# Patient Record
Sex: FEMALE | Race: WHITE | NOT HISPANIC OR LATINO | Employment: OTHER | URBAN - NONMETROPOLITAN AREA
[De-identification: names, ages, dates, MRNs, and addresses within clinical notes are randomized per-mention and may not be internally consistent; named-entity substitution may affect disease eponyms.]

---

## 2017-01-13 ENCOUNTER — COMMUNICATION - GICH (OUTPATIENT)
Dept: FAMILY MEDICINE | Facility: OTHER | Age: 67
End: 2017-01-13

## 2017-01-13 DIAGNOSIS — E78.5 HYPERLIPIDEMIA: ICD-10-CM

## 2017-02-21 ENCOUNTER — COMMUNICATION - GICH (OUTPATIENT)
Dept: FAMILY MEDICINE | Facility: OTHER | Age: 67
End: 2017-02-21

## 2017-02-21 DIAGNOSIS — F32.9 MAJOR DEPRESSIVE DISORDER, SINGLE EPISODE: ICD-10-CM

## 2017-04-24 ENCOUNTER — COMMUNICATION - GICH (OUTPATIENT)
Dept: FAMILY MEDICINE | Facility: OTHER | Age: 67
End: 2017-04-24

## 2017-04-24 DIAGNOSIS — E78.5 HYPERLIPIDEMIA: ICD-10-CM

## 2017-04-24 DIAGNOSIS — F32.9 MAJOR DEPRESSIVE DISORDER, SINGLE EPISODE: ICD-10-CM

## 2017-05-30 ENCOUNTER — COMMUNICATION - GICH (OUTPATIENT)
Dept: FAMILY MEDICINE | Facility: OTHER | Age: 67
End: 2017-05-30

## 2017-05-30 DIAGNOSIS — E13.9 OTHER SPECIFIED DIABETES MELLITUS WITHOUT COMPLICATIONS (CODE): ICD-10-CM

## 2017-07-07 ENCOUNTER — OFFICE VISIT - GICH (OUTPATIENT)
Dept: FAMILY MEDICINE | Facility: OTHER | Age: 67
End: 2017-07-07

## 2017-07-07 ENCOUNTER — HOSPITAL ENCOUNTER (OUTPATIENT)
Dept: RADIOLOGY | Facility: OTHER | Age: 67
End: 2017-07-07
Attending: FAMILY MEDICINE

## 2017-07-07 ENCOUNTER — HISTORY (OUTPATIENT)
Dept: FAMILY MEDICINE | Facility: OTHER | Age: 67
End: 2017-07-07

## 2017-07-07 ENCOUNTER — HISTORY (OUTPATIENT)
Dept: EMERGENCY MEDICINE | Facility: OTHER | Age: 67
End: 2017-07-07

## 2017-07-07 DIAGNOSIS — E11.9 TYPE 2 DIABETES MELLITUS WITHOUT COMPLICATIONS (H): ICD-10-CM

## 2017-07-07 DIAGNOSIS — E78.5 HYPERLIPIDEMIA: ICD-10-CM

## 2017-07-07 DIAGNOSIS — R07.9 CHEST PAIN: ICD-10-CM

## 2017-07-07 LAB
A/G RATIO - HISTORICAL: 1.4 (ref 1–2)
ABSOLUTE BASOPHILS - HISTORICAL: 0.1 THOU/CU MM
ABSOLUTE EOSINOPHILS - HISTORICAL: 0.4 THOU/CU MM
ABSOLUTE IMMATURE GRANULOCYTES(METAS,MYELOS,PROS) - HISTORICAL: 0 THOU/CU MM
ABSOLUTE LYMPHOCYTES - HISTORICAL: 2.9 THOU/CU MM (ref 0.9–2.9)
ABSOLUTE MONOCYTES - HISTORICAL: 0.5 THOU/CU MM
ABSOLUTE NEUTROPHILS - HISTORICAL: 3.8 THOU/CU MM (ref 1.7–7)
ALBUMIN SERPL-MCNC: 4.1 G/DL (ref 3.5–5.7)
ALP SERPL-CCNC: 95 IU/L (ref 34–104)
ALT (SGPT) - HISTORICAL: 26 IU/L (ref 7–52)
ANION GAP - HISTORICAL: 10 (ref 5–18)
AST SERPL-CCNC: 27 IU/L (ref 13–39)
BASOPHILS # BLD AUTO: 0.8 %
BILIRUB SERPL-MCNC: 0.6 MG/DL (ref 0.3–1)
BUN SERPL-MCNC: 18 MG/DL (ref 7–25)
BUN/CREAT RATIO - HISTORICAL: 21
CALCIUM SERPL-MCNC: 9.3 MG/DL (ref 8.6–10.3)
CHLORIDE SERPLBLD-SCNC: 105 MMOL/L (ref 98–107)
CO2 SERPL-SCNC: 23 MMOL/L (ref 21–31)
CREAT SERPL-MCNC: 0.86 MG/DL (ref 0.7–1.3)
EOSINOPHIL NFR BLD AUTO: 4.7 %
ERYTHROCYTE [DISTWIDTH] IN BLOOD BY AUTOMATED COUNT: 13 % (ref 11.5–15.5)
ESTIMATED AVERAGE GLUCOSE: 143 MG/DL
GFR IF NOT AFRICAN AMERICAN - HISTORICAL: >60 ML/MIN/1.73M2
GLOBULIN - HISTORICAL: 2.9 G/DL (ref 2–3.7)
GLUCOSE SERPL-MCNC: 121 MG/DL (ref 70–105)
HCT VFR BLD AUTO: 42.6 % (ref 33–51)
HEMOGLOBIN A1C MONITORING (POCT) - HISTORICAL: 6.6 % (ref 4–6.2)
HEMOGLOBIN: 14.6 G/DL (ref 12–16)
IMMATURE GRANULOCYTES(METAS,MYELOS,PROS) - HISTORICAL: 0.3 %
LYMPHOCYTES NFR BLD AUTO: 37.9 % (ref 20–44)
MCH RBC QN AUTO: 30.5 PG (ref 26–34)
MCHC RBC AUTO-ENTMCNC: 34.3 G/DL (ref 32–36)
MCV RBC AUTO: 89 FL (ref 80–100)
MONOCYTES NFR BLD AUTO: 6.3 %
NEUTROPHILS NFR BLD AUTO: 50 % (ref 42–72)
PLATELET # BLD AUTO: 269 THOU/CU MM (ref 140–440)
PMV BLD: 10.8 FL (ref 6.5–11)
POTASSIUM SERPL-SCNC: 4 MMOL/L (ref 3.5–5.1)
PROT SERPL-MCNC: 7 G/DL (ref 6.4–8.9)
RED BLOOD COUNT - HISTORICAL: 4.78 MIL/CU MM (ref 4–5.2)
SODIUM SERPL-SCNC: 138 MMOL/L (ref 133–143)
TROPONIN I - HISTORICAL: 0.13 NG/ML
WHITE BLOOD COUNT - HISTORICAL: 7.6 THOU/CU MM (ref 4.5–11)

## 2017-07-07 ASSESSMENT — ANXIETY QUESTIONNAIRES
1. FEELING NERVOUS, ANXIOUS, OR ON EDGE: NOT AT ALL
7. FEELING AFRAID AS IF SOMETHING AWFUL MIGHT HAPPEN: NOT AT ALL
6. BECOMING EASILY ANNOYED OR IRRITABLE: NOT AT ALL
2. NOT BEING ABLE TO STOP OR CONTROL WORRYING: NOT AT ALL
3. WORRYING TOO MUCH ABOUT DIFFERENT THINGS: NOT AT ALL
GAD7 TOTAL SCORE: 0
4. TROUBLE RELAXING: NOT AT ALL
5. BEING SO RESTLESS THAT IT IS HARD TO SIT STILL: NOT AT ALL

## 2017-07-07 ASSESSMENT — PATIENT HEALTH QUESTIONNAIRE - PHQ9: SUM OF ALL RESPONSES TO PHQ QUESTIONS 1-9: 6

## 2017-07-10 ENCOUNTER — AMBULATORY - GICH (OUTPATIENT)
Dept: SCHEDULING | Facility: OTHER | Age: 67
End: 2017-07-10

## 2017-07-12 ENCOUNTER — COMMUNICATION - GICH (OUTPATIENT)
Dept: INTERNAL MEDICINE | Facility: OTHER | Age: 67
End: 2017-07-12

## 2017-07-20 ENCOUNTER — OFFICE VISIT - GICH (OUTPATIENT)
Dept: FAMILY MEDICINE | Facility: OTHER | Age: 67
End: 2017-07-20

## 2017-07-20 ENCOUNTER — HISTORY (OUTPATIENT)
Dept: FAMILY MEDICINE | Facility: OTHER | Age: 67
End: 2017-07-20

## 2017-07-20 DIAGNOSIS — I25.10 ATHEROSCLEROTIC HEART DISEASE OF NATIVE CORONARY ARTERY WITHOUT ANGINA PECTORIS: ICD-10-CM

## 2017-07-20 DIAGNOSIS — F32.9 MAJOR DEPRESSIVE DISORDER, SINGLE EPISODE: ICD-10-CM

## 2017-07-20 DIAGNOSIS — E78.5 HYPERLIPIDEMIA: ICD-10-CM

## 2017-07-20 DIAGNOSIS — E13.9 OTHER SPECIFIED DIABETES MELLITUS WITHOUT COMPLICATIONS (CODE): ICD-10-CM

## 2017-07-20 DIAGNOSIS — I21.4 NON-ST ELEVATION (NSTEMI) MYOCARDIAL INFARCTION (H): ICD-10-CM

## 2017-07-20 DIAGNOSIS — I16.0 HYPERTENSIVE URGENCY: ICD-10-CM

## 2017-07-20 DIAGNOSIS — F41.9 ANXIETY DISORDER: ICD-10-CM

## 2017-07-20 LAB
ALBUMIN SERPL-MCNC: 4.4 G/DL (ref 3.5–5.7)
ANION GAP - HISTORICAL: 9 (ref 5–18)
BUN SERPL-MCNC: 13 MG/DL (ref 7–25)
BUN/CREAT RATIO - HISTORICAL: 15
CALCIUM SERPL-MCNC: 10 MG/DL (ref 8.6–10.3)
CHLORIDE SERPLBLD-SCNC: 102 MMOL/L (ref 98–107)
CHOL/HDL RATIO - HISTORICAL: 3.22
CHOLESTEROL TOTAL: 148 MG/DL
CO2 SERPL-SCNC: 26 MMOL/L (ref 21–31)
CREAT SERPL-MCNC: 0.88 MG/DL (ref 0.7–1.3)
GFR IF NOT AFRICAN AMERICAN - HISTORICAL: >60 ML/MIN/1.73M2
GLUCOSE SERPL-MCNC: 109 MG/DL (ref 70–105)
HDLC SERPL-MCNC: 46 MG/DL (ref 23–92)
LDLC SERPL CALC-MCNC: 76 MG/DL
NON-HDL CHOLESTEROL - HISTORICAL: 102 MG/DL
PATIENT STATUS - HISTORICAL: NORMAL
PHOSPHATE SERPL-MCNC: 3.8 MG/DL (ref 2.5–5)
POTASSIUM SERPL-SCNC: 3.9 MMOL/L (ref 3.5–5.1)
SODIUM SERPL-SCNC: 137 MMOL/L (ref 133–143)
TRIGL SERPL-MCNC: 129 MG/DL

## 2017-07-21 ENCOUNTER — COMMUNICATION - GICH (OUTPATIENT)
Dept: CARDIAC REHAB | Facility: OTHER | Age: 67
End: 2017-07-21

## 2017-07-24 ENCOUNTER — HOSPITAL ENCOUNTER (OUTPATIENT)
Dept: CARDIAC REHAB | Facility: OTHER | Age: 67
Setting detail: THERAPIES SERIES
End: 2017-07-24
Attending: FAMILY MEDICINE

## 2017-07-24 ENCOUNTER — COMMUNICATION - GICH (OUTPATIENT)
Dept: CARDIAC REHAB | Facility: OTHER | Age: 67
End: 2017-07-24

## 2017-07-24 DIAGNOSIS — I21.4 NON-ST ELEVATION (NSTEMI) MYOCARDIAL INFARCTION (H): ICD-10-CM

## 2017-07-28 ENCOUNTER — HOSPITAL ENCOUNTER (OUTPATIENT)
Dept: CARDIAC REHAB | Facility: OTHER | Age: 67
Setting detail: THERAPIES SERIES
End: 2017-07-28
Attending: INTERNAL MEDICINE

## 2017-07-28 DIAGNOSIS — I21.4 NON-ST ELEVATION (NSTEMI) MYOCARDIAL INFARCTION (H): ICD-10-CM

## 2017-07-31 ENCOUNTER — HOSPITAL ENCOUNTER (OUTPATIENT)
Dept: CARDIAC REHAB | Facility: OTHER | Age: 67
Setting detail: THERAPIES SERIES
End: 2017-07-31
Attending: INTERNAL MEDICINE

## 2017-07-31 DIAGNOSIS — I21.4 NON-ST ELEVATION (NSTEMI) MYOCARDIAL INFARCTION (H): ICD-10-CM

## 2017-08-02 ENCOUNTER — HOSPITAL ENCOUNTER (OUTPATIENT)
Dept: CARDIAC REHAB | Facility: OTHER | Age: 67
Setting detail: THERAPIES SERIES
End: 2017-08-02
Attending: INTERNAL MEDICINE

## 2017-08-02 DIAGNOSIS — I21.4 NON-ST ELEVATION (NSTEMI) MYOCARDIAL INFARCTION (H): ICD-10-CM

## 2017-08-04 ENCOUNTER — HOSPITAL ENCOUNTER (OUTPATIENT)
Dept: CARDIAC REHAB | Facility: OTHER | Age: 67
Setting detail: THERAPIES SERIES
End: 2017-08-04
Attending: INTERNAL MEDICINE

## 2017-08-04 DIAGNOSIS — I21.4 NON-ST ELEVATION (NSTEMI) MYOCARDIAL INFARCTION (H): ICD-10-CM

## 2017-08-07 ENCOUNTER — HOSPITAL ENCOUNTER (OUTPATIENT)
Dept: CARDIAC REHAB | Facility: OTHER | Age: 67
Setting detail: THERAPIES SERIES
End: 2017-08-07
Attending: INTERNAL MEDICINE

## 2017-08-07 DIAGNOSIS — I21.4 NON-ST ELEVATION (NSTEMI) MYOCARDIAL INFARCTION (H): ICD-10-CM

## 2017-08-14 ENCOUNTER — HOSPITAL ENCOUNTER (OUTPATIENT)
Dept: CARDIAC REHAB | Facility: OTHER | Age: 67
Setting detail: THERAPIES SERIES
End: 2017-08-14
Attending: INTERNAL MEDICINE

## 2017-08-14 DIAGNOSIS — I21.4 NON-ST ELEVATION (NSTEMI) MYOCARDIAL INFARCTION (H): ICD-10-CM

## 2017-08-16 ENCOUNTER — HOSPITAL ENCOUNTER (OUTPATIENT)
Dept: CARDIAC REHAB | Facility: OTHER | Age: 67
Setting detail: THERAPIES SERIES
End: 2017-08-16
Attending: INTERNAL MEDICINE

## 2017-08-16 DIAGNOSIS — I21.4 NON-ST ELEVATION (NSTEMI) MYOCARDIAL INFARCTION (H): ICD-10-CM

## 2017-08-17 ENCOUNTER — AMBULATORY - GICH (OUTPATIENT)
Dept: SCHEDULING | Facility: OTHER | Age: 67
End: 2017-08-17

## 2017-08-21 ENCOUNTER — HOSPITAL ENCOUNTER (OUTPATIENT)
Dept: CARDIAC REHAB | Facility: OTHER | Age: 67
Setting detail: THERAPIES SERIES
End: 2017-08-21
Attending: INTERNAL MEDICINE

## 2017-08-21 DIAGNOSIS — I21.4 NON-ST ELEVATION (NSTEMI) MYOCARDIAL INFARCTION (H): ICD-10-CM

## 2017-08-30 ENCOUNTER — HOSPITAL ENCOUNTER (OUTPATIENT)
Dept: CARDIAC REHAB | Facility: OTHER | Age: 67
Setting detail: THERAPIES SERIES
End: 2017-08-30
Attending: INTERNAL MEDICINE

## 2017-08-30 DIAGNOSIS — I21.4 NON-ST ELEVATION (NSTEMI) MYOCARDIAL INFARCTION (H): ICD-10-CM

## 2017-09-05 ENCOUNTER — OFFICE VISIT - GICH (OUTPATIENT)
Dept: FAMILY MEDICINE | Facility: OTHER | Age: 67
End: 2017-09-05

## 2017-09-05 ENCOUNTER — HISTORY (OUTPATIENT)
Dept: RADIOLOGY | Facility: OTHER | Age: 67
End: 2017-09-05

## 2017-09-05 ENCOUNTER — HISTORY (OUTPATIENT)
Dept: FAMILY MEDICINE | Facility: OTHER | Age: 67
End: 2017-09-05

## 2017-09-05 ENCOUNTER — HOSPITAL ENCOUNTER (OUTPATIENT)
Dept: RADIOLOGY | Facility: OTHER | Age: 67
End: 2017-09-05
Attending: FAMILY MEDICINE

## 2017-09-05 DIAGNOSIS — Z12.31 ENCOUNTER FOR SCREENING MAMMOGRAM FOR MALIGNANT NEOPLASM OF BREAST: ICD-10-CM

## 2017-09-05 DIAGNOSIS — F41.9 ANXIETY DISORDER: ICD-10-CM

## 2017-09-05 DIAGNOSIS — M54.41 LOW BACK PAIN WITH RIGHT-SIDED SCIATICA: ICD-10-CM

## 2017-09-05 DIAGNOSIS — M25.551 PAIN IN RIGHT HIP: ICD-10-CM

## 2017-09-05 DIAGNOSIS — G89.29 OTHER CHRONIC PAIN: ICD-10-CM

## 2017-09-07 ENCOUNTER — AMBULATORY - GICH (OUTPATIENT)
Dept: FAMILY MEDICINE | Facility: OTHER | Age: 67
End: 2017-09-07

## 2017-09-07 DIAGNOSIS — Z51.89 ENCOUNTER FOR OTHER SPECIFIED AFTERCARE: ICD-10-CM

## 2017-09-11 ENCOUNTER — COMMUNICATION - GICH (OUTPATIENT)
Dept: FAMILY MEDICINE | Facility: OTHER | Age: 67
End: 2017-09-11

## 2017-09-18 ENCOUNTER — HOSPITAL ENCOUNTER (OUTPATIENT)
Dept: CARDIAC REHAB | Facility: OTHER | Age: 67
Setting detail: THERAPIES SERIES
End: 2017-09-18
Attending: INTERNAL MEDICINE

## 2017-09-18 DIAGNOSIS — I21.4 NON-ST ELEVATION (NSTEMI) MYOCARDIAL INFARCTION (H): ICD-10-CM

## 2017-09-20 ENCOUNTER — HOSPITAL ENCOUNTER (OUTPATIENT)
Dept: CARDIAC REHAB | Facility: OTHER | Age: 67
Setting detail: THERAPIES SERIES
End: 2017-09-20
Attending: INTERNAL MEDICINE

## 2017-09-20 DIAGNOSIS — I21.4 NON-ST ELEVATION (NSTEMI) MYOCARDIAL INFARCTION (H): ICD-10-CM

## 2017-10-13 ENCOUNTER — COMMUNICATION - GICH (OUTPATIENT)
Dept: CARDIAC REHAB | Facility: OTHER | Age: 67
End: 2017-10-13

## 2017-10-17 ENCOUNTER — COMMUNICATION - GICH (OUTPATIENT)
Dept: EDUCATION SERVICES | Facility: OTHER | Age: 67
End: 2017-10-17

## 2017-12-20 ENCOUNTER — OFFICE VISIT - GICH (OUTPATIENT)
Dept: FAMILY MEDICINE | Facility: OTHER | Age: 67
End: 2017-12-20

## 2017-12-20 ENCOUNTER — HISTORY (OUTPATIENT)
Dept: FAMILY MEDICINE | Facility: OTHER | Age: 67
End: 2017-12-20

## 2017-12-20 DIAGNOSIS — N63.10 MASS OF RIGHT BREAST: ICD-10-CM

## 2017-12-20 DIAGNOSIS — N63.14 MASS OF LOWER INNER QUADRANT OF RIGHT BREAST: ICD-10-CM

## 2017-12-20 DIAGNOSIS — Z12.31 ENCOUNTER FOR SCREENING MAMMOGRAM FOR MALIGNANT NEOPLASM OF BREAST: ICD-10-CM

## 2017-12-20 DIAGNOSIS — E11.9 TYPE 2 DIABETES MELLITUS WITHOUT COMPLICATIONS (H): ICD-10-CM

## 2017-12-20 ASSESSMENT — ANXIETY QUESTIONNAIRES
3. WORRYING TOO MUCH ABOUT DIFFERENT THINGS: NEARLY EVERY DAY
2. NOT BEING ABLE TO STOP OR CONTROL WORRYING: NEARLY EVERY DAY
6. BECOMING EASILY ANNOYED OR IRRITABLE: MORE THAN HALF THE DAYS
4. TROUBLE RELAXING: NEARLY EVERY DAY
5. BEING SO RESTLESS THAT IT IS HARD TO SIT STILL: NOT AT ALL
1. FEELING NERVOUS, ANXIOUS, OR ON EDGE: NEARLY EVERY DAY
GAD7 TOTAL SCORE: 17
7. FEELING AFRAID AS IF SOMETHING AWFUL MIGHT HAPPEN: NEARLY EVERY DAY

## 2017-12-20 ASSESSMENT — PATIENT HEALTH QUESTIONNAIRE - PHQ9: SUM OF ALL RESPONSES TO PHQ QUESTIONS 1-9: 15

## 2017-12-21 ENCOUNTER — HOSPITAL ENCOUNTER (OUTPATIENT)
Dept: RADIOLOGY | Facility: OTHER | Age: 67
End: 2017-12-21
Attending: FAMILY MEDICINE

## 2017-12-21 ENCOUNTER — COMMUNICATION - GICH (OUTPATIENT)
Dept: FAMILY MEDICINE | Facility: OTHER | Age: 67
End: 2017-12-21

## 2017-12-21 ENCOUNTER — AMBULATORY - GICH (OUTPATIENT)
Dept: FAMILY MEDICINE | Facility: OTHER | Age: 67
End: 2017-12-21

## 2017-12-21 ENCOUNTER — COMMUNICATION - GICH (OUTPATIENT)
Dept: SURGERY | Facility: OTHER | Age: 67
End: 2017-12-21

## 2017-12-21 DIAGNOSIS — N63.14 MASS OF LOWER INNER QUADRANT OF RIGHT BREAST: ICD-10-CM

## 2017-12-21 DIAGNOSIS — N63.10 MASS OF RIGHT BREAST: ICD-10-CM

## 2017-12-21 DIAGNOSIS — Z12.31 ENCOUNTER FOR SCREENING MAMMOGRAM FOR MALIGNANT NEOPLASM OF BREAST: ICD-10-CM

## 2017-12-28 NOTE — TELEPHONE ENCOUNTER
Patient Information     Patient Name MRN Jennyfer Brady [id number] Female 1950      Telephone Encounter by Jelena Witt at 2017  3:56 PM     Author:  Jelena Witt Service:  (none) Author Type:  (none)     Filed:  2017  4:02 PM Encounter Date:  2017 Status:  Signed     :  Jelena Witt            Patient has concerns about making her cardiac appointments. 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 The last appointment for Cardiac Rehab is 2:00. Which she can't make. She was wondering if [facility] is an option. She wants to do something and states its better than nothing.  Jelena Russo ....................  2017   4:01 PM

## 2017-12-28 NOTE — ADDENDUM NOTE
Patient Information     Patient Name MRN Sex Jennyfer Solis [id number] Female 1950      Addendum Note by Jennyfer Hopper MD at 2017  9:41 PM     Author:  Jennyfer Hopper MD Service:  (none) Author Type:  Physician     Filed:  2017  9:41 PM Encounter Date:  2017 Status:  Signed     :  Jennyfer Hopper MD (Physician)       Addended by: JENNYFER HOPPER on: 2017 09:41 PM        Modules accepted: Orders

## 2017-12-28 NOTE — PROGRESS NOTES
Patient Information     Patient Name MRN Sex Jennyfer Solis [id number] Female 1950      Progress Notes by Jennyfer Hopper MD at 2017  1:00 PM     Author:  Jennyfer Hopper MD Service:  (none) Author Type:  Physician     Filed:  2017  6:19 PM Encounter Date:  2017 Status:  Signed     :  Jennyfer Hopper MD (Physician)            Nursing Notes:   Wen Vinson  2017  2:20 PM  Signed  Patient presents today with pain in her right leg. Patient is unable to walk any distance, or do her cardio rehab.  She is having trouble sleeping at night, and cant get comfortable.  Wen Vinson ....................  2017   1:05 PM           Subjective:  Jennyfer Elizabeth is a 67 y.o. female who presents for right leg pain       Patient is 67 year old white female here with pain in right leg.  She has had previous SI problems.  Has been to [facility]. Things are improving for a while. However mid August she started having more discomfort in her back in the lower area sometimes into her right buttock and then into her right groin area. She also notes that her right knee will infrequently give out.  She denies any trauma. She's been completing her exercises. May have overdone it. She has not been using any muscle relaxants.     No Known Allergies  Current Outpatient Prescriptions on File Prior to Visit       Medication  Sig Dispense Refill     aspirin chewable 81 mg chewable tablet Take 1 tablet by mouth once daily. 100 tablet 0     atorvastatin (LIPITOR) 40 mg tablet Take 1 tablet by mouth once daily. 90 tablet 3     clopidogrel (PLAVIX) 75 mg tablet Take 1 tablet by mouth once daily. Take for 1 year minimum 90 tablet 3     DULoxetine (CYMBALTA) 30 mg Delayed-release capsule Take 1 capsule by mouth once daily. 90 capsule 3     metFORMIN (GLUCOPHAGE) 500 mg tablet 250 mg po bid 90 tablet 1     metoprolol succinate (TOPROL XL) 25 mg Sustained-Release tablet Take 1  "tablet by mouth once daily. 90 tablet 3     nitroglycerin (NITROSTAT) 0.4 mg SL tablet Place 1 tablet under the tongue every 5 minutes if needed for Chest Pain (up to 3 doses). 25 tablet 2     No current facility-administered medications on file prior to visit.        Problem List/PMH: reviewed in EMR    Social Hx:  Social History        Substance Use Topics          Smoking status:   Former Smoker      Quit date:  10/1/2010      Smokeless tobacco:   Never Used       Comment: Patient occasionally uses Nicorette gum       Alcohol use   8.4 oz/week     14 Standard drinks or equivalent per week       Social History Narrative    Single, no children. Lives alone, own home.     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     Enjoys gardening, reading. Has been reconnecting with old friends.        Family Hx:   Family History       Problem   Relation Age of Onset     Hypertension  Father      Heart Disease  Father      MI       Cancer  Mother      Lung       Diabetes  Sister      Hyperlipidemia  Sister      High       Other  Sister 33     Kidney problems       Cancer-breast  No Family History        Objective:  /78  Pulse 80  Temp 99.1  F (37.3  C)  Ht 1.6 m (5' 3\")  Wt 70.3 kg (155 lb)  BMI 27.46 kg/m2    she is alert well-groomed oriented ×3 no apparent distress PERRLA EOMI. Walks favoring her right hip is slightly she is able to walk on her tiptoes and her heels. Lungs clear heart sounds regular abdomen is obese. She has good internal and external rotation of her left hip she is tender with internal rotation of her right hip she is tender over the right SI with palpation as well as the right bursa though mildly. Negative straight leg lifts bilaterally. She has tight hamstrings. Lateral knees with OA type changes she is mildly tender with palpation over the medial aspect of her left knee joint line no effusion is present.. No appreciable Baker cyst negative " Lin's negative Lachman       Study:XR HIP 2 OR 3 VIEWS W PELVIS RIGHT     History:67 years Female Chronic hip pain, right     Comparisons: 06/09/2015     Technique: 4 views     IMPRESSION: Stable diffuse degenerative narrowing of the bilateral hip joints. Degenerative arthritis lower lumbar spine. Vascular calcifications. The bones are demineralized.     Electronically Signed By: Cecelia Olea M.D. on 9/5/2017 3:00 PM  LUMBAR 3 VIEWS     History:67 years Female Chronic right-sided low back pain with right-sided sciatica     Comparisons: 06/03/2014     Technique: 3 views     IMPRESSION:Progression of low-grade anterolisthesis of L4 and L5 and mild lumbar curve, convex to the right. Degenerative arthritis lumbar facets.  Stable narrowed L5 interspace with endplate osteophyte formation. Abdominal surgical clips. Very dense vascular calcifications.       Assessment:    ICD-10-CM    1. Chronic hip pain, right M25.551 XR HIP 2 OR 3 VIEWS W PELVIS RIGHT     G89.29 lidocaine 5% (LIDODERM) 5 % patch      traMADol (ULTRAM) 50 mg tablet      piroxicam (FELDENE) 10 mg capsule   2. Chronic right-sided low back pain with right-sided sciatica M54.41 XR SPINE LUMBAR 3 VIEWS     G89.29 traMADol (ULTRAM) 50 mg tablet      piroxicam (FELDENE) 10 mg capsule   3. Anxiety F41.9 LORazepam (ATIVAN) 0.5 mg tab         Plan:   -- Expected clinical course discussed   -- Medications and their side effects discussed  Patient Instructions   Antiinflammatory   Ice 20 minutes three times a day in full extension   Feldene 10 mg ;  Take one tablet a day ;  Minimum of 3-5 days   ( you cannot use ibuprofen or aleve over the counter when using this medication )     Muscle relaxant   Use your lorazepam for muscle spasm every night next 3 night; then as needed #30 ( will make you drowsy)    lidoderm patch 5% ;  Use during the day ( will not make you drowsy)     Breakthrough Pain Medication   Ultram   50 mg;  Take one half to one  tablet twice a  day as needed  Pain     Stretching exercises as below    Follow up in 2-3 weeks       Index [place]ian     Adult Advisor 2016.3 published by [employer].  Last reviewed: 2016-05-26     Index [place]ian   Meniscal (Cartilage) Tear Exercises   Your healthcare provider may recommend exercises to help you heal. Talk to your healthcare provider or physical therapist about which exercises will best help you and how to do them correctly and safely.  You may do the first 7 exercises right away. You may do the rest of the exercises when the pain in your knee has decreased.    Passive knee extension: Do this exercise if you are unable to extend your knee fully. While lying on your back, place a rolled-up towel under the heel of your injured leg so the heel is about 6 inches off the ground. Relax your leg muscles and let gravity slowly straighten your knee. Try to hold this position for 2 minutes. Repeat 3 times. You may feel some discomfort while doing this exercise. Do the exercise several times a day.   This exercise can also be done while sitting in a chair with your heel on another chair or stool.    Heel slide: Sit on a firm surface with your legs straight in front of you. Slowly slide the heel of the foot on your injured side toward your buttock by pulling your knee toward your chest as you slide the heel. Return to the starting position. Do 2 sets of 15.    Standing calf stretch: Stand facing a wall with your hands on the wall at about eye level. Keep your injured leg back with your heel on the floor. Keep the other leg forward with the knee bent. Turn your back foot slightly inward (as if you were pigeon-toed). Slowly lean into the wall until you feel a stretch in the back of your calf. Hold the stretch for 15 to 30 seconds. Return to the starting position. Repeat 3 times. Do this exercise several times each day.    Hamstring stretch on wall: Lie on your back with your buttocks close to a doorway. Stretch your uninjured leg  straight out in front of you on the floor through the doorway. Raise your injured leg and rest it against the wall next to the door frame. Keep your leg as straight as possible. You should feel a stretch in the back of your thigh. Hold this position for 15 to 30 seconds. Repeat 3 times.    Straight leg raise: Lie on your back with your legs straight out in front of you. Bend the knee on your uninjured side and place the foot flat on the floor. Tighten the thigh muscle on your injured side and lift your leg about 8 inches off the floor. Keep your leg straight and your thigh muscle tight. Slowly lower your leg back down to the floor. Do 2 sets of 15.    Prone hip extension: Lie on your stomach with your legs straight out behind you. Fold your arms under your head and rest your head on your arms. Draw your belly button in towards your spine and tighten your abdominal muscles. Tighten the buttocks and thigh muscles of the leg on your injured side and lift the leg off the floor about 8 inches. Keep your leg straight. Hold for 5 seconds. Then lower your leg and relax. Do 2 sets of 15.    Clam exercise: Lie on your uninjured side with your hips and knees bent and feet together. Slowly raise your top leg toward the ceiling while keeping your heels touching each other. Hold for 2 seconds and lower slowly. Do 2 sets of 15 repetitions.    Wall squat with a ball: Stand with your back, shoulders, and head against a wall. Look straight ahead. Keep your shoulders relaxed and your feet 3 feet (90 centimeters) from the wall and shoulder's width apart. Place a soccer or basketball-sized ball behind your back. Keeping your back against the wall, slowly squat down to a 45-degree angle. Your thighs will not yet be parallel to the floor. Try to keep your knees aligned over your second toe as you bend them. Hold this position for 10 seconds and then slowly slide back up the wall. Repeat 10 times. Build up to 2 sets of 15.    Step-up:  Stand with the foot of your injured leg on a support 3 to 5 inches (8 to 13 centimeters) high --like a small step or block of wood. Keep your other foot flat on the floor. Shift your weight onto the injured leg on the support. Straighten your injured leg as the other leg comes off the floor. Return to the starting position by bending your injured leg and slowly lowering your uninjured leg back to the floor. Do 3 sets of 15.    Knee stabilization: Wrap a piece of elastic tubing around the ankle of your uninjured leg. Tie a knot in the other end of the tubing and close it in a door at about ankle height.  1. Stand facing the door on the leg without tubing (your injured leg) and bend your knee slightly, keeping your thigh muscles tight. Stay in this position while you move the leg with the tubing (the uninjured leg) straight back behind you. Do 2 sets of 15.  2. Turn 90 degrees so the leg without tubing is closest to the door. Move the leg with tubing away from your body. Do 2 sets of 15.  3. Turn 90 degrees again so your back is to the door. Move the leg with tubing straight out in front of you. Do 2 sets of 15.  4. Turn your body 90 degrees again so the leg with tubing is closest to the door. Move the leg with tubing across your body. Do 2 sets of 15.  Hold onto a chair if you need help balancing. This exercise can be made more challenging by standing on a firm pillow or foam mat while you move the leg with tubing.    Resisted terminal knee extension: Make a loop with a piece of elastic tubing by tying a knot in both ends. Close the knot in a door at knee height. Step into the loop with your injured leg so the tubing is around the back of your knee. Lift the other foot off the ground and hold onto a chair for balance, if needed. Bend the knee with tubing about 45 degrees. Slowly straighten your leg, keeping your thigh muscle tight as you do this. Repeat 15 times. Do 2 sets of 15. If you need an easier way to do this,  stand on both legs for better support while you do the exercise.  If you have access to a wobble board, do the following exercises:    Wobble board exercises  Stand on a wobble board with your feet shoulder-width apart.   1. Rock the board forwards and backwards 30 times, then side to side 30 times. Hold on to a chair if you need support.  2. Rotate the wobble board around so that the edge of the board is in contact with the floor at all times. Do this 30 times in a clockwise and then a counterclockwise direction.  3. Balance on the wobble board for as long as you can without letting the edges touch the floor. Try to do this for 2 minutes without touching the floor.  4. Rotate the wobble board in clockwise and counterclockwise circles, but do not let the edge of the board touch the floor.  When you have mastered the wobble exercises standing on both legs, try repeating them while standing on just your injured leg. After you are able to do these exercises on one leg, try to do them with your eyes closed. Make sure you have something nearby to support you in case you lose your balance.  Developed by [employer].  Adult Advisor 2016.3 published by [employer].  Last modified: 2016-06-06  Last reviewed: 2015-01-12  This content is reviewed periodically and is subject to change as new health information becomes available. The information is intended to inform and educate and is not a replacement for medical evaluation, advice, diagnosis or treatment by a healthcare professional.  References   Adult Advisor 2016.3 Index    Copyright   2016 [employer], a division of [employer] All rights reserved.                 Electronically signed by Jennyfer Hopper MD

## 2017-12-28 NOTE — TELEPHONE ENCOUNTER
Patient Information     Patient Name MRN Sex Jennyfer Solis [id number] Female 1950      Telephone Encounter by Chica Lowry NP at 2017  3:28 PM     Author:  Chica Lowry NP Service:  (none) Author Type:  PHYS- Nurse Practitioner     Filed:  2017  3:29 PM Encounter Date:  2017 Status:  Signed     :  Chica Lowry NP (PHYS- Nurse Practitioner)            She is Dr. Hopper's patient. I do not believe I ever saw this patient

## 2017-12-28 NOTE — PROGRESS NOTES
Patient Information     Patient Name MRN Sex Jennyfer Solis [id number] Female 1950      Progress Notes by Roselyn Wade RN at 10/20/2017 10:26 AM     Author:  Roselyn Wade RN Service:  (none) Author Type:  NURS- Registered Nurse     Filed:  10/20/2017 10:38 AM Date of Service:  10/20/2017 10:26 AM Status:  Signed     :  Roselyn Wade RN (NURS- Registered Nurse)            Cardiac Rehabilitation   Outpatient  Discharge Summary    Jennyfer Elizabeth has completed Phase II Cardiac Rehab on 10/20/2017.    Objective:          Diagnosis: Myocardial Infarction and Percutaneous Intervention        Sessions Attended:  12             Beginning Date: 2017                    Ending Date:  2017        Average beginning Met Level:  1.8                   Average Discharge Met Level:  4.1    Most Recent Labs:         No results for input(s): CHOL, HDL, LDL, TRIGLYCERIDE, HGBA1C, CK, TRPTQUANT in the last 720 hours.     Exercise Levels Completed:  Treadmill:     Initial:  10 Minutes    Speed 1.0     0.0 Grade    1.8 Mets     Discharge:       51 Minutes    Speed 2.9    2.5 Grade    4.2 Mets  Other:      Discharge:  15 Minutes    4 Mets    107 melendrez           Resting Heart Rate Range: 84                      Resting Blood Pressure Range:  144/84        Exercise Heart Rate Range: 108                    Exercise Blood Pressure Range:  152/78        Rhythm: Sinus Rhythm             Initial Blood Glucose Pre Exercise:               Initial Weight:  153.8lb        Discharge Weight:  155lb    Goals:     Patient goal(s) as indentified in the initial evaluation have not been met due to she has not returned to cardiac rehab since 2017. .     Tolerated exercise session(s) without cardiac symptoms or complaints.     Prevented deconditioning by increasing tolerance for activity through graded exercise program.     Increased knowledge of cardiac condition and provided with home exercise program and  activity guidelines.     Met level at discharge: 4.1    Plan:  Discharge to pt did not return to cardiac rehab.  A letter was sent to her on 10/13/17.

## 2017-12-28 NOTE — TELEPHONE ENCOUNTER
Patient Information     Patient Name MRN Sex Jennyfer Solis [id number] Female 1950      Telephone Encounter by Terra Dalton RN at 2017  2:37 PM     Author:  Terra Dalton RN Service:  (none) Author Type:  NURS- Registered Nurse     Filed:  2017  2:50 PM Encounter Date:  2017 Status:  Signed     :  Terra Dalton RN (NURS- Registered Nurse)            Called patient to set up cardiac rehab initial appointment. Scheduled 2017 @ 10:30.

## 2017-12-28 NOTE — TELEPHONE ENCOUNTER
Patient Information     Patient Name MRN Sex Jennyfer Solis [id number] Female 1950      Telephone Encounter by Jennyfer Hopper MD at 2017  6:54 AM     Author:  Jennyfer Hopper MD Service:  (none) Author Type:  Physician     Filed:  2017  6:54 AM Encounter Date:  2017 Status:  Signed     :  Jennyfer Hopper MD (Physician)            She needs to finish cardiac rehab.

## 2017-12-28 NOTE — TELEPHONE ENCOUNTER
Patient Information     Patient Name MRN Sex Jennyfer Solis [id number] Female 1950      Telephone Encounter by Dorie Christiansen at 2017  2:47 PM     Author:  Dorie Christiansen Service:  (none) Author Type:  (none)     Filed:  2017  2:47 PM Encounter Date:  2017 Status:  Signed     :  Dorie Christiansen            [facility]- Patient wants a work in on 17 instead of 17.

## 2017-12-28 NOTE — PATIENT INSTRUCTIONS
Patient Information     Patient Name MRN Sex Jennyfer Solis [id number] Female 1950      Patient Instructions by Jennyfer Hopper MD at 2017  2:34 PM     Author:  Jennyfer Hopper MD  Service:  (none) Author Type:  Physician     Filed:  2017  2:35 PM  Encounter Date:  2017 Status:  Addendum     :  Jennyfer Hopper MD (Physician)        Related Notes: Original Note by Jennyfer Hopper MD (Physician) filed at 2017  2:34 PM            Antiinflammatory   Ice 20 minutes three times a day in full extension   Feldene 10 mg ;  Take one tablet a day ;  Minimum of 3-5 days   ( you cannot use ibuprofen or aleve over the counter when using this medication )     Muscle relaxant   Use your lorazepam for muscle spasm every night next 3 night; then as needed #30 ( will make you drowsy)    lidoderm patch 5% ;  Use during the day ( will not make you drowsy)     Breakthrough Pain Medication   Ultram   50 mg;  Take one half to one  tablet twice a day as needed  Pain     Stretching exercises as below    Follow up in 2-3 weeks       Index [place]ian     Adult Advisor 2016.3 published by [employer].  Last reviewed: 2016     Index [place]ian   Meniscal (Cartilage) Tear Exercises   Your healthcare provider may recommend exercises to help you heal. Talk to your healthcare provider or physical therapist about which exercises will best help you and how to do them correctly and safely.  You may do the first 7 exercises right away. You may do the rest of the exercises when the pain in your knee has decreased.    Passive knee extension: Do this exercise if you are unable to extend your knee fully. While lying on your back, place a rolled-up towel under the heel of your injured leg so the heel is about 6 inches off the ground. Relax your leg muscles and let gravity slowly straighten your knee. Try to hold this position for 2 minutes. Repeat 3 times. You may feel some discomfort  while doing this exercise. Do the exercise several times a day.   This exercise can also be done while sitting in a chair with your heel on another chair or stool.    Heel slide: Sit on a firm surface with your legs straight in front of you. Slowly slide the heel of the foot on your injured side toward your buttock by pulling your knee toward your chest as you slide the heel. Return to the starting position. Do 2 sets of 15.    Standing calf stretch: Stand facing a wall with your hands on the wall at about eye level. Keep your injured leg back with your heel on the floor. Keep the other leg forward with the knee bent. Turn your back foot slightly inward (as if you were pigeon-toed). Slowly lean into the wall until you feel a stretch in the back of your calf. Hold the stretch for 15 to 30 seconds. Return to the starting position. Repeat 3 times. Do this exercise several times each day.    Hamstring stretch on wall: Lie on your back with your buttocks close to a doorway. Stretch your uninjured leg straight out in front of you on the floor through the doorway. Raise your injured leg and rest it against the wall next to the door frame. Keep your leg as straight as possible. You should feel a stretch in the back of your thigh. Hold this position for 15 to 30 seconds. Repeat 3 times.    Straight leg raise: Lie on your back with your legs straight out in front of you. Bend the knee on your uninjured side and place the foot flat on the floor. Tighten the thigh muscle on your injured side and lift your leg about 8 inches off the floor. Keep your leg straight and your thigh muscle tight. Slowly lower your leg back down to the floor. Do 2 sets of 15.    Prone hip extension: Lie on your stomach with your legs straight out behind you. Fold your arms under your head and rest your head on your arms. Draw your belly button in towards your spine and tighten your abdominal muscles. Tighten the buttocks and thigh muscles of the leg on  your injured side and lift the leg off the floor about 8 inches. Keep your leg straight. Hold for 5 seconds. Then lower your leg and relax. Do 2 sets of 15.    Clam exercise: Lie on your uninjured side with your hips and knees bent and feet together. Slowly raise your top leg toward the ceiling while keeping your heels touching each other. Hold for 2 seconds and lower slowly. Do 2 sets of 15 repetitions.    Wall squat with a ball: Stand with your back, shoulders, and head against a wall. Look straight ahead. Keep your shoulders relaxed and your feet 3 feet (90 centimeters) from the wall and shoulder's width apart. Place a soccer or basketball-sized ball behind your back. Keeping your back against the wall, slowly squat down to a 45-degree angle. Your thighs will not yet be parallel to the floor. Try to keep your knees aligned over your second toe as you bend them. Hold this position for 10 seconds and then slowly slide back up the wall. Repeat 10 times. Build up to 2 sets of 15.    Step-up: Stand with the foot of your injured leg on a support 3 to 5 inches (8 to 13 centimeters) high --like a small step or block of wood. Keep your other foot flat on the floor. Shift your weight onto the injured leg on the support. Straighten your injured leg as the other leg comes off the floor. Return to the starting position by bending your injured leg and slowly lowering your uninjured leg back to the floor. Do 3 sets of 15.    Knee stabilization: Wrap a piece of elastic tubing around the ankle of your uninjured leg. Tie a knot in the other end of the tubing and close it in a door at about ankle height.  1. Stand facing the door on the leg without tubing (your injured leg) and bend your knee slightly, keeping your thigh muscles tight. Stay in this position while you move the leg with the tubing (the uninjured leg) straight back behind you. Do 2 sets of 15.  2. Turn 90 degrees so the leg without tubing is closest to the door. Move  the leg with tubing away from your body. Do 2 sets of 15.  3. Turn 90 degrees again so your back is to the door. Move the leg with tubing straight out in front of you. Do 2 sets of 15.  4. Turn your body 90 degrees again so the leg with tubing is closest to the door. Move the leg with tubing across your body. Do 2 sets of 15.  Hold onto a chair if you need help balancing. This exercise can be made more challenging by standing on a firm pillow or foam mat while you move the leg with tubing.    Resisted terminal knee extension: Make a loop with a piece of elastic tubing by tying a knot in both ends. Close the knot in a door at knee height. Step into the loop with your injured leg so the tubing is around the back of your knee. Lift the other foot off the ground and hold onto a chair for balance, if needed. Bend the knee with tubing about 45 degrees. Slowly straighten your leg, keeping your thigh muscle tight as you do this. Repeat 15 times. Do 2 sets of 15. If you need an easier way to do this, stand on both legs for better support while you do the exercise.  If you have access to a wobble board, do the following exercises:    Wobble board exercises  Stand on a wobble board with your feet shoulder-width apart.   1. Rock the board forwards and backwards 30 times, then side to side 30 times. Hold on to a chair if you need support.  2. Rotate the wobble board around so that the edge of the board is in contact with the floor at all times. Do this 30 times in a clockwise and then a counterclockwise direction.  3. Balance on the wobble board for as long as you can without letting the edges touch the floor. Try to do this for 2 minutes without touching the floor.  4. Rotate the wobble board in clockwise and counterclockwise circles, but do not let the edge of the board touch the floor.  When you have mastered the wobble exercises standing on both legs, try repeating them while standing on just your injured leg. After you are  able to do these exercises on one leg, try to do them with your eyes closed. Make sure you have something nearby to support you in case you lose your balance.  Developed by [employer].  Adult Advisor 2016.3 published by [employer].  Last modified: 2016-06-06  Last reviewed: 2015-01-12  This content is reviewed periodically and is subject to change as new health information becomes available. The information is intended to inform and educate and is not a replacement for medical evaluation, advice, diagnosis or treatment by a healthcare professional.  References   Adult Advisor 2016.3 Index    Copyright   2016 [employer], a division of [employer] All rights reserved.

## 2017-12-28 NOTE — TELEPHONE ENCOUNTER
Patient Information     Patient Name MRN Sex Jennyfer Solis [id number] Female 1950      Telephone Encounter by Sunita Rincon at 2017  8:56 AM     Author:  Sunita Rincon Service:  (none) Author Type:  (none)     Filed:  2017  8:58 AM Encounter Date:  2017 Status:  Signed     :  Sunita Rincon            Spoke with patient and she is needing follow up for stent placement that was done at [facility].  She was supposed to follow up sooner but is stuck in the [facility] as she has no ride home.  Her sister that brought her to the [facility] ended up also having a heart attack.  So she now does not have a ride home until next week.  Apt was given for  the soonest available.  I will monitor our schedule for cancellations and call her if something opens up.      Sunita Rincon LPN........................2017  8:57 AM

## 2017-12-28 NOTE — PROGRESS NOTES
Patient Information     Patient Name MRN Jennyfer Brady [id number] Female 1950      Progress Notes by Klinefelter, Kristin K, RD at 2017 11:00 AM     Author:  Klinefelter, Kristin K, RD Service:  (none) Author Type:  NUTR- Registered Dietitian     Filed:  2017 12:07 PM Encounter Date:  2017 Status:  Signed     :  Klinefelter, Kristin K, RD (NUTR- Registered Dietitian)            Patient is referred by Cardiac Rehab. PCP Dr. Hopper    Reviewed Diet Hx. Eats 2 meals per day. Often will skip b and l and just eat large d.   Lives alone and eats in restaurants often with friends.    Reviewed [facility] diet for cardiac rehab and she has many questions about her DM meal plan as well. Provided her with resources. She verbalized understanding.  Time spent: 60 minutes on education.   Kristin K Klinefelter, RD ....................  2017   12:07 PM                  My Diabetes Self-Management Support Plan   [facility] Diabetes Education  The following are resources that will help you manage your diabetes.   Let your educator know if you need help accessing the websites.    Emotional Support  Diabetes Support Group: Sponsored by [facility]    Meets the  of the month at 6:30    [address]  Other: ______________________________________________________________________  Education    Diabetes Quarterly BASICS Class: for those that have attended BASICS #1 & #2    Meets the first Tuesday of the month quarterly-10:00am-11:30am    November, February, May    [address]  Doc Talk    Meets the third Tuesday of every month, 5:30-6:30pm    FREE light dinner starting at 5:00    [address]  Weight Management   Weight Watchers     Meets  9:30, 11:45, or 5:30    Dennis Santos, [address], [place]  [place], [place]     Contact Veronica [phone] [email or web address]    Weight Watchers www. [email or web address]    My Fitness Pal    [email or web address].[email or web address] or download the from [employer]  Other: ______________________________________________________________________    Exercise   [address]  [phone]  Walking/Biking Trails    Get Fit [place]  [email or web address]  Walk with The Doc    Second Thursday of every month, 11:00am-12:00pm    Free T-shirt and water    [address]    Stress Relief    [facility], [address] [place] [address], [phone]    [address] [phone]  Other: ______________________________________________________________________  Journals     Diabetes Forecast- [phone]- www.diabetesforecast.org     Diabetes Self-Management- [phone]- www.[email or web address]       Screenings:    FREE Health Screenings (cholesterol, blood pressure, blood glucose, height & weight)    Second Monday of every month, 8:30am-11:00am    [facility] Active [address]    Apps/Web Based Programs    My Fitness Pal  [email or web address]    Glucose Kehinde [email or web address]  (Free, tracks blood glucose, graphs)     Emeals  emeals.com  (weekly meal plans)    Fooducate.com   (for iphone, ipad, ipod touch, android, & web)    CalorieKing.com  (for iphone, ipad, ipod touch, android, blackberry, & web)    SparkPeople.com  (free tools, resources & support for weight loss, calorie counter, fitness)    [email or web address]              Other Steps I can Take to Improve or Maintain My Health:  1.  2.

## 2017-12-28 NOTE — TELEPHONE ENCOUNTER
Patient Information     Patient Name MRN Sex Jennyfer Solis [id number] Female 1950      Telephone Encounter by Luisa Montero LPN at 2017  4:08 PM     Author:  Luisa Montero LPN Service:  (none) Author Type:  NURS- Licensed Practical Nurse     Filed:  2017  4:09 PM Encounter Date:  2017 Status:  Signed     :  Luisa Montero LPN (NURS- Licensed Practical Nurse)            Advised patient that there is not an opening for 17 to be seen. Patient requested message to be sent to Jennyfer Hopper MD to see if she would be able to be seen on 17. Stated that she is unable to get to her appointment on Friday, 17 with [facility] [place] and will not be able to get a ride back up to [place] unit Saturday, 7/15/17. Please advise.  Luisa Montero LPN.........2017   4:09 PM

## 2017-12-28 NOTE — TELEPHONE ENCOUNTER
Patient Information     Patient Name MRN Sex Jennyfer Solis [id number] Female 1950      Telephone Encounter by Jelena Witt at 2017  8:13 AM     Author:  Jelena Witt Service:  (none) Author Type:  (none)     Filed:  2017  8:14 AM Encounter Date:  2017 Status:  Signed     :  Jelena Witt            Per patient yesterday, a detailed message was let or patient. Instructed I she has any other questions to please call us.  Jelena WittLPN ....................  2017   8:14 AM

## 2017-12-28 NOTE — TELEPHONE ENCOUNTER
Patient Information     Patient Name MRN Sex Jennyfer Solis [id number] Female 1950      Telephone Encounter by Germania Lentz RN at 2017  8:34 AM     Author:  Germania Lentz RN Service:  (none) Author Type:  NURS- Registered Nurse     Filed:  2017  8:37 AM Encounter Date:  2017 Status:  Signed     :  Germania Lentz RN (NURS- Registered Nurse)            Left message re: changing intake date.  Will check again later today or tomorrow to reschedule.

## 2017-12-28 NOTE — TELEPHONE ENCOUNTER
Patient Information     Patient Name MRN Sex Jennyfer Solis [id number] Female 1950      Telephone Encounter by Luisa Montero LPN at 2017  2:59 PM     Author:  Luisa Montero LPN Service:  (none) Author Type:  NURS- Licensed Practical Nurse     Filed:  2017  3:00 PM Encounter Date:  2017 Status:  Signed     :  Luisa Montero LPN (NURS- Licensed Practical Nurse)            She is scheduled for hospital follow up, would you like to work her in?  Luisa Montero LPN.........2017   2:59 PM

## 2017-12-28 NOTE — PROGRESS NOTES
"Patient Information     Patient Name MRN Sex Jennyfer Solis [id number] Female 1950      Progress Notes by Jennyfer Hopper MD at 2017  1:15 PM     Author:  Jennyfer Hopper MD Service:  (none) Author Type:  Physician     Filed:  2017  7:01 AM Encounter Date:  2017 Status:  Signed     :  Jennyfer Hopper MD (Physician)            Nursing Notes:   Sunita Rincon  2017  2:16 PM  Signed  Patient presents to finish her apt for medication management and also to follow up with her recent heart attach/ stent placement   Sunita Rincon LPN........................2017  1:30 PM         Subjective:  Jennyfer Elizabeth is a 67 y.o. female who presents for hospital follow up     Patient is a 67 year old white female who had recent  Drug eluting stent placement in RCA 7/10/17 after she was hospitalized after last clinic visit, and had elevated troponin.  She is doing well.  She notes her sister went to cath lab same day as she did.  She is very appreciative of her care here as well as at [facility].   \" Couldn't ask for a better cardiology team.\"   Due to the concern of her sister's condition, she was not aware that cardiac rehab was ordered.   She has not started.  She feels good.  Infrequent tightness of chest but feels this is her anxiety.   She notes no shortness of breath, no palptiations.  No nausea, no indigestion.  No back pain.  She is on plavix. She takes an aspirin and has nitro available.  She spends 15 minutes telling me of her recent hospital stay.     Diabetes     Last A1c was 6.6 on 17;   She is following diabetic diet.   Recent stent as noted above ;  She has not been following a diabetic diet last few weeks.   She has been under stress.   Blood sugars have been up  130's .   She had her metformin withheld.   She notes sister's diabetes which was uncontrolled has her now more motivated to have better control.            No Known Allergies  Current " Outpatient Prescriptions on File Prior to Visit       Medication  Sig Dispense Refill     aspirin chewable 81 mg chewable tablet Take 1 tablet by mouth once daily. 100 tablet 0     clopidogrel (PLAVIX) 75 mg tablet Take 1 tablet by mouth once daily. Take for 1 year minimum 90 tablet 3     nitroglycerin (NITROSTAT) 0.4 mg SL tablet Place 1 tablet under the tongue every 5 minutes if needed for Chest Pain (up to 3 doses). 25 tablet 2     No current facility-administered medications on file prior to visit.        Problem List/PMH: reviewed in EMR    Social Hx:  Social History        Substance Use Topics          Smoking status:   Former Smoker      Quit date:  10/1/2010      Smokeless tobacco:   Never Used       Comment: Patient occasionally uses Nicorette gum       Alcohol use   8.4 oz/week     14 Standard drinks or equivalent per week       Social History Narrative    Single, no children. Lives alone, own home.     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     Enjoys gardening, reading. Has been reconnecting with old friends.        Family Hx:   Family History       Problem   Relation Age of Onset     Hypertension  Father      Heart Disease  Father      MI       Cancer  Mother      Lung       Diabetes  Sister      Hyperlipidemia  Sister      High       Other  Sister 33     Kidney problems       Cancer-breast  No Family History        Objective:  /82  Pulse 84  Wt 68.5 kg (151 lb)  Breastfeeding? No  BMI 26.75 kg/m2    Patient appears well, alert and oriented x 3, pleasant, cooperative.  FARAZ. TM's clear, Oral pharynx with good dentition, without lesion, erythema or exudate. Moist mucous membranes. Neck supple and free of adenopathy, or masses. No thyromegaly. Lungs are clear, without wheezes, rhonchi or rales. Heart sounds are normal, no murmurs, clicks, gallops or rubs. Abdomen is soft, no tenderness, masses or organomegaly. No CVAT.  Extremities are  without edema. Peripheral pulses are normal. Screening neurological exam is normal without focal findings. Skin is normal without suspicious lesions noted.    Results for orders placed or performed in visit on 07/20/17      RENAL FUNCTION PANEL      Result  Value Ref Range    SODIUM 137 133 - 143 mmol/L    POTASSIUM 3.9 3.5 - 5.1 mmol/L    CHLORIDE 102 98 - 107 mmol/L    CO2,TOTAL 26 21 - 31 mmol/L    ANION GAP 9 5 - 18                    GLUCOSE 109 (H) 70 - 105 mg/dL    CALCIUM 10.0 8.6 - 10.3 mg/dL    BUN 13 7 - 25 mg/dL    CREATININE 0.88 0.70 - 1.30 mg/dL    BUN/CREAT RATIO           15                    GFR if African American >60 >60 ml/min/1.73m2    GFR if not African American >60 >60 ml/min/1.73m2    PHOSPHORUS 3.8 2.5 - 5.0 mg/dL    ALBUMIN 4.4 3.5 - 5.7 g/dL   LIPID PANEL      Result  Value Ref Range    CHOLESTEROL,TOTAL 148 <200 mg/dL    TRIGLYCERIDES 129 <150 mg/dL    HDL CHOLESTEROL 46 23 - 92 mg/dL    NON-HDL CHOLESTEROL 102 <145 mg/dl    CHOL/HDL RATIO            3.22 <4.50                    LDL CHOLESTEROL 76 <100 mg/dL    PATIENT STATUS            NON-FASTING                         Assessment:    ICD-10-CM    1. 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 Anxiety F41.9 LORazepam (ATIVAN) 0.5 mg tab   3. NSTEMI, initial episode of care (HC) I21.4 metoprolol succinate (TOPROL XL) 25 mg Sustained-Release tablet      LIPID PANEL      LIPID PANEL   4. Hypertensive urgency I16.0 metoprolol succinate (TOPROL XL) 25 mg Sustained-Release tablet   5. Depression, unspecified depression type F32.9 DULoxetine (CYMBALTA) 30 mg Delayed-release capsule   6. Hyperlipidemia, unspecified hyperlipidemia type E78.5 atorvastatin (LIPITOR) 40 mg tablet   7. NSTEMI (non-ST elevated myocardial infarction) (HC) I21.4 OP CARDIAC REHAB EVAL AND TREAT   8. Arteriosclerotic  heart disease (ASHD) I25.10          I spent approximately 40 minutes with the patient (exclusive of separately billed services/procedures), with greater than 50% spent in counseling, prognosis, risks and benefits of management or follow-up.  Reviewed importance of compliance with chosen treatment options and follow-up.  Risk factor reduction and patient education and coordinating care, establishing and/or reviewing the patient's medical record also completed during today's exam .    Plan:   -- Expected clinical course discussed   -- Medications and their side effects discussed  Patient Instructions   1.  Keep Follow up appointment with Dr. Everardo Rojas 8/9/ 2017;   He is part of the [facility] in [place]   2.  You will need to be on Plavix for one year  3.  Continue Lipitor;     4.  Cardiac Rehab contacted.  They did receive order from [facility];   Patient will be contacted by Cardiac rehab   5.  Okay to use tylenol for pain but no more than 3 grams a day   6.  asperceme with lidocaine otc as needed  7. Increased metformin 250 mg twice a day   8.  Diabetic diet  9.  Follow up A1c in 3 months  10.   Labs placed in [employer] account               Index Related topics   Cardiac Rehabilitation   ________________________________________________________________________  KEY POINTS    Cardiac rehabilitation is a program to help you improve your health when you have a heart problem.    The program includes learning about a heart-healthy lifestyle, including a healthy diet, exercise, managing stress, and working with your healthcare team to treat any other health problems you may have.    A cardiac rehabilitation program usually lasts from 4 to 12 weeks.  ________________________________________________________________________  What is cardiac rehabilitation?   Cardiac rehabilitation, also called cardiac rehab, is a program to help you improve your health when you have a heart problem. Cardiac rehab includes:    Learning about a heart-healthy  lifestyle    Exercise that is right for you and your condition    Follow up visits or phone calls from the cardiac rehab staff  Cardiac rehab helps you get back to normal activities slowly and safely. A rehab program usually lasts from 4 to 12 weeks.  When is it used?   Cardiac rehab can help you:    Live better with heart problems, such as angina (chest pain) or heart failure.    Recover after a heart attack or heart surgery.  If you don't have heart disease, but are out of shape, rehab can help you get in better health and lower your risk for heart disease.   How does it work?   A team of healthcare providers will develop a program that meets your individual needs. The team may include doctors, nurses, physical therapists, dietitians, and counselors.  You program may start while you are in the hospital. This usually includes short walks and stretching exercises. Your blood pressure and heart rate will be checked before and after exercise.   After you go home, you will slowly increase how much exercise you do. You may have an exercise treadmill test to help develop a safe exercise program for you.   Your program will also include classes and support groups to learn why it is important to control high blood pressure, high cholesterol, diabetes, or other things that can cause serious heart problems. Rehab helps you learn how to take care of yourself now and prevent problems in the future. This includes heart-healthy changes such as:    If you smoke, try to quit. Talk to your healthcare provider about ways to quit smoking.    Lose weight if you are overweight and eat a healthy diet.    Lower the amount of salt, saturated and trans fats, and cholesterol in your diet.    Follow your healthcare provider's advice about how much liquid you should drink.    Ask your provider if you should avoid drinking alcohol. Alcohol can weaken your heart or may worsen heart failure. Also, some of your medicines may not work well if you  drink alcohol.    Work with your healthcare provider to control diabetes, blood pressure, or other health problems you may have. Learn how to take your own blood pressure or have a family member learn how to take it.    Be physically active. Exercise helps your heart and body get stronger. It also improves your blood flow and energy level. Balance exercise with rest.    Learn to manage the stress in your life. Anxiety and anger can cause a fast heart rate and high blood pressure.    Protect yourself against infections. Get a flu shot every year. Get the pneumococcal shot as recommended by your healthcare provider.    Work as a partner with your healthcare team. This means having regular provider visits and following your treatment plan.  What are the benefits?   Cardiac rehabilitation can:    Increase your chances of surviving a heart attack.    Help prevent other heart problems.    Increase your ability to be active and exercise.    Help you return to work sooner.    Develop habits for a lifetime of heart health.  Developed by [email or web address].  Adult Advisor 2016.3 published by [employer].  Last modified: 2016-05-19  Last reviewed: 2014-12-08  This content is reviewed periodically and is subject to change as new health information becomes available. The information is intended to inform and educate and is not a replacement for medical evaluation, advice, diagnosis or treatment by a healthcare professional.  References   Adult Advisor 2016.3 Index    Copyright   2016 [employer], a division of [employer] All rights reserved.        Index [place]   Diabetes: Eating Out   ________________________________________________________________________  KEY POINTS    Having diabetes should not keep you from eating at your favorite restaurant or eating out when you travel if you plan ahead.    When you are away from home, try to eat at the same times you do when you are home and to eat the same amounts and types of  "food.    If you count carbohydrate food choices with each meal at home, try to eat the same amount when you eat out.    Follow your healthcare provider's recommendation about the use of alcohol.  ________________________________________________________________________  Having diabetes should not keep you from eating at your favorite restaurant or eating out when you travel. However, you do need to plan ahead. Following your plan helps control your blood glucose as well as your weight. Your meal plan is a guide for what you eat, when you eat, and how much you should eat to keep your blood glucose at a healthy level. It is based on:    How many calories you need each day    A balance of carbohydrates (starches, fruit, milk)    Eating less saturated fat, trans fats, and salt.    The medicines you take    Your activity level  A dietitian or diabetes health educator can help you make a meal plan. When you are away from home, try to eat at the same times you do when you are home and to eat the same amounts and types of food.  How do I stay on my meal plan when I eat out?   Many restaurants offer foods that are lower in fat, cholesterol, and salt, and higher in fiber. Healthy food choices include salads, whole-grain breads, baked, grilled or broiled foods, steamed vegetables, sugar and salt substitutes, and diet drinks.   When you eat out:    Pick a restaurant with many food choices.    Be flexible with your plan. If you want more noodles, then have less bread. If you want a dessert, order a meal such as grilled fish with steamed vegetables that doesn t contain a starch.    Don t eat large servings. You don't have to finish everything on your plate. Most restaurants offer \"to-go\" boxes.    If you count carbohydrate food choices with each meal at home, try to eat the same amount when you eat out. Here are examples of different kinds of food choices:    Italian. Choose minestrone soup, chicken or veal piccata, pasta with " marinara sauce and grilled chicken, fish or vegetables. One cup of pasta has about 45 grams of carbs. Limit or avoid lasagna and risotto, which are high in carbs. Also limit high-fat cheese and meats, and breadsticks.    Mexican. Choose soft tacos with chicken or fish and salsa, shrimp or chicken fajitas, or a taco salad. Limit or avoid tortilla chips, tortillas, rice, and beans, which are high in carbs. Also limit sour cream, cheese and chimichangas, which are high in calories.    Chinese. Choose wonton soup, chicken or beef chop suey, stir-fried chicken or shrimp with vegetables, or chicken chow mein. Limit or avoid servings of fried rice, egg rolls, breaded and fried chicken, and sauces with sugar, like sweet and sour sauce.    Ask that the bread or chips not be placed on the table until the food is served. It is too easy to overeat these foods while waiting for your meal. Have broth or salad before the meal instead.    Ask that sauces and salad dressings be served on the side so that you can control how much you put on your food. Or you can try dipping your fork in the sauce or dressing before picking up the food. Avoid sauces that are high in fat.    Order steamed nonstarchy vegetables such as broccoli or green beans.    Have fruit, or low-fat or fat-free yogurt, for dessert.    Eat slowly.    Follow your healthcare provider's recommendation about the use of alcohol.  Is it OK to eat at fast food restaurants?   Many fast food restaurants offer smaller portions, grilled chicken selections, meals with a choice of fruit and milk instead of fries and soda, and creative salads with bottled nunez. Here are some tips for ordering healthier fast foods:    Order the smaller portion sizes. Avoid menu items that include deluxe, super sized, or jumbo in their name.    Pick low-fat or skim milk or water instead of a soft drink.    Choose grilled chicken items rather than fried foods.    Ask for mustard and ketchup instead  [facility].    Ask for low-fat or fat-free salad dressing.    Skip the fries, croissants, and biscuits. They are high in carbs and fat. Fat-free muffins are also high in sugar and calories.  Where can I find nutrition information?   Most restaurants have nutrition information for all menu items. They tell you the total calories, grams of carbohydrate, protein, fat, and sodium. You can ask for this information at the restaurant or check the restaurant's Web site.  Although restaurants may offer healthy choices, many restaurant foods are still high in salt. To keep a balance on days you eat out, make sure the meals you eat at home are low in salt.  Are there other tips for when I travel?   When traveling by plane, boat, or train, request diabetic meals a day or two before you leave. Travel agents can help with this. Always keep a carry-on bag with you. The bag should have your diabetic medicines and supplies and also some snacks. If a meal is late, eat a snack from your bag. Snacks that travel well are fresh fruits, dried fruit, cheese and crackers, peanut butter, and snack bars. Be sure to replace the snacks you eat so you never run out.  When you are changing time zones, ask your healthcare provider what changes you need to make and when to take your medicine.  For more information, contact    The American Diabetes Association   482.605.2300   http://www.diabetes.org  Developed by [employer].  Adult Advisor 2016.3 published by [employer].  Last modified: 2016-03-03  Last reviewed: 2015-06-08  This content is reviewed periodically and is subject to change as new health information becomes available. The information is intended to inform and educate and is not a replacement for medical evaluation, advice, diagnosis or treatment by a healthcare professional.  References   Adult Advisor 2016.3 Index    Copyright   2016 [employer], a division of [employer] All rights reserved.        Index [place]ish  "All languages Related topics   Cholesterol: Controlling with Lifestyle Changes   ________________________________________________________________________  KEY POINTS    Your body makes some cholesterol, and you get the rest from foods such as meats, dairy products, and eggs.    Eating a diet high in fiber and low in saturated fat and cholesterol, losing weight, and exercising can help lower cholesterol levels.    Get your cholesterol levels checked by your healthcare provider regularly.  ________________________________________________________________________  What is cholesterol?  Cholesterol is a type of fat. Your body makes some cholesterol and gets the rest from foods such as meats, dairy products, and eggs.  Cholesterol has both good and bad effects on the body. Your body uses cholesterol to make hormones and to build and maintain cells. When your body has too much cholesterol, the excess fat sticks to the inside of the blood vessel walls. This is called plaque. Plaque makes the blood vessel walls thicker and the area inside the vessels smaller. This means less blood can flow through the blood vessels. Also, pieces of plaque may break off and block blood flow, which can cause a heart attack or stroke.  The main types of cholesterol are LDL (low-density lipoprotein) and HDL (high-density lipoprotein).    LDL leaves behind fatty deposits on artery walls and contributes to heart disease. LDL is called bad cholesterol. (You can think of \"L\" for \"lousy\" cholesterol.)    HDL does the opposite. It cleans the artery walls, removes extra cholesterol from the body, and lowers the risk of heart disease. HDL is called good cholesterol. (Think of \"H\" for \"healthy\" cholesterol.)  It is good to have lower levels of LDL and higher levels of HDL. Your healthcare provider can check your cholesterol with a blood test.  Controlling the level of cholesterol in your blood lowers your risk for heart disease. It also lowers the chance " "of a heart attack or death from heart disease if you already have heart disease.  How can I control my cholesterol level?  You can often control cholesterol levels by:    Eating a healthy diet    Losing weight if you are overweight    Exercising    Not smoking  If you have a high risk for heart disease, your healthcare provider may prescribe cholesterol-lowering medicine as well as advise you to change in your diet and other aspects of your lifestyle. Your provider may also prescribe medicine if you have an inherited tendency to have high cholesterol.  Eat a healthy diet.  A diet high in fiber and low in saturated fat and cholesterol can help prevent or lower cholesterol levels. You can find out how much and what kind of fat a food has by reading the food label.    No more than 25 to 35% of your total calories should be from fats.    Most of the fat you eat should be polyunsaturated or monounsaturated fat. These fats are healthier than other types of fat. They can be found in foods such as fish, avocados, vegetable oils (especially olive oil and canola oil), nuts (like almonds, peanuts, and walnuts) and seeds (for example, pumpkin seeds).    Limit the amount of saturated and trans fat that you eat. Saturated fat is found in whole milk, cheese, butter, ice cream, cream, lard, fatty cuts of meat, poultry with the skin on, and some tropical vegetable oils, such as coconut and palm kernel oil. Trans fat may be found in stick margarine, shortening, french fries, cookies, crackers, and bakery goods. If the food label has the words \"partially hydrogenated,\" the product probably has trans fat.    Limit the cholesterol in your diet to less than 300 mg a day. If you have heart disease or a high risk of heart disease, limit cholesterol to less than 200 mg a day, which is one or less egg with yolk.  Changes you can make in your diet include:    Drink nonfat or 1% milk instead of whole milk, and eat fat-free or low-fat milk, " cheese, spreads, and yogurt.    Use egg whites or egg substitutes rather than whole eggs. Ask your healthcare provider how many whole eggs or egg whites are okay for you.    Use salad dressings made from healthy oils, such as canola, olive, peanut, and flaxseed oil.    Eat fish, chicken, and turkey cooked without the skin, and meatless entrees.    Eat red meat (beef, pork, lamb) and processed meats (like salami, bologna, hot dogs, sausage, and marquez) no more than 2 times a week.    Choose lean cuts of meat and trim off all visible fat. Choose at least 90% lean ground beef. Keep portion sizes moderate, which is about 3 to 4 ounces per serving.    Eat less sugar and less fried food and junk food, like French fries, chips, cookies, crackers, and doughnuts. Choose healthier desserts, such as fresh fruits, nonfat frozen yogurt, and Popsicles. Avoid fatty desserts such as ice cream, cream-filled cakes, and cheesecakes.    Eat more fruit, vegetables; beans; and whole grains, such as oats, brown rice, quinoa and bran. The fiber in these foods helps lower cholesterol.    Eat 1.5 ounces (42.5 grams) a day of unsalted nuts and seeds. Nuts and seeds are full of fiber, protein, and healthy fats. Nuts and seeds are also high in calories, so they should be eaten in small portions and used to replace other protein foods, like some meat or poultry, rather than being added to the diet. Examples of nuts and seeds that can be a part of a healthy diet are walnuts, almonds, hazelnuts, peanuts, pecans, and pistachio nuts, sunflower seeds, and pumpkin seeds.    Check labels for plant sterols or stanols added to some foods, such as special margarines, milk, and orange juice. These ingredients can help lower LDL. The recommended amount is 2 grams a day.    Limit alcohol to no more than 2 drinks a day for men and 1 drink a day for women.  Ask your healthcare provider for a referral to a dietitian to learn more about eating healthy.  Lose  excess weight.  You can lose weight by eating fewer calories. Even losing 5 to 10% of your body weight can help:    Lower both total cholesterol and bad LDL cholesterol    Lower your blood sugar    Lower your blood pressure    Increase your energy and helps you feel better (both physically and mentally)    Lower your risk for heart attack or stroke  Talk to your healthcare provider about your weight. If you need to lose weight, plan for a gradual weight loss of 1 to 2 pounds a week.  Exercise.  Being physically active also helps control cholesterol. Exercise helps because it:    Keeps your weight down    Lowers your total cholesterol    Lowers your LDL (bad cholesterol)    Raises your HDL (good cholesterol)  A good exercise goal is at least 2 hours and 30 minutes (150 minutes) of moderate exercise a week. Moderate exercise means you are working hard enough to raise your heart rate and break a sweat. Examples of moderate exercise are walking fast, doing water aerobics, or playing doubles tennis. Increasing the intensity of your exercise or getting at least 5 hours (300 minutes) of moderate exercise a week will have even greater health benefits. It can help you lose weight and keep a healthy weight.  If you haven't been exercising, ask your provider to give you a physical activity plan that tells you what kind of activity, and how much, is safe for you. Start slowly to avoid injury.  Don t smoke.  Smoking increases your risk of heart disease because it lowers HDL levels, increases your risk of blood clots and stroke, and decreases oxygen to the tissues. If you smoke, talk to your healthcare provider about quitting.  How can I know if my cholesterol level is normal?  Get your cholesterol levels checked by your healthcare provider regularly. At first, your cholesterol level may need to be checked every 3 to 6 months until it is staying in the normal range. Then you may need to check it just once a year.  Developed by  [employer].  Adult Advisor 2016.3 published by [employer].  Last modified: 2015-09-14  Last reviewed: 2016-05-31  This content is reviewed periodically and is subject to change as new health information becomes available. The information is intended to inform and educate and is not a replacement for medical evaluation, advice, diagnosis or treatment by a healthcare professional.  References   Adult Advisor 2016.3 Index    Copyright   2016 [employer], a division of [employer] All rights reserved.             Electronically signed by Jennyfer Hopper MD

## 2017-12-28 NOTE — TELEPHONE ENCOUNTER
Patient Information     Patient Name MRN Sex Jennyfer Solis [id number] Female 1950      Telephone Encounter by Dorie Christiansen at 2017  2:45 PM     Author:  Dorie Christiansen Service:  (none) Author Type:  (none)     Filed:  2017  2:46 PM Encounter Date:  2017 Status:  Signed     :  Dorie Christiansen            RKV- Patient wants a work in on 17 instead of 17.

## 2017-12-28 NOTE — PROGRESS NOTES
Addended by: FREYERMUTH, CHERYL A on: 6/19/2017 03:33 PM     Modules accepted: Orders     Patient Information     Patient Name MRN Jennyfer Brady [id number] Female 1950      Progress Notes by Jennyfer Hopper MD at 2017 11:30 AM     Author:  Jennyfer Hopper MD Service:  (none) Author Type:  Physician     Filed:  2017  1:13 PM Encounter Date:  2017 Status:  Signed     :  Jennyfer Hopper MD (Physician)            SUBJECTIVE:    Jennyfer Elizabeth is a 67 y.o. female who presents for chest heaviness Diabetes     Chest Pain/problem    This is a new problem. The current episode started 1 to 4 weeks ago (2-3 weeks ). The onset quality is gradual. The problem occurs intermittently. The problem has been waxing and waning. The pain is present in the substernal region. The pain is at a severity of 5/10. The pain is moderate. The quality of the pain is described as heavy and pressure. The pain does not radiate (patient does indicate 'felt something in my arms' ). Associated symptoms include exertional chest pressure and malaise/fatigue. Pertinent negatives include no abdominal pain, back pain, claudication, cough, diaphoresis, fever, headaches, irregular heartbeat, leg pain, nausea, near-syncope, orthopnea, palpitations, PND, shortness of breath, sputum production, vomiting or weakness. The pain is aggravated by movement, walking and exertion. She has tried rest and acetaminophen for the symptoms. The treatment provided no relief. Risk factors include being elderly, post-menopausal and stress.   Her past medical history is significant for anxiety/panic attacks, diabetes, hyperlipidemia and sleep apnea.   Pertinent negatives for past medical history include no aortic aneurysm, no aortic dissection, no Marfan's syndrome, no MI, no mitral valve prolapse, no pacemaker, no recent injury and no spontaneous pneumothorax.   Her family medical history is significant for CAD, heart disease and early MI.     - she indicated 'almost was going to wait until  '  when she has follow up appointment schedule.     Diabetic  patient is a diabetic date-year-old white female who does not check her blood sugars regularly. Her last A1c was December 14, 2016 and her A1c at that time was 7.1. She continues on her low dose  Metformin 250 mg a day . She is taking an aspirin. She is on a statin. Her microalbumin was under 5 at time of last office visit and her creatinine is 0.44. She denies any excessive thirst hunger or urination.    No Known Allergies      No current facility-administered medications on file prior to visit.      Current Outpatient Prescriptions on File Prior to Visit       Medication  Sig Dispense Refill     ACCU-CHEK FASTCLIX Dispense item covered by pt ins. E13.9 NIDDM type II - Test 1 time/day 100 Each 3     ACCU-CHEK CHERYL Dispense glucose meter, test strips and lancets covered by the patient insurance. Test 1 time per day. 1 Device 0     ACCU-CHEK SMARTVIEW TEST STRIP strip Dispense item covered by pt ins. E13.9 NIDDM type II - Test 1 time/day 100 Strip 3     aspirin chewable 81 mg chewable tablet Take 1 tablet by mouth once daily with a meal. 1 tablet 0     atorvastatin (LIPITOR) 40 mg tablet TAKE ONE TABLET BY MOUTH EVERY DAY 90 tablet 2     CALCIUM CARB/MAGNESIUM CMB #10 (GHASSAN-MAG ORAL) Take  by mouth.       cholecalciferol (VITAMIN D) 2,000 unit capsule Take 1 capsule by mouth once daily.  0     DULoxetine (CYMBALTA) 30 mg Delayed-release capsule TAKE 1 CAPSULE BY MOUTH EVERY DAY 90 capsule 1     metFORMIN (GLUCOPHAGE) 500 mg tablet TAKE 1/2 TABLET BY MOUTH EVERY DAY WITH A MEAL 45 tablet 0     omega-3 fatty acids-vitamin E (FISH OIL) 1,000 mg cap Take  by mouth.       Family History       Problem   Relation Age of Onset     Hypertension  Father      Heart Disease  Father      MI       Cancer  Mother      Lung       Diabetes  Sister      Hyperlipidemia  Sister      High       Other  Sister 33     Kidney problems       Cancer-breast  No Family History   "        REVIEW OF SYSTEMS:  Review of Systems   Constitutional: Positive for malaise/fatigue. Negative for chills, diaphoresis, fever and weight loss.   HENT: Negative.    Eyes: Negative.    Respiratory: Negative.  Negative for cough, sputum production and shortness of breath.    Cardiovascular: Positive for chest pain. Negative for palpitations, orthopnea, claudication, leg swelling, PND and near-syncope.   Gastrointestinal: Negative for abdominal pain, constipation, diarrhea, nausea and vomiting.   Musculoskeletal: Negative.  Negative for back pain.   Skin: Negative.    Neurological: Negative for weakness and headaches.   Endo/Heme/Allergies: Negative.        OBJECTIVE:  /78  Pulse 72  Ht 1.585 m (5' 2.4\")  Wt 71.1 kg (156 lb 12.8 oz)  Breastfeeding? No  BMI 28.31 kg/m2    EXAM:   Physical Exam   Constitutional: She is oriented to person, place, and time and well-developed, well-nourished, and in no distress. No distress.   HENT:   Head: Normocephalic and atraumatic.   Right Ear: External ear normal.   Left Ear: External ear normal.   Nose: Nose normal.   Mouth/Throat: Oropharynx is clear and moist.   Eyes: Conjunctivae and EOM are normal. Pupils are equal, round, and reactive to light. Left eye exhibits no discharge.   Neck: Normal range of motion. Neck supple. No JVD present. No tracheal deviation present. No thyromegaly present.   Cardiovascular: Normal rate, regular rhythm, normal heart sounds and intact distal pulses.  Exam reveals no gallop and no friction rub.    No murmur heard.  Pulmonary/Chest: Effort normal and breath sounds normal. No respiratory distress. She has no wheezes. She has no rales.   Abdominal: Soft. Bowel sounds are normal. She exhibits distension. She exhibits no mass. There is no tenderness. There is no rebound and no guarding.   Mildly obese    Musculoskeletal: Normal range of motion. She exhibits no edema or tenderness.   Lymphadenopathy:     She has no cervical adenopathy. "   Neurological: She is alert and oriented to person, place, and time. She has normal reflexes. No cranial nerve deficit. She exhibits normal muscle tone. Gait normal. Coordination normal.   Skin: Skin is warm and dry.   She has resolving patches of skin after cryotherapy on her left cheek by dermatology    Psychiatric: Memory, affect and judgment normal.     EKG:       Results for orders placed or performed in visit on 07/07/17      TROPONIN I      Result  Value Ref Range    TROPONIN I 0.130 (H) <0.034 ng/mL   COMPLETE METABOLIC PANEL      Result  Value Ref Range    SODIUM 138 133 - 143 mmol/L    POTASSIUM 4.0 3.5 - 5.1 mmol/L    CHLORIDE 105 98 - 107 mmol/L    CO2,TOTAL 23 21 - 31 mmol/L    ANION GAP 10 5 - 18                    GLUCOSE 121 (H) 70 - 105 mg/dL    CALCIUM 9.3 8.6 - 10.3 mg/dL    BUN 18 7 - 25 mg/dL    CREATININE 0.86 0.70 - 1.30 mg/dL    BUN/CREAT RATIO           21                    GFR if African American >60 >60 ml/min/1.73m2    GFR if not African American >60 >60 ml/min/1.73m2    ALBUMIN 4.1 3.5 - 5.7 g/dL    PROTEIN,TOTAL 7.0 6.4 - 8.9 g/dL    GLOBULIN                  2.9 2.0 - 3.7 g/dL    A/G RATIO 1.4 1.0 - 2.0                    BILIRUBIN,TOTAL 0.6 0.3 - 1.0 mg/dL    ALK PHOSPHATASE 95 34 - 104 IU/L    ALT (SGPT) 26 7 - 52 IU/L    AST (SGOT) 27 13 - 39 IU/L   Hgb A1c      Result  Value Ref Range    HEMOGLOBIN A1C MONITORING (POCT) 6.6 (H) 4.0 - 6.2 %    ESTIMATED AVERAGE GLUCOSE  143 mg/dL   CBC WITH AUTO DIFFERENTIAL      Result  Value Ref Range    WHITE BLOOD COUNT         7.6 4.5 - 11.0 thou/cu mm    RED BLOOD COUNT           4.78 4.00 - 5.20 mil/cu mm    HEMOGLOBIN                14.6 12.0 - 16.0 g/dL    HEMATOCRIT                42.6 33.0 - 51.0 %    MCV                       89 80 - 100 fL    MCH                       30.5 26.0 - 34.0 pg    MCHC                      34.3 32.0 - 36.0 g/dL    RDW                       13.0 11.5 - 15.5 %    PLATELET COUNT            269 140 - 440 thou/cu  mm    MPV                       10.8 6.5 - 11.0 fL    NEUTROPHILS               50.0 42.0 - 72.0 %    LYMPHOCYTES               37.9 20.0 - 44.0 %    MONOCYTES                 6.3 <12.0 %    EOSINOPHILS               4.7 <8.0 %    BASOPHILS                 0.8 <3.0 %    IMMATURE GRANULOCYTES(METAS,MYELOS,PROS) 0.3 %    ABSOLUTE NEUTROPHILS      3.8 1.7 - 7.0 thou/cu mm    ABSOLUTE LYMPHOCYTES      2.9 0.9 - 2.9 thou/cu mm    ABSOLUTE MONOCYTES        0.5 <0.9 thou/cu mm    ABSOLUTE EOSINOPHILS      0.4 <0.5 thou/cu mm    ABSOLUTE BASOPHILS        0.1 <0.3 thou/cu mm    ABSOLUTE IMMATURE GRANULOCYTES(METAS,MYELOS,PROS) 0.0 <=0.3 thou/cu mm       EKG:  Inferior Q waves, NSR;          PROCEDURE:  XR CHEST 2 VIEWS PA AND LATERAL     HISTORY: Chest pain.     COMPARISON:  09/07/2016     FINDINGS:  The cardiomediastinal contours are stable.  The trachea is midline.  New costophrenic angle blunting suggests trace effusions. No focal consolidation or pneumothorax.      Osteopenia.     IMPRESSION:       Trace effusions can be seen with nonspecific pleurisy.       Electronically Signed By: Hay Cain on 7/7/2017 12:39 PM  4 baby aspirin given     ASSESSMENT/PLAN:    ICD-10-CM    1. Chest pain, unspecified type R07.9 TROPONIN I      COMPLETE METABOLIC PANEL      CBC WITH DIFFERENTIAL      XR CHEST 2 VIEWS PA AND LATERAL      TROPONIN I      COMPLETE METABOLIC PANEL      CBC WITH DIFFERENTIAL      CBC WITH AUTO DIFFERENTIAL      TN ELECTROCARDIOGRAM COMPLETE   2. Hyperlipidemia, unspecified hyperlipidemia type E78.5    3. Type 2 diabetes mellitus without complication, without long-term current use of insulin (HC) E11.9 Hgb A1c      Hgb A1c        Plan:       Once troponin returned back elevated for baby aspirin were provided. Patient was transferred to the emergency room after reviewing case with Dr. Barber and nurse Cedric Caruso;    Patient informed of elevated troponin, need to transfer to ER given concern of acute MI.    Patient became tearful, anxious.           I spent approximately  30 minutes with the patient (exclusive of separately billed services/procedures), with greater than 50% spent in counseling, prognosis, risks and benefits of management or follow-up.  Reviewed importance of compliance with chosen treatment options and follow-up.  Risk factor reduction and patient education and coordinating care, establishing and/or reviewing the patient's medical record also completed during today's exam .

## 2017-12-28 NOTE — TELEPHONE ENCOUNTER
Patient Information     Patient Name MRN Sex Jennyfer Solis [id number] Female 1950      Telephone Encounter by Lucia Spaulding at 2017  3:38 PM     Author:  Lucia Spaulding Service:  (none) Author Type:  (none)     Filed:  2017  3:44 PM Encounter Date:  2017 Status:  Signed     :  Lucia Spaulding            SER - Patient has questions about Cardiac Rehab.  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  Please call.    Lucia Spaulding ....................  2017   3:44 PM

## 2017-12-28 NOTE — PROGRESS NOTES
Patient Information     Patient Name MRN Sex Jennyfer Solis [id number] Female 1950      Progress Notes by Marleen Lozano at 2017 12:16 PM     Author:  Marleen Lozano Service:  (none) Author Type:  (none)     Filed:  2017 12:16 PM Date of Service:  2017 12:16 PM Status:  Signed     :  Marleen Lozano            Falls Risk Criteria:    Age 65 and older or under age 4        Sensory deficits    Poor vision    Use of ambulatory aides    Impaired judgment    Unable to walk independently    Meets High Risk criteria for falls:  Yes             1.  Do you have dizziness or vertigo?    no                    2.  Do you need help standing or walking?   no                 3.  Have you fallen within the last 6 months?    no           4.  Has the patient been fasting?      no       If any risks are marked Yes, the following interventions are utilized:    Do not leave patient unattended     Assist patient in the dressing room and bathroom    Have ambulatory aides available throughout procedure    Involve patient s family if available

## 2017-12-29 NOTE — PATIENT INSTRUCTIONS
Patient Information     Patient Name MRN Jennyfer Brady [id number] Female 1950      Patient Instructions by Jennyfer Hopper MD at 2017  3:03 PM     Author:  Jennyfer Hopper MD  Service:  (none) Author Type:  Physician     Filed:  2017  7:01 AM  Encounter Date:  2017 Status:  Addendum     :  Jennyfer Hopper MD (Physician)        Related Notes: Original Note by Jennyfer Hopper MD (Physician) filed at 2017  3:03 PM            1.  Keep Follow up appointment with Dr. Everardo Rojas 2017;   He is part of the [facility] in [place]   2.  You will need to be on Plavix for one year  3.  Continue Lipitor;     4.  Cardiac Rehab contacted.  They did receive order from [facility];   Patient will be contacted by Cardiac rehab   5.  Okay to use tylenol for pain but no more than 3 grams a day   6.  asperceme with lidocaine otc as needed  7. Increased metformin 250 mg twice a day   8.  Diabetic diet  9.  Follow up A1c in 3 months  10.   Labs placed in [employer] account               Index Related topics   Cardiac Rehabilitation   ________________________________________________________________________  KEY POINTS    Cardiac rehabilitation is a program to help you improve your health when you have a heart problem.    The program includes learning about a heart-healthy lifestyle, including a healthy diet, exercise, managing stress, and working with your healthcare team to treat any other health problems you may have.    A cardiac rehabilitation program usually lasts from 4 to 12 weeks.  ________________________________________________________________________  What is cardiac rehabilitation?   Cardiac rehabilitation, also called cardiac rehab, is a program to help you improve your health when you have a heart problem. Cardiac rehab includes:    Learning about a heart-healthy lifestyle    Exercise that is right for you and your condition    Follow up visits or phone calls  from the cardiac rehab staff  Cardiac rehab helps you get back to normal activities slowly and safely. A rehab program usually lasts from 4 to 12 weeks.  When is it used?   Cardiac rehab can help you:    Live better with heart problems, such as angina (chest pain) or heart failure.    Recover after a heart attack or heart surgery.  If you don't have heart disease, but are out of shape, rehab can help you get in better health and lower your risk for heart disease.   How does it work?   A team of healthcare providers will develop a program that meets your individual needs. The team may include doctors, nurses, physical therapists, dietitians, and counselors.  You program may start while you are in the hospital. This usually includes short walks and stretching exercises. Your blood pressure and heart rate will be checked before and after exercise.   After you go home, you will slowly increase how much exercise you do. You may have an exercise treadmill test to help develop a safe exercise program for you.   Your program will also include classes and support groups to learn why it is important to control high blood pressure, high cholesterol, diabetes, or other things that can cause serious heart problems. Rehab helps you learn how to take care of yourself now and prevent problems in the future. This includes heart-healthy changes such as:    If you smoke, try to quit. Talk to your healthcare provider about ways to quit smoking.    Lose weight if you are overweight and eat a healthy diet.    Lower the amount of salt, saturated and trans fats, and cholesterol in your diet.    Follow your healthcare provider's advice about how much liquid you should drink.    Ask your provider if you should avoid drinking alcohol. Alcohol can weaken your heart or may worsen heart failure. Also, some of your medicines may not work well if you drink alcohol.    Work with your healthcare provider to control diabetes, blood pressure, or other  health problems you may have. Learn how to take your own blood pressure or have a family member learn how to take it.    Be physically active. Exercise helps your heart and body get stronger. It also improves your blood flow and energy level. Balance exercise with rest.    Learn to manage the stress in your life. Anxiety and anger can cause a fast heart rate and high blood pressure.    Protect yourself against infections. Get a flu shot every year. Get the pneumococcal shot as recommended by your healthcare provider.    Work as a partner with your healthcare team. This means having regular provider visits and following your treatment plan.  What are the benefits?   Cardiac rehabilitation can:    Increase your chances of surviving a heart attack.    Help prevent other heart problems.    Increase your ability to be active and exercise.    Help you return to work sooner.    Develop habits for a lifetime of heart health.  Developed by [employer].  Adult Advisor 2016.3 published by [employer].  Last modified: 2016-05-19  Last reviewed: 2014-12-08  This content is reviewed periodically and is subject to change as new health information becomes available. The information is intended to inform and educate and is not a replacement for medical evaluation, advice, diagnosis or treatment by a healthcare professional.  References   Adult Advisor 2016.3 Index    Copyright   2016 [employer], a division of [employer] All rights reserved.        Index Welsh   Diabetes: Eating Out   ________________________________________________________________________  KEY POINTS    Having diabetes should not keep you from eating at your favorite restaurant or eating out when you travel if you plan ahead.    When you are away from home, try to eat at the same times you do when you are home and to eat the same amounts and types of food.    If you count carbohydrate food choices with each meal at home, try to eat the same amount  "when you eat out.    Follow your healthcare provider's recommendation about the use of alcohol.  ________________________________________________________________________  Having diabetes should not keep you from eating at your favorite restaurant or eating out when you travel. However, you do need to plan ahead. Following your plan helps control your blood glucose as well as your weight. Your meal plan is a guide for what you eat, when you eat, and how much you should eat to keep your blood glucose at a healthy level. It is based on:    How many calories you need each day    A balance of carbohydrates (starches, fruit, milk)    Eating less saturated fat, trans fats, and salt.    The medicines you take    Your activity level  A dietitian or diabetes health educator can help you make a meal plan. When you are away from home, try to eat at the same times you do when you are home and to eat the same amounts and types of food.  How do I stay on my meal plan when I eat out?   Many restaurants offer foods that are lower in fat, cholesterol, and salt, and higher in fiber. Healthy food choices include salads, whole-grain breads, baked, grilled or broiled foods, steamed vegetables, sugar and salt substitutes, and diet drinks.   When you eat out:    Pick a restaurant with many food choices.    Be flexible with your plan. If you want more noodles, then have less bread. If you want a dessert, order a meal such as grilled fish with steamed vegetables that doesn t contain a starch.    Don t eat large servings. You don't have to finish everything on your plate. Most restaurants offer \"to-go\" boxes.    If you count carbohydrate food choices with each meal at home, try to eat the same amount when you eat out. Here are examples of different kinds of food choices:    Italian. Choose minestrone soup, chicken or veal piccata, pasta with marinara sauce and grilled chicken, fish or vegetables. One cup of pasta has about 45 grams of carbs. " Limit or avoid lasagna and risotto, which are high in carbs. Also limit high-fat cheese and meats, and breadsticks.    Mexican. Choose soft tacos with chicken or fish and salsa, shrimp or chicken fajitas, or a taco salad. Limit or avoid tortilla chips, tortillas, rice, and beans, which are high in carbs. Also limit sour cream, cheese and chimichangas, which are high in calories.    Chinese. Choose wonton soup, chicken or beef chop suey, stir-fried chicken or shrimp with vegetables, or chicken chow mein. Limit or avoid servings of fried rice, egg rolls, breaded and fried chicken, and sauces with sugar, like sweet and sour sauce.    Ask that the bread or chips not be placed on the table until the food is served. It is too easy to overeat these foods while waiting for your meal. Have broth or salad before the meal instead.    Ask that sauces and salad dressings be served on the side so that you can control how much you put on your food. Or you can try dipping your fork in the sauce or dressing before picking up the food. Avoid sauces that are high in fat.    Order steamed nonstarchy vegetables such as broccoli or green beans.    Have fruit, or low-fat or fat-free yogurt, for dessert.    Eat slowly.    Follow your healthcare provider's recommendation about the use of alcohol.  Is it OK to eat at fast food restaurants?   Many fast food restaurants offer smaller portions, grilled chicken selections, meals with a choice of fruit and milk instead of fries and soda, and creative salads with bottled nunez. Here are some tips for ordering healthier fast foods:    Order the smaller portion sizes. Avoid menu items that include deluxe, super sized, or jumbo in their name.    Pick low-fat or skim milk or water instead of a soft drink.    Choose grilled chicken items rather than fried foods.    Ask for mustard and ketchup instead of mayonnaise.    Ask for low-fat or fat-free salad dressing.    Skip the fries, croissants, and  biscuits. They are high in carbs and fat. Fat-free muffins are also high in sugar and calories.  Where can I find nutrition information?   Most restaurants have nutrition information for all menu items. They tell you the total calories, grams of carbohydrate, protein, fat, and sodium. You can ask for this information at the restaurant or check the restaurant's Web site.  Although restaurants may offer healthy choices, many restaurant foods are still high in salt. To keep a balance on days you eat out, make sure the meals you eat at home are low in salt.  Are there other tips for when I travel?   When traveling by plane, boat, or train, request diabetic meals a day or two before you leave. Travel agents can help with this. Always keep a carry-on bag with you. The bag should have your diabetic medicines and supplies and also some snacks. If a meal is late, eat a snack from your bag. Snacks that travel well are fresh fruits, dried fruit, cheese and crackers, peanut butter, and snack bars. Be sure to replace the snacks you eat so you never run out.  When you are changing time zones, ask your healthcare provider what changes you need to make and when to take your medicine.  For more information, contact    The American Diabetes Association   [phone]   http://www.diabetes.org  Developed by [employer].  Adult Advisor 2016.3 published by [employer].  Last modified: 2016-03-03  Last reviewed: 2015-06-08  This content is reviewed periodically and is subject to change as new health information becomes available. The information is intended to inform and educate and is not a replacement for medical evaluation, advice, diagnosis or treatment by a healthcare professional.  References   Adult Advisor 2016.3 Index    Copyright   2016 [employer], a division of [employer] All rights reserved.        Index Kinyarwanda All languages Related topics   Cholesterol: Controlling with Lifestyle Changes  "  ________________________________________________________________________  KEY POINTS    Your body makes some cholesterol, and you get the rest from foods such as meats, dairy products, and eggs.    Eating a diet high in fiber and low in saturated fat and cholesterol, losing weight, and exercising can help lower cholesterol levels.    Get your cholesterol levels checked by your healthcare provider regularly.  ________________________________________________________________________  What is cholesterol?  Cholesterol is a type of fat. Your body makes some cholesterol and gets the rest from foods such as meats, dairy products, and eggs.  Cholesterol has both good and bad effects on the body. Your body uses cholesterol to make hormones and to build and maintain cells. When your body has too much cholesterol, the excess fat sticks to the inside of the blood vessel walls. This is called plaque. Plaque makes the blood vessel walls thicker and the area inside the vessels smaller. This means less blood can flow through the blood vessels. Also, pieces of plaque may break off and block blood flow, which can cause a heart attack or stroke.  The main types of cholesterol are LDL (low-density lipoprotein) and HDL (high-density lipoprotein).    LDL leaves behind fatty deposits on artery walls and contributes to heart disease. LDL is called bad cholesterol. (You can think of \"L\" for \"lousy\" cholesterol.)    HDL does the opposite. It cleans the artery walls, removes extra cholesterol from the body, and lowers the risk of heart disease. HDL is called good cholesterol. (Think of \"H\" for \"healthy\" cholesterol.)  It is good to have lower levels of LDL and higher levels of HDL. Your healthcare provider can check your cholesterol with a blood test.  Controlling the level of cholesterol in your blood lowers your risk for heart disease. It also lowers the chance of a heart attack or death from heart disease if you already have heart " "disease.  How can I control my cholesterol level?  You can often control cholesterol levels by:    Eating a healthy diet    Losing weight if you are overweight    Exercising    Not smoking  If you have a high risk for heart disease, your healthcare provider may prescribe cholesterol-lowering medicine as well as advise you to change in your diet and other aspects of your lifestyle. Your provider may also prescribe medicine if you have an inherited tendency to have high cholesterol.  Eat a healthy diet.  A diet high in fiber and low in saturated fat and cholesterol can help prevent or lower cholesterol levels. You can find out how much and what kind of fat a food has by reading the food label.    No more than 25 to 35% of your total calories should be from fats.    Most of the fat you eat should be polyunsaturated or monounsaturated fat. These fats are healthier than other types of fat. They can be found in foods such as fish, avocados, vegetable oils (especially olive oil and canola oil), nuts (like almonds, peanuts, and walnuts) and seeds (for example, pumpkin seeds).    Limit the amount of saturated and trans fat that you eat. Saturated fat is found in whole milk, cheese, butter, ice cream, cream, lard, fatty cuts of meat, poultry with the skin on, and some tropical vegetable oils, such as coconut and palm kernel oil. Trans fat may be found in stick margarine, shortening, french fries, cookies, crackers, and bakery goods. If the food label has the words \"partially hydrogenated,\" the product probably has trans fat.    Limit the cholesterol in your diet to less than 300 mg a day. If you have heart disease or a high risk of heart disease, limit cholesterol to less than 200 mg a day, which is one or less egg with yolk.  Changes you can make in your diet include:    Drink nonfat or 1% milk instead of whole milk, and eat fat-free or low-fat milk, cheese, spreads, and yogurt.    Use egg whites or egg substitutes rather " than whole eggs. Ask your healthcare provider how many whole eggs or egg whites are okay for you.    Use salad dressings made from healthy oils, such as canola, olive, peanut, and flaxseed oil.    Eat fish, chicken, and turkey cooked without the skin, and meatless entrees.    Eat red meat (beef, pork, lamb) and processed meats (like salami, bologna, hot dogs, sausage, and marquez) no more than 2 times a week.    Choose lean cuts of meat and trim off all visible fat. Choose at least 90% lean ground beef. Keep portion sizes moderate, which is about 3 to 4 ounces per serving.    Eat less sugar and less fried food and junk food, like French fries, chips, cookies, crackers, and doughnuts. Choose healthier desserts, such as fresh fruits, nonfat frozen yogurt, and Popsicles. Avoid fatty desserts such as ice cream, cream-filled cakes, and cheesecakes.    Eat more fruit, vegetables; beans; and whole grains, such as oats, brown rice, quinoa and bran. The fiber in these foods helps lower cholesterol.    Eat 1.5 ounces (42.5 grams) a day of unsalted nuts and seeds. Nuts and seeds are full of fiber, protein, and healthy fats. Nuts and seeds are also high in calories, so they should be eaten in small portions and used to replace other protein foods, like some meat or poultry, rather than being added to the diet. Examples of nuts and seeds that can be a part of a healthy diet are walnuts, almonds, hazelnuts, peanuts, pecans, and pistachio nuts, sunflower seeds, and pumpkin seeds.    Check labels for plant sterols or stanols added to some foods, such as special margarines, milk, and orange juice. These ingredients can help lower LDL. The recommended amount is 2 grams a day.    Limit alcohol to no more than 2 drinks a day for men and 1 drink a day for women.  Ask your healthcare provider for a referral to a dietitian to learn more about eating healthy.  Lose excess weight.  You can lose weight by eating fewer calories. Even losing 5  to 10% of your body weight can help:    Lower both total cholesterol and bad LDL cholesterol    Lower your blood sugar    Lower your blood pressure    Increase your energy and helps you feel better (both physically and mentally)    Lower your risk for heart attack or stroke  Talk to your healthcare provider about your weight. If you need to lose weight, plan for a gradual weight loss of 1 to 2 pounds a week.  Exercise.  Being physically active also helps control cholesterol. Exercise helps because it:    Keeps your weight down    Lowers your total cholesterol    Lowers your LDL (bad cholesterol)    Raises your HDL (good cholesterol)  A good exercise goal is at least 2 hours and 30 minutes (150 minutes) of moderate exercise a week. Moderate exercise means you are working hard enough to raise your heart rate and break a sweat. Examples of moderate exercise are walking fast, doing water aerobics, or playing doubles tennis. Increasing the intensity of your exercise or getting at least 5 hours (300 minutes) of moderate exercise a week will have even greater health benefits. It can help you lose weight and keep a healthy weight.  If you haven't been exercising, ask your provider to give you a physical activity plan that tells you what kind of activity, and how much, is safe for you. Start slowly to avoid injury.  Don t smoke.  Smoking increases your risk of heart disease because it lowers HDL levels, increases your risk of blood clots and stroke, and decreases oxygen to the tissues. If you smoke, talk to your healthcare provider about quitting.  How can I know if my cholesterol level is normal?  Get your cholesterol levels checked by your healthcare provider regularly. At first, your cholesterol level may need to be checked every 3 to 6 months until it is staying in the normal range. Then you may need to check it just once a year.  Developed by [employer].  Adult Advisor 2016.3 published by [employer].  Last modified:  2015-09-14  Last reviewed: 2016-05-31  This content is reviewed periodically and is subject to change as new health information becomes available. The information is intended to inform and educate and is not a replacement for medical evaluation, advice, diagnosis or treatment by a healthcare professional.  References   Adult Advisor 2016.3 Index    Copyright   2016 [employer], a division of [employer] All rights reserved.

## 2017-12-30 NOTE — NURSING NOTE
Patient Information     Patient Name MRN Sex Jennyfer Solis [id number] Female 1950      Nursing Note by Sunita Rincon at 2017  1:15 PM     Author:  Sunita Rincon Service:  (none) Author Type:  (none)     Filed:  2017  2:16 PM Encounter Date:  2017 Status:  Signed     :  Sunita Rincon            Patient presents to finish her apt for medication management and also to follow up with her recent heart attach/ stent placement   Sunita Rincon LPN........................2017  1:30 PM

## 2017-12-30 NOTE — NURSING NOTE
Patient Information     Patient Name MRN Sex Jennyfer Solis [id number] Female 1950      Nursing Note by Wen Vinson at 2017  1:00 PM     Author:  Wen Vinson Service:  (none) Author Type:  (none)     Filed:  2017  2:20 PM Encounter Date:  2017 Status:  Signed     :  Wen Vinson            Patient presents today with pain in her right leg. Patient is unable to walk any distance, or do her cardio rehab.  She is having trouble sleeping at night, and cant get comfortable.  Wen Vinson ....................  2017   1:05 PM

## 2017-12-30 NOTE — INITIAL ASSESSMENTS
"Patient Information     Patient Name MRN Sex Jennyfer Solis [id number] Female 1950      Initial Assessments by Germania Lentz RN at 2017  3:11 PM     Author:  Germania Lentz RN Service:  (none) Author Type:  NURS- Registered Nurse     Filed:  2017  3:21 PM Date of Service:  2017  3:11 PM Status:  Signed     :  Germania Lentz RN (NURS- Registered Nurse)             Cardiac Rehabilitation   Outpatient Initial Assessment     Patient:  Jennyfer Elizabeth    Primary Care Provider:  MD HARLEY Aguilar Referring MD/Provider: EKATERINA  Cardiologist Maria Fernanda    Date of Evaluation: 2017  Onset Date: 17  Outpatient Cardiac Rehab Provider: [facility] [id number]    Diagnosis: Myocardial Infarction and Percutaneous Intervention    Allergies:  No Known Allergies    Current Laboratory Results:       Recent Labs        17   1340  17   1222   CHOL  148   --    HDL  46   --    LDLCHOL  76   --    TRIGLYCERIDE  129   --    HGBA1C   --   6.6 H       Physical Assessment:    Cardiovascular Exam:  Skin Color: Normal  Vital Signs:  /62  Pulse 74  Resp 20  Ht 1.6 m (5' 3\")  Wt 69.8 kg (153 lb 12.8 oz)  SpO2 94%  BMI 27.24 kg/m2   Auscultation:  Right Posteriorr-Clear  Left Posteriorr-Clear  Heart Rate:  74  Heart Sounds:  S1S2  Blood Pressure:  Left Arm, Sitting, 124/62  Pain Assessment:         Location / Type: (not recorded)     Pain Intensity (0-10): (not recorded)     Onset: (not recorded)     Location / Type: (not recorded)     Pain Intensity (0-10): (not recorded)     Quality: (not recorded)  Body Composition:     Age: 67 y.o.     Height: Height: 160 cm (5' 3\")     Weight: Wt.: 69.8 kg (153 lb 12.8 oz)     BMI-kg/m: BMI: 27.24  Musculoskeletal: bilateral bunions and hammertoes and metatarsal pain  Fall Risk Assessment Total Score:       Total Score (If 5 or > is High Risk): 0  Patient Reports an Unexplained Fall in the Past Year/Patient " Considers her to be at Risk for Falling: No    Risk Factor Assessment:     Gender     Nutrition:    Current Diet: Low Fat, Low Cholesterol, No Added Salt and ADA (American Diabetic Association Diet)     Stage of Change: Contemplation     CV Disease:    Stage of Change: Contemplation     Diabetes:    Management:       Diet        Oral Agents     Hyperlipidemia:    Known  Risk Stratification: Intermediate Risk: Functional capacity < 5 METs    Medical History:   Diabetes, Myocardial Infarction and Percutaneous Intervention    Social/Functional History:      Marital Status: Single        Home Responsibilities: Cooking, Shopping, Laundry, Light Cleaning, Lawn Care, Self-Care, Gardening, Driving and Walking Pet(s)        Living Situation:      Preadmission Functional Mobility: Independent       Steps: One Flight       Leisure / Recreational Activities: paddle boat and water activities         Substance Use/Abuse:  No         Domestic Violence:  Unable to Report    Current Mobility and Impairments: Independent    Treatment Plan/Targeted Outcomes:      Frequency of Treatment: 2-3 Times Per Week      Duration of Treatment: 8-12 weeks    Goals:     Activity: Verbalizes an understanding of home exercise guidelines  Exercises 3-5 times per week     Nutrition:     Verbalizes understanding of the food pyramid     Verbalizes understanding how to read food labels     Verbalizes understanding of dietary limitation of fat, salt and cholesterol     Readjust / normalize appetite.     CV Disease: Understands normal CV Anatomy  Identifies personal risk factors of HD  Understands cardiac signs and symptoms     Stress: Verbalizes understanding of stress and it's effects on the human body, Patient is able to identify stressors in their life and Stress management techniques to be used when stress events occur     Diabetes: Verbalizes understanding of optimal blood sugar readings  Verbalizes understanding of effects of diabetes on CV  system  Verbalizes effect exercise has on blood sugar levels  Keep daily log of daily blood sugar readings     Hyperlipidemia: Verbalizes understanding of optimal lipid profile  Verbalizes the effects and side-effects of cholesterol lowering medications     Weight: Understands safety of losing weight slowly (1-2 pounds per week)     General Health:  Increased energy for day to day activities and self care.  Increase joint and body flexibility, less stiffness.  Increase general health by Increasing HDL.  Diabetes control with decreased need for Insulin.     Return to normal daily activities. and increase met level to pre event level..  Patient Personal Goal(s):  Return to pre event level   Patient and/or Family were included in goal selection: yes     Exercise Prescription: Frequency: 3-5 times per week  Duration: 40-60 minutes     Target Heart Rate (THR): THR= 65-80% (age adjusted THR)     Rate of Perceived Exertion (RPE):  7       Exercise Equipment Safety Discussed: Yes    Patient Received the Following Information:    admission education    The patient was oriented to Cardiac Rehabilitation.  Exercise sessions can be seen on the flowsheet..

## 2017-12-30 NOTE — NURSING NOTE
Patient Information     Patient Name MRN Sex Jennyfer Solis [id number] Female 1950      Nursing Note by Lawrence Oh at 2017 11:30 AM     Author:  Lawrence Oh Service:  (none) Author Type:  NURS- Student Nurse     Filed:  2017 11:50 AM Encounter Date:  2017 Status:  Signed     :  Lawrence Oh            Patient presents with pressure above diaphragm that extends to stomach and chest. Patient states it is more of just a dull ache and pressure in chest. Pressure comes and goes, patient states it is not constant and no SOB.  Lawrence Oh ....................  2017   11:38 AM

## 2018-01-03 NOTE — TELEPHONE ENCOUNTER
Patient Information     Patient Name MRN Jennyfer Brady [id number] Female 1950      Telephone Encounter by Deborah Montana at 2017 11:41 AM     Author:  Deborah Montana Service:  (none) Author Type:  (none)     Filed:  2017 11:42 AM Encounter Date:  2017 Status:  Signed     :  Deborah Montana            Patient is wanting to decrease her Duloxetine from 40 mg's to 30 mg's. She states that taking two 20 mg's is too pricey and she spoke to the pharmacist and the 30 mg's are more cost effective.  Deborah Montana LPN.......................... 2017  11:42 AM

## 2018-01-03 NOTE — TELEPHONE ENCOUNTER
Patient Information     Patient Name MRN Jennyfer Brady [id number] Female 1950      Telephone Encounter by Norma Priest at 2017 10:54 AM     Author:  Norma Priest Service:  (none) Author Type:  (none)     Filed:  2017 10:58 AM Encounter Date:  2017 Status:  Signed     :  Norma Priest            SER- PATIENT IS WONDERING IF SHE CAN SWITCH HER DULOXETINE FROM 20 MG TO 30 MG. PLEASE CALL BACK THANK YOU    Norma Priest ....................  2017   10:58 AM

## 2018-01-04 NOTE — TELEPHONE ENCOUNTER
Patient Information     Patient Name MRN Jennyfer Brady [id number] Female 1950      Telephone Encounter by Sabrina Martin RN at 2017 11:57 AM     Author:  Sabrina Martin RN Service:  (none) Author Type:  (none)     Filed:  2017 11:59 AM Encounter Date:  2017 Status:  Signed     :  Sabrina Martni RN (NURS- Registered Nurse)            Depression-in adults 18 and over  Serotonin/Norepinephrine Reuptake Inhibitors    Office visit in the past 12 months or as indicated in chart.  Should have clinic visit 1-2 months after initial prescription.    Last visit with JENNYFER GUNDERSON was on: 2016 in "[facility]  Next visit with JENNYFER GUNDERSON is on: No future appointment listed with this provider  Next visit with Family Practice is on: No future appointment listed in this department    Max refills 12 months from last office visit or per providers notes.    Statins    Office visit in the past 12 months.    Last visit with JENNYFER GUNDERSON was on: 2016 in "[facility]  Next visit with JENNYFER GUNDERSON is on: No future appointment listed with this provider  Next visit with Family Practice is on: No future appointment listed in this department      Last Lipids:  Chol: 165    2016  T    2016  HDL:   46    2016  LDL:  97    2016  LDL DIRECT:  No results found in past 5 years    .      Max refills 12 months from last office visit.      Prescription refilled per RN Medication Refill Policy.................... Sabrina Martin ....................  2017   11:57 AM

## 2018-01-05 NOTE — TELEPHONE ENCOUNTER
Patient Information     Patient Name MRN Sex Jennyfer Solis [id number] Female 1950      Telephone Encounter by Sabrina Martin RN at 2017  2:43 PM     Author:  Sabrina Martin RN Service:  (none) Author Type:  NURS- Registered Nurse     Filed:  2017  3:06 PM Encounter Date:  2017 Status:  Signed     :  Sabrina Martin RN (NURS- Registered Nurse)            Biguanides    Office visit in the past 12 months or per provider note.    Last visit with JENNYFER GUNDERSON was on: 2016 in [facility]  Next visit with JENNYFER GUNDERSON is on: No future appointment listed with this provider  Next visit with Family Practice is on: No future appointment listed in this department    Lab test requirements:  HgbA1c annually or per provider note.  HEMOGLOBIN A1C MONITORING (POCT) (%)    Date Value   2016 7.1 (H)     HEMOGLOBIN A1C GI (%)    Date Value   2011 6.1       Max refill for 12 months from last office visit or per provider note.    If taking for polycystic ovary disease, may refill for 12 months.    Patient is due for medication management appointment. Limited refill provided at this time and letter sent for reminder to patient. Prescription refilled per RN Medication Refill Policy.................... Sabrina Martin RN ....................  2017   3:05 PM

## 2018-01-11 ENCOUNTER — COMMUNICATION - GICH (OUTPATIENT)
Dept: FAMILY MEDICINE | Facility: OTHER | Age: 68
End: 2018-01-11

## 2018-01-23 ENCOUNTER — COMMUNICATION - GICH (OUTPATIENT)
Dept: SURGERY | Facility: OTHER | Age: 68
End: 2018-01-23

## 2018-01-27 VITALS
HEART RATE: 72 BPM | HEIGHT: 62 IN | WEIGHT: 156.8 LBS | DIASTOLIC BLOOD PRESSURE: 78 MMHG | SYSTOLIC BLOOD PRESSURE: 140 MMHG | BODY MASS INDEX: 28.85 KG/M2

## 2018-01-27 VITALS
HEIGHT: 63 IN | BODY MASS INDEX: 27.46 KG/M2 | WEIGHT: 155 LBS | HEART RATE: 80 BPM | SYSTOLIC BLOOD PRESSURE: 132 MMHG | TEMPERATURE: 99.1 F | DIASTOLIC BLOOD PRESSURE: 78 MMHG

## 2018-01-27 VITALS
HEART RATE: 84 BPM | BODY MASS INDEX: 27.26 KG/M2 | DIASTOLIC BLOOD PRESSURE: 82 MMHG | WEIGHT: 151 LBS | SYSTOLIC BLOOD PRESSURE: 128 MMHG

## 2018-02-02 ASSESSMENT — PATIENT HEALTH QUESTIONNAIRE - PHQ9: SUM OF ALL RESPONSES TO PHQ QUESTIONS 1-9: 6

## 2018-02-02 ASSESSMENT — ANXIETY QUESTIONNAIRES: GAD7 TOTAL SCORE: 0

## 2018-02-06 ENCOUNTER — COMMUNICATION - GICH (OUTPATIENT)
Dept: FAMILY MEDICINE | Facility: OTHER | Age: 68
End: 2018-02-06

## 2018-02-06 DIAGNOSIS — E10.9 TYPE 1 DIABETES MELLITUS WITHOUT COMPLICATIONS (H): ICD-10-CM

## 2018-02-09 VITALS
TEMPERATURE: 98.1 F | WEIGHT: 156.6 LBS | HEART RATE: 80 BPM | BODY MASS INDEX: 27.74 KG/M2 | DIASTOLIC BLOOD PRESSURE: 80 MMHG | SYSTOLIC BLOOD PRESSURE: 138 MMHG

## 2018-02-11 ASSESSMENT — PATIENT HEALTH QUESTIONNAIRE - PHQ9: SUM OF ALL RESPONSES TO PHQ QUESTIONS 1-9: 15

## 2018-02-11 ASSESSMENT — ANXIETY QUESTIONNAIRES: GAD7 TOTAL SCORE: 17

## 2018-02-12 NOTE — TELEPHONE ENCOUNTER
Patient Information     Patient Name MRN Sex Jennyfer Solis [id number] Female 1950      Telephone Encounter by Maude Cifuentes at 2018 10:14 AM     Author:  Maude Cifuentes Service:  (none) Author Type:  (none)     Filed:  2018 10:14 AM Encounter Date:  2017 Status:  Signed     :  Maude Cifuentes            Unable to reach patient to schedule.  Sent letter to contact us.  Maude Cifuentes ....................  2018   10:14 AM

## 2018-02-12 NOTE — TELEPHONE ENCOUNTER
Patient Information     Patient Name MRN Jennyfer Brady [id number] Female 1950      Telephone Encounter by Maude Cifuentes at 2017  9:36 AM     Author:  Maude Cifuentes Service:  (none) Author Type:  (none)     Filed:  2017  9:36 AM Encounter Date:  2017 Status:  Signed     :  Maude Cifuentes            Left message to call back.  Maude Cifuentes ....................  2017   9:36 AM

## 2018-02-12 NOTE — PROGRESS NOTES
Patient Information     Patient Name MRN Sex Jennyfer Solis [id number] Female 1950      Progress Notes by Jennyfer Hopper MD at 2017 10:45 AM     Author:  Jennyfer Hopper MD Service:  (none) Author Type:  Physician     Filed:  2017  6:51 PM Encounter Date:  2017 Status:  Signed     :  Jennyfer Hopper MD (Physician)            Nursing Notes:   Kimberly Dukes  2017  1:25 PM  Signed  Patient presents in the clinic with concerns of a lump on her right breast that she noticed one week ago. Patient states she takes blood thinners and alos noticed some bruising in that area.  Kimberly Dukes LPN............................ 2017 11:19 AM        Subjective:  Jennyfer Elizabeth is a 67 y.o. female who presents for right breast lump    patient is a 67-year-old white female with known history of ASHD status post stent placement and on Plavix. Since she's been on Plavix she bruises easily. At time she has no idea why her bruising occurs. She says it typically occurs in her breast area. One week ago she noticed a lump in the medial of her breast and is here to have this evaluated. Denies any weight loss hot flashes. She is a G0. No family history of breast cancer    Wt Readings from Last 3 Encounters:    17 71 kg (156 lb 9.6 oz)   17 70.3 kg (155 lb)   17 70.3 kg (155 lb)      Family History       Problem   Relation Age of Onset     Hypertension  Father      Heart Disease  Father      MI       Cancer  Mother      Lung       Diabetes  Sister      Hyperlipidemia  Sister      High       Other  Sister 33     Kidney problems       Cancer-breast  No Family History           No Known Allergies    Current Outpatient Prescriptions on File Prior to Visit       Medication  Sig Dispense Refill     aspirin chewable 81 mg chewable tablet Take 1 tablet by mouth once daily. 100 tablet 0     atorvastatin (LIPITOR) 40 mg tablet Take 1 tablet by mouth  once daily. 90 tablet 3     clopidogrel (PLAVIX) 75 mg tablet Take 1 tablet by mouth once daily. Take for 1 year minimum 90 tablet 3     DULoxetine (CYMBALTA) 30 mg Delayed-release capsule Take 1 capsule by mouth once daily. 90 capsule 3     lidocaine 5% (LIDODERM) 5 % patch Apply 1 patch to painful area of skin for up to 12 hours within a 24-hour period. 30 Patch 1     LORazepam (ATIVAN) 0.5 mg tab Take 0.5 tablets by mouth at bedtime if needed for Anxiety or Muscle Spasm. 30 tablet 0     metFORMIN (GLUCOPHAGE) 500 mg tablet 250 mg po bid 90 tablet 1     metoprolol succinate (TOPROL XL) 25 mg Sustained-Release tablet Take 1 tablet by mouth once daily. 90 tablet 3     nitroglycerin (NITROSTAT) 0.4 mg SL tablet Place 1 tablet under the tongue every 5 minutes if needed for Chest Pain (up to 3 doses). 25 tablet 2     No current facility-administered medications on file prior to visit.        Problem List/PMH: reviewed in EMR    Social Hx:  Social History        Substance Use Topics          Smoking status:   Former Smoker      Quit date:  10/1/2010      Smokeless tobacco:   Never Used       Comment: Patient occasionally uses Nicorette gum       Alcohol use   8.4 oz/week     14 Standard drinks or equivalent per week       Social History Narrative    Single, no children. Lives alone, own home.     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     Enjoys gardening, reading. Has been reconnecting with old friends.        Family Hx:   Family History       Problem   Relation Age of Onset     Hypertension  Father      Heart Disease  Father      MI       Cancer  Mother      Lung       Diabetes  Sister      Hyperlipidemia  Sister      High       Other  Sister 33     Kidney problems       Cancer-breast  No Family History        Objective:  /80 (Cuff Site: Right Arm, Position: Sitting, Cuff Size: Adult Large)  Pulse 80  Temp 98.1  F (36.7  C) (Tympanic)   Wt 71 kg (156 lb 9.6  oz)  Breastfeeding? No  BMI 27.74 kg/m2    patient is alert oriented ×3 PERRLA EOMI. Left breast is without discrete mass axillary adenopathy or nipple discharge. Right breast has a breast lump at the 3 o'clock position 2 cm from the Vicki measuring approximately 1-1/2 cm it is firm slightly rubbery and slightly mobile. Does not cause dimpling. She does have a dimple at the 3 fingerbreadths away from the 10 o'clock position which is noted more when patient is laying back and not noted when she is sitting up. She has mild chafing of her right nipple no discharge noted. She has ecchymosis of her right breast from the 2 to 5 o'clock position approximately 5 or 6 days old. Lungs clear heart sounds regular    Assessment:    ICD-10-CM    1. Breast mass, right N63.10 XR MAMMO UNI DIAGNOSTIC RIGHT      US BREAST UNILATERAL RIGHT COMPLETE      AMB CONSULT TO GENERAL SURGEON      CANCELED: AMB CONSULT TO GENERAL SURGEON   2. Breast cancer screening, high risk patient Z12.31 XR MAMMO UNI DIAGNOSTIC RIGHT      US BREAST UNILATERAL RIGHT COMPLETE      AMB CONSULT TO GENERAL SURGEON      CANCELED: AMB CONSULT TO GENERAL SURGEON   3. Mass of lower inner quadrant of right breast  N63.14 XR MAMMO UNI DIAGNOSTIC RIGHT      US BREAST UNILATERAL RIGHT COMPLETE      AMB CONSULT TO GENERAL SURGEON      CANCELED: AMB CONSULT TO GENERAL SURGEON   4. Type 2 diabetes mellitus without complication, without long-term current use of insulin (HC) E11.9 HEMOGLOBIN A1C MONITORING (POCT)      CREATININE      MICROALBUMIN RANDOM URINE    discussion with patient that she needs diagnostic mammogram and ultrasound. Differential diagnosis includes tumor, bruise, fibroadenoma, cyst. She agrees to see surgeon after above testing has been completed. Patient informed she had sex bread appointment for her diabetes as she has only an acute care visit today         Plan:   -- Expected clinical course discussed   -- Medications and their side effects  discussed  Patient Instructions   You are due for diabetic check up ;  Labs will be ordered follow up separate appointment    Referral for diagnostic mammogram and ultrasound of right breast lump and dimpling ordered  Recommend seeing Dr. Bauer after above completed        [employer]: Have You Had Your Mammogram?     Mammograms  Why are mammograms important?            Mammograms can help women detect breast cancer early.            A mammogram may find the smallest cancers before a lump can be felt.            Early detection means more choices for treatment if you do have cancer.  Reasons Women Give for Avoiding a Mammogram  Do you or someone you know say the following about mammograms?   There s no breast cancer in my family.    I don t have time.    It might hurt.   There are many reasons why women say they do not get regular mammograms. The following are some of their reasons along with some important information for you to know.             I can t afford a mammogram.   Most insurance companies cover the cost of the screening (you may have a copay).     Check with your insurance provider to see if the mammogram(s) will be covered. Your health care provider s recommendation for services does not guarantee coverage by your insurance provider.    Medicare now covers yearly mammograms. Free and low-cost mammograms are available for women with little or no insurance. Call the American Cancer Society at 1-210.275.3187 for more information about these programs.               No one in my family has ever had breast cancer. I don t need screening.   Being a woman and getting older are the two greatest risk factors for getting breast cancer. The majority of all breast cancers occur in women with no other risk factors.    Breast cancer risk is increased if other close members of your family (mother or sister) had breast cancer.    You have a slightly increased risk of breast cancer if you:  -       had your first  period before age 12  -       have never given birth  -       gave birth to your first child after age 35  -       started menopause after age 55.  Obesity is linked to an increased risk of breast cancer.     Please talk with your health care provider if you have any questions or concerns about these risk factors.                My health care provider never told me to get a mammogram.   If your health care provider hasn t told you about the need for a mammogram, ask him or her.                I don t have time to get a mammogram.   The entire test usually takes less than 30 minutes. It s important to take the time to care for yourself and your health.                I heard it hurts. I don t want to go through that.   You may feel some brief discomfort, but it lasts less than 30 seconds. A technologist will position your breast and compress it to get a good  picture.      If you have sensitive breasts, try having your mammogram at a time of the month when your breasts will be least tender. For example, avoid scheduling a mammogram the week before your period. This will help lessen discomfort.                I m better off not knowing.   More than 80 percent of breast lumps are not cancerous. You can gain peace of mind from taking control of your health and having regular mammograms.     You Can See the Difference a Mammogram Makes    Regular mammograms are your best way to find tumors at their smallest when treatment can be the most successful.  Breast Cancer Screening Schedule  When to start having mammograms to screen for breast cancer, and how often to have them, is a personal decision. It should be based on your preferences, your values and your risk for developing breast cancer. [email or web address] [facility] recommends that you and your health care provider together determine when mammograms are right for you.  [email or web address] [facility] recommends the following guidelines for women who have an average risk for breast cancer, based on  American Cancer Society guidelines:            Age 40 to 44: Mammograms are optional.             Age 45 to 54: Have a mammogram every year.            Age 55 and older: Have a mammogram every year, or transition to having one every 2 years. Continue to have mammograms as long as your health is good.  If you have a  higher than average risk for breast cancer, your health care provider may recommend a different schedule.  Talk with your health care provider about your risk level. Together, you and your health care provider can decide what screening schedule is right for you.   2016 [employer] - A [employer] OF [employer]  OTHER TRADEMARKS USED ARE OWNED BY THEIR RESPECTIVE OWNERS  THIS FACT SHEET DOES NOT REPLACE MEDICAL OR PROFESSIONAL ADVICE; IT IS ONLY A GUIDE  [id number] (12/16)        Electronically signed by Jennyfer Hopper MD

## 2018-02-12 NOTE — TELEPHONE ENCOUNTER
Patient Information     Patient Name MRN Sex Jennyfer Solis [id number] Female 1950      Telephone Encounter by Jennyfer Hopper MD at 2017  1:06 PM     Author:  Jennyfer Hopper MD Service:  (none) Author Type:  Physician     Filed:  2017  1:07 PM Encounter Date:  2017 Status:  Signed     :  Jennyfer Hopper MD (Physician)            Results are not ready;   She has appointment with Dr. Bauer to review results.

## 2018-02-12 NOTE — NURSING NOTE
Patient Information     Patient Name MRN Jennyfer Brady [id number] Female 1950      Nursing Note by Kimberly Dukes at 2017 10:45 AM     Author:  Kimberly Dukes Service:  (none) Author Type:  (none)     Filed:  2017  1:25 PM Encounter Date:  2017 Status:  Signed     :  Kimberly Dukes            Patient presents in the clinic with concerns of a lump on her right breast that she noticed one week ago. Patient states she takes blood thinners and alos noticed some bruising in that area.  Kimberly Dukes LPN............................ 2017 11:19 AM

## 2018-02-12 NOTE — TELEPHONE ENCOUNTER
Patient Information     Patient Name MRN Sex Jennyfer Solis [id number] Female 1950      Telephone Encounter by Deborah Montana at 2017  4:28 PM     Author:  Deborah Montana Service:  (none) Author Type:  (none)     Filed:  2017  4:28 PM Encounter Date:  2017 Status:  Signed     :  Deborah Montana            Left message to call back  ....................  2017   4:28 PM  Deborah Montana LPN.......................... 2017  4:28 PM

## 2018-02-12 NOTE — TELEPHONE ENCOUNTER
Patient Information     Patient Name MRN Sex Jennyfer Solis [id number] Female 1950      Telephone Encounter by Dorie Christiansen at 2017 12:29 PM     Author:  Dorie Christiansen Service:  (none) Author Type:  (none)     Filed:  2017 12:30 PM Encounter Date:  2017 Status:  Signed     :  Dorie Christiansen             Patient was seen today 17 for lump in rt breast. Patient would like results from breast exam as soon as possible.

## 2018-02-12 NOTE — PATIENT INSTRUCTIONS
Patient Information     Patient Name MRN Jennyfer Brady [id number] Female 1950      Patient Instructions by Jennyfer Hopper MD at 2017 11:55 AM     Author:  Jennyfer Hopper MD  Service:  (none) Author Type:  Physician     Filed:  2017 11:57 AM  Encounter Date:  2017 Status:  Addendum     :  Jennyfer Hopper MD (Physician)        Related Notes: Original Note by Jennyfer Hopper MD (Physician) filed at 2017 11:55 AM            You are due for diabetic check up ;  Labs will be ordered follow up separate appointment    Referral for diagnostic mammogram and ultrasound of right breast lump and dimpling ordered  Recommend seeing Dr. Bauer after above completed        [employer]: Have You Had Your Mammogram?     Mammograms  Why are mammograms important?            Mammograms can help women detect breast cancer early.            A mammogram may find the smallest cancers before a lump can be felt.            Early detection means more choices for treatment if you do have cancer.  Reasons Women Give for Avoiding a Mammogram  Do you or someone you know say the following about mammograms?   There s no breast cancer in my family.    I don t have time.    It might hurt.   There are many reasons why women say they do not get regular mammograms. The following are some of their reasons along with some important information for you to know.             I can t afford a mammogram.   Most insurance companies cover the cost of the screening (you may have a copay).     Check with your insurance provider to see if the mammogram(s) will be covered. Your health care provider s recommendation for services does not guarantee coverage by your insurance provider.    Medicare now covers yearly mammograms. Free and low-cost mammograms are available for women with little or no insurance. Call the American [employer] at 1-172.843.6500 for more information  about these programs.               No one in my family has ever had breast cancer. I don t need screening.   Being a woman and getting older are the two greatest risk factors for getting breast cancer. The majority of all breast cancers occur in women with no other risk factors.    Breast cancer risk is increased if other close members of your family (mother or sister) had breast cancer.    You have a slightly increased risk of breast cancer if you:  -       had your first period before age 12  -       have never given birth  -       gave birth to your first child after age 35  -       started menopause after age 55.  Obesity is linked to an increased risk of breast cancer.     Please talk with your health care provider if you have any questions or concerns about these risk factors.                My health care provider never told me to get a mammogram.   If your health care provider hasn t told you about the need for a mammogram, ask him or her.                I don t have time to get a mammogram.   The entire test usually takes less than 30 minutes. It s important to take the time to care for yourself and your health.                I heard it hurts. I don t want to go through that.   You may feel some brief discomfort, but it lasts less than 30 seconds. A technologist will position your breast and compress it to get a good  picture.      If you have sensitive breasts, try having your mammogram at a time of the month when your breasts will be least tender. For example, avoid scheduling a mammogram the week before your period. This will help lessen discomfort.                I m better off not knowing.   More than 80 percent of breast lumps are not cancerous. You can gain peace of mind from taking control of your health and having regular mammograms.     You Can See the Difference a Mammogram Makes    Regular mammograms are your best way to find tumors at their smallest when treatment can be the most  successful.  Breast Cancer Screening Schedule  When to start having mammograms to screen for breast cancer, and how often to have them, is a personal decision. It should be based on your preferences, your values and your risk for developing breast cancer. [employer] recommends that you and your health care provider together determine when mammograms are right for you.  [employer] recommends the following guidelines for women who have an average risk for breast cancer, based on American Cancer Society guidelines:            Age 40 to 44: Mammograms are optional.             Age 45 to 54: Have a mammogram every year.            Age 55 and older: Have a mammogram every year, or transition to having one every 2 years. Continue to have mammograms as long as your health is good.  If you have a  higher than average risk for breast cancer, your health care provider may recommend a different schedule.  Talk with your health care provider about your risk level. Together, you and your health care provider can decide what screening schedule is right for you.   2016 [employer] - A [employer]EMARK OF [employer]  OTHER TRADEMARKS USED ARE OWNED BY THEIR RESPECTIVE OWNERS  THIS FACT SHEET DOES NOT REPLACE MEDICAL OR PROFESSIONAL ADVICE; IT IS ONLY A GUIDE  [id number] (12/16)

## 2018-02-12 NOTE — TELEPHONE ENCOUNTER
Patient Information     Patient Name MRN Sex Jennyfer Solis [id number] Female 1950      Telephone Encounter by Deborah Montana at 2017  8:55 AM     Author:  Deborah Montana Service:  (none) Author Type:  (none)     Filed:  2017  8:56 AM Encounter Date:  2017 Status:  Signed     :  Deborah Montana            Left message to call back  ....................  2017   8:55 AM    Per Anayeli Bauer MD patient can be seen on the  or .  Deborah Montana LPN.......................... 2017  8:56 AM

## 2018-02-12 NOTE — TELEPHONE ENCOUNTER
Patient Information     Patient Name MRN Sex Jennyfer Solis [id number] Female 1950      Telephone Encounter by Amarilis Nam at 2017  2:58 PM     Author:  Amarilis Nam Service:  (none) Author Type:  (none)     Filed:  2017  3:03 PM Encounter Date:  2017 Status:  Signed     :  Amarilis Nam            Patient is having US and XR today at 330pm and was under the understanding that she was suppose to see a Surgeon today.   She is wondering if someone can call her and let her know what is going on before the Holidays.   The reason I was calling patient was to make an appt Dr Bauer. Thank You  Amarilis Nam ....................  2017   3:01 PM

## 2018-02-12 NOTE — TELEPHONE ENCOUNTER
Patient Information     Patient Name MRN Sex Jennyfer Solis [id number] Female 1950      Telephone Encounter by Sunita Rincon at 2017  2:02 PM     Author:  Sunita Rincon Service:  (none) Author Type:  (none)     Filed:  2017  2:02 PM Encounter Date:  2017 Status:  Signed     :  Sunita Rincon            Patient notified  Sunita Rincon LPN........................2017  2:02 PM

## 2018-02-12 NOTE — TELEPHONE ENCOUNTER
Patient Information     Patient Name MRN Sex Jennyfer Solis [id number] Female 1950      Telephone Encounter by Maude Cifuentes at 2018  3:08 PM     Author:  Maude Cifuentes Service:  (none) Author Type:  (none)     Filed:  2018  3:12 PM Encounter Date:  2018 Status:  Signed     :  Maude Cifuentes            Patient called back in reference to the general surgery referral that was placed on 2017.  She had a breast lump and was recommended seeing general surgeon to have it checked out.  She has had a mammogram and ultrasound and was told that they did not think that it was cancerous.  She has not seen the surgeon yet and she stated that the lump has gotten smaller.  She is wondering if Dr. Hopper would still recommend her to see the surgeon, her, or just see how it goes.  Please call to discuss and advise.  Maude Cifuentes ....................  2018   3:11 PM

## 2018-02-13 NOTE — TELEPHONE ENCOUNTER
Patient Information     Patient Name MRN Sex Jennyfer Solis [id number] Female 1950      Telephone Encounter by Deborah Montana at 2018  3:10 PM     Author:  Deborah Montana Service:  (none) Author Type:  (none)     Filed:  2018  3:11 PM Encounter Date:  2018 Status:  Signed     :  Deborah Montana

## 2018-02-13 NOTE — TELEPHONE ENCOUNTER
Patient Information     Patient Name MRN Sex Jennyfer Solis [id number] Female 1950      Telephone Encounter by Eunice Granados RN at 2018 12:26 PM     Author:  Eunice Granados RN Service:  (none) Author Type:  NURS- Registered Nurse     Filed:  2018 12:31 PM Encounter Date:  2018 Status:  Signed     :  Eunice Granados RN (NURS- Registered Nurse)            Biguanides    Office visit in the past 12 months or per provider note.    Last visit with JENNYFER GUNDERSON was on: 2017 in [facility]  Next visit with JENNYFER GUNDERSON is on: No future appointment listed with this provider  Next visit with Family Practice is on: No future appointment listed in this department    Lab test requirements:  HgbA1c annually or per provider note.  HEMOGLOBIN A1C MONITORING (POCT) (%)    Date Value   2017 6.6 (H)     HEMOGLOBIN A1C GI (%)    Date Value   2011 6.1       Max refill for 12 months from last office visit or per provider note.    If taking for polycystic ovary disease, may refill for 12 months.  Patient is due for medication management appointment. Limited refill provided at this time. [employer] message and/or letter sent for reminder to patient. Prescription refilled per RN Medication Refill Policy.................... Eunice Granados RN ....................  2018   12:30 PM

## 2018-02-13 NOTE — TELEPHONE ENCOUNTER
Patient Information     Patient Name MRN Sex Jennyfer Solis [id number] Female 1950      Telephone Encounter by Jennyfer Hopper MD at 2018  6:48 PM     Author:  Jennyfer Hopper MD Service:  (none) Author Type:  Physician     Filed:  2018  6:49 PM Encounter Date:  2018 Status:  Signed     :  Jennyfer Hopper MD (Physician)            Keep appointment with surgeon

## 2018-02-13 NOTE — TELEPHONE ENCOUNTER
Patient Information     Patient Name MRN Jennyfer Brady [id number] Female 1950      Telephone Encounter by Maude Cifuentes at 2018  3:04 PM     Author:  Maude Cifuentes Service:  (none) Author Type:  (none)     Filed:  2018  3:06 PM Encounter Date:  2018 Status:  Signed     :  Maude Cifuentes            Spoke with patient several times.  We were unable to find a day that works for her and she is holding off for now.  She's going to touch base with [facility] again and let us know if she wants to try to find a day that works.  She can only had late in the afternoon as she doesn't want to miss any work.  She will be in touch if anything changes.  Maude Cifuentes ....................  2018   3:05 PM

## 2018-02-13 NOTE — TELEPHONE ENCOUNTER
Patient Information     Patient Name MRN Sex Jennyfer Solis [id number] Female 1950      Telephone Encounter by Kimberly Dukes at 2018  8:14 AM     Author:  Kimberly Dukes Service:  (none) Author Type:  (none)     Filed:  2018  8:17 AM Encounter Date:  2018 Status:  Signed     :  Kimberly Dukes            Spoke with patient who confirmed her last name and birth date. Informed patient of Jennyfer Hopper MD note below. Patient had no further questions, and she will await a call to schedule.   Kimberly Dukes LPN............................ 2018 8:16 AM

## 2018-02-13 NOTE — TELEPHONE ENCOUNTER
Patient Information     Patient Name MRN Sex Jennyfer Solis [id number] Female 1950      Telephone Encounter by Maude Cifuentes at 2018  9:49 AM     Author:  Maude Cifuentes Service:  (none) Author Type:  (none)     Filed:  2018  9:51 AM Encounter Date:  2018 Status:  Signed     :  Maude Cifuentes            Patient called with questions regarding a consult with [facility].  She is an [facility] patient who was referred to [facility] back in December.  Patient had been putting it off, wondering if she still needed to see a surgeon.  She has a Breast lump on right side at 3 o'clock position and it has been getting smaller but it is still there.  She spoke with [facility] who told her to still see a surgeon.  She's now concerned and would like to see [facility] as soon as possible.  Can she be worked in at all?  Thank you.  Maude Cifuentes ....................  2018   9:51 AM

## 2018-02-28 ENCOUNTER — OFFICE VISIT (OUTPATIENT)
Dept: FAMILY MEDICINE | Facility: OTHER | Age: 68
End: 2018-02-28
Attending: FAMILY MEDICINE
Payer: MEDICARE

## 2018-02-28 VITALS
HEART RATE: 76 BPM | WEIGHT: 156 LBS | SYSTOLIC BLOOD PRESSURE: 138 MMHG | BODY MASS INDEX: 27.63 KG/M2 | DIASTOLIC BLOOD PRESSURE: 78 MMHG

## 2018-02-28 DIAGNOSIS — I25.2 HISTORY OF MI (MYOCARDIAL INFARCTION): ICD-10-CM

## 2018-02-28 DIAGNOSIS — I10 BENIGN ESSENTIAL HYPERTENSION: Primary | ICD-10-CM

## 2018-02-28 DIAGNOSIS — L91.8 INFLAMED SKIN TAG: ICD-10-CM

## 2018-02-28 DIAGNOSIS — F41.1 GAD (GENERALIZED ANXIETY DISORDER): ICD-10-CM

## 2018-02-28 DIAGNOSIS — M25.561 RIGHT KNEE PAIN, UNSPECIFIED CHRONICITY: ICD-10-CM

## 2018-02-28 DIAGNOSIS — H92.03 DISCOMFORT OF BOTH EARS: ICD-10-CM

## 2018-02-28 DIAGNOSIS — E11.8 TYPE 2 DIABETES MELLITUS WITH COMPLICATION, WITHOUT LONG-TERM CURRENT USE OF INSULIN (H): ICD-10-CM

## 2018-02-28 LAB
ALBUMIN SERPL-MCNC: 4.5 G/DL (ref 3.5–5.7)
ALP SERPL-CCNC: 99 U/L (ref 34–104)
ALT SERPL W P-5'-P-CCNC: 19 U/L (ref 7–52)
ANION GAP SERPL CALCULATED.3IONS-SCNC: 10 MMOL/L (ref 3–14)
AST SERPL W P-5'-P-CCNC: 23 U/L (ref 13–39)
BILIRUB SERPL-MCNC: 0.4 MG/DL (ref 0.3–1)
BUN SERPL-MCNC: 14 MG/DL (ref 7–25)
CALCIUM SERPL-MCNC: 9.8 MG/DL (ref 8.6–10.3)
CHLORIDE SERPL-SCNC: 102 MMOL/L (ref 98–107)
CO2 SERPL-SCNC: 27 MMOL/L (ref 21–31)
CREAT SERPL-MCNC: 0.77 MG/DL (ref 0.6–1.2)
ERYTHROCYTE [DISTWIDTH] IN BLOOD BY AUTOMATED COUNT: 13.2 % (ref 10–15)
GFR SERPL CREATININE-BSD FRML MDRD: 74 ML/MIN/1.7M2
GLUCOSE SERPL-MCNC: 108 MG/DL (ref 70–105)
HBA1C MFR BLD: 6.5 % (ref 4–6)
HCT VFR BLD AUTO: 40.9 % (ref 35–47)
HGB BLD-MCNC: 13.8 G/DL (ref 11.7–15.7)
MCH RBC QN AUTO: 29.4 PG (ref 26.5–33)
MCHC RBC AUTO-ENTMCNC: 33.7 G/DL (ref 31.5–36.5)
MCV RBC AUTO: 87 FL (ref 78–100)
PLATELET # BLD AUTO: 354 10E9/L (ref 150–450)
POTASSIUM SERPL-SCNC: 3.7 MMOL/L (ref 3.5–5.1)
PROT SERPL-MCNC: 8.2 G/DL (ref 6.4–8.9)
RBC # BLD AUTO: 4.7 10E12/L (ref 3.8–5.2)
SODIUM SERPL-SCNC: 139 MMOL/L (ref 134–144)
TROPONIN I SERPL-MCNC: <0.03 UG/L (ref 0–0.03)
WBC # BLD AUTO: 8.7 10E9/L (ref 4–11)

## 2018-02-28 PROCEDURE — 85027 COMPLETE CBC AUTOMATED: CPT | Performed by: FAMILY MEDICINE

## 2018-02-28 PROCEDURE — 11200 RMVL SKIN TAGS UP TO&INC 15: CPT | Performed by: FAMILY MEDICINE

## 2018-02-28 PROCEDURE — 36415 COLL VENOUS BLD VENIPUNCTURE: CPT | Performed by: FAMILY MEDICINE

## 2018-02-28 PROCEDURE — G0463 HOSPITAL OUTPT CLINIC VISIT: HCPCS

## 2018-02-28 PROCEDURE — 83036 HEMOGLOBIN GLYCOSYLATED A1C: CPT | Performed by: FAMILY MEDICINE

## 2018-02-28 PROCEDURE — 84484 ASSAY OF TROPONIN QUANT: CPT | Performed by: FAMILY MEDICINE

## 2018-02-28 PROCEDURE — 82043 UR ALBUMIN QUANTITATIVE: CPT | Performed by: FAMILY MEDICINE

## 2018-02-28 PROCEDURE — 99214 OFFICE O/P EST MOD 30 MIN: CPT | Mod: 25 | Performed by: FAMILY MEDICINE

## 2018-02-28 PROCEDURE — 80053 COMPREHEN METABOLIC PANEL: CPT | Performed by: FAMILY MEDICINE

## 2018-02-28 RX ORDER — METOPROLOL SUCCINATE 25 MG/1
25 TABLET, EXTENDED RELEASE ORAL DAILY
COMMUNITY
Start: 2017-07-20 | End: 2018-03-27

## 2018-02-28 RX ORDER — CLOPIDOGREL BISULFATE 75 MG/1
75 TABLET ORAL DAILY
COMMUNITY
Start: 2017-07-11 | End: 2018-03-27

## 2018-02-28 RX ORDER — ASPIRIN 81 MG/1
81 TABLET, CHEWABLE ORAL DAILY
COMMUNITY
Start: 2017-07-11 | End: 2022-10-31 | Stop reason: ALTCHOICE

## 2018-02-28 RX ORDER — DULOXETIN HYDROCHLORIDE 30 MG/1
30 CAPSULE, DELAYED RELEASE ORAL DAILY
COMMUNITY
Start: 2017-07-20 | End: 2018-03-27

## 2018-02-28 RX ORDER — LORAZEPAM 0.5 MG/1
0.25 TABLET ORAL
COMMUNITY
Start: 2017-09-05 | End: 2018-04-09

## 2018-02-28 RX ORDER — NITROGLYCERIN 0.4 MG/1
0.4 TABLET SUBLINGUAL
COMMUNITY
Start: 2017-07-11 | End: 2020-09-30

## 2018-02-28 RX ORDER — ATORVASTATIN CALCIUM 40 MG/1
40 TABLET, FILM COATED ORAL DAILY
COMMUNITY
Start: 2017-07-20 | End: 2018-03-27

## 2018-02-28 RX ORDER — TRAMADOL HYDROCHLORIDE 50 MG/1
1 TABLET ORAL 3 TIMES DAILY PRN
Refills: 0 | COMMUNITY
Start: 2017-09-05 | End: 2018-10-02

## 2018-02-28 ASSESSMENT — PAIN SCALES - GENERAL: PAINLEVEL: NO PAIN (0)

## 2018-02-28 NOTE — PROGRESS NOTES
Nursing Notes:   Sunita Rincon LPN  2/28/2018  3:59 PM  Signed  Patient presents for concerns of elevated Blood pressure and pulse.  Also injured her Right knee.  Ears are also bothering her.   Sunita Rincon LPN........................2/28/2018  3:42 PM       Subjective:  Jennyfer Elizabeth is a 68 year old female who presents for  Multiple issues    High blood pressure  Patient has had MI previously;   She is suppose to see I in April.  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  She has history of anxiety.  She has learned of a coworker's relative having a stroke with mildly elevated blood pressure.  She is quite worried.  She is not having any focal neurologic symptoms, no change in vision, ears are full, no slurred speech.  No focal tingling or numbness.  She notes she has been aware of her heart and at times can feel her heart beat.   No symptoms during office visit.  No diaphoresis, no reflux, no jaw pain         Right knee  Patient reports her right knee has hurt off and on.   She was at a friends house for dinner and a dog jumped and injured her right femur when a large dog jumped up on her.   She thought she would have a large bruise but none has appeared.  She has not had any obvious swelling or bruise but feels like it.  She is able to walk on it but dull toothache with prolonged sitting.  Her knee will occasionally give out.   She puts a tennis ball under her right knee;  She uses a ace wrap            Type 2 diabetes mellitus with complication, without long-term current use of insulin (H)  Patient with diagnosis of type 2 diabetes.  She has had MI .  Does not regularly check her blood sugars but indicates they have been elevated in the last week.   S        Discomfort of both ears  Patient complaining pain in bilateral ears.   Feels full.  Is getting over uri.   She denies  fever/ chills.       No Known Allergies       Current Outpatient Prescriptions   Medication     aspirin 81 MG chewable tablet     atorvastatin (LIPITOR) 40 MG tablet     clopidogrel (PLAVIX) 75 MG tablet     DULoxetine (CYMBALTA) 30 MG EC capsule     LORazepam (ATIVAN) 0.5 MG tablet     metFORMIN (GLUCOPHAGE) 500 MG tablet     metoprolol succinate (TOPROL-XL) 25 MG 24 hr tablet     nitroGLYcerin (NITROSTAT) 0.4 MG sublingual tablet     traMADol (ULTRAM) 50 MG tablet     No current facility-administered medications for this visit.        Problem List/PMH: reviewed in EMR    Social Hx:  Social History   Substance Use Topics     Smoking status: Former Smoker     Quit date: 10/1/2007     Smokeless tobacco: Never Used      Comment: Quit smoking: Patient occasionally uses Nicorette gum     Alcohol use 8.4 oz/week       Family Hx:   Family History   Problem Relation Age of Onset     Hypertension Father      Hypertension     HEART DISEASE Father      Heart Disease,MI     CANCER Mother      Cancer,Lung     DIABETES Sister      Diabetes     Hyperlipidemia Sister      Hyperlipidemia,High     Other - See Comments Sister 33     Kidney problems     Breast Cancer No family hx of      Cancer-breast       Objective:  /78  Pulse 76  Wt 156 lb (70.8 kg)  Breastfeeding? No  BMI 27.63 kg/m2    Patient appears well, alert and oriented x 3, pleasant, cooperative.  FARAZ. TM's clear after significant amount of cerumen removed. , Oral pharynx with good dentition, without lesion, erythema or exudate. Moist mucous membranes.Neck supple and free of adenopathy, or masses. No thyromegaly. Lungs are clear, without wheezes, rhonchi or rales. Heart sounds are normal, no murmurs, clicks, gallops or rubs. Abdomen is soft, no tenderness, masses ororganomegaly. No CVAT.  Extremities are without edema.  Right knee with negative lachman, negative Lin, no erythema.  No bruise,  No obvious swelling.  Contralateral knee is normal.   Bilateral  hips with good ROM. Peripheral pulses are normal. Screening neurological exam is normal without focal findings. Skin is slightly dry.  She has a pedunculated flesh colored skin tag on anterior neck she would like frozen. It is inflamed at base.  Areas of excoriation noted. After risks benefits and alternatives to cryotherapy discussed liquid nitrogen was applied to each area times two seven second freezes carefully protecting normal tissue.  Patient tolerated procedure well.          Results for orders placed or performed in visit on 02/28/18   Albumin Random Urine Quantitative with Creat Ratio   Result Value Ref Range    Creatinine Urine 130 mg/dL    Albumin Urine mg/L 8 mg/L    Albumin Urine mg/g Cr 6.35 0 - 25 mg/g Cr   Hemoglobin A1c   Result Value Ref Range    Hemoglobin A1C 6.5 (H) 4.0 - 6.0 %   CBC with platelets   Result Value Ref Range    WBC 8.7 4.0 - 11.0 10e9/L    RBC Count 4.70 3.8 - 5.2 10e12/L    Hemoglobin 13.8 11.7 - 15.7 g/dL    Hematocrit 40.9 35.0 - 47.0 %    MCV 87 78 - 100 fl    MCH 29.4 26.5 - 33.0 pg    MCHC 33.7 31.5 - 36.5 g/dL    RDW 13.2 10.0 - 15.0 %    Platelet Count 354 150 - 450 10e9/L   Troponin I   Result Value Ref Range    Troponin I ES <0.030 0.000 - 0.034 ug/L   Comprehensive metabolic panel (BMP + Alb, Alk Phos, ALT, AST, Total. Bili, TP)   Result Value Ref Range    Sodium 139 134 - 144 mmol/L    Potassium 3.7 3.5 - 5.1 mmol/L    Chloride 102 98 - 107 mmol/L    Carbon Dioxide 27 21 - 31 mmol/L    Anion Gap 10 3 - 14 mmol/L    Glucose 108 (H) 70 - 105 mg/dL    Urea Nitrogen 14 7 - 25 mg/dL    Creatinine 0.77 0.60 - 1.20 mg/dL    GFR Estimate 74 >60 mL/min/1.7m2    GFR Estimate If Black 90 >60 mL/min/1.7m2    Calcium 9.8 8.6 - 10.3 mg/dL    Bilirubin Total 0.4 0.3 - 1.0 mg/dL    Albumin 4.5 3.5 - 5.7 g/dL    Protein Total 8.2 6.4 - 8.9 g/dL    Alkaline Phosphatase 99 34 - 104 U/L    ALT 19 7 - 52 U/L    AST 23 13 - 39 U/L         Assessment:    ICD-10-CM    1. Benign essential  hypertension I10 CBC with platelets     Troponin I     Comprehensive metabolic panel (BMP + Alb, Alk Phos, ALT, AST, Total. Bili, TP)   2. Type 2 diabetes mellitus with complication, without long-term current use of insulin (H) E11.8 Albumin Random Urine Quantitative with Creat Ratio     Hemoglobin A1c     Comprehensive metabolic panel (BMP + Alb, Alk Phos, ALT, AST, Total. Bili, TP)   3. Discomfort of both ears H92.03    4. Right knee pain, unspecified chronicity M25.561    5. Inflamed skin tag L91.8 DESTRUCT PREMALIGNANT LESION, FIRST   6. History of MI (myocardial infarction) I25.2    7. TAYLOR (generalized anxiety disorder) F41.1           Plan:   -- Expected clinical course discussed   -- Medications and their side effects discussed     Patient Instructions     1.  If your blood pressure consistently stays above 140/90 and or pulse 80's could consider increasing your toprol XL 25 mg  From 1 tablet a day to 1.5 tablet a day.   2.  Blood sugar is normal  3.   Ice to right knee;   Ice in extension ;  4.  Use icyhot with lidocaine or aspercreme with lidocaine to knee  5.  Walk every day for 30 minutes  6. Keep follow up appointment [facility]  7.  Goal A1c under 7.0   8.  Lesion that was frozen today will blister up and then slough off       Results for orders placed or performed in visit on 02/28/18   Hemoglobin A1c   Result Value Ref Range    Hemoglobin A1C 6.5 (H) 4.0 - 6.0 %   CBC with platelets   Result Value Ref Range    WBC 8.7 4.0 - 11.0 10e9/L    RBC Count 4.70 3.8 - 5.2 10e12/L    Hemoglobin 13.8 11.7 - 15.7 g/dL    Hematocrit 40.9 35.0 - 47.0 %    MCV 87 78 - 100 fl    MCH 29.4 26.5 - 33.0 pg    MCHC 33.7 31.5 - 36.5 g/dL    RDW 13.2 10.0 - 15.0 %    Platelet Count 354 150 - 450 10e9/L   Troponin I   Result Value Ref Range    Troponin I ES <0.030 0.000 - 0.034 ug/L   Comprehensive metabolic panel (BMP + Alb, Alk Phos, ALT, AST, Total. Bili, TP)   Result Value Ref Range    Sodium 139 134 - 144 mmol/L    Potassium  3.7 3.5 - 5.1 mmol/L    Chloride 102 98 - 107 mmol/L    Carbon Dioxide 27 21 - 31 mmol/L    Anion Gap 10 3 - 14 mmol/L    Glucose 108 (H) 70 - 105 mg/dL    Urea Nitrogen 14 7 - 25 mg/dL    Creatinine 0.77 0.60 - 1.20 mg/dL    GFR Estimate 74 >60 mL/min/1.7m2    GFR Estimate If Black 90 >60 mL/min/1.7m2    Calcium 9.8 8.6 - 10.3 mg/dL    Bilirubin Total 0.4 0.3 - 1.0 mg/dL    Albumin 4.5 3.5 - 5.7 g/dL    Protein Total 8.2 6.4 - 8.9 g/dL    Alkaline Phosphatase 99 34 - 104 U/L    ALT 19 7 - 52 U/L    AST 23 13 - 39 U/L          Eating Heart-Healthy Food: Using the DASH Plan    Eating for your heart doesn t have to be hard or boring. You just need to know how to make healthier choices. The DASH eating plan has been developed to help you do just that. DASH stands for Dietary Approaches to Stop Hypertension. It is a plan that has been proven to be healthier for your heart and to lower your risk for high blood pressure. It can also help lower your risk for cancer, heart disease, osteoporosis, and diabetes.  Choosing from each food group  Choose foods from each of the food groups below each day. Try to get the recommended number of servings for each food group. The serving numbers are based on a diet of 2,000 calories a day. Talk to your doctor if you re unsure about your calorie needs. Along with getting the correct servings, the DASH plan also recommends a sodium intake less than 2,300 mg per day.        Grains  Servings: 6 to 8 a day  A serving is:    1 slice bread    1 ounce dry cereal    Half a cup cooked rice, pasta or cereal  Best choices: Whole grains and any grains high in fiber. Vegetables  Servings: 4 to 5 a day  A serving is:    1 cup raw leafy vegetable    Half a cup cut-up raw or cooked vegetable    Half a cup vegetable juice  Best choices: Fresh or frozen vegetables prepared without added salt or fat.   Fruits  Servings: 4 to 5 a day  A serving is:    1 medium fruit    One-quarter cup dried fruit    Half a  cup fresh, frozen, or canned fruit    Half a cup of 100% fruit juices  Best choices: A variety of fresh fruits of different colors. Whole fruits are a better choice than fruit juices. Low-fat or fat-free dairy  Servings: 2 to 3 a day  A serving is:    1 cup milk    1 cup yogurt    One and a half ounces cheese  Best choices: Skim or 1% milk, low-fat or fat-free yogurt or buttermilk, and low-fat cheeses.         Lean meats, poultry, fish  Servings: 6 or fewer a day  A serving is:    1 ounce cooked meats, poultry, or fish    1 egg  Best choices: Lean poultry and fish. Trim away visible fat. Broil, grill, roast, or boil instead of frying. Remove skin from poultry before eating. Limit how much red meat you eat.  Nuts, seeds, beans  Servings: 4 to 5 a week  A serving is:    One-third cup nuts (one and a half ounces)    2 tablespoons nut butter or seeds    Half a cup cooked dry beans or legumes  Best choices: Dry roasted nuts with no salt added, lentils, kidney beans, garbanzo beans, and whole hall beans.   Fats and oils  Servings: 2 to 3 a day  A serving is:    1 teaspoon vegetable oil    1 teaspoon soft margarine    1 tablespoon mayonnaise    2 tablespoons salad dressing  Best choices: Nut and vegetable oils (nontropical vegetable oils), such as olive and canola oil. Sweets  Servings: 5 a week or fewer  A serving is:    1 tablespoon sugar, maple syrup, or honey    1 tablespoon jam or jelly    1 half-ounce jelly beans (about 15)    1 cup lemonade  Best choices: Dried fruit can be a satisfying sweet. Choose low-fat sweets. And watch your serving sizes!      For more on the DASH eating plan, visit:  www.nhlbi.nih.gov/health/health-topics/topics/dash   Date Last Reviewed: 6/1/2016 2000-2017 [employer]. [address] [place] [address], [place] [address]. All rights reserved. This information is not intended as a substitute for professional medical care. Always follow your healthcare professional's  instructions.        Controlling High Blood Pressure  High blood pressure (hypertension) is often called the silent killer. This is because many people who have it don t know it. High blood pressure is defined as 140/90 mm Hg or higher. Know your blood pressure and remember to check it regularly. Doing so can save your life. Here are some things you can do to help control your blood pressure.    Choose heart-healthy foods    Select low-salt, low-fat foods. Limit sodium intake to 2,400 mg per day or the amount suggested by your healthcare provider.    Limit canned, dried, cured, packaged, and fast foods. These can contain a lot of salt.    Eat 8 to 10 servings of fruits and vegetables every day.    Choose lean meats, fish, or chicken.    Eat whole-grain pasta, brown rice, and beans.    Eat 2 to 3 servings of low-fat or fat-free dairy products.    Ask your doctor about the DASH eating plan. This plan helps reduce blood pressure.    When you go to a restaurant, ask that your meal be prepared with no added salt.  Maintain a healthy weight    Ask your healthcare provider how many calories to eat a day. Then stick to that number.    Ask your healthcare provider what weight range is healthiest for you. If you are overweight, a weight loss of only 3% to 5% of your body weight can help lower blood pressure. Generally, a good weight loss goal is to lose 10% of your body weight in a year.    Limit snacks and sweets.    Get regular exercise.  Get up and get active    Choose activities you enjoy. Find ones you can do with friends or family. This includes bicycling, dancing, walking, and jogging.    Park farther away from building entrances.    Use stairs instead of the elevator.    When you can, walk or bike instead of driving.    Centennial leaves, garden, or do household repairs.    Be active at a moderate to vigorous level of physical activity for at least 40 minutes for a minimum of 3 to 4 days a week.   Manage stress    Make time  to relax and enjoy life. Find time to laugh.    Communicate your concerns with your loved ones and your healthcare provider.    Visit with family and friends, and keep up with hobbies.  Limit alcohol and quit smoking    Men should have no more than 2 drinks per day.    Women should have no more than 1 drink per day.    Talk with your healthcare provider about quitting smoking. Smoking significantly increases your risk for heart disease and stroke. Ask your healthcare provider about community smoking cessation programs and other options.  Medicines  If lifestyle changes aren t enough, your healthcare provider may prescribe high blood pressure medicine. Take all medicines as prescribed. If you have any questions about your medicines, ask your healthcare provider before stopping or changing them.   Date Last Reviewed: 4/27/2016 2000-2017 [employer]. [address] [place] [address], [place] [address]. All rights reserved. This information is not intended as a substitute for professional medical care. Always follow your healthcare professional's instructions.        What is High Blood Pressure?  High blood pressure (also called hypertension) is known as the  silent killer.  This is because most of the time it doesn t cause symptoms. In fact, many people don t know they have it until other problems develop. In most cases, high blood pressure can t be cured. It s a disease that often requires lifelong treatment. The good news is that it can be managed.  Understanding blood pressure  The circulatory system is made up of the heart and blood vessels that carry blood through the body. Your heart is the pump for this system. With each heartbeat (contraction), the heart sends blood out through large blood vessels called arteries. Blood pressure is a measure of how hard the moving blood pushes against the walls of the arteries.  High blood pressure can harm your health  In a healthy blood vessel, the blood moves smoothly  "through the vessel and puts normal pressure on the vessel walls.       High blood pressure occurs when blood pushes too hard against artery walls. This causes damage to the artery walls and then the formation of scar tissue as it heals. This makes the arteries stiff and weak. Plaque sticks to the scarred tissue narrowing and hardening the arteries. High blood pressure also causes your heart to work harder to get blood out to the body. High blood pressure raises your risk of heart attack, also known as acute myocardial infarction, or AMI, and stroke. It can also lead to kidney disease, and blindness.  Measuring blood pressure  An example of a blood pressure measurement is 120/70 (120 over 70). The top number is the pressure of blood against the artery walls during a heartbeat (systolic). The bottom number is the pressure of blood against artery walls between heartbeats (diastolic). Talk with your healthcare provider to find out what your blood pressure goals should be.   Controlling blood pressure  If your blood pressure is too high, work with your doctor on a plan for lowering it. Below are steps you can take that will help lower your blood pressure.    Choose heart-healthy foods. Eating healthier meals helps you control your blood pressure. Ask your doctor about the DASH eating plan. This plan helps reduce blood pressure by limiting the amount of sodium (salt) you have in your diet. DASH also encourages eating plenty of fruits and vegetables, low-fat or non-fat dairy, whole-grains, and foods high in fiber, and low in fat.    Reduce sodium. Reducing sodium in your diet reduces fluid retention. Fluid retention caused by too much salt increases blood volume and blood pressure. The American Heart Association s (AHA) \"ideal\" sodium intake recommendation is 1,500 milligrams per day.  However, since American's eat so much salt, the AHA says a positive change can occur by cutting back to even 2,400 milligrams of sodium a " day.    Maintain a healthy weight. Being overweight makes you more likely to have high blood pressure. Losing excess weight helps lower blood pressure.    Exercise regularly. Daily exercise helps your heart and blood vessels work better and stay healthier. It can help lower your blood pressure.    Stop smoking. Smoking increases blood pressure and damages blood vessels.    Limit alcohol. Drinking too much alcohol can raise blood pressure. Men should have no more than 2 drinks a day. Women should have no more than 1. (A drink is equal to 1 beer, or a small glass of wine, or a shot of liquor.)    Control stress. Stress makes your heart work harder and beat faster. Controlling stress helps you control your blood pressure.  Facts about high blood pressure    Feeling OK does not mean that blood pressure is under control. Likewise, feeling bad doesn t mean it s out of control. The only way to know for sure is to check your pressure regularly.    Medicine is only one part of controlling high blood pressure. You also need to manage your weight, get regular exercise, and adjust your eating habits.    High blood pressure is usually a lifelong problem. But it can be controlled with healthy lifestyle changes and medicine.    Hypertension is not the same as stress. Although stress may be a factor in high blood pressure, it s only one part of the story.    Blood pressure medicines need to be taken every day. Stopping suddenly may cause a dangerous increase in pressure.   Date Last Reviewed: 4/27/2016 2000-2017 [employer]. [address] [place] [address]. All rights reserved. This information is not intended as a substitute for professional medical care. Always follow your healthcare professional's instructions.        Understanding Meniscal Tears    The meniscus is a tough cushion of fibrous tissue called cartilage in the knee joint. It cushions the knee. It absorbs shock and helps spread weight across  the knee joint. It also works with other parts of the knee to help keep the joint stable. Injury or aging can cause the meniscus to tear and lead to pain and problems using the knee.   What causes meniscal tears?  A sudden injury can tear the meniscus. This is often because of planting the foot and twisting the knee. Sports such as soccer, football, and basketball are often involved. Repeated actions, such as squatting, may also lead to a tear. Breakdown of the meniscus because of aging can also lead to tears.  Symptoms of meniscal tears  These can include:    Knee pain    Knee swelling    Catching of the knee or inability to straighten the knee    Unstable feeling in the knee  Treatment for meniscal tears  A tear is unlikely to heal on its own. You often will need surgery to repair a tear. In many cases, your healthcare provider will first try treatments to help relieve symptoms. These may include:    Rest the knee. This means avoiding any activity that puts stress on the knee joint. These include kneeling, squatting, jogging, and climbing stairs. In some cases, you may need to use crutches for a time to keep body weight off of the knee joint.    Cold pack. Putting a cold pack on the knee helps reduce pain and swelling.    Knee brace. Bracing the knee helps support it.    Medicine. Prescription and over-the-counter pain medicines can help relieve swelling and pain.    Exercises. Exercises help strengthen the muscles of the leg to help support the knee joint.  If these treatments don t help relieve symptoms or the injury is severe, you may need surgery. This can repair the meniscus to relieve symptoms and restore movement.     When to call your healthcare provider  Call your healthcare provider right away if you have any of these:    Fever of 100.4 F (38 C) or higher, or as directed    Pain or swelling that gets worse, including pain in the calf    Numbness or tingling in leg or foot    You suddenly can t put any  weight on your leg    Your knee  locks    Date Last Reviewed: 3/10/2016    3323-6375 [employer]. [address] [place] [address], [place] [address]. All rights reserved. This information is not intended as a substitute for professional medical care. Always follow your healthcare professional's instructions.          ALVA GUNDERSON MD

## 2018-02-28 NOTE — MR AVS SNAPSHOT
After Visit Summary   2/28/2018    Jennyfer Elizabeth    MRN: [id number]           Patient Information     Date Of Birth          1950        Visit Information        Provider Department      2/28/2018 3:00 PM Jennyfer Hopper MD [facility]        Today's Diagnoses     Benign essential hypertension    -  1    Type 2 diabetes mellitus with complication, without long-term current use of insulin (H)        Discomfort of both ears          Care Instructions    1.  If your blood pressure consistently stays above 140/90 and or pulse 80's could consider increasing your toprol XL 25 mg  From 1 tablet a day to 1.5 tablet a day.   2.  Blood sugar is normal  3.   Ice to right knee;   Ice in extension ;  4.  Use icyhot with lidocaine or aspercreme with lidocaine to knee  5.  Walk every day for 30 minutes  6. Keep follow up appointment [facility]  7.  Goal A1c under 7.0   8.  Lesion that was frozen today will blister up and then slough off       Results for orders placed or performed in visit on 02/28/18   Hemoglobin A1c   Result Value Ref Range    Hemoglobin A1C 6.5 (H) 4.0 - 6.0 %   CBC with platelets   Result Value Ref Range    WBC 8.7 4.0 - 11.0 10e9/L    RBC Count 4.70 3.8 - 5.2 10e12/L    Hemoglobin 13.8 11.7 - 15.7 g/dL    Hematocrit 40.9 35.0 - 47.0 %    MCV 87 78 - 100 fl    MCH 29.4 26.5 - 33.0 pg    MCHC 33.7 31.5 - 36.5 g/dL    RDW 13.2 10.0 - 15.0 %    Platelet Count 354 150 - 450 10e9/L   Troponin I   Result Value Ref Range    Troponin I ES <0.030 0.000 - 0.034 ug/L   Comprehensive metabolic panel (BMP + Alb, Alk Phos, ALT, AST, Total. Bili, TP)   Result Value Ref Range    Sodium 139 134 - 144 mmol/L    Potassium 3.7 3.5 - 5.1 mmol/L    Chloride 102 98 - 107 mmol/L    Carbon Dioxide 27 21 - 31 mmol/L    Anion Gap 10 3 - 14 mmol/L    Glucose 108 (H) 70 - 105 mg/dL    Urea Nitrogen 14 7 - 25 mg/dL    Creatinine 0.77 0.60 - 1.20 mg/dL    GFR Estimate 74 >60 mL/min/1.7m2    GFR Estimate  If Black 90 >60 mL/min/1.7m2    Calcium 9.8 8.6 - 10.3 mg/dL    Bilirubin Total 0.4 0.3 - 1.0 mg/dL    Albumin 4.5 3.5 - 5.7 g/dL    Protein Total 8.2 6.4 - 8.9 g/dL    Alkaline Phosphatase 99 34 - 104 U/L    ALT 19 7 - 52 U/L    AST 23 13 - 39 U/L          Eating Heart-Healthy Food: Using the DASH Plan    Eating for your heart doesn t have to be hard or boring. You just need to know how to make healthier choices. The DASH eating plan has been developed to help you do just that. DASH stands for Dietary Approaches to Stop Hypertension. It is a plan that has been proven to be healthier for your heart and to lower your risk for high blood pressure. It can also help lower your risk for cancer, heart disease, osteoporosis, and diabetes.  Choosing from each food group  Choose foods from each of the food groups below each day. Try to get the recommended number of servings for each food group. The serving numbers are based on a diet of 2,000 calories a day. Talk to your doctor if you re unsure about your calorie needs. Along with getting the correct servings, the DASH plan also recommends a sodium intake less than 2,300 mg per day.        Grains  Servings: 6 to 8 a day  A serving is:    1 slice bread    1 ounce dry cereal    Half a cup cooked rice, pasta or cereal  Best choices: Whole grains and any grains high in fiber. Vegetables  Servings: 4 to 5 a day  A serving is:    1 cup raw leafy vegetable    Half a cup cut-up raw or cooked vegetable    Half a cup vegetable juice  Best choices: Fresh or frozen vegetables prepared without added salt or fat.   Fruits  Servings: 4 to 5 a day  A serving is:    1 medium fruit    One-quarter cup dried fruit    Half a cup fresh, frozen, or canned fruit    Half a cup of 100% fruit juices  Best choices: A variety of fresh fruits of different colors. Whole fruits are a better choice than fruit juices. Low-fat or fat-free dairy  Servings: 2 to 3 a day  A serving is:    1 cup milk    1 cup  yogurt    One and a half ounces cheese  Best choices: Skim or 1% milk, low-fat or fat-free yogurt or buttermilk, and low-fat cheeses.         Lean meats, poultry, fish  Servings: 6 or fewer a day  A serving is:    1 ounce cooked meats, poultry, or fish    1 egg  Best choices: Lean poultry and fish. Trim away visible fat. Broil, grill, roast, or boil instead of frying. Remove skin from poultry before eating. Limit how much red meat you eat.  Nuts, seeds, beans  Servings: 4 to 5 a week  A serving is:    One-third cup nuts (one and a half ounces)    2 tablespoons nut butter or seeds    Half a cup cooked dry beans or legumes  Best choices: Dry roasted nuts with no salt added, lentils, kidney beans, garbanzo beans, and whole hall beans.   Fats and oils  Servings: 2 to 3 a day  A serving is:    1 teaspoon vegetable oil    1 teaspoon soft margarine    1 tablespoon mayonnaise    2 tablespoons salad dressing  Best choices: Nut and vegetable oils (nontropical vegetable oils), such as olive and canola oil. Sweets  Servings: 5 a week or fewer  A serving is:    1 tablespoon sugar, maple syrup, or honey    1 tablespoon jam or jelly    1 half-ounce jelly beans (about 15)    1 cup lemonade  Best choices: Dried fruit can be a satisfying sweet. Choose low-fat sweets. And watch your serving sizes!      For more on the DASH eating plan, visit:  www.nhlbi.nih.gov/health/health-topics/topics/dash   Date Last Reviewed: 6/1/2016 2000-2017 [employer]. [address] [place] [address], [place] [address]. All rights reserved. This information is not intended as a substitute for professional medical care. Always follow your healthcare professional's instructions.        Controlling High Blood Pressure  High blood pressure (hypertension) is often called the silent killer. This is because many people who have it don t know it. High blood pressure is defined as 140/90 mm Hg or higher. Know your blood pressure and remember to check it  regularly. Doing so can save your life. Here are some things you can do to help control your blood pressure.    Choose heart-healthy foods    Select low-salt, low-fat foods. Limit sodium intake to 2,400 mg per day or the amount suggested by your healthcare provider.    Limit canned, dried, cured, packaged, and fast foods. These can contain a lot of salt.    Eat 8 to 10 servings of fruits and vegetables every day.    Choose lean meats, fish, or chicken.    Eat whole-grain pasta, brown rice, and beans.    Eat 2 to 3 servings of low-fat or fat-free dairy products.    Ask your doctor about the DASH eating plan. This plan helps reduce blood pressure.    When you go to a restaurant, ask that your meal be prepared with no added salt.  Maintain a healthy weight    Ask your healthcare provider how many calories to eat a day. Then stick to that number.    Ask your healthcare provider what weight range is healthiest for you. If you are overweight, a weight loss of only 3% to 5% of your body weight can help lower blood pressure. Generally, a good weight loss goal is to lose 10% of your body weight in a year.    Limit snacks and sweets.    Get regular exercise.  Get up and get active    Choose activities you enjoy. Find ones you can do with friends or family. This includes bicycling, dancing, walking, and jogging.    Park farther away from building entrances.    Use stairs instead of the elevator.    When you can, walk or bike instead of driving.    [employer] leaves, garden, or do household repairs.    Be active at a moderate to vigorous level of physical activity for at least 40 minutes for a minimum of 3 to 4 days a week.   Manage stress    Make time to relax and enjoy life. Find time to laugh.    Communicate your concerns with your loved ones and your healthcare provider.    Visit with family and friends, and keep up with hobbies.  Limit alcohol and quit smoking    Men should have no more than 2 drinks per day.    Women should  have no more than 1 drink per day.    Talk with your healthcare provider about quitting smoking. Smoking significantly increases your risk for heart disease and stroke. Ask your healthcare provider about community smoking cessation programs and other options.  Medicines  If lifestyle changes aren t enough, your healthcare provider may prescribe high blood pressure medicine. Take all medicines as prescribed. If you have any questions about your medicines, ask your healthcare provider before stopping or changing them.   Date Last Reviewed: 4/27/2016 2000-2017 [employer]. [address] [place] [address]. All rights reserved. This information is not intended as a substitute for professional medical care. Always follow your healthcare professional's instructions.        What is High Blood Pressure?  High blood pressure (also called hypertension) is known as the  silent killer.  This is because most of the time it doesn t cause symptoms. In fact, many people don t know they have it until other problems develop. In most cases, high blood pressure can t be cured. It s a disease that often requires lifelong treatment. The good news is that it can be managed.  Understanding blood pressure  The circulatory system is made up of the heart and blood vessels that carry blood through the body. Your heart is the pump for this system. With each heartbeat (contraction), the heart sends blood out through large blood vessels called arteries. Blood pressure is a measure of how hard the moving blood pushes against the walls of the arteries.  High blood pressure can harm your health  In a healthy blood vessel, the blood moves smoothly through the vessel and puts normal pressure on the vessel walls.       High blood pressure occurs when blood pushes too hard against artery walls. This causes damage to the artery walls and then the formation of scar tissue as it heals. This makes the arteries stiff and weak.  "Plaque sticks to the scarred tissue narrowing and hardening the arteries. High blood pressure also causes your heart to work harder to get blood out to the body. High blood pressure raises your risk of heart attack, also known as acute myocardial infarction, or AMI, and stroke. It can also lead to kidney disease, and blindness.  Measuring blood pressure  An example of a blood pressure measurement is 120/70 (120 over 70). The top number is the pressure of blood against the artery walls during a heartbeat (systolic). The bottom number is the pressure of blood against artery walls between heartbeats (diastolic). Talk with your healthcare provider to find out what your blood pressure goals should be.   Controlling blood pressure  If your blood pressure is too high, work with your doctor on a plan for lowering it. Below are steps you can take that will help lower your blood pressure.    Choose heart-healthy foods. Eating healthier meals helps you control your blood pressure. Ask your doctor about the DASH eating plan. This plan helps reduce blood pressure by limiting the amount of sodium (salt) you have in your diet. DASH also encourages eating plenty of fruits and vegetables, low-fat or non-fat dairy, whole-grains, and foods high in fiber, and low in fat.    Reduce sodium. Reducing sodium in your diet reduces fluid retention. Fluid retention caused by too much salt increases blood volume and blood pressure. The American Heart Association s (AHA) \"ideal\" sodium intake recommendation is 1,500 milligrams per day.  However, since American's eat so much salt, the AHA says a positive change can occur by cutting back to even 2,400 milligrams of sodium a day.    Maintain a healthy weight. Being overweight makes you more likely to have high blood pressure. Losing excess weight helps lower blood pressure.    Exercise regularly. Daily exercise helps your heart and blood vessels work better and stay healthier. It can help lower " your blood pressure.    Stop smoking. Smoking increases blood pressure and damages blood vessels.    Limit alcohol. Drinking too much alcohol can raise blood pressure. Men should have no more than 2 drinks a day. Women should have no more than 1. (A drink is equal to 1 beer, or a small glass of wine, or a shot of liquor.)    Control stress. Stress makes your heart work harder and beat faster. Controlling stress helps you control your blood pressure.  Facts about high blood pressure    Feeling OK does not mean that blood pressure is under control. Likewise, feeling bad doesn t mean it s out of control. The only way to know for sure is to check your pressure regularly.    Medicine is only one part of controlling high blood pressure. You also need to manage your weight, get regular exercise, and adjust your eating habits.    High blood pressure is usually a lifelong problem. But it can be controlled with healthy lifestyle changes and medicine.    Hypertension is not the same as stress. Although stress may be a factor in high blood pressure, it s only one part of the story.    Blood pressure medicines need to be taken every day. Stopping suddenly may cause a dangerous increase in pressure.   Date Last Reviewed: 4/27/2016 2000-2017 [employer]. [address] [place] [address]. All rights reserved. This information is not intended as a substitute for professional medical care. Always follow your healthcare professional's instructions.        Understanding Meniscal Tears    The meniscus is a tough cushion of fibrous tissue called cartilage in the knee joint. It cushions the knee. It absorbs shock and helps spread weight across the knee joint. It also works with other parts of the knee to help keep the joint stable. Injury or aging can cause the meniscus to tear and lead to pain and problems using the knee.   What causes meniscal tears?  A sudden injury can tear the meniscus. This is often because  of planting the foot and twisting the knee. Sports such as soccer, football, and basketball are often involved. Repeated actions, such as squatting, may also lead to a tear. Breakdown of the meniscus because of aging can also lead to tears.  Symptoms of meniscal tears  These can include:    Knee pain    Knee swelling    Catching of the knee or inability to straighten the knee    Unstable feeling in the knee  Treatment for meniscal tears  A tear is unlikely to heal on its own. You often will need surgery to repair a tear. In many cases, your healthcare provider will first try treatments to help relieve symptoms. These may include:    Rest the knee. This means avoiding any activity that puts stress on the knee joint. These include kneeling, squatting, jogging, and climbing stairs. In some cases, you may need to use crutches for a time to keep body weight off of the knee joint.    Cold pack. Putting a cold pack on the knee helps reduce pain and swelling.    Knee brace. Bracing the knee helps support it.    Medicine. Prescription and over-the-counter pain medicines can help relieve swelling and pain.    Exercises. Exercises help strengthen the muscles of the leg to help support the knee joint.  If these treatments don t help relieve symptoms or the injury is severe, you may need surgery. This can repair the meniscus to relieve symptoms and restore movement.     When to call your healthcare provider  Call your healthcare provider right away if you have any of these:    Fever of 100.4 F (38 C) or higher, or as directed    Pain or swelling that gets worse, including pain in the calf    Numbness or tingling in leg or foot    You suddenly can t put any weight on your leg    Your knee  locks    Date Last Reviewed: 3/10/2016    9064-5247 [employer]. [address] [place] [address]. All rights reserved. This information is not intended as a substitute for professional medical care. Always follow your  "healthcare professional's instructions.                Follow-ups after your visit        Who to contact     If you have questions or need follow up information about today's clinic visit or your schedule please contact [facility] directly at [phone].  Normal or non-critical lab and imaging results will be communicated to you by [employer], letter or phone within 4 business days after the clinic has received the results. If you do not hear from us within 7 days, please contact the clinic through [employer] or phone. If you have a critical or abnormal lab result, we will notify you by phone as soon as possible.  Submit refill requests through [employer] or call your pharmacy and they will forward the refill request to us. Please allow 3 business days for your refill to be completed.          Additional Information About Your Visit        [employer] Information     [employer] lets you send messages to your doctor, view your test results, renew your prescriptions, schedule appointments and more. To sign up, go to [email or web address] . Click on \"Log in\" on the left side of the screen, which will take you to the Welcome page. Then click on \"Sign up Now\" on the right side of the page.     You will be asked to enter the access code listed below, as well as some personal information. Please follow the directions to create your username and password.     Your access code is: [email or web address]  Expires: 2018  5:39 PM     Your access code will  in 90 days. If you need help or a new code, please call your [place] clinic or [phone].        Care EveryWhere ID     This is your Care EveryWhere ID. This could be used by other organizations to access your [place] medical records  [id number]        Your Vitals Were     Pulse Breastfeeding? BMI (Body Mass Index)             76 No 27.63 kg/m2          Blood Pressure from Last 3 Encounters:   18 138/78   17 138/80   17 132/78    Weight " from Last 3 Encounters:   02/28/18 156 lb (70.8 kg)   12/20/17 156 lb 9.6 oz (71 kg)   09/05/17 155 lb (70.3 kg)              We Performed the Following     Albumin Random Urine Quantitative with Creat Ratio     CBC with platelets     Comprehensive metabolic panel (BMP + Alb, Alk Phos, ALT, AST, Total. Bili, TP)     Hemoglobin A1c     Troponin I        Primary Care Provider Office Phone # Fax #    Jennyfer Hopper [phone] [address]   [place] [address]        Equal Access to Services     [facility]: Hadii aad ku hadasho Soomaali, waaxda luqadaha, qaybta kaalmada adechristiano, lisbet rivera . So [facility] [phone].    ATENCIÓN: Si habla español, tiene a jackson disposición servicios gratuitos de asistencia lingüística. [facility] [phone].    We comply with applicable federal civil rights laws and [place] laws. We do not discriminate on the basis of race, color, national origin, age, disability, sex, sexual orientation, or gender identity.            Thank you!     Thank you for choosing [facility]  for your care. Our goal is always to provide you with excellent care. Hearing back from our patients is one way we can continue to improve our services. Please take a few minutes to complete the written survey that you may receive in the mail after your visit with us. Thank you!             Your Updated Medication List - Protect others around you: Learn how to safely use, store and throw away your medicines at www.disposemymeds.org.          This list is accurate as of 2/28/18  5:39 PM.  Always use your most recent med list.                   Brand Name Dispense Instructions for use Diagnosis    aspirin 81 MG chewable tablet      Take 81 mg by mouth daily        atorvastatin 40 MG tablet    LIPITOR     Take 40 mg by mouth daily        clopidogrel 75 MG tablet    PLAVIX     Take 75 mg by mouth daily        DULoxetine 30 MG EC capsule     CYMBALTA     Take 30 mg by mouth daily        LORazepam 0.5 MG tablet    ATIVAN     Take 0.25 mg by mouth nightly as needed        metFORMIN 500 MG tablet    GLUCOPHAGE     Take 250 mg by mouth 2 times daily (with meals)        metoprolol succinate 25 MG 24 hr tablet    TOPROL-XL     Take 25 mg by mouth daily        nitroGLYcerin 0.4 MG sublingual tablet    NITROSTAT     Place 0.4 mg under the tongue Place 1 tablet under the tongue every 5 mon if needed for chest pain may repeat X 3 doses.        traMADol 50 MG tablet    ULTRAM     Take 1 tablet by mouth 3 times daily as needed

## 2018-02-28 NOTE — NURSING NOTE
Patient presents for concerns of elevated Blood pressure and pulse.  Also injured her Right knee.  Ears are also bothering her.   Sunita Rincon LPN........................2/28/2018  3:42 PM

## 2018-02-28 NOTE — PATIENT INSTRUCTIONS
1.  If your blood pressure consistently stays above 140/90 and or pulse 80's could consider increasing your toprol XL 25 mg  From 1 tablet a day to 1.5 tablet a day.   2.  Blood sugar is normal  3.   Ice to right knee;   Ice in extension ;  4.  Use icyhot with lidocaine or aspercreme with lidocaine to knee  5.  Walk every day for 30 minutes  6. Keep follow up appointment [facility]  7.  Goal A1c under 7.0   8.  Lesion that was frozen today will blister up and then slough off       Results for orders placed or performed in visit on 02/28/18   Hemoglobin A1c   Result Value Ref Range    Hemoglobin A1C 6.5 (H) 4.0 - 6.0 %   CBC with platelets   Result Value Ref Range    WBC 8.7 4.0 - 11.0 10e9/L    RBC Count 4.70 3.8 - 5.2 10e12/L    Hemoglobin 13.8 11.7 - 15.7 g/dL    Hematocrit 40.9 35.0 - 47.0 %    MCV 87 78 - 100 fl    MCH 29.4 26.5 - 33.0 pg    MCHC 33.7 31.5 - 36.5 g/dL    RDW 13.2 10.0 - 15.0 %    Platelet Count 354 150 - 450 10e9/L   Troponin I   Result Value Ref Range    Troponin I ES <0.030 0.000 - 0.034 ug/L   Comprehensive metabolic panel (BMP + Alb, Alk Phos, ALT, AST, Total. Bili, TP)   Result Value Ref Range    Sodium 139 134 - 144 mmol/L    Potassium 3.7 3.5 - 5.1 mmol/L    Chloride 102 98 - 107 mmol/L    Carbon Dioxide 27 21 - 31 mmol/L    Anion Gap 10 3 - 14 mmol/L    Glucose 108 (H) 70 - 105 mg/dL    Urea Nitrogen 14 7 - 25 mg/dL    Creatinine 0.77 0.60 - 1.20 mg/dL    GFR Estimate 74 >60 mL/min/1.7m2    GFR Estimate If Black 90 >60 mL/min/1.7m2    Calcium 9.8 8.6 - 10.3 mg/dL    Bilirubin Total 0.4 0.3 - 1.0 mg/dL    Albumin 4.5 3.5 - 5.7 g/dL    Protein Total 8.2 6.4 - 8.9 g/dL    Alkaline Phosphatase 99 34 - 104 U/L    ALT 19 7 - 52 U/L    AST 23 13 - 39 U/L          Eating Heart-Healthy Food: Using the DASH Plan    Eating for your heart doesn t have to be hard or boring. You just need to know how to make healthier choices. The DASH eating plan has been developed to help you do just that. DASH stands for  Dietary Approaches to Stop Hypertension. It is a plan that has been proven to be healthier for your heart and to lower your risk for high blood pressure. It can also help lower your risk for cancer, heart disease, osteoporosis, and diabetes.  Choosing from each food group  Choose foods from each of the food groups below each day. Try to get the recommended number of servings for each food group. The serving numbers are based on a diet of 2,000 calories a day. Talk to your doctor if you re unsure about your calorie needs. Along with getting the correct servings, the DASH plan also recommends a sodium intake less than 2,300 mg per day.        Grains  Servings: 6 to 8 a day  A serving is:    1 slice bread    1 ounce dry cereal    Half a cup cooked rice, pasta or cereal  Best choices: Whole grains and any grains high in fiber. Vegetables  Servings: 4 to 5 a day  A serving is:    1 cup raw leafy vegetable    Half a cup cut-up raw or cooked vegetable    Half a cup vegetable juice  Best choices: Fresh or frozen vegetables prepared without added salt or fat.   Fruits  Servings: 4 to 5 a day  A serving is:    1 medium fruit    One-quarter cup dried fruit    Half a cup fresh, frozen, or canned fruit    Half a cup of 100% fruit juices  Best choices: A variety of fresh fruits of different colors. Whole fruits are a better choice than fruit juices. Low-fat or fat-free dairy  Servings: 2 to 3 a day  A serving is:    1 cup milk    1 cup yogurt    One and a half ounces cheese  Best choices: Skim or 1% milk, low-fat or fat-free yogurt or buttermilk, and low-fat cheeses.         Lean meats, poultry, fish  Servings: 6 or fewer a day  A serving is:    1 ounce cooked meats, poultry, or fish    1 egg  Best choices: Lean poultry and fish. Trim away visible fat. Broil, grill, roast, or boil instead of frying. Remove skin from poultry before eating. Limit how much red meat you eat.  Nuts, seeds, beans  Servings: 4 to 5 a week  A serving  is:    One-third cup nuts (one and a half ounces)    2 tablespoons nut butter or seeds    Half a cup cooked dry beans or legumes  Best choices: Dry roasted nuts with no salt added, lentils, kidney beans, garbanzo beans, and whole hall beans.   Fats and oils  Servings: 2 to 3 a day  A serving is:    1 teaspoon vegetable oil    1 teaspoon soft margarine    1 tablespoon mayonnaise    2 tablespoons salad dressing  Best choices: Nut and vegetable oils (nontropical vegetable oils), such as olive and canola oil. Sweets  Servings: 5 a week or fewer  A serving is:    1 tablespoon sugar, maple syrup, or honey    1 tablespoon jam or jelly    1 half-ounce jelly beans (about 15)    1 cup lemonade  Best choices: Dried fruit can be a satisfying sweet. Choose low-fat sweets. And watch your serving sizes!      For more on the DASH eating plan, visit:  www.nhlbi.nih.gov/health/health-topics/topics/dash   Date Last Reviewed: 6/1/2016 2000-2017 [employer]. [address] [place] [address]. All rights reserved. This information is not intended as a substitute for professional medical care. Always follow your healthcare professional's instructions.        Controlling High Blood Pressure  High blood pressure (hypertension) is often called the silent killer. This is because many people who have it don t know it. High blood pressure is defined as 140/90 mm Hg or higher. Know your blood pressure and remember to check it regularly. Doing so can save your life. Here are some things you can do to help control your blood pressure.    Choose heart-healthy foods    Select low-salt, low-fat foods. Limit sodium intake to 2,400 mg per day or the amount suggested by your healthcare provider.    Limit canned, dried, cured, packaged, and fast foods. These can contain a lot of salt.    Eat 8 to 10 servings of fruits and vegetables every day.    Choose lean meats, fish, or chicken.    Eat whole-grain pasta, brown rice, and  beans.    Eat 2 to 3 servings of low-fat or fat-free dairy products.    Ask your doctor about the DASH eating plan. This plan helps reduce blood pressure.    When you go to a restaurant, ask that your meal be prepared with no added salt.  Maintain a healthy weight    Ask your healthcare provider how many calories to eat a day. Then stick to that number.    Ask your healthcare provider what weight range is healthiest for you. If you are overweight, a weight loss of only 3% to 5% of your body weight can help lower blood pressure. Generally, a good weight loss goal is to lose 10% of your body weight in a year.    Limit snacks and sweets.    Get regular exercise.  Get up and get active    Choose activities you enjoy. Find ones you can do with friends or family. This includes bicycling, dancing, walking, and jogging.    Park farther away from building entrances.    Use stairs instead of the elevator.    When you can, walk or bike instead of driving.    [place] leaves, garden, or do household repairs.    Be active at a moderate to vigorous level of physical activity for at least 40 minutes for a minimum of 3 to 4 days a week.   Manage stress    Make time to relax and enjoy life. Find time to laugh.    Communicate your concerns with your loved ones and your healthcare provider.    Visit with family and friends, and keep up with hobbies.  Limit alcohol and quit smoking    Men should have no more than 2 drinks per day.    Women should have no more than 1 drink per day.    Talk with your healthcare provider about quitting smoking. Smoking significantly increases your risk for heart disease and stroke. Ask your healthcare provider about community smoking cessation programs and other options.  Medicines  If lifestyle changes aren t enough, your healthcare provider may prescribe high blood pressure medicine. Take all medicines as prescribed. If you have any questions about your medicines, ask your healthcare provider before  stopping or changing them.   Date Last Reviewed: 4/27/2016 2000-2017 [employer]. [address] [place] [address], [place] [address]. All rights reserved. This information is not intended as a substitute for professional medical care. Always follow your healthcare professional's instructions.        What is High Blood Pressure?  High blood pressure (also called hypertension) is known as the  silent killer.  This is because most of the time it doesn t cause symptoms. In fact, many people don t know they have it until other problems develop. In most cases, high blood pressure can t be cured. It s a disease that often requires lifelong treatment. The good news is that it can be managed.  Understanding blood pressure  The circulatory system is made up of the heart and blood vessels that carry blood through the body. Your heart is the pump for this system. With each heartbeat (contraction), the heart sends blood out through large blood vessels called arteries. Blood pressure is a measure of how hard the moving blood pushes against the walls of the arteries.  High blood pressure can harm your health  In a healthy blood vessel, the blood moves smoothly through the vessel and puts normal pressure on the vessel walls.       High blood pressure occurs when blood pushes too hard against artery walls. This causes damage to the artery walls and then the formation of scar tissue as it heals. This makes the arteries stiff and weak. Plaque sticks to the scarred tissue narrowing and hardening the arteries. High blood pressure also causes your heart to work harder to get blood out to the body. High blood pressure raises your risk of heart attack, also known as acute myocardial infarction, or AMI, and stroke. It can also lead to kidney disease, and blindness.  Measuring blood pressure  An example of a blood pressure measurement is 120/70 (120 over 70). The top number is the pressure of blood against the artery walls during a  "heartbeat (systolic). The bottom number is the pressure of blood against artery walls between heartbeats (diastolic). Talk with your healthcare provider to find out what your blood pressure goals should be.   Controlling blood pressure  If your blood pressure is too high, work with your doctor on a plan for lowering it. Below are steps you can take that will help lower your blood pressure.    Choose heart-healthy foods. Eating healthier meals helps you control your blood pressure. Ask your doctor about the DASH eating plan. This plan helps reduce blood pressure by limiting the amount of sodium (salt) you have in your diet. DASH also encourages eating plenty of fruits and vegetables, low-fat or non-fat dairy, whole-grains, and foods high in fiber, and low in fat.    Reduce sodium. Reducing sodium in your diet reduces fluid retention. Fluid retention caused by too much salt increases blood volume and blood pressure. The American Heart Association s (AHA) \"ideal\" sodium intake recommendation is 1,500 milligrams per day.  However, since American's eat so much salt, the AHA says a positive change can occur by cutting back to even 2,400 milligrams of sodium a day.    Maintain a healthy weight. Being overweight makes you more likely to have high blood pressure. Losing excess weight helps lower blood pressure.    Exercise regularly. Daily exercise helps your heart and blood vessels work better and stay healthier. It can help lower your blood pressure.    Stop smoking. Smoking increases blood pressure and damages blood vessels.    Limit alcohol. Drinking too much alcohol can raise blood pressure. Men should have no more than 2 drinks a day. Women should have no more than 1. (A drink is equal to 1 beer, or a small glass of wine, or a shot of liquor.)    Control stress. Stress makes your heart work harder and beat faster. Controlling stress helps you control your blood pressure.  Facts about high blood pressure    Feeling OK " does not mean that blood pressure is under control. Likewise, feeling bad doesn t mean it s out of control. The only way to know for sure is to check your pressure regularly.    Medicine is only one part of controlling high blood pressure. You also need to manage your weight, get regular exercise, and adjust your eating habits.    High blood pressure is usually a lifelong problem. But it can be controlled with healthy lifestyle changes and medicine.    Hypertension is not the same as stress. Although stress may be a factor in high blood pressure, it s only one part of the story.    Blood pressure medicines need to be taken every day. Stopping suddenly may cause a dangerous increase in pressure.   Date Last Reviewed: 4/27/2016 2000-2017 [employer]. [address] [place] [address], [place] [address]. All rights reserved. This information is not intended as a substitute for professional medical care. Always follow your healthcare professional's instructions.        Understanding Meniscal Tears    The meniscus is a tough cushion of fibrous tissue called cartilage in the knee joint. It cushions the knee. It absorbs shock and helps spread weight across the knee joint. It also works with other parts of the knee to help keep the joint stable. Injury or aging can cause the meniscus to tear and lead to pain and problems using the knee.   What causes meniscal tears?  A sudden injury can tear the meniscus. This is often because of planting the foot and twisting the knee. Sports such as soccer, football, and basketball are often involved. Repeated actions, such as squatting, may also lead to a tear. Breakdown of the meniscus because of aging can also lead to tears.  Symptoms of meniscal tears  These can include:    Knee pain    Knee swelling    Catching of the knee or inability to straighten the knee    Unstable feeling in the knee  Treatment for meniscal tears  A tear is unlikely to heal on its own. You often will need  surgery to repair a tear. In many cases, your healthcare provider will first try treatments to help relieve symptoms. These may include:    Rest the knee. This means avoiding any activity that puts stress on the knee joint. These include kneeling, squatting, jogging, and climbing stairs. In some cases, you may need to use crutches for a time to keep body weight off of the knee joint.    Cold pack. Putting a cold pack on the knee helps reduce pain and swelling.    Knee brace. Bracing the knee helps support it.    Medicine. Prescription and over-the-counter pain medicines can help relieve swelling and pain.    Exercises. Exercises help strengthen the muscles of the leg to help support the knee joint.  If these treatments don t help relieve symptoms or the injury is severe, you may need surgery. This can repair the meniscus to relieve symptoms and restore movement.     When to call your healthcare provider  Call your healthcare provider right away if you have any of these:    Fever of 100.4 F (38 C) or higher, or as directed    Pain or swelling that gets worse, including pain in the calf    Numbness or tingling in leg or foot    You suddenly can t put any weight on your leg    Your knee  locks    Date Last Reviewed: 3/10/2016    0339-2829 [employer]. [address] [place] [address], [place] [address]. All rights reserved. This information is not intended as a substitute for professional medical care. Always follow your healthcare professional's instructions.

## 2018-03-01 LAB
CREAT UR-MCNC: 130 MG/DL
MICROALBUMIN UR-MCNC: 8 MG/L
MICROALBUMIN/CREAT UR: 6.35 MG/G CR (ref 0–25)

## 2018-03-03 PROBLEM — F41.1 GAD (GENERALIZED ANXIETY DISORDER): Status: ACTIVE | Noted: 2018-03-03

## 2018-03-03 PROBLEM — I25.2 HISTORY OF MI (MYOCARDIAL INFARCTION): Status: ACTIVE | Noted: 2018-03-03

## 2018-03-03 PROBLEM — E11.8 TYPE 2 DIABETES MELLITUS WITH COMPLICATION, WITHOUT LONG-TERM CURRENT USE OF INSULIN (H): Status: ACTIVE | Noted: 2018-03-03

## 2018-03-03 PROBLEM — I10 BENIGN ESSENTIAL HYPERTENSION: Status: ACTIVE | Noted: 2018-03-03

## 2018-03-07 ENCOUNTER — TELEPHONE (OUTPATIENT)
Dept: FAMILY MEDICINE | Facility: OTHER | Age: 68
End: 2018-03-07

## 2018-03-07 NOTE — TELEPHONE ENCOUNTER
Yes; needs follow up mammogram per radiologist suggestion   She can look up on [employer] knee exercises for meniscal tear

## 2018-03-07 NOTE — TELEPHONE ENCOUNTER
SER-Pt wondering if she needs F/U mammo. Lump is gone. Also did not receive knee exercises. Thank You.  Savannah Fierro

## 2018-03-07 NOTE — TELEPHONE ENCOUNTER
Message below was left on voicemail per patients instructions  Cassie Mo LPN.......3/7/2018 4:30 PM

## 2018-03-26 ENCOUNTER — TELEPHONE (OUTPATIENT)
Dept: FAMILY MEDICINE | Facility: OTHER | Age: 68
End: 2018-03-26

## 2018-03-26 DIAGNOSIS — G89.29 CHRONIC PAIN OF RIGHT KNEE: ICD-10-CM

## 2018-03-26 DIAGNOSIS — M25.561 CHRONIC PAIN OF RIGHT KNEE: ICD-10-CM

## 2018-03-26 DIAGNOSIS — I10 BENIGN ESSENTIAL HYPERTENSION: ICD-10-CM

## 2018-03-26 DIAGNOSIS — E11.9 TYPE 2 DIABETES MELLITUS WITHOUT COMPLICATION, WITHOUT LONG-TERM CURRENT USE OF INSULIN (H): ICD-10-CM

## 2018-03-26 DIAGNOSIS — E78.5 HYPERLIPIDEMIA LDL GOAL <100: Primary | ICD-10-CM

## 2018-03-26 DIAGNOSIS — F41.9 ANXIETY: ICD-10-CM

## 2018-03-26 NOTE — TELEPHONE ENCOUNTER
SER - Patient would like to speak with Sunita regarding recent visit. Patient would like an order for fasting lab work.  Please call.    Lucia Spaulding on 3/26/2018 at 4:57 PM

## 2018-03-27 RX ORDER — METOPROLOL SUCCINATE 25 MG/1
25 TABLET, EXTENDED RELEASE ORAL DAILY
Qty: 90 TABLET | Refills: 3 | Status: SHIPPED | OUTPATIENT
Start: 2018-03-27 | End: 2018-10-02

## 2018-03-27 RX ORDER — ATORVASTATIN CALCIUM 40 MG/1
40 TABLET, FILM COATED ORAL DAILY
Qty: 90 TABLET | Refills: 1 | Status: SHIPPED | OUTPATIENT
Start: 2018-03-27 | End: 2018-05-31

## 2018-03-27 RX ORDER — DULOXETIN HYDROCHLORIDE 30 MG/1
30 CAPSULE, DELAYED RELEASE ORAL DAILY
Qty: 90 CAPSULE | Refills: 1 | Status: SHIPPED | OUTPATIENT
Start: 2018-03-27 | End: 2018-05-31

## 2018-03-27 RX ORDER — CLOPIDOGREL BISULFATE 75 MG/1
75 TABLET ORAL DAILY
Qty: 90 TABLET | Refills: 1 | Status: SHIPPED | OUTPATIENT
Start: 2018-03-27 | End: 2018-06-29

## 2018-03-27 NOTE — TELEPHONE ENCOUNTER
Needs fasting lipid can do this with next A1c at the end of may   See ortho, referral placed  meds refilled 6 months

## 2018-03-27 NOTE — TELEPHONE ENCOUNTER
1) Patient is calling as she thought she was supposed to come back for fasting lab work.  If so can you order those labs.    2.) She states her Right knee is not better either after trying the recommendations of brace, rest, ice she is wondering what to do next - if she needs to see ortho she would like to know who you would recommend.     3.) Also she Would like her medications refilled to give her more time to find a regular provider after you are gone and since most of her labs have already been done.    Sunita Rincon LPN........................3/27/2018  9:02 AM

## 2018-03-28 ENCOUNTER — TRANSFERRED RECORDS (OUTPATIENT)
Dept: HEALTH INFORMATION MANAGEMENT | Facility: OTHER | Age: 68
End: 2018-03-28

## 2018-04-02 DIAGNOSIS — M25.561 ACUTE PAIN OF RIGHT KNEE: Primary | ICD-10-CM

## 2018-04-03 ENCOUNTER — HOSPITAL ENCOUNTER (OUTPATIENT)
Dept: MAMMOGRAPHY | Facility: OTHER | Age: 68
End: 2018-04-03
Attending: FAMILY MEDICINE
Payer: MEDICARE

## 2018-04-03 DIAGNOSIS — Z09 FOLLOW-UP EXAM, 3-6 MONTHS SINCE PREVIOUS EXAM: ICD-10-CM

## 2018-04-03 PROCEDURE — 77065 DX MAMMO INCL CAD UNI: CPT | Mod: RT

## 2018-04-09 DIAGNOSIS — F41.1 GAD (GENERALIZED ANXIETY DISORDER): Primary | ICD-10-CM

## 2018-04-09 DIAGNOSIS — G89.29 CHRONIC PAIN OF RIGHT KNEE: ICD-10-CM

## 2018-04-09 DIAGNOSIS — M25.561 CHRONIC PAIN OF RIGHT KNEE: ICD-10-CM

## 2018-04-11 ENCOUNTER — HOSPITAL ENCOUNTER (OUTPATIENT)
Dept: GENERAL RADIOLOGY | Facility: OTHER | Age: 68
Discharge: HOME | End: 2018-04-11
Attending: ORTHOPAEDIC SURGERY | Admitting: ORTHOPAEDIC SURGERY
Payer: MEDICARE

## 2018-04-11 ENCOUNTER — OFFICE VISIT (OUTPATIENT)
Dept: ORTHOPEDICS | Facility: OTHER | Age: 68
End: 2018-04-11
Attending: FAMILY MEDICINE
Payer: MEDICARE

## 2018-04-11 VITALS
WEIGHT: 156 LBS | SYSTOLIC BLOOD PRESSURE: 132 MMHG | HEIGHT: 63 IN | DIASTOLIC BLOOD PRESSURE: 70 MMHG | HEART RATE: 80 BPM | BODY MASS INDEX: 27.64 KG/M2

## 2018-04-11 DIAGNOSIS — M25.561 CHRONIC PAIN OF RIGHT KNEE: Primary | ICD-10-CM

## 2018-04-11 DIAGNOSIS — M25.561 ACUTE PAIN OF RIGHT KNEE: ICD-10-CM

## 2018-04-11 DIAGNOSIS — M62.559 ATROPHY OF QUADRICEPS FEMORIS MUSCLE: ICD-10-CM

## 2018-04-11 DIAGNOSIS — G89.29 CHRONIC PAIN OF RIGHT KNEE: Primary | ICD-10-CM

## 2018-04-11 DIAGNOSIS — M79.604 RIGHT LEG PAIN: ICD-10-CM

## 2018-04-11 PROCEDURE — 99213 OFFICE O/P EST LOW 20 MIN: CPT | Performed by: ORTHOPAEDIC SURGERY

## 2018-04-11 PROCEDURE — G0463 HOSPITAL OUTPT CLINIC VISIT: HCPCS | Mod: 25

## 2018-04-11 PROCEDURE — G0463 HOSPITAL OUTPT CLINIC VISIT: HCPCS

## 2018-04-11 PROCEDURE — 73562 X-RAY EXAM OF KNEE 3: CPT | Mod: RT

## 2018-04-11 ASSESSMENT — PAIN SCALES - GENERAL: PAINLEVEL: NO PAIN (0)

## 2018-04-11 NOTE — PROGRESS NOTES
Jennyfer Elizabeth is seen in consultation for Jennyfer Hopper for a chief complaint of right knee pain.    CHIEF COMPLAINT: Consult (Right knee)    HPI: This 68 year old female relates she began experiencing some pain around the right knee may be in January 2018.  She remembers a dog jumping up and hitting the front of the right knee near the distal thigh area.  She may have had some discomfort afterwards.  No other significant trauma.  She has noticeable pain when driving.  Seated position with leg and one area bothers her significantly.  Sometimes 10 out of 10 pain.  She describes multiple locations.  The distal and middle quadriceps area.  The medial thigh and lower leg along the lateral calf.  Today, more comfortable with no pain sitting.  She has pain when getting up out of a seated position at times.  Also uncomfortable at nighttime.  Driving activity causes significant pain.  No complaint of bruising or redness.  No complaint of knee joint swelling.  Comfortable hip.  Sometimes puts a tennis ball under her lower hamstring area.    REVIEW OF SYSTEMS:    The review of systems as documented in the HPI and on the intake questionnaire, completed by the patient 4/11/2018 have been reviewed by myself and the pertinent positives and negatives addressed.  The remainder of the complete review of systems was non-contributory.    ([facility]) PAST, FAMILY, and/or SOCIAL HISTORY:    PAST MEDICAL HISTORY:  Past Medical History:   Diagnosis Date     Hypertensive urgency     7/7/2017     Major depressive disorder, single episode     1995     Obstructive sleep apnea     2005,BIPAP     Other specified disorders of bone density and structure, unspecified site (CODE)     2007,Lumbar spine     Prediabetes     2008     Pure hypercholesterolemia     2001     Pure hyperglyceridemia     2008       PAST SURGICAL HISTORY:  Past Surgical History:   Procedure Laterality Date     CARDIAC CATH - HIM SCAN      07/10/2017,[id number],CARDIAC  "CATHETERIZATION,Mid RCA;  drug eluting stent     CHOLECYSTECTOMY      2008     OTHER SURGICAL HISTORY      1986,[id number],BIOPSY BREAST,In [place]     TONSILLECTOMY, ADENOIDECTOMY, COMBINED      1955       FAMILY HISTORY:  Family History   Problem Relation Age of Onset     Hypertension Father      Hypertension     HEART DISEASE Father      Heart Disease,MI     CANCER Mother      Cancer,Lung     DIABETES Sister      Diabetes     Hyperlipidemia Sister      Hyperlipidemia,High     Other - See Comments Sister 33     Kidney problems     Breast Cancer No family hx of      Cancer-breast       Additional [facility] information documented on the intake form  completed by the patient 4/11/2018 was reviewed by myself.    ALLERGIES:  No Known Allergies    CURRENT MEDICATIONS:    Current Outpatient Prescriptions on File Prior to Visit:  atorvastatin (LIPITOR) 40 MG tablet Take 1 tablet (40 mg) by mouth daily   clopidogrel (PLAVIX) 75 MG tablet Take 1 tablet (75 mg) by mouth daily   DULoxetine (CYMBALTA) 30 MG EC capsule Take 1 capsule (30 mg) by mouth daily   metFORMIN (GLUCOPHAGE) 500 MG tablet Take 0.5 tablets (250 mg) by mouth 2 times daily (with meals)   metoprolol succinate (TOPROL-XL) 25 MG 24 hr tablet Take 1 tablet (25 mg) by mouth daily   aspirin 81 MG chewable tablet Take 81 mg by mouth daily   LORazepam (ATIVAN) 0.5 MG tablet Take 0.25 mg by mouth nightly as needed   nitroGLYcerin (NITROSTAT) 0.4 MG sublingual tablet Place 0.4 mg under the tongue Place 1 tablet under the tongue every 5 mon if needed for chest pain may repeat X 3 doses.   traMADol (ULTRAM) 50 MG tablet Take 1 tablet by mouth 3 times daily as needed     No current facility-administered medications on file prior to visit.     PHYSICAL EXAM:   /70 (BP Location: Right arm, Patient Position: Sitting, Cuff Size: Adult Regular)  Pulse 80  Ht 1.6 m (5' 3\")  Wt 70.8 kg (156 lb)  BMI 27.63 kg/m2 Body mass index is 27.63 kg/(m^2).    General Appearance: " Pleasant female in good appearance, mood and affect.  Alert and orientated times three (time, date and location).    Right knee exam:    Skin: No bruising or redness to the knee or leg.  Noticeable quadriceps atrophy on the right compared to the left.  Intact extensor mechanism of the thigh and leg.  She has noticeable pain along the distal quadriceps tendon just proximal to the kneecap with palpation.  Reproduces symptoms.  Pressure and manipulation at the area causes pain.  Mild pain along the lateral side of the knee and the iliotibial band with exam.  No knee joint effusion.  Full active and passive knee extension.  Discomfort to the knee with flexion past 130 .  Most of it around the front of the knee and quadriceps area.  Ligaments are stable.  Patella tracks well.  No significant joint line tenderness.  The patient's calf is supple.  No popliteal masses palpable.  Sensory intact to touch.  Comfortable passive motion of the right hip and groin..      Xray/MRI/MRA:  Radiographic images where independently reviewed and discussed with the patient.   X-rays of the right knee today demonstrates well-maintained joint space.  Mild narrowing and signs of arthritis.  No fractures appreciated.    IMPRESSION:  Chronic right knee and leg pain.  Most of her symptoms seem to be extra-articular to the soft tissues, tendons and muscles around the quadriceps, thigh and calf area.  distal quadriceps tendinitis.  Noticeable atrophy of the right thigh, quadriceps compared to the left.  Ligaments stable on exam.  Suspect right iliotibial band tendinitis    PLAN:  Discussion included review of x-rays.  Discussed with the patient reasonably well-maintained joint space for her age.  Discussed symptoms seem to be more extra-articular especially along the quad and quadriceps tendon area with noticeable atrophy.  I suspect the extra-articular findings are causing more of her knee symptoms and leg symptoms.    Recommendation includes  physical therapy for evaluation and treatment of the soft tissues and strengthening activities.  Ice to the affected area as needed.  Consider a cane for walking activities to take stress off the leg.    Discussed further evaluation with MRI but we will see how she progresses with the therapy first.  Possibility of some meniscal pathology but I do not think that would explain all of her symptoms around the leg and soft tissues.  Patient seems satisfied with recommendations and will call back if needed.    Edil Shah D.O., F.A.O.A.O.  Orthopedic Surgeon    [address] [address]  Phone [phone]  Fax [phone]    4/11/2018

## 2018-04-11 NOTE — MR AVS SNAPSHOT
"              After Visit Summary   4/11/2018    Jennyfer Elizabeth    MRN: [id number]           Patient Information     Date Of Birth          1950        Visit Information        Provider Department      4/11/2018 3:45 PM Edil Shah DO [facility]        Today's Diagnoses     Chronic pain of right knee    -  1    Atrophy of quadriceps femoris muscle        Right leg pain           Follow-ups after your visit        Additional Services     PHYSICAL THERAPY REFERRAL       *This therapy referral will be filtered to a centralized scheduling office at [facility] and the patient will receive a call to schedule an appointment at a [place] location most convenient for them. *     [facility] provides Physical Therapy evaluation and treatment and many specialty services across the [place] system.  If requesting a specialty program, please choose from the list below.    If you have not heard from the scheduling office within 2 business days, please call [phone] for all locations, with the exception of [place], please call [phone] and [facility], please call [phone]  Treatment: Evaluation & Treatment  Special Instructions/Modalities: none  Special Programs: None      Please be aware that coverage of these services is subject to the terms and limitations of your health insurance plan.  Call member services at your health plan with any benefit or coverage questions.      **Note to Provider:  If you are referring outside of [place] for the therapy appointment, please list the name of the location in the \"special instructions\" above, print the referral and give to the patient to schedule the appointment.                  Follow-up notes from your care team     Return if symptoms worsen or fail to improve.      Your next 10 appointments already scheduled     May 31, 2018  9:00 AM CDT   LAB with GH LAB   [facility] ([facility] " "[facility])    [address]  [place] [address]   [phone]           Please do not eat 10-12 hours before your appointment if you are coming in fasting for labs on lipids, cholesterol, or glucose (sugar). This does not apply to pregnant women. Water, hot tea and black coffee (with nothing added) are okay. Do not drink other fluids, diet soda or chew gum.            May 31, 2018 11:00 AM CDT   CONSULT with Lili Perkins,    [facility] ([facility]    [address]  [place] [address]   [phone]              Who to contact     If you have questions or need follow up information about today's clinic visit or your schedule please contact [facility] directly at [phone].  Normal or non-critical lab and imaging results will be communicated to you by AGILE customer insighthart, letter or phone within 4 business days after the clinic has received the results. If you do not hear from us within 7 days, please contact the clinic through AGILE customer insighthart or phone. If you have a critical or abnormal lab result, we will notify you by phone as soon as possible.  Submit refill requests through Vestorly or call your pharmacy and they will forward the refill request to us. Please allow 3 business days for your refill to be completed.          Additional Information About Your Visit        AGILE customer insighthart Information     Vestorly lets you send messages to your doctor, view your test results, renew your prescriptions, schedule appointments and more. To sign up, go to [email or web address] . Click on \"Log in\" on the left side of the screen, which will take you to the Welcome page. Then click on \"Sign up Now\" on the right side of the page.     You will be asked to enter the access code listed below, as well as some personal information. Please follow the directions to create your username and password.     Your access code is: [email or web address]  Expires: " "2018  6:39 PM     Your access code will  in 90 days. If you need help or a new code, please call your [facility] or [phone].        Care EveryWhere ID     This is your Care EveryWhere ID. This could be used by other organizations to access your [place] medical records  [id number]        Your Vitals Were     Pulse Height BMI (Body Mass Index)             80 1.6 m (5' 3\") 27.63 kg/m2          Blood Pressure from Last 3 Encounters:   18 132/70   18 138/78   17 138/80    Weight from Last 3 Encounters:   18 70.8 kg (156 lb)   18 70.8 kg (156 lb)   17 71 kg (156 lb 9.6 oz)              We Performed the Following     PHYSICAL THERAPY REFERRAL        Primary Care Provider Office Phone # Fax #    Jennyfer Hopper [phone]       [address] [facility] [address]        Equal Access to Services     [facility]: Hadii aad ku hadasho Soomaali, waaxda luqadaha, qaybta kaalmada adeegyada, lisbet rivera . So [facility] [phone].    ATENCIÓN: Si habla español, tiene a jackson disposición servicios gratuitos de asistencia lingüística. Llame al [phone].    We comply with applicable federal civil rights laws and [place] laws. We do not discriminate on the basis of race, color, national origin, age, disability, sex, sexual orientation, or gender identity.            Thank you!     Thank you for choosing [facility] AND [facility]  for your care. Our goal is always to provide you with excellent care. Hearing back from our patients is one way we can continue to improve our services. Please take a few minutes to complete the written survey that you may receive in the mail after your visit with us. Thank you!             Your Updated Medication List - Protect others around you: Learn how to safely use, store and throw away your medicines at www.disposemymeds.org.          This list is accurate as of 18  4:40 PM. "  Always use your most recent med list.                   Brand Name Dispense Instructions for use Diagnosis    aspirin 81 MG chewable tablet      Take 81 mg by mouth daily        atorvastatin 40 MG tablet    LIPITOR    90 tablet    Take 1 tablet (40 mg) by mouth daily    Hyperlipidemia LDL goal <100       clopidogrel 75 MG tablet    PLAVIX    90 tablet    Take 1 tablet (75 mg) by mouth daily    Hyperlipidemia LDL goal <100       DULoxetine 30 MG EC capsule    CYMBALTA    90 capsule    Take 1 capsule (30 mg) by mouth daily    Anxiety       LORazepam 0.5 MG tablet    ATIVAN     Take 0.25 mg by mouth nightly as needed        metFORMIN 500 MG tablet    GLUCOPHAGE    90 tablet    Take 0.5 tablets (250 mg) by mouth 2 times daily (with meals)    Type 2 diabetes mellitus without complication, without long-term current use of insulin (H)       metoprolol succinate 25 MG 24 hr tablet    TOPROL-XL    90 tablet    Take 1 tablet (25 mg) by mouth daily    Hyperlipidemia LDL goal <100, Benign essential hypertension       nitroGLYcerin 0.4 MG sublingual tablet    NITROSTAT     Place 0.4 mg under the tongue Place 1 tablet under the tongue every 5 mon if needed for chest pain may repeat X 3 doses.        traMADol 50 MG tablet    ULTRAM     Take 1 tablet by mouth 3 times daily as needed

## 2018-04-12 RX ORDER — LORAZEPAM 0.5 MG/1
TABLET ORAL
Qty: 15 TABLET | Refills: 0 | Status: SHIPPED | OUTPATIENT
Start: 2018-04-12 | End: 2019-04-17

## 2018-04-12 NOTE — TELEPHONE ENCOUNTER
Patient may have a limited amount of ativan; remind her it is to be used for panic , anxiety or muscle spasm if other alternative treatments do not work.        Needs to establish care with another provider.

## 2018-04-12 NOTE — TELEPHONE ENCOUNTER
printed placed on Jennyfer Hopper MD desk for review.     Patient notified and she states she has not used much at all, however has recently been in an increase amount of pain due to her R) knee.  She states she was recently in to see you and you referred her to see Ortho.  She did see Dr. Shah yesterday and he does not think its her meniscus he feels it is a Tendon issue which can take time to heal.  She will be starting Physical therapy but would like to know if you think that [facility] would be best for that.      She is hoping for something else for her pain as she is taking as much tylenol that she can and she can not take Ibuprofen.  I did let her know that typically we are unable to prescribe over the phone due to clinic policy but she was recently seen and she was persistent that I send Jennyfer Hopper MD this message.     Sunita Rincon LPN........................4/12/2018  2:45 PM

## 2018-04-12 NOTE — TELEPHONE ENCOUNTER
Patient notified  She states that she just looked in cuboard and found about 6 tramadol left.  So she will try one of those too.   Sunita Rincon LPN........................4/12/2018  4:25 PM   Sunita Rincon LPN........................4/12/2018  4:24 PM

## 2018-04-12 NOTE — TELEPHONE ENCOUNTER
MPMP shows she was provided tramdol 9/5/17 by myself #30;   She was given ativan 7/11/17 by Brenda Kang from [facility];  This was refilled by myself on 9/5/17;  Limited supply  She may try voltaren gel   No other pain meds.   PT good at [facility]

## 2018-04-12 NOTE — TELEPHONE ENCOUNTER
,  Routing refill request to provider for review/approval because:  Drug not on the [facility] refill protocol   Last office visit was 02/28/2018, medication appears to have been reviewed.  Please review and advise or sign if appropriate.  Eunice Granados RN.............................4/12/2018 10:38 AM

## 2018-04-17 ENCOUNTER — HOSPITAL ENCOUNTER (OUTPATIENT)
Dept: PHYSICAL THERAPY | Facility: OTHER | Age: 68
Setting detail: THERAPIES SERIES
End: 2018-04-17
Attending: ORTHOPAEDIC SURGERY
Payer: MEDICARE

## 2018-04-17 PROCEDURE — 97162 PT EVAL MOD COMPLEX 30 MIN: CPT | Mod: GP

## 2018-04-17 PROCEDURE — G8978 MOBILITY CURRENT STATUS: HCPCS | Mod: GP,CK

## 2018-04-17 PROCEDURE — 97110 THERAPEUTIC EXERCISES: CPT | Mod: GP

## 2018-04-17 PROCEDURE — 40000185 ZZHC STATISTIC PT OUTPT VISIT

## 2018-04-17 PROCEDURE — G8979 MOBILITY GOAL STATUS: HCPCS | Mod: GP,CI

## 2018-04-17 NOTE — PROGRESS NOTES
[facility]          OUTPATIENT PHYSICAL THERAPY ORTHOPEDIC EVALUATION  PLAN OF TREATMENT FOR OUTPATIENT REHABILITATION  (COMPLETE FOR INITIAL CLAIMS ONLY)  Patient's Last Name, First Name, M.I.  YOB: 1950  GlennJennyfer  JANNETTE    Provider s Name:  [facility]   Medical Record No.  [id number]   Start of Care Date:  04/11/18   Onset Date:  01/01/18   Type:     _X__PT   ___OT   ___SLP Medical Diagnosis:  Acute pain of right knee     PT Diagnosis:  Pain, reduced strength in RLE   Visits from SOC:  1      _________________________________________________________________________________  Plan of Treatment/Functional Goals:  gait training, manual therapy, neuromuscular re-education, ROM, strengthening, stretching     Ultrasound     Goals  Goal Identifier: Pain  Goal Description: Pt to report worst pain as 5/10  Target Date: 06/12/18    Goal Identifier: Driving  Goal Description: Pt able to drive 1 hour with 5/10 pain or less  Target Date: 06/12/18    Goal Identifier: Car transfer  Goal Description: Pt able to transfer into car with 3/10 pain or less  Target Date: 06/12/18    Goal Identifier: Self-Manage  Goal Description: Pt to manage symptoms with HEP  Target Date: 06/12/18    Therapy Frequency:  2 times/Week  Predicted Duration of Therapy Intervention:  8 weeks    Lizzy Singh, PT                 I CERTIFY THE NEED FOR THESE SERVICES FURNISHED UNDER        THIS PLAN OF TREATMENT AND WHILE UNDER MY CARE     (Physician co-signature of this document indicates review and certification of the therapy plan).                       Certification Date From:  04/17/18   Certification Date To:  06/12/18    Referring Provider:  Dr. Shah    Initial Assessment        See Epic Evaluation Start of Care Date: 04/11/18

## 2018-04-17 NOTE — PROGRESS NOTES
04/17/18 0900   Signing Clinician's Name / Credentials   Signing clinician's name / credentials Lizzy Singh DPT   Session Number   Session Number 1   PT Medicare Only G-code   G-code Mobility: Walking & Moving Around   Mobility: Walking & Moving Around   Mobility Current Status,  (eval/re-eval & every progress note) CK: 40-59% impairment   Current Mobility Modifier Rationale PSFS 54% disability   Mobility Goal,  (eval/re-eval, every progress note, & discharge) CI: 1-19% impairment   Adult Goals   PT Ortho Eval Goals 1;2;3;4   Ortho Goal 1   Goal Identifier Pain   Goal Description Pt to report worst pain as 5/10   Target Date 06/12/18   Ortho Goal 2   Goal Identifier Driving   Goal Description Pt able to drive 1 hour with 5/10 pain or less   Target Date 06/12/18   Ortho Goal 3   Goal Identifier Car transfer   Goal Description Pt able to transfer into car with 3/10 pain or less   Target Date 06/12/18   Ortho Goal 4   Goal Identifier Self-Manage   Goal Description Pt to manage symptoms with HEP   Target Date 06/12/18   Objective Measures   Objective Measures Objective Measure 1   Objective Measure 1   Objective Measure R knee ROM   Details Knee hyper ext L 5, R 5, flex supine L 140, R 127, with hands assisting R knee flex 142   Treatment Interventions   Interventions Therapeutic Procedure/Exercise   Therapeutic Procedure/exercise   Minutes 25   Skilled Intervention Reduce pain, increase strength   Patient Response Good   Treatment Detail Quad sets x 15 R, SLR x 15 R, Sidelying hip abd x 15R, long sitting knee flexion with hand assist, seated HS stretch 2x30 R   Plan   Homework Medbridge [id number]   Total Session Time   Timed Code Treatment Minutes 25   Total Treatment Time (sum of timed and untimed services) 45

## 2018-04-23 ENCOUNTER — HOSPITAL ENCOUNTER (OUTPATIENT)
Dept: PHYSICAL THERAPY | Facility: OTHER | Age: 68
Setting detail: THERAPIES SERIES
End: 2018-04-23
Attending: ORTHOPAEDIC SURGERY
Payer: MEDICARE

## 2018-04-23 PROCEDURE — 40000185 ZZHC STATISTIC PT OUTPT VISIT

## 2018-04-23 PROCEDURE — 97110 THERAPEUTIC EXERCISES: CPT | Mod: GP

## 2018-04-30 ENCOUNTER — HOSPITAL ENCOUNTER (OUTPATIENT)
Dept: PHYSICAL THERAPY | Facility: OTHER | Age: 68
Setting detail: THERAPIES SERIES
End: 2018-04-30
Attending: ORTHOPAEDIC SURGERY
Payer: MEDICARE

## 2018-04-30 PROCEDURE — 40000185 ZZHC STATISTIC PT OUTPT VISIT

## 2018-04-30 PROCEDURE — 97110 THERAPEUTIC EXERCISES: CPT | Mod: GP

## 2018-04-30 PROCEDURE — 97112 NEUROMUSCULAR REEDUCATION: CPT | Mod: GP

## 2018-05-17 ENCOUNTER — HOSPITAL ENCOUNTER (OUTPATIENT)
Dept: PHYSICAL THERAPY | Facility: OTHER | Age: 68
Setting detail: THERAPIES SERIES
End: 2018-05-17
Attending: ORTHOPAEDIC SURGERY
Payer: MEDICARE

## 2018-05-17 PROCEDURE — 97140 MANUAL THERAPY 1/> REGIONS: CPT | Mod: GP

## 2018-05-17 PROCEDURE — 97110 THERAPEUTIC EXERCISES: CPT | Mod: GP

## 2018-05-17 PROCEDURE — 40000185 ZZHC STATISTIC PT OUTPT VISIT

## 2018-05-31 ENCOUNTER — OFFICE VISIT (OUTPATIENT)
Dept: INTERNAL MEDICINE | Facility: OTHER | Age: 68
End: 2018-05-31
Attending: INTERNAL MEDICINE
Payer: MEDICARE

## 2018-05-31 VITALS
DIASTOLIC BLOOD PRESSURE: 82 MMHG | BODY MASS INDEX: 28.11 KG/M2 | SYSTOLIC BLOOD PRESSURE: 138 MMHG | WEIGHT: 158.7 LBS | HEART RATE: 72 BPM

## 2018-05-31 DIAGNOSIS — E78.5 HYPERLIPIDEMIA LDL GOAL <100: ICD-10-CM

## 2018-05-31 DIAGNOSIS — F41.9 ANXIETY: ICD-10-CM

## 2018-05-31 DIAGNOSIS — M25.561 CHRONIC PAIN OF RIGHT KNEE: ICD-10-CM

## 2018-05-31 DIAGNOSIS — G89.29 CHRONIC PAIN OF RIGHT KNEE: ICD-10-CM

## 2018-05-31 DIAGNOSIS — M85.89 OSTEOPENIA OF MULTIPLE SITES: ICD-10-CM

## 2018-05-31 DIAGNOSIS — Z23 NEED FOR VACCINATION: ICD-10-CM

## 2018-05-31 DIAGNOSIS — Z11.59 NEED FOR HEPATITIS C SCREENING TEST: ICD-10-CM

## 2018-05-31 DIAGNOSIS — E11.9 TYPE 2 DIABETES MELLITUS WITHOUT COMPLICATION, WITHOUT LONG-TERM CURRENT USE OF INSULIN (H): Primary | ICD-10-CM

## 2018-05-31 PROBLEM — I10 BENIGN ESSENTIAL HYPERTENSION: Status: RESOLVED | Noted: 2018-03-03 | Resolved: 2018-05-31

## 2018-05-31 PROBLEM — I25.2 HISTORY OF MI (MYOCARDIAL INFARCTION): Status: RESOLVED | Noted: 2018-03-03 | Resolved: 2018-05-31

## 2018-05-31 PROBLEM — M62.559 ATROPHY OF QUADRICEPS FEMORIS MUSCLE: Status: RESOLVED | Noted: 2018-04-11 | Resolved: 2018-05-31

## 2018-05-31 LAB
CHOLEST SERPL-MCNC: 168 MG/DL
HDLC SERPL-MCNC: 45 MG/DL (ref 23–92)
LDLC SERPL CALC-MCNC: 89 MG/DL
NONHDLC SERPL-MCNC: 123 MG/DL
TRIGL SERPL-MCNC: 172 MG/DL

## 2018-05-31 PROCEDURE — 90732 PPSV23 VACC 2 YRS+ SUBQ/IM: CPT | Performed by: INTERNAL MEDICINE

## 2018-05-31 PROCEDURE — 99215 OFFICE O/P EST HI 40 MIN: CPT | Performed by: INTERNAL MEDICINE

## 2018-05-31 PROCEDURE — G0463 HOSPITAL OUTPT CLINIC VISIT: HCPCS | Mod: 25

## 2018-05-31 PROCEDURE — G0009 ADMIN PNEUMOCOCCAL VACCINE: HCPCS

## 2018-05-31 PROCEDURE — G0472 HEP C SCREEN HIGH RISK/OTHER: HCPCS | Performed by: INTERNAL MEDICINE

## 2018-05-31 PROCEDURE — 36415 COLL VENOUS BLD VENIPUNCTURE: CPT | Performed by: INTERNAL MEDICINE

## 2018-05-31 PROCEDURE — G0463 HOSPITAL OUTPT CLINIC VISIT: HCPCS

## 2018-05-31 PROCEDURE — 90471 IMMUNIZATION ADMIN: CPT

## 2018-05-31 PROCEDURE — 36415 COLL VENOUS BLD VENIPUNCTURE: CPT | Performed by: FAMILY MEDICINE

## 2018-05-31 PROCEDURE — 80061 LIPID PANEL: CPT | Performed by: FAMILY MEDICINE

## 2018-05-31 RX ORDER — DULOXETIN HYDROCHLORIDE 30 MG/1
30 CAPSULE, DELAYED RELEASE ORAL 2 TIMES DAILY
Qty: 180 CAPSULE | Refills: 4 | Status: SHIPPED | OUTPATIENT
Start: 2018-05-31 | End: 2019-04-17

## 2018-05-31 RX ORDER — ATORVASTATIN CALCIUM 40 MG/1
40 TABLET, FILM COATED ORAL DAILY
Qty: 90 TABLET | Refills: 3 | Status: SHIPPED | OUTPATIENT
Start: 2018-05-31 | End: 2018-10-02

## 2018-05-31 ASSESSMENT — PATIENT HEALTH QUESTIONNAIRE - PHQ9: 5. POOR APPETITE OR OVEREATING: NOT AT ALL

## 2018-05-31 ASSESSMENT — ANXIETY QUESTIONNAIRES
1. FEELING NERVOUS, ANXIOUS, OR ON EDGE: NOT AT ALL
GAD7 TOTAL SCORE: 1
IF YOU CHECKED OFF ANY PROBLEMS ON THIS QUESTIONNAIRE, HOW DIFFICULT HAVE THESE PROBLEMS MADE IT FOR YOU TO DO YOUR WORK, TAKE CARE OF THINGS AT HOME, OR GET ALONG WITH OTHER PEOPLE: NOT DIFFICULT AT ALL
5. BEING SO RESTLESS THAT IT IS HARD TO SIT STILL: NOT AT ALL
7. FEELING AFRAID AS IF SOMETHING AWFUL MIGHT HAPPEN: NOT AT ALL
6. BECOMING EASILY ANNOYED OR IRRITABLE: SEVERAL DAYS
3. WORRYING TOO MUCH ABOUT DIFFERENT THINGS: NOT AT ALL
2. NOT BEING ABLE TO STOP OR CONTROL WORRYING: NOT AT ALL

## 2018-05-31 ASSESSMENT — PAIN SCALES - GENERAL: PAINLEVEL: NO PAIN (0)

## 2018-05-31 NOTE — MR AVS SNAPSHOT
After Visit Summary   5/31/2018    Jennyfer Elizabeth    MRN: [id number]           Patient Information     Date Of Birth          1950        Visit Information        Provider Department      5/31/2018 11:00 AM Lili Perkins DO [facility]        Today's Diagnoses     Type 2 diabetes mellitus without complication, without long-term current use of insulin (H)    -  1    Anxiety        Hyperlipidemia LDL goal <100        Chronic pain of right knee        Need for vaccination        Osteopenia of multiple sites        Need for hepatitis C screening test          Care Instructions    Caution with NSAIDS  (ibuprofen, high dose aspirin, naproxen, aleve, advil) due to risk for increased blood pressure, heart and vascular disease, stomach pain/nausea/ulcers and kidney damage; use minimal amount necessary            Follow-ups after your visit        Follow-up notes from your care team     Return in about 6 months (around 11/30/2018) for Recheck diabetes.      Future tests that were ordered for you today     Open Future Orders        Priority Expected Expires Ordered    DX Hip/Pelvis/Spine Routine  5/31/2019 5/31/2018            Who to contact     If you have questions or need follow up information about today's clinic visit or your schedule please contact [facility] AND [facility] directly at [phone].  Normal or non-critical lab and imaging results will be communicated to you by MyChart, letter or phone within 4 business days after the clinic has received the results. If you do not hear from us within 7 days, please contact the clinic through MyChart or phone. If you have a critical or abnormal lab result, we will notify you by phone as soon as possible.  Submit refill requests through Big Live or call your pharmacy and they will forward the refill request to us. Please allow 3 business days for your refill to be completed.          Additional Information About Your Visit       "  MyChart Information     Sessions lets you send messages to your doctor, view your test results, renew your prescriptions, schedule appointments and more. To sign up, go to [email or web address] . Click on \"Log in\" on the left side of the screen, which will take you to the Welcome page. Then click on \"Sign up Now\" on the right side of the page.     You will be asked to enter the access code listed below, as well as some personal information. Please follow the directions to create your username and password.     Your access code is: [email or web address]  Expires: 2018  9:01 AM     Your access code will  in 90 days. If you need help or a new code, please call your [place] clinic or [phone].        Care EveryWhere ID     This is your Care EveryWhere ID. This could be used by other organizations to access your [place] medical records  [id number]        Your Vitals Were     Pulse BMI (Body Mass Index)                72 28.11 kg/m2           Blood Pressure from Last 3 Encounters:   18 138/82   18 132/70   18 138/78    Weight from Last 3 Encounters:   18 158 lb 11.2 oz (72 kg)   18 156 lb (70.8 kg)   18 156 lb (70.8 kg)              We Performed the Following     Hemoglobin A1c     Hepatitis C antibody     Pneumococcal vaccine 23 valent PPSV23 (Pneumovax) [68372]          Today's Medication Changes          These changes are accurate as of 18 12:08 PM.  If you have any questions, ask your nurse or doctor.               These medicines have changed or have updated prescriptions.        Dose/Directions    DULoxetine 30 MG EC capsule   Commonly known as:  CYMBALTA   This may have changed:  when to take this   Used for:  Anxiety   Changed by:  Lili Perkins DO        Dose:  30 mg   Take 1 capsule (30 mg) by mouth 2 times daily   Quantity:  180 capsule   Refills:  4            Where to get your medicines      These medications were sent to [facility] " - [place], [place] - [address]  [address], [facility] [address]     Phone:  [phone]     atorvastatin 40 MG tablet    DULoxetine 30 MG EC capsule    metFORMIN 500 MG tablet                Primary Care Provider Office Phone # Fax #    Jennyfer Hopper [phone]       [address]  [facility] [address]        Equal Access to Services     FELECIA TO : Hadii aad ku hadasho Soomaali, waaxda luqadaha, qaybta kaalmada adeegyada, waxay idiin hayaan adeeg kharash lanessa . So [facility] [phone].    ATENCIÓN: Si asuncion damon, tiene a jackson disposición servicios gratuitos de asistencia lingüística. Llame al [phone].    We comply with applicable federal civil rights laws and [place] laws. We do not discriminate on the basis of race, color, national origin, age, disability, sex, sexual orientation, or gender identity.            Thank you!     Thank you for choosing [facility] AND hospitals  for your care. Our goal is always to provide you with excellent care. Hearing back from our patients is one way we can continue to improve our services. Please take a few minutes to complete the written survey that you may receive in the mail after your visit with us. Thank you!             Your Updated Medication List - Protect others around you: Learn how to safely use, store and throw away your medicines at www.disposemymeds.org.          This list is accurate as of 5/31/18 12:08 PM.  Always use your most recent med list.                   Brand Name Dispense Instructions for use Diagnosis    aspirin 81 MG chewable tablet      Take 81 mg by mouth daily        atorvastatin 40 MG tablet    LIPITOR    90 tablet    Take 1 tablet (40 mg) by mouth daily    Hyperlipidemia LDL goal <100       clopidogrel 75 MG tablet    PLAVIX    90 tablet    Take 1 tablet (75 mg) by mouth daily    Hyperlipidemia LDL goal <100       diclofenac 1 % Gel topical gel    VOLTAREN    100 g    Apply 4  grams to knees or 2 grams to hands four times daily using enclosed dosing card.    TAYLOR (generalized anxiety disorder)       DULoxetine 30 MG EC capsule    CYMBALTA    180 capsule    Take 1 capsule (30 mg) by mouth 2 times daily    Anxiety       LORazepam 0.5 MG tablet    ATIVAN    15 tablet    TAKE 1/2 TABLET BY MOUTH AT BEDTIME AS NEEDED FOR ANXIETY OR FOR MUSCLE SPASMS    Chronic pain of right knee       metFORMIN 500 MG tablet    GLUCOPHAGE    90 tablet    Take 0.5 tablets (250 mg) by mouth 2 times daily (with meals)    Type 2 diabetes mellitus without complication, without long-term current use of insulin (H)       metoprolol succinate 25 MG 24 hr tablet    TOPROL-XL    90 tablet    Take 1 tablet (25 mg) by mouth daily    Hyperlipidemia LDL goal <100, Benign essential hypertension       nitroGLYcerin 0.4 MG sublingual tablet    NITROSTAT     Place 0.4 mg under the tongue Place 1 tablet under the tongue every 5 mon if needed for chest pain may repeat X 3 doses.        traMADol 50 MG tablet    ULTRAM     Take 1 tablet by mouth 3 times daily as needed

## 2018-05-31 NOTE — PROGRESS NOTES
Chief Complaint   Patient presents with     [facility]       HPI: Ms. Elizabeth is a 68 year old female who presents today to [facility].  She overall is feeling good.      She has a history of type 2 diabetes mellitus.  She is currently on metformin very low dose for this at 250 mg twice daily.  She denies any side effects from the medication.  She is on a statin.  She is on an aspirin.  She does have her eye exam yearly in June 2018.  She is up-to-date on her microalbumin.    She does have underlining anxiety.  She is on Cymbalta at 30 mg daily.  She has found that it is too expensive when she went up to the 60 mg dose.  She does find this to be very beneficial though and is hoping that perhaps there is a way to get a higher dose to help better with her anxiety.    She is on rosuvastatin for her lipids.  She denies any side effects.  She is on Plavix due to a history of myocardial infarction.  This did occur in July 2017.  We did discuss that she likely will be done with the Plavix after 1 year.  She is also on metoprolol and sublingual nitroglycerin although has not had to use this.    She has chronic pain of her right knee.  She uses tramadol as needed for this.  She denies any side effects from the medication.    She is postmenopausal.  She is due for hep C screening.  She is due for pneumonia vaccine.  She has a history of osteopenia and is due for recheck DEXA scan.    History is discussed on 5/31/2018 with patient and reviewed in previous available records by myself.  It is current to the best of my knowledge as below.    Past Medical History:   Diagnosis Date     Benign essential hypertension 3/3/2018     Chronic pain of right knee 4/11/2018     TAYLOR (generalized anxiety disorder) 3/3/2018     History of MI (myocardial infarction) 07/2017    follows yearly with [facility]     Major depressive disorder, single episode 1995     Obstructive sleep apnea 2005    BIPAP     Osteopenia 2007    Lumbar spine     Pure  hypercholesterolemia 2001     Type 2 diabetes mellitus with complication, without long-term current use of insulin (H) 03/03/2018        Past Surgical History:   Procedure Laterality Date     BREAST BIOPSY, RT/LT  1986    x2; in [place]     CARDIAC CATH - HIM SCAN  07/10/2017    Mid RCA;  drug eluting stent     CHOLECYSTECTOMY  2008     TONSILLECTOMY, ADENOIDECTOMY, COMBINED  1955         Current Outpatient Prescriptions   Medication Sig Dispense Refill     aspirin 81 MG chewable tablet Take 81 mg by mouth daily       atorvastatin (LIPITOR) 40 MG tablet Take 1 tablet (40 mg) by mouth daily 90 tablet 3     clopidogrel (PLAVIX) 75 MG tablet Take 1 tablet (75 mg) by mouth daily 90 tablet 1     diclofenac (VOLTAREN) 1 % GEL topical gel Apply 4 grams to knees or 2 grams to hands four times daily using enclosed dosing card. 100 g 0     DULoxetine (CYMBALTA) 30 MG EC capsule Take 1 capsule (30 mg) by mouth 2 times daily 180 capsule 4     LORazepam (ATIVAN) 0.5 MG tablet TAKE 1/2 TABLET BY MOUTH AT BEDTIME AS NEEDED FOR ANXIETY OR FOR MUSCLE SPASMS 15 tablet 0     metFORMIN (GLUCOPHAGE) 500 MG tablet Take 0.5 tablets (250 mg) by mouth 2 times daily (with meals) 90 tablet 3     metoprolol succinate (TOPROL-XL) 25 MG 24 hr tablet Take 1 tablet (25 mg) by mouth daily 90 tablet 3     nitroGLYcerin (NITROSTAT) 0.4 MG sublingual tablet Place 0.4 mg under the tongue Place 1 tablet under the tongue every 5 mon if needed for chest pain may repeat X 3 doses.       traMADol (ULTRAM) 50 MG tablet Take 1 tablet by mouth 3 times daily as needed  0     [DISCONTINUED] atorvastatin (LIPITOR) 40 MG tablet Take 1 tablet (40 mg) by mouth daily 90 tablet 1     [DISCONTINUED] DULoxetine (CYMBALTA) 30 MG EC capsule Take 1 capsule (30 mg) by mouth daily 90 capsule 1     [DISCONTINUED] metFORMIN (GLUCOPHAGE) 500 MG tablet Take 0.5 tablets (250 mg) by mouth 2 times daily (with meals) 90 tablet 1        No Known Allergies     Family History    Problem Relation Age of Onset     Hypertension Father      HEART DISEASE Father      MI     Lung Cancer Mother      smoker     DIABETES Sister      HEART DISEASE Sister      KIDNEY DISEASE Sister 33     ?congenital     Hyperlipidemia Sister      No Known Problems Brother      Breast Cancer No family hx of      Cancer-breast       Family Status   Relation Status     Father  at age 56    MI     Mother  at age 70    Lung cancer     Sister Alive     Brother Alive     No family hx of          Social History     Social History     Marital status: Single     Spouse name: N/A     Number of children: 0     Years of education: N/A     Social History Main Topics     Smoking status: Former Smoker     Quit date: 10/1/2007     Smokeless tobacco: Never Used      Comment: Patient occasionally uses Nicorette gum     Alcohol use 8.4 oz/week      Comment: 2-3 beer daily     Drug use: No      Comment: Drug use: No     Sexual activity: Not Currently     Partners: Male     Other Topics Concern     None     Social History Narrative    Single, no children. Lives alone, own home.   s.o of 30 yrs left her in    Retired () as a . continues to sub.   Enjoys gardening, reading.s.            ROS  GEN:-fevers/-chills/-night sweats/-wt change  NEURO: -headaches/-vision changes  EARS: -hearing changes/-tinnitus  NOSE: -drainage/-congestion  MOUTH/THROAT: - sore throat/-dysphagia/-sores  LUNGS: -sob/-cough  CARDIOVASCULAR: -cp/-palpitations  GI: -pain/-diarrhea/-constipation/-bloody stools  : -dysuria/-hematuria  ENDOCRINE: -skin or hair changes  HEMATOLOGIC/LYMPHATIC: -swollen nodes  SKIN: -rashes/-lesions  MSK/RHEUM: +joint pain/-swelling/-falls  NEURO: -weakness/-parasthesias  PSYCH:-anhedonia/+depression/+anxiety  SLEEP: -daytime somnolence        EXAM:   /82 (BP Location: Right arm, Patient Position: Sitting, Cuff Size: Adult Regular)  Pulse 72  Wt 158 lb 11.2 oz (72 kg)  BMI 28.11  "kg/m2    Estimated body mass index is 28.11 kg/(m^2) as calculated from the following:    Height as of 4/11/18: 5' 3\" (1.6 m).    Weight as of this encounter: 158 lb 11.2 oz (72 kg).   GEN: Vitals reviewed.  Healthy appearing. Patient is in no acute distress. Cooperative with exam.  HEENT: Normocephalic atraumatic.  Normal external eye, conjunctiva, lids, cornea with no scleral icterus or conjunctival erythema. Pupils equally round. Oropharynx with no erythema or exudates. Dentition adequate.    NECK: Supple; no thyromegaly or masses noted.  No cervical or supraclavicular lymphadenopathy.  CV:Heart regular in rate and rhythm with no murmur.    LUNGS: Lungs clear to auscultation bilaterally.  Chest rise equal bilaterally.  No accessory muscle use.  ABD:  Non distended  SKIN: Warm and dry to touch.  No rash on face, arms and legs.  EXT: No clubbing or cyanosis.  No peripheral edema.  NEURO: Alert and oriented to person, place, and time.  Cranial nerves II-XII grossly intact with no focal or lateralizing deficits.  Muscle tone normal.  Gait normal. No tremor. Sensation intact to light touch.  MSK: ROM of upper and lower ext symmetric and full.  PSYCH: Mood is good. Affect appropriate. Speech fluent. Answers questions appropriately and thought process normal.    LABS: 5/31/2018 - Personally ordered/reviewed  Results for orders placed or performed in visit on 05/31/18   Lipid Profile (Chol, Trig, HDL, LDL calc) - FASTING   Result Value Ref Range    Cholesterol 168 <200 mg/dL    Triglycerides 172 (H) <150 mg/dL    HDL Cholesterol 45 23 - 92 mg/dL    LDL Cholesterol Calculated 89 <100 mg/dL    Non HDL Cholesterol 123 <130 mg/dL        Results for orders placed or performed in visit on 05/31/18   Hepatitis C antibody   Result Value Ref Range    Hepatitis C Antibody Nonreactive NR^Nonreactive               ASSESSMENT AND PLAN:    1. Type 2 diabetes mellitus without complication, without long-term current use of insulin " (H)  - Previous A1C is at goal of < 8 -unable to add this on the labs that were already drawn today.  We will plan to repeat with next labs.  Continue with current medication.  - Urine microalbumin:creatine: yearly  - Foot exam yearly  - Eye exam yearly  - Patient is not a current smoker  - Patient is on a daily aspirin  - Patient is on a statin  - Blood pressure today of 138/82 is at the goal of <140/90 for diabetics   - metFORMIN (GLUCOPHAGE) 500 MG tablet; Take 0.5 tablets (250 mg) by mouth 2 times daily (with meals)  Dispense: 90 tablet; Refill: 3    2. Anxiety  -At this time we will try to see if insurance will cover 30 mg twice daily as I do think she would strongly benefit from this medication at a slightly higher dose.  If this does not work we will consider between dose if that would would be covered as such as 40 or 50 mg daily.  She is to let me know what she finds at the pharmacy.  - DULoxetine (CYMBALTA) 30 MG EC capsule; Take 1 capsule (30 mg) by mouth 2 times daily  Dispense: 180 capsule; Refill: 4    3. Hyperlipidemia LDL goal <100  - lipids checked and okay  - On statin with no clinical evidence or complaint of myalgias or other ADRs  - Discussed importance of exercise and diet.  Weight management plan: Discussed healthy diet and exercise guidelines and patient will follow up in 6 months in clinic to re-evaluate.  - atorvastatin (LIPITOR) 40 MG tablet; Take 1 tablet (40 mg) by mouth daily  Dispense: 90 tablet; Refill: 3    4. Chronic pain of right knee  -She will call when she needs a refill of her tramadol.  She only uses this sparingly.    5. Need for vaccination  - Pneumococcal vaccine 23 valent PPSV23 (Pneumovax) [56611]    6. Osteopenia of multiple sites  -Continue on calcium and vitamin D.  - DX Hip/Pelvis/Spine; Future    7. Need for hepatitis C screening test  - Hepatitis C antibody    Follow-up in approximately 6 months with A1c, BMP, renewal of her Cymbalta and verification of dose.  She  may need a urine drug screen at that time also.      A total of 40 minutes spent with in face-to-face consultation of this patient with greater than 50% spent in history taking, counseling and care coordination of above listed medical problems including diagnosis, treatment options with emphasis on risks and benefits of each,prognosis and importance of compliance for each.     Patient Instructions   Caution with NSAIDS  (ibuprofen, high dose aspirin, naproxen, aleve, advil) due to risk for increased blood pressure, heart and vascular disease, stomach pain/nausea/ulcers and kidney damage; use minimal amount necessary          CHULA ROBBINS DO   5/31/2018 6:36 PM    This document was prepared using voice generated softwear. While every attempt was made for accuracy, grammatical errors may exist.

## 2018-05-31 NOTE — NURSING NOTE
Patient presents today to establish care. Patient concerned about a mole on her left shoulder. She is unsure how long it has been there, but noticed 1 month  Ago.  Patient inquiring about pneumonia shot.    Previous A1C is at goal of <8  Lab Results   Component Value Date    A1C 6.5 02/28/2018     Urine microalbumin:creatine:    Foot exam unknown  Eye exam future-6/7/2018    Tobacco User no  Patient is on a daily aspirin  Patient is on a Statin.  Blood pressure today of:     BP Readings from Last 1 Encounters:   04/11/18 132/70      is at the goal of <139/89 for diabetics.    Wen Vinson LPN on 5/31/2018 at 11:20 AM        Wen Vinson LPN on 5/31/2018 at 11:19 AM

## 2018-05-31 NOTE — PATIENT INSTRUCTIONS
Caution with NSAIDS  (ibuprofen, high dose aspirin, naproxen, aleve, advil) due to risk for increased blood pressure, heart and vascular disease, stomach pain/nausea/ulcers and kidney damage; use minimal amount necessary

## 2018-06-01 LAB — HCV AB SERPL QL IA: NONREACTIVE

## 2018-06-01 ASSESSMENT — PATIENT HEALTH QUESTIONNAIRE - PHQ9: SUM OF ALL RESPONSES TO PHQ QUESTIONS 1-9: 10

## 2018-06-01 ASSESSMENT — ANXIETY QUESTIONNAIRES: GAD7 TOTAL SCORE: 1

## 2018-06-04 ENCOUNTER — TELEPHONE (OUTPATIENT)
Dept: SURGERY | Facility: OTHER | Age: 68
End: 2018-06-04

## 2018-06-04 NOTE — TELEPHONE ENCOUNTER
Returned call to patient and notified her of lab results.    Wen Vinson LPN on 6/4/2018 at 12:11 PM

## 2018-06-05 ENCOUNTER — TRANSFERRED RECORDS (OUTPATIENT)
Dept: HEALTH INFORMATION MANAGEMENT | Facility: OTHER | Age: 68
End: 2018-06-05

## 2018-06-07 ENCOUNTER — TELEPHONE (OUTPATIENT)
Dept: INTERNAL MEDICINE | Facility: OTHER | Age: 68
End: 2018-06-07

## 2018-06-07 ENCOUNTER — TRANSFERRED RECORDS (OUTPATIENT)
Dept: HEALTH INFORMATION MANAGEMENT | Facility: OTHER | Age: 68
End: 2018-06-07

## 2018-06-07 NOTE — TELEPHONE ENCOUNTER
Left message inquiring if patient would like order for colonoscopy placed at this time with Dr. Melendez or to have procedure here at [facility].  See below message.    Zenia Negro LPN............6/7/2018 3:22 PM

## 2018-06-07 NOTE — TELEPHONE ENCOUNTER
Left message on answering machine for patient to call back. Patient is due for a Colonoscopy. Dr. Perkins said she can have it with Dr. Melendez or with us at [facility].  Radha Zayas CMA..............6/7/2018........9:27 AM

## 2018-06-12 NOTE — TELEPHONE ENCOUNTER
After birth date was verified, spoke with Jennyfer. Patient is wondering whether Dr. Perkins would recommend a cologuard screening or a colonoscopy. Patient states that she has no personal or family history of colon cancer.         Lawrence Oh LPN 06/12/18 2:42 PM

## 2018-06-13 NOTE — TELEPHONE ENCOUNTER
I think it is reasonable to complete a cologuard.  She would have to stop in to unit 3 to sign the paperwork to order it.      Either test is good and just depends on her preference.  If the cologuard is positive, colonoscopy would be needed.

## 2018-06-14 NOTE — TELEPHONE ENCOUNTER
Patient notified of information below. Patient is going to stop by and  paperwork for randy.  Noy Dalton LPN .............6/14/2018  2:44 PM

## 2018-06-26 ENCOUNTER — TRANSFERRED RECORDS (OUTPATIENT)
Dept: HEALTH INFORMATION MANAGEMENT | Facility: OTHER | Age: 68
End: 2018-06-26

## 2018-06-29 ENCOUNTER — TELEPHONE (OUTPATIENT)
Dept: INTERNAL MEDICINE | Facility: OTHER | Age: 68
End: 2018-06-29

## 2018-06-29 ENCOUNTER — HOSPITAL ENCOUNTER (OUTPATIENT)
Dept: BONE DENSITY | Facility: OTHER | Age: 68
Discharge: HOME | End: 2018-06-29
Attending: INTERNAL MEDICINE | Admitting: INTERNAL MEDICINE
Payer: MEDICARE

## 2018-06-29 DIAGNOSIS — Z12.11 COLON CANCER SCREENING: Primary | ICD-10-CM

## 2018-06-29 DIAGNOSIS — M85.89 OSTEOPENIA OF MULTIPLE SITES: ICD-10-CM

## 2018-06-29 PROCEDURE — 77080 DXA BONE DENSITY AXIAL: CPT

## 2018-06-29 RX ORDER — LOSARTAN POTASSIUM 25 MG/1
25 TABLET ORAL
COMMUNITY
Start: 2018-06-05 | End: 2018-10-02

## 2018-06-29 RX ORDER — ROSUVASTATIN CALCIUM 40 MG/1
40 TABLET, COATED ORAL
COMMUNITY
Start: 2018-06-05 | End: 2018-10-02

## 2018-06-29 NOTE — TELEPHONE ENCOUNTER
Patient is inquiring if she had any polyps from the last colonoscopy in 2008.  If she did then she would want another colonoscopy.  Also, she was seen by Dr. Ortiz, Cardiologist at [facility].  He has made a few medication changes and wanted you to be aware.  Please note paper dropped off by patient.    Zenia Negro LPN............6/29/2018 1:54 PM

## 2018-06-29 NOTE — TELEPHONE ENCOUNTER
Patient's note and medications are reviewed.      If her echocardiogram is done in [place] I will not have the results and cardiology will need to send these to her.  I only have the ability to send records that are completed here at [facility].    She did have one polyp on her colonoscopy in 2008.  Pathology from this is not available however it would be reasonable to repeat a colonoscopy instead of the DNA testing.  If she would like this ordered, please let me know and clarify if she wants it done with Dr. Melendez again versus elsewhere.

## 2018-07-02 NOTE — TELEPHONE ENCOUNTER
Called patient with the message from Dr. Perkins.  She will call to get her echocardiogram sent.  She would like to have colonoscopy with Dr. Melendez.  Rama Blevins LPN .......7/2/2018 10:26 AM

## 2018-07-11 ENCOUNTER — TELEPHONE (OUTPATIENT)
Dept: INTERNAL MEDICINE | Facility: OTHER | Age: 68
End: 2018-07-11

## 2018-07-11 NOTE — TELEPHONE ENCOUNTER
Spoke with patient she would like to know what type and dose of Calcium she should be taking and also said that she will have to wait on her colonoscopy if it's preventative only because of her insurance. Please advise. Stephanie Sheppard LPN......................7/11/2018 4:27 PM

## 2018-07-11 NOTE — TELEPHONE ENCOUNTER
I would recommend Calcium citrate 1200-1500mg daily from supplement and diet combined.  Be sure to take into account the amount of calcium she may be getting from food daily so as not to get too much calcium.

## 2018-07-12 NOTE — TELEPHONE ENCOUNTER
Patient bought calcium citrate and will sart taking it.  She was given the information below by Holl, Stacy K Norma J. Gosselin LPN....................  7/12/2018   11:50 AM

## 2018-07-23 NOTE — PROGRESS NOTES
Patient Information     Patient Name  Jennyfer Elizabeth MRN  [id number] Sex  Female   1950      Letter by Jennyfer Hopper MD at      Author:  Jennyfer Hopper MD Service:  (none) Author Type:  (none)    Filed:   Encounter Date:  2017 Status:  (Other)           Jennyfer Elizabeth  [address] [facility] [address]          May 31, 2017    Dear Ms. Elizabeth:    This letter is to remind you that you are due for your 3 month diabetic exam with Jennyfer Hopper MD . Your last comprehensive diabetic visit was more than 3 months ago.     A LIMITED refill of metFORMIN (GLUCOPHAGE) 500 mg tablet has been called into your pharmacy.    Additional refills require a diabetic medication management appointment with Jennyfer Hopper MD. Please call the clinic at [phone] to schedule your appointment.    Please call to inform us if you no longer see Jennyfer Hopper MD for primary care, doing so will remove you from our call/contact list.    Thank you,    The Refill Nurse  [facility]

## 2018-07-23 NOTE — PROGRESS NOTES
Patient Information     Patient Name  Jennyfer Elizabeth MRN  [id number] Sex  Female   1950      Letter by Zenia Dominique MD at      Author:  Zenia Dominique MD Service:  (none) Author Type:  (none)    Filed:   Date of Service:   Status:  (Other)       [facility]  [address]  [place] [address]  [phone]         Jennyfer Elizabeth   [address]  [facility] [address]      2017  Date of Breast Imagin2017  3:51 PM    Dear Ms. Elizabeth:    Your recent breast imaging examination showed an area that we believe is probably benign (probably not cancer). However, in 3 month(s), you should have a follow-up breast imaging study to confirm that this area has not changed. Please call [phone] to schedule an appointment for these tests if you have not already done so.    A report of your results was sent to your health care provider(s).    Your images will become part of your medical file here at [facility] and will be available for your continuing care. You are responsible for informing any new health care provider or breast imaging facility of the date and location of this examination.    Although mammography is the most accurate method for early detection, not all cancers are found through mammography. If you notice any new changes in your breast(s) please inform your health care provider without delay.    Thank you for choosing [facility] to participate in your healthcare needs.         [facility] Recommendations for Early Breast Cancer Detection   in Women without Symptoms  When to start having mammograms to screen for breast cancer, and how often to have them, is a personal decision. It should be based on your preferences, your values and your risk for developing breast cancer. [facility] recommends that you and your health care provider together determine when mammograms are right for  you.    [facility] recommends the following guidelines for women who have an average risk for breast cancer, based on American Cancer Society guidelines:    Age 40 to 44: Mammograms are optional.     Age 45 to 54: Have a mammogram every year.           Age 55 and older: Have a mammogram every year, or transition to having one every 2 years. Continue to have mammograms as long as your health is good.    If you have a higher than average risk for breast cancer, your health care provider may recommend a different schedule.

## 2018-07-23 NOTE — PROGRESS NOTES
Patient Information     Patient Name  Jennyfer Elizabeth MRN  [id number] Sex  Female   1950      Letter by Klinefelter, Kristin K at      Author:  Klinefelter, Kristin K Service:  (none) Author Type:  (none)    Filed:   Encounter Date:  10/17/2017 Status:  (Other)           Jennyfer Elizabeth  [address]  [facility] [address]          2017    Dear Ms. Elizabeth:    This is a reminder that you are signed up for our Diabetes BASICS class, which meets 2017 from 9:00 AM-11:00 AM.  We will meet in the [facility]. You can enter the [facility] and go to the clinic registration window to check in.  If you need to change or cancel this appointment please call us at [phone].  What you can expect from the class:    Information on nutrition from our Registered Dietitian Nutritionist    Information about caring for your diabetes from our Certified Diabetes Educator    A book that is yours to keep that will give you helpful information about diabetes    Personal, individual planning to care for your diabetes    Answers to your questions about diabetes  You are welcome to bring a support person with you to the class.  We will have a light snack and coffee available.   Please complete the enclosed assessment form and bring it with you to the class. You may also bring your glucose meter, if you have one.   Thank you for taking your diabetes seriously. We are confident that you will learn more about diabetes and set some goals to help you manage it.    Sincerely,     Your Diabetes Education Team at [facility]

## 2018-07-23 NOTE — PROGRESS NOTES
Patient Information     Patient Name  Jennyfer Elizabeth MRN  [id number] Sex  Female   1950      Letter by Nora Chaudhry at      Author:  Nora Chaudhry Service:  (none) Author Type:  (none)    Filed:   Encounter Date:  10/13/2017 Status:  (Other)           [address] [address]  [phone]                           10/13/2017        Dear  Jennyfer JANNETTE Elizabeth                         We have noticed that you have not attended Cardiac Rehabilitation since  2017.  We are concerned that you are not receiving the care that was ordered due to missed appointments.  Please make every effort to attend your next appointment.  If you are experiencing problems, have questions or concerns, or are unable to keep your appointments, please call our office at [phone].  We value you as a patient and desire to assist you in achieving your goals for Cardiac Rehabilitation.    Sincerely,                BRONSON Salmeron      Cardiac Rehab Staff   [facility]  [address] [place] [id number]  [phone]

## 2018-07-24 NOTE — PROGRESS NOTES
Patient Information     Patient Name  Jennyfer Elizabeth MRN  [id number] Sex  Female   1950      Letter by Pascual Wood MD at      Author:  Pascual Wood MD Service:  (none) Author Type:  (none)    Filed:   Date of Service:   Status:  (Other)       [facility]  [address]  [place] [address]  [phone]         Jennyfer Elizabeth   [address] [facility] [address]      2017  Date of Breast Imagin2017 12:27 PM    Dear Ms. Elizabeth:    We are pleased to inform you that the result of your recent breast imaging examination is normal/benign (not cancer).    A report of your results was sent to your health care provider(s).    Your images will become part of your medical file here at [facility] and will be available for your continuing care. You are responsible for informing any new health care provider or breast imaging facility of the date and location of this examination.    Although mammography is the most accurate method for early detection, not all cancers are found through mammography. If you notice any new changes in your breast(s) please inform your health care provider without delay.    Thank you for choosing [facility] to participate in your healthcare needs.         [facility] Recommendations for Early Breast Cancer Detection   in Women without Symptoms  When to start having mammograms to screen for breast cancer, and how often to have them, is a personal decision. It should be based on your preferences, your values and your risk for developing breast cancer. [facility] recommends that you and your health care provider together determine when mammograms are right for you.    [facility] recommends the following guidelines for women who have an average risk for breast cancer, based on American Cancer Society guidelines:    Age 40 to 44: Mammograms  are optional.     Age 45 to 54: Have a mammogram every year.           Age 55 and older: Have a mammogram every year, or transition to having one every 2 years. Continue to have mammograms as long as your health is good.    If you have a higher than average risk for breast cancer, your health care provider may recommend a different schedule.

## 2018-08-27 ENCOUNTER — TELEPHONE (OUTPATIENT)
Dept: LAB | Facility: OTHER | Age: 68
End: 2018-08-27

## 2018-08-27 DIAGNOSIS — Z79.899 HIGH RISK MEDICATION USE: Primary | ICD-10-CM

## 2018-08-27 DIAGNOSIS — E11.9 TYPE 2 DIABETES MELLITUS WITHOUT COMPLICATION, WITHOUT LONG-TERM CURRENT USE OF INSULIN (H): ICD-10-CM

## 2018-08-30 DIAGNOSIS — E11.9 TYPE 2 DIABETES MELLITUS WITHOUT COMPLICATION, WITHOUT LONG-TERM CURRENT USE OF INSULIN (H): ICD-10-CM

## 2018-08-30 DIAGNOSIS — Z79.899 HIGH RISK MEDICATION USE: ICD-10-CM

## 2018-08-30 LAB
ANION GAP SERPL CALCULATED.3IONS-SCNC: 10 MMOL/L (ref 3–14)
BUN SERPL-MCNC: 12 MG/DL (ref 7–25)
CALCIUM SERPL-MCNC: 9.4 MG/DL (ref 8.6–10.3)
CHLORIDE SERPL-SCNC: 99 MMOL/L (ref 98–107)
CO2 SERPL-SCNC: 26 MMOL/L (ref 21–31)
CREAT SERPL-MCNC: 0.85 MG/DL (ref 0.6–1.2)
GFR SERPL CREATININE-BSD FRML MDRD: 66 ML/MIN/1.7M2
GLUCOSE SERPL-MCNC: 220 MG/DL (ref 70–105)
HBA1C MFR BLD: 6.9 % (ref 4–6)
POTASSIUM SERPL-SCNC: 3.9 MMOL/L (ref 3.5–5.1)
SODIUM SERPL-SCNC: 135 MMOL/L (ref 134–144)

## 2018-08-30 PROCEDURE — 80048 BASIC METABOLIC PNL TOTAL CA: CPT | Performed by: INTERNAL MEDICINE

## 2018-08-30 PROCEDURE — 83036 HEMOGLOBIN GLYCOSYLATED A1C: CPT | Performed by: INTERNAL MEDICINE

## 2018-08-30 PROCEDURE — 36415 COLL VENOUS BLD VENIPUNCTURE: CPT | Performed by: INTERNAL MEDICINE

## 2018-10-02 ENCOUNTER — OFFICE VISIT (OUTPATIENT)
Dept: INTERNAL MEDICINE | Facility: OTHER | Age: 68
End: 2018-10-02
Attending: INTERNAL MEDICINE
Payer: MEDICARE

## 2018-10-02 VITALS
SYSTOLIC BLOOD PRESSURE: 122 MMHG | BODY MASS INDEX: 27.99 KG/M2 | HEART RATE: 84 BPM | WEIGHT: 158 LBS | DIASTOLIC BLOOD PRESSURE: 84 MMHG

## 2018-10-02 DIAGNOSIS — G89.29 CHRONIC PAIN OF RIGHT KNEE: Primary | ICD-10-CM

## 2018-10-02 DIAGNOSIS — M25.561 CHRONIC PAIN OF RIGHT KNEE: Primary | ICD-10-CM

## 2018-10-02 DIAGNOSIS — E11.8 TYPE 2 DIABETES MELLITUS WITH COMPLICATION, WITHOUT LONG-TERM CURRENT USE OF INSULIN (H): ICD-10-CM

## 2018-10-02 DIAGNOSIS — Z23 NEED FOR IMMUNIZATION AGAINST INFLUENZA: ICD-10-CM

## 2018-10-02 DIAGNOSIS — Z23 NEED FOR PROPHYLACTIC VACCINATION AND INOCULATION AGAINST INFLUENZA: ICD-10-CM

## 2018-10-02 PROCEDURE — G0463 HOSPITAL OUTPT CLINIC VISIT: HCPCS

## 2018-10-02 PROCEDURE — G0463 HOSPITAL OUTPT CLINIC VISIT: HCPCS | Mod: 25

## 2018-10-02 PROCEDURE — G0008 ADMIN INFLUENZA VIRUS VAC: HCPCS

## 2018-10-02 PROCEDURE — 90686 IIV4 VACC NO PRSV 0.5 ML IM: CPT | Performed by: INTERNAL MEDICINE

## 2018-10-02 PROCEDURE — 99214 OFFICE O/P EST MOD 30 MIN: CPT | Performed by: INTERNAL MEDICINE

## 2018-10-02 RX ORDER — LOSARTAN POTASSIUM 25 MG/1
25 TABLET ORAL DAILY
Qty: 30 TABLET | COMMUNITY
Start: 2018-10-02 | End: 2019-04-17

## 2018-10-02 RX ORDER — OMEGA-3 FATTY ACIDS/FISH OIL 300-1000MG
1 CAPSULE ORAL DAILY
Qty: 90 CAPSULE | COMMUNITY
Start: 2018-10-02 | End: 2021-11-10

## 2018-10-02 RX ORDER — ROSUVASTATIN CALCIUM 40 MG/1
40 TABLET, COATED ORAL DAILY
Qty: 30 TABLET | COMMUNITY
Start: 2018-10-02 | End: 2019-04-17

## 2018-10-02 ASSESSMENT — ANXIETY QUESTIONNAIRES

## 2018-10-02 ASSESSMENT — ENCOUNTER SYMPTOMS
ALLERGIC/IMMUNOLOGIC NEGATIVE: 1
ENDOCRINE NEGATIVE: 1
CONSTITUTIONAL NEGATIVE: 1
HEMATOLOGIC/LYMPHATIC NEGATIVE: 1

## 2018-10-02 ASSESSMENT — PATIENT HEALTH QUESTIONNAIRE - PHQ9: 5. POOR APPETITE OR OVEREATING: NOT AT ALL

## 2018-10-02 NOTE — PROGRESS NOTES
Injectable Influenza Immunization Documentation    1.  Is the person to be vaccinated sick today?   No    2. Does the person to be vaccinated have an allergy to a component   of the vaccine?   No  Egg Allergy Algorithm Link    3. Has the person to be vaccinated ever had a serious reaction   to influenza vaccine in the past?   No    4. Has the person to be vaccinated ever had Guillain-Barré syndrome?   No    Form completed by Bailey Fatima LPN on 10/2/2018 at 4:16 PM

## 2018-10-02 NOTE — MR AVS SNAPSHOT
After Visit Summary   10/2/2018    Jennyfer Elizabeth    MRN: [id number]           Patient Information     Date Of Birth          1950        Visit Information        Provider Department      10/2/2018 4:00 PM Placido Redding MD [facility]        Today's Diagnoses     Chronic pain of right knee    -  1    Type 2 diabetes mellitus with complication, without long-term current use of insulin (H)        Need for immunization against influenza        Need for prophylactic vaccination and inoculation against influenza           Follow-ups after your visit        Additional Services     ORTHOPEDICS ADULT REFERRAL                 Future tests that were ordered for you today     Open Future Orders        Priority Expected Expires Ordered    MR Knee Right w/o Contrast Routine  10/2/2019 10/2/2018            Who to contact     If you have questions or need follow up information about today's clinic visit or your schedule please contact [facility] AND [facility] directly at [phone].  Normal or non-critical lab and imaging results will be communicated to you by MyChart, letter or phone within 4 business days after the clinic has received the results. If you do not hear from us within 7 days, please contact the clinic through MyChart or phone. If you have a critical or abnormal lab result, we will notify you by phone as soon as possible.  Submit refill requests through [employer] or call your pharmacy and they will forward the refill request to us. Please allow 3 business days for your refill to be completed.          Additional Information About Your Visit        Care EveryWhere ID     This is your Care EveryWhere ID. This could be used by other organizations to access your [facility] medical records  [id number]        Your Vitals Were     Pulse Breastfeeding? BMI (Body Mass Index)             84 No 27.99 kg/m2          Blood Pressure from Last 3 Encounters:   10/02/18 122/84    05/31/18 138/82   04/11/18 132/70    Weight from Last 3 Encounters:   10/02/18 158 lb (71.7 kg)   05/31/18 158 lb 11.2 oz (72 kg)   04/11/18 156 lb (70.8 kg)              We Performed the Following     GH IMM-  HC FLU VAC PRESRV FREE QUAD SPLIT VIR 3+YRS IM     ORTHOPEDICS ADULT REFERRAL          Today's Medication Changes          These changes are accurate as of 10/2/18  4:16 PM.  If you have any questions, ask your nurse or doctor.               These medicines have changed or have updated prescriptions.        Dose/Directions    losartan 25 MG tablet   Commonly known as:  COZAAR   This may have changed:  when to take this   Changed by:  Placido Redding MD        Dose:  25 mg   Take 1 tablet (25 mg) by mouth daily   Quantity:  30 tablet   Refills:  0       rosuvastatin 40 MG tablet   Commonly known as:  CRESTOR   This may have changed:  when to take this   Changed by:  Placido Redding MD        Dose:  40 mg   Take 1 tablet (40 mg) by mouth daily   Quantity:  30 tablet   Refills:  0         Stop taking these medicines if you haven't already. Please contact your care team if you have questions.     atorvastatin 40 MG tablet   Commonly known as:  LIPITOR   Stopped by:  Placido Redding MD           DOCOSAHEXAENOIC ACID PO   Stopped by:  Placido Redding MD           metoprolol succinate 25 MG 24 hr tablet   Commonly known as:  TOPROL-XL   Stopped by:  Placido Redding MD           traMADol 50 MG tablet   Commonly known as:  ULTRAM   Stopped by:  Placido Redding MD                    Primary Care Provider Office Phone # Fax #    Lili REESE DO Maddie [phone] [address]  [place] [address]        Equal Access to Services     : Hadii aad faisal hadviral Soomaali, waaxda luqadaha, qaybta kaalmada adeegyada, lisbet rivera . So [facility] [phone].    ATENCIÓN: Si habla español, tiene a jackson disposición servicios gratuitos de asistencia lingüística. Llame al  [phone].    We comply with applicable federal civil rights laws and [place] laws. We do not discriminate on the basis of race, color, national origin, age, disability, sex, sexual orientation, or gender identity.            Thank you!     Thank you for choosing [facility] AND [facility]  for your care. Our goal is always to provide you with excellent care. Hearing back from our patients is one way we can continue to improve our services. Please take a few minutes to complete the written survey that you may receive in the mail after your visit with us. Thank you!             Your Updated Medication List - Protect others around you: Learn how to safely use, store and throw away your medicines at www.disposemymeds.org.          This list is accurate as of 10/2/18  4:16 PM.  Always use your most recent med list.                   Brand Name Dispense Instructions for use Diagnosis    aspirin 81 MG chewable tablet      Take 81 mg by mouth daily        diclofenac 1 % Gel topical gel    VOLTAREN    100 g    Apply 4 grams to knees or 2 grams to hands four times daily using enclosed dosing card.    TAYLOR (generalized anxiety disorder)       DULoxetine 30 MG EC capsule    CYMBALTA    180 capsule    Take 1 capsule (30 mg) by mouth 2 times daily    Anxiety       LORazepam 0.5 MG tablet    ATIVAN    15 tablet    TAKE 1/2 TABLET BY MOUTH AT BEDTIME AS NEEDED FOR ANXIETY OR FOR MUSCLE SPASMS    Chronic pain of right knee       losartan 25 MG tablet    COZAAR    30 tablet    Take 1 tablet (25 mg) by mouth daily        metFORMIN 500 MG tablet    GLUCOPHAGE    90 tablet    Take 0.5 tablets (250 mg) by mouth 2 times daily (with meals)    Type 2 diabetes mellitus without complication, without long-term current use of insulin (H)       nitroGLYcerin 0.4 MG sublingual tablet    NITROSTAT     Place 0.4 mg under the tongue Place 1 tablet under the tongue every 5 mon if needed for chest pain may repeat X 3 doses.        omega 3  1000 MG Caps     90 capsule    Take 1 g by mouth daily        rosuvastatin 40 MG tablet    CRESTOR    30 tablet    Take 1 tablet (40 mg) by mouth daily

## 2018-10-02 NOTE — NURSING NOTE
"The patient is here today to discuss her right knee pain and discomfort.  Bailey Fatima LPN on 10/2/2018 at 3:47 PM  Chief Complaint   Patient presents with     Knee Pain     right        Initial /84 (BP Location: Right arm, Patient Position: Sitting, Cuff Size: Adult Large)  Pulse 84  Wt 158 lb (71.7 kg)  Breastfeeding? No  BMI 27.99 kg/m2 Estimated body mass index is 27.99 kg/(m^2) as calculated from the following:    Height as of 4/11/18: 5' 3\" (1.6 m).    Weight as of this encounter: 158 lb (71.7 kg).  Medication Reconciliation: incomplete    Bailey Fatima LPN    "

## 2018-10-02 NOTE — PROGRESS NOTES
Chief Complaint:  Complaints of knee pain.    HPI: This patient comes in today with complaint of right knee pain.  This is a chronic problem for her.  She did see Dr. Shah in March of this year.  He did an x-ray which showed fairly well-preserved joint space anatomy.  He recommended physical therapy which she did not that seem to help quite a bit.  Now the pain is back and is just as bad as it was before and the exercises that she learned in therapy really do not seem to be hurting.  She is really quite troubled with this discomfort.  She had to drive back from [place] and had to put a tennis ball behind her knee.  She finds that it often will throb at night and there is no comfortable position to put it in.  There is no injury that she can recall.  Sometimes it feels great such as today when she is in the office.    Medications are reconciled.  Her list was not up-to-date.  In July she had her one-year anniversary from her stent and she was changed from atorvastatin to Crestor.  She was also taken off of the Toprol and put on lisinopril in its place.  Her Plavix was discontinued at that time.  She did have an echocardiogram showing a 4 cm ascending aortic aneurysm.  Past medical history, past surgical history, family history and social histories are reviewed and updated.  Immunizations are reviewed, she is only due for a flu shot.    Past Medical History:   Diagnosis Date     Benign essential hypertension 3/3/2018     Chronic pain of right knee 4/11/2018     Diabetic eye exam (H) 06/07/2018     TAYLOR (generalized anxiety disorder) 3/3/2018     History of MI (myocardial infarction) 07/2017    follows yearly with [facility]     Major depressive disorder, single episode 1995     Obstructive sleep apnea 2005    BIPAP     Osteopenia 2007    Lumbar spine     Pure hypercholesterolemia 2001     Type 2 diabetes mellitus with complication, without long-term current use of insulin (H) 03/03/2018       Past Surgical History:    Procedure Laterality Date     BREAST BIOPSY, RT/LT  1986    x2; in [place]     CARDIAC CATH - HIM SCAN  07/10/2017    Mid RCA;  drug eluting stent     CHOLECYSTECTOMY  2008     TONSILLECTOMY, ADENOIDECTOMY, COMBINED  1955       No Known Allergies    Current Outpatient Prescriptions   Medication Sig Dispense Refill     aspirin 81 MG chewable tablet Take 81 mg by mouth daily       diclofenac (VOLTAREN) 1 % GEL topical gel Apply 4 grams to knees or 2 grams to hands four times daily using enclosed dosing card. 100 g 0     DULoxetine (CYMBALTA) 30 MG EC capsule Take 1 capsule (30 mg) by mouth 2 times daily 180 capsule 4     LORazepam (ATIVAN) 0.5 MG tablet TAKE 1/2 TABLET BY MOUTH AT BEDTIME AS NEEDED FOR ANXIETY OR FOR MUSCLE SPASMS 15 tablet 0     losartan (COZAAR) 25 MG tablet Take 1 tablet (25 mg) by mouth daily 30 tablet      metFORMIN (GLUCOPHAGE) 500 MG tablet Take 0.5 tablets (250 mg) by mouth 2 times daily (with meals) 90 tablet 3     nitroGLYcerin (NITROSTAT) 0.4 MG sublingual tablet Place 0.4 mg under the tongue Place 1 tablet under the tongue every 5 mon if needed for chest pain may repeat X 3 doses.       omega 3 1000 MG CAPS Take 1 g by mouth daily 90 capsule      rosuvastatin (CRESTOR) 40 MG tablet Take 1 tablet (40 mg) by mouth daily 30 tablet      [DISCONTINUED] losartan (COZAAR) 25 MG tablet Take 25 mg by mouth       [DISCONTINUED] rosuvastatin (CRESTOR) 40 MG tablet Take 40 mg by mouth         Social History     Social History     Marital status: Single     Spouse name: N/A     Number of children: 0     Years of education: N/A     Occupational History     Not on file.     Social History Main Topics     Smoking status: Former Smoker     Quit date: 10/1/2007     Smokeless tobacco: Never Used      Comment: Patient occasionally uses Nicorette gum     Alcohol use 8.4 oz/week      Comment: 2-3 beer daily     Drug use: No      Comment: Drug use: No     Sexual activity: Not Currently     Partners: Male      Other Topics Concern     Not on file     Social History Narrative    Single, no children. Lives alone, own home.   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   Enjoys gardening, reading.s.       Review of Systems   Constitutional: Negative.    Endocrine: Negative.    Skin: Negative.    Allergic/Immunologic: Negative.    Hematological: Negative.        Physical Exam   Constitutional: She appears well-developed and well-nourished. No distress.   Musculoskeletal:   Right knee exam today is normal.  There is no effusion.  There is full range of motion.  There is no palpable tenderness.  No ligamentous laxity.  Patella movement is normal and unremarkable.  There is no abnormality with palpation in the upper portion of the calf or the lower portion of the thigh.   Skin: She is not diaphoretic.   Nursing note and vitals reviewed.      Assessment:      ICD-10-CM    1. Chronic pain of right knee M25.561 MR Knee Right w/o Contrast    G89.29 ORTHOPEDICS ADULT REFERRAL   2. Type 2 diabetes mellitus with complication, without long-term current use of insulin (H) E11.8    3. Need for immunization against influenza Z23 GH IMM-  HC FLU VAC PRESRV FREE QUAD SPLIT VIR 3+YRS IM   4. Need for prophylactic vaccination and inoculation against influenza Z23        Plan: Long discussion with the patient today.  Elected to get her scheduled for an MRI of the knee to be followed by orthopedic consultation as I believe she will need to see orthopedics regardless of the results of the MRI due to the amount of discomfort she is having with the knee.  No specific treatment is prescribed for her today other than continued exercises as she learned in physical therapy.  Flu shot given today.  Immunizations are all up-to-date and it was reviewed with her today.

## 2018-10-03 ASSESSMENT — ANXIETY QUESTIONNAIRES: GAD7 TOTAL SCORE: 0

## 2018-10-03 ASSESSMENT — PATIENT HEALTH QUESTIONNAIRE - PHQ9: SUM OF ALL RESPONSES TO PHQ QUESTIONS 1-9: 0

## 2018-10-05 ENCOUNTER — HOSPITAL ENCOUNTER (OUTPATIENT)
Dept: MRI IMAGING | Facility: OTHER | Age: 68
Discharge: HOME | End: 2018-10-05
Attending: INTERNAL MEDICINE | Admitting: INTERNAL MEDICINE
Payer: MEDICARE

## 2018-10-05 DIAGNOSIS — G89.29 CHRONIC PAIN OF RIGHT KNEE: ICD-10-CM

## 2018-10-05 DIAGNOSIS — M25.561 CHRONIC PAIN OF RIGHT KNEE: ICD-10-CM

## 2018-10-05 PROCEDURE — 73721 MRI JNT OF LWR EXTRE W/O DYE: CPT | Mod: RT

## 2018-11-29 DIAGNOSIS — E11.9 TYPE 2 DIABETES MELLITUS WITHOUT COMPLICATION, WITHOUT LONG-TERM CURRENT USE OF INSULIN (H): ICD-10-CM

## 2018-11-29 NOTE — LETTER
November 30, 2018      Jennyfer Elizabeth  [address] [facility] [address]        A refill request was received from your pharmacy for Metformin.    In reviewing your chart you are due for diabetic follow with Dr. Perkins.    Please call [phone] to schedule appointment.      Sincerely,      Refill nurse

## 2018-11-30 NOTE — TELEPHONE ENCOUNTER
"Routing refill request to provider for review/approval because:  Will route to Lili Perkins for review and consideration due to needing change for formulary coverage.      LOV: 5/31/18  Last HGBA1C: 8/30/18  Per result letter   \"Continue with current dosing of metformin.  Follow-up with me in approximately 3-4 months for diabetes\"  Reminder letter sent  Debbie Jackson RN on 11/30/2018 at 8:34 AM    "

## 2019-01-31 NOTE — ADDENDUM NOTE
Encounter addended by: Lizzy Singh, PT on: 1/31/2019 10:39 AM   Actions taken: Sign clinical note, Flowsheet accepted, Episode resolved

## 2019-01-31 NOTE — PROGRESS NOTES
Outpatient Physical Therapy Discharge Note     Patient: Jennyfer Elizabeth  : 1950    Beginning/End Dates of Reporting Period:  18 to 18    Referring Provider: Dr. Shah    Therapy Diagnosis: Acute pain of right knee     Client Self Report: Reports improvement generally. Sometimes no problems at all, sometimes really bothers her at night. No correlation between activity and night pain. Pain is at the top of the calf.    Objective Measurements:  Objective Measure: R knee ROM     Objective Measure: Pain  Details: 1/10       Goals:  Goal Identifier Pain   Goal Description Pt to report worst pain as 5/10   Target Date 18   Date Met      Progress:     Goal Identifier Driving   Goal Description Pt able to drive 1 hour with 5/10 pain or less   Target Date 18   Date Met      Progress:     Goal Identifier Car transfer   Goal Description Pt able to transfer into car with 3/10 pain or less   Target Date 18   Date Met      Progress:     Goal Identifier Self-Manage   Goal Description Pt to manage symptoms with HEP   Target Date 18   Date Met      Progress:     Progress Toward Goals:   Progress this reporting period: good progress          Plan:  Discharge from therapy.    Discharge:    Reason for Discharge: Patient chooses to discontinue therapy, was doing ok on her own, kept chart open in case of exacerbation.     Equipment Issued: none    Discharge Plan: Patient to continue home program.

## 2019-02-15 ENCOUNTER — TELEPHONE (OUTPATIENT)
Dept: INTERNAL MEDICINE | Facility: OTHER | Age: 69
End: 2019-02-15

## 2019-02-15 NOTE — TELEPHONE ENCOUNTER
Called and spoke with patient after proper verification. Patient states she is about to run out of her medication. Needing Metformin. Hasn't seen PCP in a while. Wondering if she can just have lab work or if she needs a physical. Please advise.   FYI: leaving March, 1st on vacation.   Frances Richardson LPN............. February 15, 2019 2:43 PM

## 2019-02-15 NOTE — TELEPHONE ENCOUNTER
Called and spoke with patient after proper verification. Informed patient of below message. Patient states she hasn't refilled her medication, will fill with her pharmacy. Transferred to scheduling line to make px in may.   Frances Richardson LPN............. February 15, 2019 3:04 PM

## 2019-02-15 NOTE — TELEPHONE ENCOUNTER
It appears that I sent in her medication at the beginning of December with enough refills to get to the summer.  She should have additional refills at [facility] unless she is taking it differently than prescribed (half tablet twice daily).  Does she need other meds refilled?    At this time I will send in the metformin needed and recommend that she set up an appointment with me in May for a physical at which point we will get labs and send in additional renewals of her medications.

## 2019-02-15 NOTE — TELEPHONE ENCOUNTER
The patient called because she was needing to refill her medications but was told she needed blood work first.  Does she need an appointment to see [facility] or would you just order lab work for her.  Please advise.

## 2019-03-11 ENCOUNTER — TELEPHONE (OUTPATIENT)
Dept: INTERNAL MEDICINE | Facility: OTHER | Age: 69
End: 2019-03-11

## 2019-03-11 NOTE — TELEPHONE ENCOUNTER
Patient called in regards to getting a physical and med refill before may 2nd I do not see any openings in her schedule. Patient would really like to be seen before then. She also states she only can be seen after 3pm due to being in school during the day. Please call her back in regards to this. Thank You!

## 2019-03-13 NOTE — TELEPHONE ENCOUNTER
Please advise work in time for physical.   Frances Richardson LPN............. March 13, 2019 9:10 AM

## 2019-03-14 NOTE — TELEPHONE ENCOUNTER
Called and spoke with patient after proper verification. Informed patient of below message. Patient stated understanding and no further questions at this time.   Frances Richardson LPN............. March 14, 2019 4:07 PM

## 2019-04-01 ENCOUNTER — TELEPHONE (OUTPATIENT)
Dept: INTERNAL MEDICINE | Facility: OTHER | Age: 69
End: 2019-04-01

## 2019-04-01 DIAGNOSIS — E11.8 TYPE 2 DIABETES MELLITUS WITH COMPLICATION, WITHOUT LONG-TERM CURRENT USE OF INSULIN (H): Primary | ICD-10-CM

## 2019-04-01 DIAGNOSIS — Z79.899 HIGH RISK MEDICATION USE: ICD-10-CM

## 2019-04-01 NOTE — TELEPHONE ENCOUNTER
Pt wants to know if she needs fasting bloodwork done prior to appt on 4/18.  If so, pt has appt on 4/3 at 9:30.  She would like to get blood work done at that time if possibile and necessary.

## 2019-04-02 NOTE — TELEPHONE ENCOUNTER
Called and spoke with patient after proper verification. Informed patient of below message. Patient stated understanding and no further questions at this time.  Jono only apt made.   Frances Richardson LPN............. April 2, 2019 8:49 AM

## 2019-04-02 NOTE — TELEPHONE ENCOUNTER
Yes she does need labs - these should be fasting.  Orders are placed.  Please let her know and have her schedule a lab appointment for 4/3 prior to her mammogram.

## 2019-04-03 ENCOUNTER — HOSPITAL ENCOUNTER (OUTPATIENT)
Dept: MAMMOGRAPHY | Facility: OTHER | Age: 69
Discharge: HOME | End: 2019-04-03
Attending: INTERNAL MEDICINE | Admitting: INTERNAL MEDICINE
Payer: MEDICARE

## 2019-04-03 DIAGNOSIS — Z12.39 SCREENING BREAST EXAMINATION: ICD-10-CM

## 2019-04-03 DIAGNOSIS — E11.8 TYPE 2 DIABETES MELLITUS WITH COMPLICATION, WITHOUT LONG-TERM CURRENT USE OF INSULIN (H): ICD-10-CM

## 2019-04-03 DIAGNOSIS — Z79.899 HIGH RISK MEDICATION USE: ICD-10-CM

## 2019-04-03 LAB
ALBUMIN SERPL-MCNC: 4.7 G/DL (ref 3.5–5.7)
ALP SERPL-CCNC: 86 U/L (ref 34–104)
ALT SERPL W P-5'-P-CCNC: 32 U/L (ref 7–52)
ANION GAP SERPL CALCULATED.3IONS-SCNC: 7 MMOL/L (ref 3–14)
AST SERPL W P-5'-P-CCNC: 37 U/L (ref 13–39)
BILIRUB SERPL-MCNC: 0.6 MG/DL (ref 0.3–1)
BUN SERPL-MCNC: 14 MG/DL (ref 7–25)
CALCIUM SERPL-MCNC: 9.7 MG/DL (ref 8.6–10.3)
CHLORIDE SERPL-SCNC: 104 MMOL/L (ref 98–107)
CHOLEST SERPL-MCNC: 124 MG/DL
CO2 SERPL-SCNC: 27 MMOL/L (ref 21–31)
CREAT SERPL-MCNC: 0.81 MG/DL (ref 0.6–1.2)
CREAT UR-MCNC: 114 MG/DL
ERYTHROCYTE [DISTWIDTH] IN BLOOD BY AUTOMATED COUNT: 14.1 % (ref 10–15)
GFR SERPL CREATININE-BSD FRML MDRD: 70 ML/MIN/{1.73_M2}
GLUCOSE SERPL-MCNC: 152 MG/DL (ref 70–105)
HBA1C MFR BLD: 6.8 % (ref 4–6)
HCT VFR BLD AUTO: 45.3 % (ref 35–47)
HDLC SERPL-MCNC: 54 MG/DL (ref 23–92)
HGB BLD-MCNC: 14.7 G/DL (ref 11.7–15.7)
LDLC SERPL CALC-MCNC: 52 MG/DL
MCH RBC QN AUTO: 28.5 PG (ref 26.5–33)
MCHC RBC AUTO-ENTMCNC: 32.5 G/DL (ref 31.5–36.5)
MCV RBC AUTO: 88 FL (ref 78–100)
MICROALBUMIN UR-MCNC: 12 MG/L
MICROALBUMIN/CREAT UR: 10.61 MG/G CR (ref 0–25)
NONHDLC SERPL-MCNC: 70 MG/DL
PLATELET # BLD AUTO: 311 10E9/L (ref 150–450)
POTASSIUM SERPL-SCNC: 4.2 MMOL/L (ref 3.5–5.1)
PROT SERPL-MCNC: 7.9 G/DL (ref 6.4–8.9)
RBC # BLD AUTO: 5.16 10E12/L (ref 3.8–5.2)
SODIUM SERPL-SCNC: 138 MMOL/L (ref 134–144)
TRIGL SERPL-MCNC: 90 MG/DL
TSH SERPL DL<=0.05 MIU/L-ACNC: 2.85 IU/ML (ref 0.34–5.6)
WBC # BLD AUTO: 9 10E9/L (ref 4–11)

## 2019-04-03 PROCEDURE — 84443 ASSAY THYROID STIM HORMONE: CPT | Performed by: INTERNAL MEDICINE

## 2019-04-03 PROCEDURE — 36415 COLL VENOUS BLD VENIPUNCTURE: CPT | Performed by: INTERNAL MEDICINE

## 2019-04-03 PROCEDURE — 83036 HEMOGLOBIN GLYCOSYLATED A1C: CPT | Performed by: INTERNAL MEDICINE

## 2019-04-03 PROCEDURE — 77063 BREAST TOMOSYNTHESIS BI: CPT

## 2019-04-03 PROCEDURE — 80061 LIPID PANEL: CPT | Performed by: INTERNAL MEDICINE

## 2019-04-03 PROCEDURE — 85027 COMPLETE CBC AUTOMATED: CPT | Performed by: INTERNAL MEDICINE

## 2019-04-03 PROCEDURE — 80053 COMPREHEN METABOLIC PANEL: CPT | Performed by: INTERNAL MEDICINE

## 2019-04-03 PROCEDURE — 82043 UR ALBUMIN QUANTITATIVE: CPT | Performed by: INTERNAL MEDICINE

## 2019-04-16 ENCOUNTER — TELEPHONE (OUTPATIENT)
Dept: INTERNAL MEDICINE | Facility: OTHER | Age: 69
End: 2019-04-16

## 2019-04-16 NOTE — TELEPHONE ENCOUNTER
Called to reschedule appointment as there is a schedule conflict this day. Patient stated this appointment has already been rescheduled multiple times and would like to be seen by the end of the week. Please advise.

## 2019-04-17 ENCOUNTER — OFFICE VISIT (OUTPATIENT)
Dept: INTERNAL MEDICINE | Facility: OTHER | Age: 69
End: 2019-04-17
Attending: INTERNAL MEDICINE
Payer: MEDICARE

## 2019-04-17 VITALS
WEIGHT: 163.8 LBS | HEIGHT: 63 IN | RESPIRATION RATE: 18 BRPM | HEART RATE: 80 BPM | BODY MASS INDEX: 29.02 KG/M2 | TEMPERATURE: 99 F | SYSTOLIC BLOOD PRESSURE: 136 MMHG | DIASTOLIC BLOOD PRESSURE: 72 MMHG

## 2019-04-17 DIAGNOSIS — Z23 NEED FOR SHINGLES VACCINE: ICD-10-CM

## 2019-04-17 DIAGNOSIS — E78.2 MIXED HYPERLIPIDEMIA: ICD-10-CM

## 2019-04-17 DIAGNOSIS — M21.961 FOOT DEFORMITY, BILATERAL: ICD-10-CM

## 2019-04-17 DIAGNOSIS — M25.561 CHRONIC PAIN OF RIGHT KNEE: ICD-10-CM

## 2019-04-17 DIAGNOSIS — R25.1 TREMOR: ICD-10-CM

## 2019-04-17 DIAGNOSIS — Z87.891 PERSONAL HISTORY OF TOBACCO USE: ICD-10-CM

## 2019-04-17 DIAGNOSIS — M21.962 FOOT DEFORMITY, BILATERAL: ICD-10-CM

## 2019-04-17 DIAGNOSIS — Z12.11 SCREENING FOR COLON CANCER: ICD-10-CM

## 2019-04-17 DIAGNOSIS — F41.9 ANXIETY: Primary | ICD-10-CM

## 2019-04-17 DIAGNOSIS — I10 ESSENTIAL HYPERTENSION: ICD-10-CM

## 2019-04-17 DIAGNOSIS — G89.29 CHRONIC PAIN OF RIGHT KNEE: ICD-10-CM

## 2019-04-17 DIAGNOSIS — E11.9 TYPE 2 DIABETES MELLITUS WITHOUT COMPLICATION, WITHOUT LONG-TERM CURRENT USE OF INSULIN (H): ICD-10-CM

## 2019-04-17 PROCEDURE — G0296 VISIT TO DETERM LDCT ELIG: HCPCS | Performed by: INTERNAL MEDICINE

## 2019-04-17 PROCEDURE — 99215 OFFICE O/P EST HI 40 MIN: CPT | Mod: 25 | Performed by: INTERNAL MEDICINE

## 2019-04-17 PROCEDURE — G0463 HOSPITAL OUTPT CLINIC VISIT: HCPCS | Mod: 25

## 2019-04-17 PROCEDURE — G0463 HOSPITAL OUTPT CLINIC VISIT: HCPCS

## 2019-04-17 RX ORDER — DULOXETIN HYDROCHLORIDE 30 MG/1
30 CAPSULE, DELAYED RELEASE ORAL 2 TIMES DAILY
Qty: 180 CAPSULE | Refills: 4 | Status: SHIPPED | OUTPATIENT
Start: 2019-04-17 | End: 2019-05-16

## 2019-04-17 RX ORDER — LOSARTAN POTASSIUM 25 MG/1
25 TABLET ORAL DAILY
Qty: 90 TABLET | Refills: 4 | Status: SHIPPED | OUTPATIENT
Start: 2019-04-17 | End: 2019-08-29

## 2019-04-17 RX ORDER — LORAZEPAM 0.5 MG/1
0.25 TABLET ORAL
Qty: 15 TABLET | Refills: 5 | Status: SHIPPED | OUTPATIENT
Start: 2019-04-17 | End: 2020-07-22

## 2019-04-17 RX ORDER — ROSUVASTATIN CALCIUM 40 MG/1
40 TABLET, COATED ORAL DAILY
Qty: 90 TABLET | Refills: 4 | Status: SHIPPED | OUTPATIENT
Start: 2019-04-17 | End: 2020-08-13

## 2019-04-17 ASSESSMENT — MIFFLIN-ST. JEOR: SCORE: 1232.12

## 2019-04-17 ASSESSMENT — PATIENT HEALTH QUESTIONNAIRE - PHQ9: SUM OF ALL RESPONSES TO PHQ QUESTIONS 1-9: 18

## 2019-04-17 ASSESSMENT — PAIN SCALES - GENERAL: PAINLEVEL: NO PAIN (0)

## 2019-04-17 NOTE — PATIENT INSTRUCTIONS
Decrease Duloxetine to 30mg daily for 2 weeks and every other day for 2 weeks and then stop      Lung Cancer Screening   Frequently Asked Questions  If you are at high-risk for lung cancer, getting screened with low-dose computed tomography (LDCT) every year can help save your life. This handout offers answers to some of the most common questions about lung cancer screening. If you have other questions, please call [phone] ([phone]).     What is it?  Lung cancer screening uses special X-ray technology to create an image of your lung tissue. The exam is quick and easy and takes less than 10 seconds. We don t give you any medicine or use any needles. You can eat before and after the exam. You don t need to change your clothes as long as the clothing on your chest doesn t contain metal. But, you do need to be able to hold your breath for at least 6 seconds during the exam.    What is the goal of lung cancer screening?  The goal of lung cancer screening is to save lives. Many times, lung cancer is not found until a person starts having physical symptoms. Lung cancer screening can help detect lung cancer in the earliest stages when it may be easier to treat.    Who should be screened for lung cancer?  We suggest lung cancer screening for anyone who is at high-risk for lung cancer. You are in the high-risk group if you:      are between the ages of 55 and 79, and    have smoked at least 1 pack of cigarettes a day for 30 or more years, and    still smoke or have quit within the past 15 years.    However, if you have a new cough or shortness of breath, you should talk to your doctor before being screened.    Some national lung health advocacy groups also recommend screening for people ages 50 to 79 who have smoked an average of 1 pack of cigarettes a day for 20 years. They must also have at least 1 other risk factor for lung cancer, not including exposure to secondhand smoke. Other risk factors are having  had cancer in the past, emphysema, pulmonary fibrosis, COPD, a family history of lung cancer, or exposure to certain materials such as arsenic, asbestos, beryllium, cadmium, chromium, diesel fumes, nickel, radon or silica. Your care team can help you know if you have one of these risk factors.     Why does it matter if I have symptoms?  Certain symptoms can be a sign that you have a condition in your lungs that should be checked and treated by your doctor. These symptoms include fever, chest pain, a new or changing cough, shortness of breath that you have never felt before, coughing up blood or unexplained weight loss. Having any of these symptoms can greatly affect the results of lung cancer screening.       Should all smokers get an LDCT lung cancer screening exam?  It depends. Lung cancer screening is for a very specific group of men and women who have a history of heavy smoking over a long period of time (see  Who should be screened for lung cancer  above).  I am in the high-risk group, but have been diagnosed with cancer in the past. Is LDCT lung cancer screening right for me?  In some cases, you should not have LDCT lung screening, such as when your doctor is already following your cancer with CT scan studies. Your doctor will help you decide if LDCT lung screening is right for you.  Do I need to have a screening exam every year?  Yes. If you are in the high-risk group described earlier, you should get an LDCT lung cancer screening exam every year until you are 79, or are no longer willing or able to undergo screening and possible procedures to diagnose and treat lung cancer.  How effective is LDCT at preventing death from lung cancer?  Studies have shown that LDCT lung cancer screening can lower the risk of death from lung cancer by 20 percent in people who are at high-risk.  What are the risks?  There are some risks and limitations of LDCT lung cancer screening. We want to make sure you understand the risks  and benefits, so please let us know if you have any questions. Your doctor may want to talk with you more about these risks.    Radiation exposure: As with any exam that uses radiation, there is a very small increased risk of cancer. The amount of radiation in LDCT is small--about the same amount a person would get from a mammogram. Your doctor orders the exam when he or she feels the potential benefits outweigh the risks.    False negatives: No test is perfect, including LDCT. It is possible that you may have a medical condition, including lung cancer, that is not found during your exam. This is called a false negative result.    False positives and more testing: LDCT very often finds something in the lung that could be cancer, but in fact is not. This is called a false positive result. False positive tests often cause anxiety. To make sure these findings are not cancer, you may need to have more tests. These tests will be done only if you give us permission. Sometimes patients need a treatment that can have side effects, such as a biopsy. For more information on false positives, see  What can I expect from the results?     Findings not related to lung cancer: Your LDCT exam also takes pictures of areas of your body next to your lungs. In a very small number of cases, the CT scan will show an abnormal finding in one of these areas, such as your kidneys, adrenal glands, liver or thyroid. This finding may not be serious, but you may need more tests. Your doctor can help you decide what other tests you may need, if any.  What can I expect from the results?  About 1 out of 4 LDCT exams will find something that may need more tests. Most of the time, these findings are lung nodules. Lung nodules are very small collections of tissue in the lung. These nodules are very common, and the vast majority--more than 97 percent--are not cancer (benign). Most are normal lymph nodes or small areas of scarring from past  infections.  But, if a small lung nodule is found to be cancer, the cancer can be cured more than 90 percent of the time. To know if the nodule is cancer, we may need to get more images before your next yearly screening exam. If the nodule has suspicious features (for example, it is large, has an odd shape or grows over time), we will refer you to a specialist for further testing.  Will my doctor also get the results?  Yes. Your doctor will get a copy of your results.  Is it okay to keep smoking now that there s a cancer screening exam?  No. Tobacco is one of the strongest cancer-causing agents. It causes not only lung cancer, but other cancers and cardiovascular (heart) diseases as well. The damage caused by smoking builds over time. This means that the longer you smoke, the higher your risk of disease. While it is never too late to quit, the sooner you quit, the better.  Where can I find help to quit smoking?  The best way to prevent lung cancer is to stop smoking. If you have already quit smoking, congratulations and keep it up! For help on quitting smoking, please call [facility] at 5-787-949-JPJB ([phone]) or the American [employer] Society at 1-535.833.5485 to find local resources near you.  One-on-one health coaching:  If you d prefer to work individually with a health care provider on tobacco cessation, we offer:      Medication Therapy Management:  Our specially trained pharmacists work closely with you and your doctor to help you quit smoking.  Call [phone] or [phone] (toll free).     Can Do: Health coaching offered by [facility].  [email or web address]

## 2019-04-17 NOTE — PROGRESS NOTES
Chief Complaint   Patient presents with     Annual Visit         HPI: Ms. Elizabeth is a 69 year old female who presents today for annual review of her medications.    She has type 2 diabetes mellitus.  She is on low-dose metformin for this.  Her A1c has traditionally been well controlled.  She is up-to-date on her eye exam.  She recently had labs done.  She is due for a foot exam today.  She denies any high or low sugars and does not check them regularly at home.  She is on an aspirin and a statin.    She continues to have issues with chronic knee pain.  She has previously used diclofenac topical for this.  She has not found it to be significantly beneficial.  She is not interested in additional treatment at this time.    She is on losartan for her blood pressure.  She feels that overall this has been controlled.  She denies any side effects from the medication.  She is on Crestor for cholesterol.  She denies any muscle aches with this or other known side effects.    She continues to have issues with anxiety.  She does not feel that her Cymbalta is working as much as it has in the past.  She has felt some increased depression with this.  She is interested in possibly switching to something else.  She would like to taper off her current medication first.  She does use Ativan as needed.    She has noted a tremor of her head.  It is worse at night.  She feels that it has been worse over the last month and a half.      She has postmenopausal.  She is due for shingles vaccine.  She is due for colon cancer screening.  She is interested in lung cancer screening also.  She last smoked in 2007.  She started smoking she believes in 1967.  She smoked 1-1.5 packs/day.    History is discussed and updated on 4/17/2019 with patient.  It is current to the best of my knowledge as below.    Past Medical History:   Diagnosis Date     Benign essential hypertension 3/3/2018     Chronic pain of right knee 4/11/2018     Diabetic eye exam  (H) 06/07/2018     TAYLOR (generalized anxiety disorder) 3/3/2018     History of MI (myocardial infarction) 07/2017    follows yearly with [facility]     Major depressive disorder, single episode 1995     Obstructive sleep apnea 2005    BIPAP     Osteopenia 2007    Lumbar spine     Pure hypercholesterolemia 2001     Type 2 diabetes mellitus with complication, without long-term current use of insulin (H) 03/03/2018        Past Surgical History:   Procedure Laterality Date     BREAST BIOPSY, RT/LT  1986    x2; in [place]     CARDIAC CATH - HIM SCAN  07/10/2017    Mid RCA;  drug eluting stent     CHOLECYSTECTOMY  2008     TONSILLECTOMY, ADENOIDECTOMY, COMBINED  1955         Current Outpatient Medications   Medication Sig Dispense Refill     aspirin 81 MG chewable tablet Take 81 mg by mouth daily       DULoxetine (CYMBALTA) 30 MG capsule Take 1 capsule (30 mg) by mouth 2 times daily 180 capsule 4     LORazepam (ATIVAN) 0.5 MG tablet Take 0.5 tablets (0.25 mg) by mouth nightly as needed for anxiety 15 tablet 5     losartan (COZAAR) 25 MG tablet Take 1 tablet (25 mg) by mouth daily 90 tablet 4     metFORMIN (GLUCOPHAGE) 500 MG tablet Take 0.5 tablets (250 mg) by mouth 2 times daily (with meals) 90 tablet 4     nitroGLYcerin (NITROSTAT) 0.4 MG sublingual tablet Place 0.4 mg under the tongue Place 1 tablet under the tongue every 5 mon if needed for chest pain may repeat X 3 doses.       omega 3 1000 MG CAPS Take 1 g by mouth daily 90 capsule      rosuvastatin (CRESTOR) 40 MG tablet Take 1 tablet (40 mg) by mouth daily 90 tablet 4     zoster vaccine recombinant adjuvanted (SHINGRIX) injection Inject 0.5 mLs into the muscle once for 1 dose Repeat in 6 months 0.5 mL 0       No Known Allergies     Family History   Problem Relation Age of Onset     Hypertension Father      Heart Disease Father         MI     Lung Cancer Mother         smoker     Diabetes Sister      Heart Disease Sister      Kidney Disease Sister 33        ?congenital  "    Hyperlipidemia Sister      No Known Problems Brother      Breast Cancer No family hx of         Cancer-breast       Family Status   Relation Name Status     Fa   at age 56        MI     Mo   at age 70        Lung cancer     Sis Tari Alive     Bro Doyle Alive     Neg  (Not Specified)        Social History     Tobacco Use     Smoking status: Former Smoker     Last attempt to quit: 10/1/2007     Years since quittin.5     Smokeless tobacco: Never Used     Tobacco comment: Patient occasionally uses Nicorette gum   Substance Use Topics     Alcohol use: Yes     Alcohol/week: 8.4 oz     Comment: 2-3 beer daily       Social History     Social History Narrative    Single, no children. Lives alone, own home.   s.o of 30 yrs left her in    Retired () as a . continues to sub.   Enjoys gardening, reading.              ROS  GEN: -fevers/-chills/-night sweats/+wt change - gradual increase of 7 lbs over past year  NEURO: -headaches/-vision changes  EARS: -hearing changes/-tinnitus  NOSE: +drainage/-congestion  MOUTH/THROAT: - sore throat/-dysphagia/-sores  LUNGS: -sob/-cough  CARDIOVASCULAR: -cp/-palpitations  GI: -pain/-diarrhea/-constipation/-bloody stools  :-dysuria/-hematuria/-sores/-vaginal discharge or bleeding  ENDOCRINE: -skin or hair changes  HEMATOLOGIC/LYMPHATIC: -swollen nodes  SKIN: -rashes/-lesions  MSK/RHEUM: +occasional joint pain/-swelling  NEURO: -weakness/-parasthesias  PSYCH: -anhedonia/+depression/+anxiety       EXAM:   /72 (BP Location: Right arm, Patient Position: Sitting, Cuff Size: Adult Regular)   Pulse 80   Temp 99  F (37.2  C) (Tympanic)   Resp 18   Ht 1.6 m (5' 3\")   Wt 74.3 kg (163 lb 12.8 oz)   Breastfeeding? No   BMI 29.02 kg/m    Estimated body mass index is 29.02 kg/m  as calculated from the following:    Height as of this encounter: 1.6 m (5' 3\").    Weight as of this encounter: 74.3 kg (163 lb 12.8 oz).      GEN: Vitals reviewed. " Healthy appearing. Patient is in no acute distress. Cooperative with exam.  HEENT: Normocephalic atraumatic.  Normal external eye, conjunctiva, lids, cornea with no scleral icterus or conjunctival erythema. Pupils equally round. Oropharynx with no erythema or exudates. Dentition adequate.  EACs clear bilat, TM gray with normal landmarks.  NECK: Supple; no thyromegaly or masses noted.  No cervical or supraclavicular lymphadenopathy.  CV: Heart regular in rate and rhythm with 2/6 systolic murmur.    LUNGS: Lungs clear to auscultation bilaterally.  Chest rise equal bilaterally.  No accessory muscle use.  ABD:  Obese, Soft, nontender, and nondistended.  No rebound. Bowel sounds positive.  SKIN: Warm and dry to touch.  No rash on face, arms and legs.  EXT: No clubbing or cyanosis.  No peripheral edema.  DIABETIC FOOT EXAM: No sores or lesions on feet bilaterally, no fungus noted, no erythema or ulceration, pulses adequate bilaterally, monofilament with n decrease in sensation on the right, 9/10 sensation on the left.  Bunion and hammer toe deformity noted bilaterally.  moderate callus formation noted along her MTP and heels.  NEURO: Alert and oriented to person, place, and time.  Cranial nerves II-XII grossly intact with no focal or lateralizing deficits.  Muscle tone normal.  Gait normal. No tremor obvious today  MSK: ROM of upper and lower ext symmetric and full.  PSYCH:Mood is good.  Affect appropriate. Speech fluent. Answers questions appropriately and thought process normal.       ASSESSMENT AND PLAN:    1. Anxiety  -We will plan to taper down.  Instructions are given for this.  Once she is off of this we will discuss additional options for medication treatment.  - DULoxetine (CYMBALTA) 30 MG capsule; Take 1 capsule (30 mg) by mouth 2 times daily  Dispense: 180 capsule; Refill: 4  - LORazepam (ATIVAN) 0.5 MG tablet; Take 0.5 tablets (0.25 mg) by mouth nightly as needed for anxiety  Dispense: 15 tablet; Refill:  5    2. Chronic pain of right knee  We discussed the potential of additional testing.  She is not interested at this time.  We will plan to continue to monitor.  She can use ice, Tylenol and consider referral to physical therapy if interested in the future.      3. Type 2 diabetes mellitus without complication, without long-term current use of insulin (H)  Her A1c continues to be good on low-dose metformin.  We will continue with this.  She is to call if she has any questions or concerns.  She was instructed to have her A1c checked in 6 months.  - metFORMIN (GLUCOPHAGE) 500 MG tablet; Take 0.5 tablets (250 mg) by mouth 2 times daily (with meals)  Dispense: 90 tablet; Refill: 4    4. Tremor  -At this time we will see if her tremor changes as we decrease her Cymbalta.  If it continues when I see her in follow-up we will plan to consider medication such as propranolol.    5. Need for shingles vaccine  Rx given  - zoster vaccine recombinant adjuvanted (SHINGRIX) injection; Inject 0.5 mLs into the muscle once for 1 dose Repeat in 6 months  Dispense: 0.5 mL; Refill: 0    6. Screening for colon cancer  - GASTROENTEROLOGY ADULT REF PROCEDURE ONLY Other; ([facility])    7. Mixed hyperlipidemia  Stable at this time on current dose of medication.  Continue with this.  - rosuvastatin (CRESTOR) 40 MG tablet; Take 1 tablet (40 mg) by mouth daily  Dispense: 90 tablet; Refill: 4    8. Essential hypertension  - Blood pressure today of 136/72   is at the goal of <140/90 with no exacerbation.  - Continue current regimen  Instructed to check BP at home.  - Cautioned patient to monitor with antibiotics, herbals and any OTC medications  - electrolytes and renal function done and normal  - losartan (COZAAR) 25 MG tablet; Take 1 tablet (25 mg) by mouth daily  Dispense: 90 tablet; Refill: 4    9. Foot deformity, bilateral  With her history of bunion and hammertoe if she is interested in any point for referral for podiatry she will call.    10.  Personal history of tobacco use  Patient was an active smoker for approximately 40 years.  She smoked 1-1-1/2 packs/day during this time.  She has smoked within the last 15 years.  - Prof Fee: Shared Decision Making Visit for Lung Cancer Screening  - CT Chest Lung Cancer Scrn Low Dose wo; Future    A total of 40 minutes spent with in face-to-face consultation of this patient with greater than 50% spent in history taking, counseling and care coordination of above listed medical problems in addition to discussion regarding lung cancer screening, including diagnosis, treatment options with emphasis on risks and benefits of each,prognosis and importance of compliance for each.     Return in about 1 month (around 5/15/2019) for Recheck, medications.      CHULA ROBBINS DO   4/17/2019 3:40 PM    This document was prepared using voice generated softwear. While every attempt was made for accuracy, spelling and grammatical errors may exist.      Lung Cancer Screening Shared Decision Making Visit     Jennyfer Elizabeth is eligible for lung cancer screening on the basis of the information provided in my signed lung cancer screening order.     I have discussed with patient the risks and benefits of screening for lung cancer with low-dose CT.     The risks include:  radiation exposure: one low dose chest CT has as much ionizing radiation as about 15 chest x-rays or 6 months of background radiation living in [place]    false positives: 96% of positive findings/nodules are NOT cancer, but some might still require additional diagnostic evaluation, including biopsy  over-diagnosis: some slow growing cancers that might never have been clinically significant will be detected and treated unnecessarily     The benefit of early detection of lung cancer is contingent upon adherence to annual screening or more frequent follow up if indicated.     Furthermore, reaping the benefits of screening requires Jennyfer Elizabeth to be willing and  physically able to undergo diagnostic procedures, if indicated. Although no specific guide is available for determining severity of comorbidities, it is reasonable to withhold screening in patients who have greater mortality risk from other diseases.     We did discuss that the only way to prevent lung cancer is to not smoke. Smoking cessation assistance was not offered as patient is no longer smoking    I did not offer risk estimation using a calculator such as this one:    ShouldIScreen

## 2019-05-10 ENCOUNTER — HOSPITAL ENCOUNTER (OUTPATIENT)
Dept: CT IMAGING | Facility: OTHER | Age: 69
Discharge: HOME | End: 2019-05-10
Attending: INTERNAL MEDICINE | Admitting: INTERNAL MEDICINE
Payer: MEDICARE

## 2019-05-10 DIAGNOSIS — Z87.891 PERSONAL HISTORY OF TOBACCO USE: ICD-10-CM

## 2019-05-10 PROCEDURE — G0297 LDCT FOR LUNG CA SCREEN: HCPCS

## 2019-05-16 ENCOUNTER — OFFICE VISIT (OUTPATIENT)
Dept: INTERNAL MEDICINE | Facility: OTHER | Age: 69
End: 2019-05-16
Attending: INTERNAL MEDICINE
Payer: MEDICARE

## 2019-05-16 VITALS
BODY MASS INDEX: 29.38 KG/M2 | SYSTOLIC BLOOD PRESSURE: 136 MMHG | HEIGHT: 63 IN | RESPIRATION RATE: 16 BRPM | DIASTOLIC BLOOD PRESSURE: 78 MMHG | OXYGEN SATURATION: 93 % | TEMPERATURE: 99.4 F | WEIGHT: 165.8 LBS | HEART RATE: 100 BPM

## 2019-05-16 DIAGNOSIS — R25.1 TREMOR: Primary | ICD-10-CM

## 2019-05-16 DIAGNOSIS — F32.A ANXIETY AND DEPRESSION: ICD-10-CM
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
## 2019-05-16 DIAGNOSIS — F41.9 ANXIETY AND DEPRESSION: ICD-10-CM

## 2019-05-16 PROCEDURE — G0463 HOSPITAL OUTPT CLINIC VISIT: HCPCS

## 2019-05-16 PROCEDURE — 99214 OFFICE O/P EST MOD 30 MIN: CPT | Performed by: INTERNAL MEDICINE

## 2019-05-16 RX ORDER — PROPRANOLOL HYDROCHLORIDE 10 MG/1
10 TABLET ORAL 2 TIMES DAILY
Qty: 60 TABLET | Refills: 1 | Status: SHIPPED | OUTPATIENT
Start: 2019-05-16 | End: 2019-06-12

## 2019-05-16 ASSESSMENT — ANXIETY QUESTIONNAIRES
3. WORRYING TOO MUCH ABOUT DIFFERENT THINGS: SEVERAL DAYS
6. BECOMING EASILY ANNOYED OR IRRITABLE: SEVERAL DAYS
7. FEELING AFRAID AS IF SOMETHING AWFUL MIGHT HAPPEN: SEVERAL DAYS
5. BEING SO RESTLESS THAT IT IS HARD TO SIT STILL: SEVERAL DAYS
1. FEELING NERVOUS, ANXIOUS, OR ON EDGE: SEVERAL DAYS
GAD7 TOTAL SCORE: 7
2. NOT BEING ABLE TO STOP OR CONTROL WORRYING: SEVERAL DAYS
IF YOU CHECKED OFF ANY PROBLEMS ON THIS QUESTIONNAIRE, HOW DIFFICULT HAVE THESE PROBLEMS MADE IT FOR YOU TO DO YOUR WORK, TAKE CARE OF THINGS AT HOME, OR GET ALONG WITH OTHER PEOPLE: SOMEWHAT DIFFICULT

## 2019-05-16 ASSESSMENT — PAIN SCALES - GENERAL: PAINLEVEL: NO PAIN (0)

## 2019-05-16 ASSESSMENT — PATIENT HEALTH QUESTIONNAIRE - PHQ9
5. POOR APPETITE OR OVEREATING: SEVERAL DAYS
SUM OF ALL RESPONSES TO PHQ QUESTIONS 1-9: 10

## 2019-05-16 ASSESSMENT — MIFFLIN-ST. JEOR: SCORE: 1241.19

## 2019-05-16 NOTE — PROGRESS NOTES
Chief Complaint   Patient presents with     RECHECK         HPI: Ms. Elizabeth is a 69 year old female who presents today for follow up of tremor and anxiety.    She was last seen she has noted a head tremor.  She feels that it is still there.  She has tapered off of the Cymbalta that had a 3% risk of causing tremors and does not feel that she has noted much difference.  She is off the Cymbalta now probably about 10 days.  She does feel that the tremor is worse in the morning.    She has a history of anxiety and depression.  This has been a long-standing issue for her.  She does take Ativan as needed.  She has been on several different medications including Cymbalta, Prozac, trazodone.  She is not sure what medications have worked and which ones have not.  She does not recall any specific side effects.    She had a recent tick bite.  She did bring the tick in with her today and it does have the appearance of a deer tick nymph.  She believes that it was in place less than 24 hours.  She removed it last night from her right breast.    She  reports that she quit smoking about 11 years ago. She has never used smokeless tobacco.    Past medical history reviewed as below:     Past Medical History:   Diagnosis Date     Benign essential hypertension 3/3/2018     Chronic pain of right knee 4/11/2018     Diabetic eye exam (H) 06/07/2018     TAYLOR (generalized anxiety disorder) 3/3/2018     History of MI (myocardial infarction) 07/2017    follows yearly with [facility]     Major depressive disorder, single episode 1995     Obstructive sleep apnea 2005    BIPAP     Osteopenia 2007    Lumbar spine     Pure hypercholesterolemia 2001     Type 2 diabetes mellitus with complication, without long-term current use of insulin (H) 03/03/2018   .      ROS  Pertinent ROS was performed and was negative, including for fever, chills, chest pain, shortness of breath, increased lower extremity edema, changes in bowel or bladder, blood in the stool,  "difficulty swallowing, sores in the mouth. No other concerns, with exception of HPI above.      EXAM:   /78 (BP Location: Right arm, Patient Position: Sitting, Cuff Size: Adult Regular)   Pulse 100   Temp 99.4  F (37.4  C) (Tympanic)   Resp 16   Ht 1.6 m (5' 3\")   Wt 75.2 kg (165 lb 12.8 oz)   SpO2 93%   BMI 29.37 kg/m      Estimated body mass index is 29.37 kg/m  as calculated from the following:    Height as of this encounter: 1.6 m (5' 3\").    Weight as of this encounter: 75.2 kg (165 lb 12.8 oz).      GEN: Vitals reviewed. Healthy appearing. Patient is in no acute distress. Cooperative with exam.  HEENT: Normocephalic atraumatic.  Pupils equally round.  No scleral icterus, no conjunctival erythema. Oropharynx with no erythema or exudates. Dentition adequate.  Minimal tremor is noted of the head.  NECK: Supple; no thyromegaly.  No neck, cervical LAD.   CV: Heart regular in rate and rhythm with 1/6 systolic murmur.    LUNGS: Lungs clear to auscultation bilaterally.  Chest rise equal bilaterally.  No accessory muscle use.  ABD:  Obese.  SKIN: Warm and dry to touch.  No rash on face, arms and legs.  Area of tick bite on the right breast with central necrosis, approximately 1 inch of erythema surrounding this.  EXT: No clubbing or cyanosis.  No peripheral edema.  PSYCH: Mood is fair.  Affect appropriate. Speech fluent. Answers questions appropriately and thought process normal.     ASSESSMENT AND PLAN:    Tremor  -At this time we will start her on propranolol low dose for her tremor.  She is to call if she has any side effects.  We discussed these in depth including fatigue, bradycardia, hypotension.  If the tremor is secondary to her Cymbalta she still may see some benefit over the next several weeks off the medication peer  - propranolol (INDERAL) 10 MG tablet  Dispense: 60 tablet; Refill: 1    Anxiety and depression  -At this time we will obtain [employer] testing.  We will use this as guidance for " treatment if appropriate.  She will be called next week with possible medications to try.    Tick bite, initial encounter  -Given that it has been in place less than 24 hours it is unlikely for her to develop Lyme disease however she was encouraged to call if she has any fevers, chills, increased rash or other symptoms.       Return in about 6 weeks (around 6/27/2019) for Recheck.      CHULA ROBBINS DO   5/16/2019 4:22 PM    This document was prepared using voice generated softwear. While every attempt was made for accuracy, grammatical errors may exist.

## 2019-05-16 NOTE — NURSING NOTE
Patient presents to the clinic for diabetic follow-up.     Previous A1C is at goal of <8    Lab Results   Component Value Date    A1C 6.8 04/03/2019       Patient has Type 2 Diabetes  Diabetes medications: Oral Medications: Metformin - Dose: 500 MG, Time: breakfast and supper  Patient is on a daily aspirin  Patient is on a Statin.  Blood glucose tests per day 3  Urine microalbumin:creatine: 04/03/2018  Foot exam DUE  Eye exam 06/07/2018    Blood pressure today of 136/78 is at the goal of <139/89 for diabetics.    Tobacco User:   History   Smoking Status     Former Smoker     Quit date: 10/1/2007   Smokeless Tobacco     Never Used     Comment: Patient occasionally uses Nicorette gum       Other concerns today: follow-up tremor and anxiety    Medication Reconciliation: complete     Frances Richardson LPN on 5/16/2019 at 3:51 PM

## 2019-05-17 ASSESSMENT — ANXIETY QUESTIONNAIRES: GAD7 TOTAL SCORE: 7

## 2019-05-21 ENCOUNTER — TRANSFERRED RECORDS (OUTPATIENT)
Dept: HEALTH INFORMATION MANAGEMENT | Facility: OTHER | Age: 69
End: 2019-05-21

## 2019-05-23 ENCOUNTER — TELEPHONE (OUTPATIENT)
Dept: INTERNAL MEDICINE | Facility: OTHER | Age: 69
End: 2019-05-23

## 2019-05-23 DIAGNOSIS — F32.A ANXIETY AND DEPRESSION: Primary | ICD-10-CM

## 2019-05-23 DIAGNOSIS — F41.9 ANXIETY AND DEPRESSION: Primary | ICD-10-CM

## 2019-05-23 RX ORDER — BUPROPION HYDROCHLORIDE 150 MG/1
150 TABLET ORAL EVERY MORNING
Qty: 30 TABLET | Refills: 1 | Status: SHIPPED | OUTPATIENT
Start: 2019-05-23 | End: 2019-06-12

## 2019-05-23 NOTE — TELEPHONE ENCOUNTER
Called and spoke with patient after proper verification. Informed patient of below message. Patient stated understanding and no further questions at this time.   Frances Richardson LPN............. May 23, 2019 10:29 AM

## 2019-05-23 NOTE — TELEPHONE ENCOUNTER
Please let patient know I received the results of her testing.  Medications that are in her use as directed category include bupropion (Wellbutrin), desvenlafaxine (Pristiq).  I would be okay with starting either of these.  Please let me know if she has a preference.    Please set her up in approximately 6 weeks for follow-up of the medication.  Okay to use the same day spot if needed.

## 2019-05-23 NOTE — TELEPHONE ENCOUNTER
"Called and spoke with patient after proper verification.  Patient states she needs to start on something right away. Since she seen Lili Perkins DO, she has been hearing \"the whooshing sounds\" in her head again. States shes in tears every night, can't think and know she needs to go on medication. Patient is wondering if a side effect for either medication would include weight loss. Does NOT want to start one that would increase her weight. Please advise.   Frances Richardson LPN............. May 23, 2019 8:49 AM     "

## 2019-05-31 DIAGNOSIS — E11.9 TYPE 2 DIABETES MELLITUS WITHOUT COMPLICATION, WITHOUT LONG-TERM CURRENT USE OF INSULIN (H): ICD-10-CM

## 2019-06-03 NOTE — TELEPHONE ENCOUNTER
"Requested Prescriptions   Pending Prescriptions Disp Refills     metFORMIN (GLUCOPHAGE) 500 MG tablet [Pharmacy Med Name: METFORMIN 500MG TABLETS] 90 tablet 0     Sig: TAKE 1/2 TABLET BY MOUTH TWICE DAILY WITH MEALS       Biguanide Agents Passed - 6/3/2019  3:42 PM        Passed - Blood pressure less than 140/90 in past 6 months     BP Readings from Last 3 Encounters:   05/16/19 136/78   04/17/19 136/72   10/02/18 122/84                 Passed - Patient has documented LDL within the past 12 mos.     Recent Labs   Lab Test 04/03/19 0922   LDL 52             Passed - Patient has had a Microalbumin in the past 15 mos.     Recent Labs   Lab Test 04/03/19 0929   MICROL 12   UMALCR 10.61             Passed - Patient is age 10 or older        Passed - Patient has documented A1c within the specified period of time.     If HgbA1C is 8 or greater, it needs to be on file within the past 3 months.  If less than 8, must be on file within the past 6 months.     Recent Labs   Lab Test 04/03/19 0922 07/07/17  1253   A1C 6.8*   < >  --    [id number]  --   --  6.6*    < > = values in this interval not displayed.             Passed - Patient's CR is NOT>1.4 OR Patient's EGFR is NOT<45 within past 12 mos.     Recent Labs   Lab Test 04/03/19 0922   GFRESTIMATED 70   GFRESTBLACK 85       Recent Labs   Lab Test 04/03/19 0922   CR 0.81             Passed - Patient does NOT have a diagnosis of CHF.        Passed - Medication is active on med list        Passed - Patient is not pregnant        Passed - Patient has not had a positive pregnancy test within the past 12 mos.         Passed - Recent (6 mo) or future (30 days) visit within the authorizing provider's specialty     Patient had office visit in the last 6 months or has a visit in the next 30 days with authorizing provider or within the authorizing provider's specialty.  See \"Patient Info\" tab in inbasket, or \"Choose Columns\" in Meds & Orders section of the refill encounter.        "     LOV 4/17/19    Prescription approved per [facility] [place].

## 2019-06-10 ENCOUNTER — TELEPHONE (OUTPATIENT)
Dept: INTERNAL MEDICINE | Facility: OTHER | Age: 69
End: 2019-06-10

## 2019-06-10 NOTE — TELEPHONE ENCOUNTER
Called and spoke with patient after proper verification.  Patient states se is seeing no new changes in her medications. Still is having her hand tremors and states she has no energy is very down and depressed. New medication is Bupropion (Wellbutrin XL) and propranolol. Pharmacist suggested vitamin b complex, has been taking that, but hasn't been helping. Patient states she is very frustrated. Doesn't want to do anything. Patient is unsure if she should have a med increase of these medications, or go back to her Cymbalta. Please advise.     Frances Richardson LPN............. Tiff 10, 2019 11:25 AM

## 2019-06-10 NOTE — TELEPHONE ENCOUNTER
"Called and spoke with patient after proper verification.  Patient states she is very frustrated. Doesn't understand why she has to come in and medications can't just be changed over the phone. Patient also states she has company coming on Friday and wanted \"to feel better\" before her company comes. Patient did take the 8:20 spot on Friday.     Frances Richardson LPN............. Tiff 10, 2019 1:12 PM     "

## 2019-06-10 NOTE — TELEPHONE ENCOUNTER
I would recommend that she stop her propranolol and see me in follow-up later this week.  I have openings available on Friday.

## 2019-06-10 NOTE — TELEPHONE ENCOUNTER
Pt states that her new medications are ineffective, would like to have her Bupropion and her Propranolol doses reviewed for possible adjustment.

## 2019-06-10 NOTE — TELEPHONE ENCOUNTER
Called and spoke with patient after proper verification. Informed patient of below message. Patient stated understanding and no further questions at this time. Appt time moved.     Frances Richardson LPN............. Tiff 10, 2019 1:43 PM

## 2019-06-12 ENCOUNTER — OFFICE VISIT (OUTPATIENT)
Dept: INTERNAL MEDICINE | Facility: OTHER | Age: 69
End: 2019-06-12
Attending: DERMATOLOGY
Payer: MEDICARE

## 2019-06-12 VITALS — OXYGEN SATURATION: 98 % | HEART RATE: 98 BPM | RESPIRATION RATE: 18 BRPM

## 2019-06-12 DIAGNOSIS — F41.9 ANXIETY AND DEPRESSION: Primary | ICD-10-CM

## 2019-06-12 DIAGNOSIS — F32.A ANXIETY AND DEPRESSION: Primary | ICD-10-CM

## 2019-06-12 DIAGNOSIS — R25.1 TREMOR: ICD-10-CM

## 2019-06-12 PROCEDURE — G0463 HOSPITAL OUTPT CLINIC VISIT: HCPCS

## 2019-06-12 PROCEDURE — 99214 OFFICE O/P EST MOD 30 MIN: CPT | Performed by: INTERNAL MEDICINE

## 2019-06-12 RX ORDER — DULOXETIN HYDROCHLORIDE 30 MG/1
30 CAPSULE, DELAYED RELEASE ORAL DAILY
Qty: 30 CAPSULE | Refills: 1 | Status: SHIPPED | OUTPATIENT
Start: 2019-06-12 | End: 2019-06-26

## 2019-06-12 RX ORDER — BUPROPION HYDROCHLORIDE 150 MG/1
300 TABLET ORAL EVERY MORNING
Qty: 60 TABLET | Refills: 1 | Status: SHIPPED | OUTPATIENT
Start: 2019-06-12 | End: 2019-07-09

## 2019-06-12 ASSESSMENT — PATIENT HEALTH QUESTIONNAIRE - PHQ9: SUM OF ALL RESPONSES TO PHQ QUESTIONS 1-9: 18

## 2019-06-12 ASSESSMENT — PAIN SCALES - GENERAL: PAINLEVEL: NO PAIN (0)

## 2019-06-12 NOTE — PROGRESS NOTES
Chief Complaint   Patient presents with     RECHECK         HPI: Ms. Elizabeth is a 69 year old female who presents today for follow up of anxiety and depression along with tremor.    At this time she reports that her anxiety and depression have significantly worsened.  When I saw her over the last couple months she felt that her Cymbalta was making her tremor worse.  Because of this we tapered off of it and started on Wellbutrin.  Additionally she was started on propranolol for a head tremor.  She did not notice a significant improvement in this while on the propranolol however with stopping it a couple days ago she has noted perhaps some slight worsening of her tremor.    Since tapering off of the Cymbalta she has had significant worsening of her symptoms.  When I saw her last we did obtain gene testing which showed that she may benefit from lower doses overall of the Cymbalta.  It also showed options for treatment include Wellbutrin, Pristiq, Viibryd, Fetzima.  She does not believe that she has noticed any improvement of her tremor since stopping the Cymbalta.  She has been significantly tearful and is having a hard time completing the tasks she normally would like to do    She  reports that she quit smoking about 11 years ago. She has never used smokeless tobacco.    Past medical history reviewed as below:     Past Medical History:   Diagnosis Date     Benign essential hypertension 3/3/2018     Chronic pain of right knee 4/11/2018     Diabetic eye exam (H) 06/07/2018     TAYLOR (generalized anxiety disorder) 3/3/2018     History of MI (myocardial infarction) 07/2017    follows yearly with [facility]     Major depressive disorder, single episode 1995     Obstructive sleep apnea 2005    BIPAP     Osteopenia 2007    Lumbar spine     Pure hypercholesterolemia 2001     Type 2 diabetes mellitus with complication, without long-term current use of insulin (H) 03/03/2018   .      ROS  Pertinent ROS was performed and was negative,  "including for fever, chills, chest pain, shortness of breath, increased lower extremity edema, changes in bowel or bladder, blood in the stool, difficulty swallowing, sores in the mouth. No other concerns, with exception of HPI above.      EXAM:   Pulse 98   Resp 18   SpO2 98%   Breastfeeding? No     Estimated body mass index is 29.37 kg/m  as calculated from the following:    Height as of 5/16/19: 1.6 m (5' 3\").    Weight as of 5/16/19: 75.2 kg (165 lb 12.8 oz).      GEN: Vitals reviewed. Healthy appearing. Patient is in mild acute distress. Cooperative with exam.  HEENT: Normocephalic atraumatic.  Pupils equally round.  No scleral icterus, no conjunctival erythema.   LUNGS: Chest rise equal bilaterally.  No accessory muscle use.  ABD: Nondistended.  SKIN: Warm and dry to touch.  No rash on face, arms and legs.  EXT: No clubbing or cyanosis.  No peripheral edema.  PSYCH: Mood is depressed and anxious.  She is tearful throughout our visit..  Affect appropriate. Speech fluent. Answers questions appropriately and thought process normal.     ASSESSMENT AND PLAN:    Anxiety and depression  -At this time given her significant worsening of her depression we will restart her Cymbalta at a low dose.  It does have a side effect of tremor and so she will need to monitor for this closely.  We will also increase her Wellbutrin to 300 mg daily.  She is to call with any side effects.  We will plan to see her in close follow-up in 2 weeks to see if additional adjustments are needed.  - buPROPion (WELLBUTRIN XL) 150 MG 24 hr tablet  Dispense: 60 tablet; Refill: 1  - DULoxetine (CYMBALTA) 30 MG capsule  Dispense: 30 capsule; Refill: 1    Tremor  -At this time I would like her to discontinue her propranolol.  We discussed that this may contribute to depression.  If she continues to have issues with tremor we can consider treatment with primidone.  Plan to follow-up on this when I see her in 2 weeks.         Return in about 2 weeks " (around 6/26/2019) for Recheck.      CHULA ROBBINS DO   6/12/2019 1:37 PM    This document was prepared using voice generated softwear. While every attempt was made for accuracy, grammatical errors may exist.

## 2019-06-12 NOTE — PATIENT INSTRUCTIONS
Stay off of Propranolol    Increase Wellbutrin to 300mg daily (2 tablets); take at the same every morning    Start Cymbalta 30mg daily - in afternoon or evening

## 2019-06-12 NOTE — NURSING NOTE
Patient presents to the clinic for mood follow up.     Medication Reconciliation: complete   Frances Richardson LPN............. June 12, 2019 1:15 PM

## 2019-06-25 ENCOUNTER — TRANSFERRED RECORDS (OUTPATIENT)
Dept: HEALTH INFORMATION MANAGEMENT | Facility: OTHER | Age: 69
End: 2019-06-25

## 2019-06-26 ENCOUNTER — OFFICE VISIT (OUTPATIENT)
Dept: INTERNAL MEDICINE | Facility: OTHER | Age: 69
End: 2019-06-26
Attending: INTERNAL MEDICINE
Payer: MEDICARE

## 2019-06-26 VITALS
DIASTOLIC BLOOD PRESSURE: 60 MMHG | HEIGHT: 63 IN | HEART RATE: 86 BPM | WEIGHT: 161 LBS | SYSTOLIC BLOOD PRESSURE: 126 MMHG | RESPIRATION RATE: 18 BRPM | TEMPERATURE: 98.5 F | OXYGEN SATURATION: 94 % | BODY MASS INDEX: 28.53 KG/M2

## 2019-06-26 DIAGNOSIS — R25.1 TREMOR: ICD-10-CM

## 2019-06-26 DIAGNOSIS — R41.3 MEMORY PROBLEM: ICD-10-CM

## 2019-06-26 DIAGNOSIS — F41.9 ANXIETY AND DEPRESSION: Primary | ICD-10-CM

## 2019-06-26 DIAGNOSIS — F32.A ANXIETY AND DEPRESSION: Primary | ICD-10-CM

## 2019-06-26 PROCEDURE — G0463 HOSPITAL OUTPT CLINIC VISIT: HCPCS

## 2019-06-26 PROCEDURE — 99214 OFFICE O/P EST MOD 30 MIN: CPT | Performed by: INTERNAL MEDICINE

## 2019-06-26 RX ORDER — ZOSTER VACCINE RECOMBINANT, ADJUVANTED 50 MCG/0.5
KIT INTRAMUSCULAR
Refills: 1 | COMMUNITY
Start: 2019-06-12 | End: 2020-07-24

## 2019-06-26 RX ORDER — DULOXETIN HYDROCHLORIDE 30 MG/1
30 CAPSULE, DELAYED RELEASE ORAL 2 TIMES DAILY
Qty: 180 CAPSULE | Refills: 1 | Status: SHIPPED | OUTPATIENT
Start: 2019-06-26 | End: 2019-08-03

## 2019-06-26 ASSESSMENT — MIFFLIN-ST. JEOR: SCORE: 1219.42

## 2019-06-26 ASSESSMENT — PAIN SCALES - GENERAL: PAINLEVEL: NO PAIN (0)

## 2019-06-26 NOTE — PATIENT INSTRUCTIONS
Patient Education     Increase cymbalta to 60mg daily, can be taken together or separate.      Essential Tremor (ET)  What is essential tremor?  Essential tremor (ET) is a neurological disorder that causes your hands, head, trunk, voice, and/or legs to shake rhythmically. It is often confused with Parkinson disease.  ET is the most common trembling disorder that people experience. Everyone has some ET, but the movements usually cannot be seen or felt. When tremors are noticeable, the condition is classified as ET.  ET is most common among people older than age 65, but it can affect people at any age.  What causes ET?  ET can occur in different people for different reasons:    Familial essential tremor. In most people, the condition seems to be passed down from a parent to a child. If your parent has ET, there is a 50% chance that you or your children will inherit the gene responsible for the condition.    Essential tremor related to another disorder. Sometimes, a tremor is a symptom of another neurological disorder, such as Parkinson disease or dystonia. Sometimes, ET is mistaken for these other diseases when they are not present. A health care provider s careful diagnosis is extremely important.  The cause of ET isn t known. However, one theory suggests that your cerebellum and other parts of your brain are not communicating correctly. The cerebellum is a part of the brain that controls muscle coordination.  What are the symptoms of ET?  If you have ET, you will experience shaking and trembling at different times and in different situations, but some characteristics are common to all. Here is what you might typically experience:    Tremors occur when you move and are less noticeable when you rest.    Certain medications, caffeine, or stress can make your tremors worse.    Tremors get worse as you age.    Tremors don t affect both sides of your body in the same way.  Here are different signs of essential  tremor:    Tremors that are most obvious in your hands    Difficulty performing tasks with your hands, such as writing or using tools    Shaking or quivering sound in your voice    Uncontrollable head-nodding    In rare instances, tremors in your legs or feet  How is ET diagnosed?  Your rapid, uncontrollable trembling, as well as questions about your medical and family history, can help your health care provider determine if you have familial ET. He or she will probably need to rule out other conditions that could cause shaking or trembling. For example, tremors could be symptoms of diseases, such as hyperthyroidism. Your health care provider might test you for those, as well.  In some cases, the tremors might be related to other factors. To find out for certain, your health care provider may have you try to:    Abstain from alcohol; if you re an alcoholic, trembling is a common symptom.    Cut out cigarette smoking.    Avoid caffeine.    Avoid certain medications  How is ET treated?   Propanolol and primidone are 2 medications often prescribed to treat ET. Propanolol blocks the stimulating action of neurotransmitters to calm your trembling. Primidone is a common antiseizure medication that also controls the actions of neurotransmitters.  Gabapentin and topiramate are 2 other antiseizure medications that are sometimes prescribed. In some cases, tranquilizers like alprazolam or clonazepam might be suggested.  For ET in your hands, botulinum toxin (Botox) injections have shown some promise in easing the trembling. They work by weakening the surrounding muscles around your hands. For severe tremors, a stimulating device (deep brain stimulator) surgically implanted in your brain may help.  Can ET be prevented?   The specific cause of ET is not known, so scientists are not sure how the condition can be prevented.  Living with ET  ET is usually not dangerous, but it can certainly be frustrating if you have to deal with  it. Certain factors can make tremors worse, so the following steps may help to decrease tremors:    Avoid alcohol, cigarettes, and caffeine    Avoid stressful situations as much as possible    Use relaxation techniques, such as yoga, deep-breathing exercises, or biofeedback    Check with your health care provider to see if any medications you re taking could be making your tremors worse.  Talk with your health care provider about other options, such as surgery, if ET starts to affect your quality of life.  When should I call my health care provider?  If you have been diagnosed with ET, talk with your health care provider about when you might need to call. He or she will likely advise you to call if your tremors become worse, or if you develop new neurologic symptoms, such as numbness or weakness.  Key points    ET is a neurological disorder that causes your hands, head, trunk, voice, or legs to shake rhythmically. The cause is not known, but it is often passed down from a parent to a child.    ET is sometimes confused with other types of tremor, so getting the right diagnosis is important.    Tremors tend to be worse during movement than when at rest. The tremors are usually not dangerous, but they can get worse over time.    Avoiding things that might make tremors worse, such as stress, caffeine, and certain medications, may be helpful. Medicines can also help control or limit tremors in some people. Severe tremors can sometimes be treated with surgery.  Next steps  Tips to help you get the most from a visit to your health care provider:    Before your visit, write down questions you want answered.    Bring someone with you to help you ask questions and remember what your provider tells you.    At the visit, write down the names of new medicines, treatments, or tests, and any new instructions your provider gives you.    If you have a follow-up appointment, write down the date, time, and purpose for that  visit.    Know how you can contact your provider if you have questions.

## 2019-06-26 NOTE — PROGRESS NOTES
Chief Complaint   Patient presents with     RECHECK         HPI: Ms. Elizabeth is a 69 year old female who presents today for follow up of mood and tremor.    When I saw her last she was having significant depression.  We went up on her Wellbutrin and restarted her on Cymbalta.  She is crying less recently but had friends visiting last two weeks.  She is on wellbutrin 300mg, cymbalta 30mg daily, ativan as needed although she takes this infrequently.    She has felt that her tremor has been okay over the last couple weeks although she just restarted back on the Cymbalta.  There was some thought that perhaps the Cymbalta led to worsening tremor previously.  She tried some propranolol however there was concern that this caused her depression to worsen.    She is concerned that she has been having some memory issues.  We discussed performing testing and she is not interested at this time.    She  reports that she quit smoking about 11 years ago. She has never used smokeless tobacco.    Past medical history reviewed as below:     Past Medical History:   Diagnosis Date     Benign essential hypertension 3/3/2018     Cataract      Chronic pain of right knee 4/11/2018     Diabetic eye exam (H) 06/07/2018     TAYLOR (generalized anxiety disorder) 3/3/2018     History of MI (myocardial infarction) 07/2017    follows yearly with [facility]     Macular degeneration      Major depressive disorder, single episode 1995     Obstructive sleep apnea 2005    BIPAP     Osteopenia 2007    Lumbar spine     Pure hypercholesterolemia 2001     Type 2 diabetes mellitus with complication, without long-term current use of insulin (H) 03/03/2018   .      ROS  Pertinent ROS was performed and was negative, including for fever, chills, chest pain, shortness of breath, increased lower extremity edema, changes in bowel or bladder, blood in the stool, difficulty swallowing, sores in the mouth. No other concerns, with exception of HPI above.      EXAM:   /60  "(BP Location: Right arm, Patient Position: Sitting, Cuff Size: Adult Regular)   Pulse 86   Temp 98.5  F (36.9  C) (Tympanic)   Resp 18   Ht 1.6 m (5' 3\")   Wt 73 kg (161 lb)   SpO2 94%   Breastfeeding? No   BMI 28.52 kg/m      Estimated body mass index is 28.52 kg/m  as calculated from the following:    Height as of this encounter: 1.6 m (5' 3\").    Weight as of this encounter: 73 kg (161 lb).      GEN: Vitals reviewed. Healthy appearing. Patient is in no acute distress. Cooperative with exam.  HEENT: Normocephalic atraumatic.  Pupils equally round.  No scleral icterus, no conjunctival erythema.   CV: Heart regular in rate and rhythm with no murmur.    LUNGS: Lungs clear to auscultation bilaterally.  Chest rise equal bilaterally.  No accessory muscle use.  ABD:  Obese.  SKIN: Warm and dry to touch.  No rash on face, arms and legs.  EXT: No clubbing or cyanosis.  No peripheral edema.  PSYCH: Mood is good.  Affect appropriate. Speech fluent. Answers questions appropriately and thought process normal.  NEURO: Minimal tremor     ASSESSMENT AND PLAN:    Anxiety and depression  -At this time she will continue on her Wellbutrin.  We will try the Cymbalta at 30 mg twice daily.  She is to monitor her mood over the next couple weeks without her friends visiting.  She is to call with any issues.  - DULoxetine (CYMBALTA) 30 MG capsule  Dispense: 180 capsule; Refill: 1    Tremor  -Appears to be okay at this time however she will monitor with increased dose of Cymbalta.  If needed we can consider primidone for treatment.  She was given information on this to look over today.    Memory problem  - We discussed undergoing testing.  She declined today she is to let me know if she notices any worsening..         Return in about 6 weeks (around 8/7/2019) for preop.      CHULA ROBBINS DO   6/26/2019 10:14 AM    This document was prepared using voice generated softwear. While every attempt was made for accuracy, grammatical " errors may exist.

## 2019-07-09 DIAGNOSIS — F32.A ANXIETY AND DEPRESSION: ICD-10-CM

## 2019-07-09 DIAGNOSIS — F41.9 ANXIETY AND DEPRESSION: ICD-10-CM

## 2019-07-11 RX ORDER — BUPROPION HYDROCHLORIDE 150 MG/1
TABLET ORAL
Qty: 180 TABLET | Refills: 1 | Status: SHIPPED | OUTPATIENT
Start: 2019-07-11 | End: 2019-10-14

## 2019-07-11 NOTE — TELEPHONE ENCOUNTER
"Requested Prescriptions   Pending Prescriptions Disp Refills     buPROPion (WELLBUTRIN XL) 150 MG 24 hr tablet [Pharmacy Med Name: BUPROPION XL 150MG TABLETS (24 H)] 180 tablet 1     Sig: TAKE 2 TABLETS BY MOUTH EVERY MORNING       SSRIs Protocol Passed - 7/9/2019  2:02 PM        Passed - Recent (12 mo) or future (30 days) visit within the authorizing provider's specialty     Patient had office visit in the last 12 months or has a visit in the next 30 days with authorizing provider or within the authorizing provider's specialty.  See \"Patient Info\" tab in inbasket, or \"Choose Columns\" in Meds & Orders section of the refill encounter.              Passed - Medication is Bupropion     If the medication is Bupropion (Wellbutrin), and the patient is taking for smoking cessation; OK to refill.          Passed - Medication is active on med list        Passed - Patient is age 18 or older        Passed - No active pregnancy on record        Passed - No positive pregnancy test in last 12 months        LOV 6/26/19    Prescription [place] per [facility] [place].        "

## 2019-07-15 ENCOUNTER — MEDICAL CORRESPONDENCE (OUTPATIENT)
Dept: HEALTH INFORMATION MANAGEMENT | Facility: OTHER | Age: 69
End: 2019-07-15

## 2019-07-22 ENCOUNTER — TELEPHONE (OUTPATIENT)
Dept: INTERNAL MEDICINE | Facility: OTHER | Age: 69
End: 2019-07-22

## 2019-07-22 NOTE — TELEPHONE ENCOUNTER
Please call patient and see how her mood has been on increased Cymbalta?  Has the tremor worsened?

## 2019-07-24 NOTE — TELEPHONE ENCOUNTER
"Called and spoke with patient after proper verification.  Patient states her mood has increase \"by far\" since starting on the Cymbalta. Also states her tremors have gotten better, still notices them, but not as much.     Frances Richardson LPN............. July 24, 2019 2:14 PM     "

## 2019-08-03 DIAGNOSIS — F41.9 ANXIETY AND DEPRESSION: ICD-10-CM

## 2019-08-03 DIAGNOSIS — F32.A ANXIETY AND DEPRESSION: ICD-10-CM

## 2019-08-05 RX ORDER — DULOXETIN HYDROCHLORIDE 30 MG/1
CAPSULE, DELAYED RELEASE ORAL
Qty: 180 CAPSULE | Refills: 0 | Status: SHIPPED | OUTPATIENT
Start: 2019-08-05 | End: 2019-08-29

## 2019-08-05 NOTE — TELEPHONE ENCOUNTER
"Requested Prescriptions   Pending Prescriptions Disp Refills     DULoxetine (CYMBALTA) 30 MG capsule [Pharmacy Med Name: DULOXETINE DR 30MG CAPSULES] 180 capsule 0     Sig: TAKE 1 CAPSULE BY MOUTH TWICE DAILY. INCREASE IN DOSE       Serotonin-Norepinephrine Reuptake Inhibitors  Passed - 8/3/2019  2:03 PM        Passed - Blood pressure under 140/90 in past 12 months     BP Readings from Last 3 Encounters:   06/26/19 126/60   05/16/19 136/78   04/17/19 136/72                 Passed - Recent (12 mo) or future (30 days) visit within the authorizing provider's specialty     Patient had office visit in the last 12 months or has a visit in the next 30 days with authorizing provider or within the authorizing provider's specialty.  See \"Patient Info\" tab in inbasket, or \"Choose Columns\" in Meds & Orders section of the refill encounter.              Passed - Medication is active on med list        Passed - Patient is age 18 or older        Passed - No active pregnancy on record        Passed - No positive pregnancy test in past 12 months        LOV 6/26/19    Prescription approved per [facility] Refill Protocol.    "

## 2019-08-06 ENCOUNTER — TELEPHONE (OUTPATIENT)
Dept: INTERNAL MEDICINE | Facility: OTHER | Age: 69
End: 2019-08-06

## 2019-08-06 NOTE — TELEPHONE ENCOUNTER
Called and spoke with patient after proper verification. Patient has 2 pre-ops scheduled. Per patient, her surgery dates are scheduled on 9/4 and 9/25. Informed patient that her pre-op on 8/29 will cover both. Appt for 8/8 was cancelled.     Frances Richardson LPN............. August 6, 2019 1:47 PM

## 2019-08-15 DIAGNOSIS — I10 HYPERTENSION: Primary | ICD-10-CM

## 2019-08-15 LAB
CREAT SERPL-MCNC: 1.27 MG/DL (ref 0.6–1.2)
GFR SERPL CREATININE-BSD FRML MDRD: 42 ML/MIN/{1.73_M2}
POTASSIUM SERPL-SCNC: 4 MMOL/L (ref 3.5–5.1)
SODIUM SERPL-SCNC: 137 MMOL/L (ref 134–144)

## 2019-08-15 PROCEDURE — 36415 COLL VENOUS BLD VENIPUNCTURE: CPT

## 2019-08-15 PROCEDURE — 84295 ASSAY OF SERUM SODIUM: CPT

## 2019-08-15 PROCEDURE — 82565 ASSAY OF CREATININE: CPT

## 2019-08-15 PROCEDURE — 84132 ASSAY OF SERUM POTASSIUM: CPT

## 2019-08-29 ENCOUNTER — OFFICE VISIT (OUTPATIENT)
Dept: INTERNAL MEDICINE | Facility: OTHER | Age: 69
End: 2019-08-29
Attending: INTERNAL MEDICINE
Payer: MEDICARE

## 2019-08-29 ENCOUNTER — MEDICAL CORRESPONDENCE (OUTPATIENT)
Dept: LAB | Facility: OTHER | Age: 69
End: 2019-08-29

## 2019-08-29 VITALS
SYSTOLIC BLOOD PRESSURE: 144 MMHG | RESPIRATION RATE: 16 BRPM | HEART RATE: 92 BPM | TEMPERATURE: 98.2 F | DIASTOLIC BLOOD PRESSURE: 72 MMHG | WEIGHT: 160.4 LBS | BODY MASS INDEX: 28.42 KG/M2 | OXYGEN SATURATION: 96 % | HEIGHT: 63 IN

## 2019-08-29 DIAGNOSIS — Z79.899 HIGH RISK MEDICATION USE: ICD-10-CM

## 2019-08-29 DIAGNOSIS — I10 ESSENTIAL HYPERTENSION: ICD-10-CM

## 2019-08-29 DIAGNOSIS — Z01.818 PREOP GENERAL PHYSICAL EXAM: Primary | ICD-10-CM

## 2019-08-29 DIAGNOSIS — R06.09 DYSPNEA ON EXERTION: ICD-10-CM

## 2019-08-29 DIAGNOSIS — E11.9 TYPE 2 DIABETES MELLITUS WITHOUT COMPLICATION, WITHOUT LONG-TERM CURRENT USE OF INSULIN (H): ICD-10-CM

## 2019-08-29 DIAGNOSIS — I10 ESSENTIAL HYPERTENSION, MALIGNANT: ICD-10-CM

## 2019-08-29 DIAGNOSIS — F41.9 ANXIETY AND DEPRESSION: ICD-10-CM

## 2019-08-29 DIAGNOSIS — F32.A ANXIETY AND DEPRESSION: ICD-10-CM

## 2019-08-29 DIAGNOSIS — R07.89 CHEST TIGHTNESS: ICD-10-CM

## 2019-08-29 DIAGNOSIS — I10 ESSENTIAL HYPERTENSION, MALIGNANT: Primary | ICD-10-CM

## 2019-08-29 LAB
CREAT SERPL-MCNC: 0.94 MG/DL (ref 0.6–1.2)
ERYTHROCYTE [DISTWIDTH] IN BLOOD BY AUTOMATED COUNT: 14.3 % (ref 10–15)
GFR SERPL CREATININE-BSD FRML MDRD: 59 ML/MIN/{1.73_M2}
HBA1C MFR BLD: 6.9 % (ref 4–6)
HCT VFR BLD AUTO: 45 % (ref 35–47)
HGB BLD-MCNC: 14.5 G/DL (ref 11.7–15.7)
MAGNESIUM SERPL-MCNC: 2.2 MG/DL (ref 1.9–2.7)
MCH RBC QN AUTO: 28.8 PG (ref 26.5–33)
MCHC RBC AUTO-ENTMCNC: 32.2 G/DL (ref 31.5–36.5)
MCV RBC AUTO: 90 FL (ref 78–100)
PLATELET # BLD AUTO: 314 10E9/L (ref 150–450)
RBC # BLD AUTO: 5.03 10E12/L (ref 3.8–5.2)
TSH SERPL DL<=0.05 MIU/L-ACNC: 3.57 IU/ML (ref 0.34–5.6)
VIT B12 SERPL-MCNC: 343 PG/ML (ref 180–914)
WBC # BLD AUTO: 9.1 10E9/L (ref 4–11)

## 2019-08-29 PROCEDURE — 36415 COLL VENOUS BLD VENIPUNCTURE: CPT

## 2019-08-29 PROCEDURE — 99214 OFFICE O/P EST MOD 30 MIN: CPT | Performed by: INTERNAL MEDICINE

## 2019-08-29 PROCEDURE — G0463 HOSPITAL OUTPT CLINIC VISIT: HCPCS

## 2019-08-29 PROCEDURE — 84443 ASSAY THYROID STIM HORMONE: CPT | Performed by: INTERNAL MEDICINE

## 2019-08-29 PROCEDURE — 85027 COMPLETE CBC AUTOMATED: CPT | Performed by: INTERNAL MEDICINE

## 2019-08-29 PROCEDURE — 82607 VITAMIN B-12: CPT | Performed by: INTERNAL MEDICINE

## 2019-08-29 PROCEDURE — 83735 ASSAY OF MAGNESIUM: CPT | Performed by: INTERNAL MEDICINE

## 2019-08-29 PROCEDURE — 82565 ASSAY OF CREATININE: CPT

## 2019-08-29 PROCEDURE — 93005 ELECTROCARDIOGRAM TRACING: CPT | Performed by: INTERNAL MEDICINE

## 2019-08-29 PROCEDURE — 93010 ELECTROCARDIOGRAM REPORT: CPT | Performed by: INTERNAL MEDICINE

## 2019-08-29 PROCEDURE — 83036 HEMOGLOBIN GLYCOSYLATED A1C: CPT | Performed by: INTERNAL MEDICINE

## 2019-08-29 RX ORDER — DULOXETIN HYDROCHLORIDE 30 MG/1
30 CAPSULE, DELAYED RELEASE ORAL DAILY
Qty: 90 CAPSULE | Refills: 0 | COMMUNITY
Start: 2019-08-29 | End: 2019-10-14

## 2019-08-29 RX ORDER — MOXIFLOXACIN 5 MG/ML
SOLUTION/ DROPS OPHTHALMIC
Refills: 0 | COMMUNITY
Start: 2019-08-06 | End: 2020-07-24

## 2019-08-29 RX ORDER — LOSARTAN POTASSIUM AND HYDROCHLOROTHIAZIDE 12.5; 5 MG/1; MG/1
1 TABLET ORAL
COMMUNITY
Start: 2019-07-29 | End: 2019-12-09

## 2019-08-29 RX ORDER — PREDNISOLONE ACETATE 10 MG/ML
SUSPENSION/ DROPS OPHTHALMIC
Refills: 1 | COMMUNITY
Start: 2019-08-20 | End: 2020-07-24

## 2019-08-29 RX ORDER — LOSARTAN POTASSIUM 50 MG/1
50 TABLET ORAL DAILY
Qty: 30 TABLET | Refills: 1 | Status: SHIPPED | OUTPATIENT
Start: 2019-08-29 | End: 2019-10-14

## 2019-08-29 ASSESSMENT — PAIN SCALES - GENERAL: PAINLEVEL: MODERATE PAIN (4)

## 2019-08-29 ASSESSMENT — MIFFLIN-ST. JEOR: SCORE: 1216.7

## 2019-08-29 NOTE — NURSING NOTE
This patient presents today for a Preoperative exam for this procedure:  Bilat Catarat  Date of Surgery:  LE 09/04/2019 RE 09/25/2019  Surgeon:  Dr. Allen  Facility:  [facility]  Fax:  KARLY Richardson LPN............. August 29, 2019 10:19 AM

## 2019-08-30 ENCOUNTER — HOSPITAL ENCOUNTER (OUTPATIENT)
Dept: NUCLEAR MEDICINE | Facility: OTHER | Age: 69
End: 2019-08-30
Attending: INTERNAL MEDICINE
Payer: MEDICARE

## 2019-08-30 ENCOUNTER — HOSPITAL ENCOUNTER (OUTPATIENT)
Dept: NUCLEAR MEDICINE | Facility: OTHER | Age: 69
Discharge: HOME | End: 2019-08-30
Attending: INTERNAL MEDICINE | Admitting: INTERNAL MEDICINE
Payer: MEDICARE

## 2019-08-30 VITALS — DIASTOLIC BLOOD PRESSURE: 70 MMHG | HEART RATE: 86 BPM | SYSTOLIC BLOOD PRESSURE: 150 MMHG

## 2019-08-30 DIAGNOSIS — R07.89 CHEST TIGHTNESS: ICD-10-CM

## 2019-08-30 DIAGNOSIS — R06.09 DYSPNEA ON EXERTION: ICD-10-CM

## 2019-08-30 PROCEDURE — A9500 TC99M SESTAMIBI: HCPCS | Performed by: INTERNAL MEDICINE

## 2019-08-30 PROCEDURE — 93016 CV STRESS TEST SUPVJ ONLY: CPT

## 2019-08-30 PROCEDURE — 34300033 ZZH RX 343: Performed by: INTERNAL MEDICINE

## 2019-08-30 PROCEDURE — 93018 CV STRESS TEST I&R ONLY: CPT

## 2019-08-30 PROCEDURE — 93017 CV STRESS TEST TRACING ONLY: CPT

## 2019-08-30 PROCEDURE — 78452 HT MUSCLE IMAGE SPECT MULT: CPT

## 2019-08-30 PROCEDURE — 25000128 H RX IP 250 OP 636: Performed by: INTERNAL MEDICINE

## 2019-08-30 RX ORDER — REGADENOSON 0.08 MG/ML
0.4 INJECTION, SOLUTION INTRAVENOUS ONCE
Status: COMPLETED | OUTPATIENT
Start: 2019-08-30 | End: 2019-08-30

## 2019-08-30 RX ORDER — AMINOPHYLLINE 25 MG/ML
50 INJECTION, SOLUTION INTRAVENOUS
Status: DISCONTINUED | OUTPATIENT
Start: 2019-08-30 | End: 2019-08-31 | Stop reason: HOSPADM

## 2019-08-30 RX ADMIN — REGADENOSON 0.4 MG: 0.08 INJECTION, SOLUTION INTRAVENOUS at 12:56

## 2019-08-30 RX ADMIN — KIT FOR THE PREPARATION OF TECHNETIUM TC99M SESTAMIBI 8.86 MILLICURIE: 1 INJECTION, POWDER, LYOPHILIZED, FOR SOLUTION PARENTERAL at 11:10

## 2019-08-30 RX ADMIN — KIT FOR THE PREPARATION OF TECHNETIUM TC99M SESTAMIBI 30.8 MILLICURIE: 1 INJECTION, POWDER, LYOPHILIZED, FOR SOLUTION PARENTERAL at 13:00

## 2019-08-30 NOTE — PROGRESS NOTES
1100The patient arrived for a Lexiscan Cardiolite stress test.  The procedure, risks, and benefits were discussed with the patient,and the consent was signed.  A saline lock was started,and the Cardiolite was injected by x-ray.  The patient was taken to the waiting area, to await resting images at 1130.  1240The patient returned from x-ray and was prepped for the stress test.   Dr. Redding arrived, and the patient was administered the Lexiscan per procedure.  The patient had no symptoms from the Lexiscan..  She  was given coffee and a snack and taken to x-ray in stable condition for stress images.  The saline lock will be removed by x-ray for proper disposal.  The patient was instructed that the ordering MD will call with results in one to two days. Please see the chart for the complete test results.

## 2019-09-05 ENCOUNTER — OFFICE VISIT (OUTPATIENT)
Dept: INTERNAL MEDICINE | Facility: OTHER | Age: 69
End: 2019-09-05
Attending: INTERNAL MEDICINE
Payer: MEDICARE

## 2019-09-05 VITALS
BODY MASS INDEX: 28.99 KG/M2 | HEART RATE: 84 BPM | WEIGHT: 163.6 LBS | DIASTOLIC BLOOD PRESSURE: 80 MMHG | TEMPERATURE: 98.6 F | RESPIRATION RATE: 16 BRPM | HEIGHT: 63 IN | SYSTOLIC BLOOD PRESSURE: 142 MMHG

## 2019-09-05 DIAGNOSIS — I71.20 THORACIC AORTIC ANEURYSM WITHOUT RUPTURE (H): ICD-10-CM

## 2019-09-05 DIAGNOSIS — R07.89 CHEST TIGHTNESS: ICD-10-CM

## 2019-09-05 DIAGNOSIS — R06.09 DYSPNEA ON EXERTION: ICD-10-CM

## 2019-09-05 DIAGNOSIS — I10 ESSENTIAL HYPERTENSION: Primary | ICD-10-CM

## 2019-09-05 PROCEDURE — G0463 HOSPITAL OUTPT CLINIC VISIT: HCPCS

## 2019-09-05 PROCEDURE — 99214 OFFICE O/P EST MOD 30 MIN: CPT | Performed by: INTERNAL MEDICINE

## 2019-09-05 RX ORDER — LOSARTAN POTASSIUM 50 MG/1
TABLET ORAL
Qty: 90 TABLET | Refills: 1 | OUTPATIENT
Start: 2019-09-05

## 2019-09-05 ASSESSMENT — ANXIETY QUESTIONNAIRES
6. BECOMING EASILY ANNOYED OR IRRITABLE: NOT AT ALL
4. TROUBLE RELAXING: NOT AT ALL
5. BEING SO RESTLESS THAT IT IS HARD TO SIT STILL: NOT AT ALL
1. FEELING NERVOUS, ANXIOUS, OR ON EDGE: NOT AT ALL
3. WORRYING TOO MUCH ABOUT DIFFERENT THINGS: NOT AT ALL
2. NOT BEING ABLE TO STOP OR CONTROL WORRYING: NOT AT ALL
GAD7 TOTAL SCORE: 0
7. FEELING AFRAID AS IF SOMETHING AWFUL MIGHT HAPPEN: NOT AT ALL

## 2019-09-05 ASSESSMENT — PAIN SCALES - GENERAL: PAINLEVEL: NO PAIN (0)

## 2019-09-05 ASSESSMENT — PATIENT HEALTH QUESTIONNAIRE - PHQ9: SUM OF ALL RESPONSES TO PHQ QUESTIONS 1-9: 0

## 2019-09-05 ASSESSMENT — MIFFLIN-ST. JEOR: SCORE: 1231.21

## 2019-09-05 NOTE — PROGRESS NOTES
Chief Complaint   Patient presents with     RECHECK     Blood Pressure         HPI: Ms. Elizabeth is a 69 year old female who presents today for follow up of blood pressure.    She reports that she has not been checking her blood pressures regularly as instructed prior.  At her surgery it was elevated in the 140 systolic range.  She admittedly eats more salt than she should and drinks a decent amount of caffeine.      She has continued to have some dyspnea on exertion and chest pain which has been about the same over the last several weeks.  She notices it more when she is walking on an incline it.  She had a stress test last Friday that was negative.  She saw cardiology over the summer although they did not make any changes other than slight adjustment of her blood pressure med.  She has not had an echocardiogram in over a year.  She was noted to have aortic dilatation of 4.0 cm on her last echo in 2018.    She  reports that she quit smoking about 11 years ago. She has never used smokeless tobacco.  She has not had any pulmonary function studies.    Past medical history reviewed as below:     Past Medical History:   Diagnosis Date     Benign essential hypertension 3/3/2018     Cataract      Chronic pain of right knee 4/11/2018     Diabetic eye exam (H) 06/07/2018     TAYLOR (generalized anxiety disorder) 3/3/2018     History of MI (myocardial infarction) 07/2017    follows yearly with [facility]     Macular degeneration      Major depressive disorder, single episode 1995     Obstructive sleep apnea 2005    BIPAP     Osteopenia 2007    Lumbar spine     Pure hypercholesterolemia 2001     Type 2 diabetes mellitus with complication, without long-term current use of insulin (H) 03/03/2018   .      ROS  Pertinent ROS was performed and was negative, including for fever, chills, increased lower extremity edema, changes in bowel or bladder, blood in the stool, difficulty swallowing, sores in the mouth. No other concerns, with exception  "of HPI above.      EXAM:   BP (!) 142/80 (BP Location: Right arm, Patient Position: Sitting, Cuff Size: Adult Large)   Pulse 84   Temp 98.6  F (37  C)   Resp 16   Ht 1.6 m (5' 3\")   Wt 74.2 kg (163 lb 9.6 oz)   Breastfeeding? No   BMI 28.98 kg/m      Estimated body mass index is 28.98 kg/m  as calculated from the following:    Height as of this encounter: 1.6 m (5' 3\").    Weight as of this encounter: 74.2 kg (163 lb 9.6 oz).      GEN: Vitals reviewed. Healthy appearing. Patient is in no acute distress. Cooperative with exam.  HEENT: Normocephalic atraumatic.  Pupils equally round.  No scleral icterus, no conjunctival erythema.   CV: Heart regular in rate and rhythm with no murmur.    LUNGS: Lungs clear to auscultation bilaterally.  Chest rise equal bilaterally.  No accessory muscle use.  ABD:  Obese.  SKIN: Warm and dry to touch.  No rash on face, arms and legs.  EXT: No clubbing or cyanosis.  No peripheral edema.  PSYCH: Mood is good.  Affect appropriate. Speech fluent. Answers questions appropriately and thought process normal.     ASSESSMENT AND PLAN:    Essential hypertension  -At this time given that she has not been checking her blood pressures we will continue on same dosing and she was instructed to do this daily for the next couple weeks.  We will touch base with her at that point and decide whether medications need to be increased.  She will have a follow-up with me in approximately 6 weeks.  -She is encouraged to reduce her salt and caffeine intake.    Thoracic aortic aneurysm without rupture (H)   -Given her history of aortic dilatation along with her symptoms we will repeat an echocardiogram.  - Echocardiogram Complete    Chest tightness  -Etiology is unknown.  This may reflect cardiac disease versus pulmonary disease.  We will obtain an echocardiogram however if this is negative we will pursue pulmonary function studies.    - Echocardiogram Complete    Dyspnea on exertion  See above  - " Echocardiogram Complete                 Return in about 6 weeks (around 10/17/2019) for Recheck breathing , BP Recheck.        CHULA ROBBINS, DO   9/5/2019 6:51 AM    This document was prepared using voice generated softwear. While every attempt was made for accuracy, grammatical errors may exist.

## 2019-09-05 NOTE — NURSING NOTE
Patient presents to the clinic for a blood pressure recheck.  Medication Reconciliation Completed.    Paolo Norris LPN  9/5/2019 11:10 AM

## 2019-09-05 NOTE — PATIENT INSTRUCTIONS
Stay on Losartan 100mg daily/HCTZ at 12.5mg daily (combination of plain losartan and combo pill).    Check BP daily and record.      Decreased salt (sodium) intake, caffeine intake.    Patient Education     Low-Salt Choices  Eating salt (sodium) can make your body retain too much water. Excess water makes your heart work harder. Canned, packaged, and frozen foods are easy to prepare. But they are often high in sodium. Here are some ideas for low-salt foods you can easily make yourself.    For breakfast    Fruit or 100% fruit juice    Whole-wheat bread or an English muffin. Look for sodium content on Nutrition Facts labels.    Low-fat milk or yogurt    Unsalted eggs    Shredded wheat    Corn tortillas    Unsalted steamed rice    Regular (not instant) hot cereal, made without salt  Stay away from:    Sausage, marquez, and ham    Flour tortillas    Packaged muffins, pancakes, and biscuits    Instant hot cereals    Cottage cheese  For lunch and dinner    Fresh fish, chicken, turkey, or meat--baked, broiled, or roasted without salt    Dry beans, cooked without salt    Tofu, stir-fried without salt    Unsalted fresh fruit and vegetables, or frozen or canned fruit and vegetables with no added salt  Stay away from:    Lunch or deli meat that is cured or smoked    Cheese    Tomato juice and ketchup    Canned vegetables, soups, and fish not labeled as no-salt-added or reduced sodium    Packaged gravies and sauces    Olives, pickles, and relish    Bottled salad dressings  For snacks and desserts    Yogurt    Unsalted, air-popped popcorn    Unsalted nuts or seeds  Stay away from:    Pies and cakes    Packaged dessert mixes    Pizza    Canned and packaged puddings    Pretzels, chips, crackers, and nuts--unless the label says unsalted  Date Last Reviewed: 6/1/2017 2000-2018 [employer] [address] [place] [address], [place] [address]. All rights reserved. This information is not intended as a substitute for professional  medical care. Always follow your healthcare professional's instructions.    Patient Education     Low-Salt Diet  This diet removes foods that are high in salt. It also limits the amount of salt you use when cooking. It is most often used for people with high blood pressure, edema (fluid retention), and kidney, liver, or heart disease.  Table salt contains the mineral sodium. Your body needs sodium to work normally. But too much sodium can make your health problems worse. Your healthcare provider is recommending a low-salt (also called low-sodium) diet for you. Your total daily allowance of salt is 1,500 to 2,300 milligrams (mg). It is less than 1 teaspoon of table salt. This means you can have only about 500 to 700 mg of sodium at each meal. People with certain health problems should limit salt intake to the lower end of the recommended range.    When you cook, don t add much salt. If you can cook without using salt, even better. Don t add salt to your food at the table.  When shopping, read food labels. Salt is often called sodium on the label. Choose foods that are salt-free, low salt, or very low salt. Note that foods with reduced salt may not lower your salt intake enough.    Beans, potatoes, and pasta  Ok: Dry beans, split peas, lentils, potatoes, rice, macaroni, pasta, spaghetti without added salt  Avoid: Potato chips, tortilla chips, and similar products  Breads and cereals  Ok: Low-sodium breads, rolls, cereals, and cakes; low-salt crackers, matzo crackers  Avoid: Salted crackers, pretzels, popcorn, Guatemalan toast, pancakes, muffins  Dairy  Ok: Milk, chocolate milk, hot chocolate mix, low-salt cheeses, and yogurt  Avoid: Processed cheese and cheese spreads; Roquefort, Camembert, and cottage cheese; buttermilk, instant breakfast drink  Desserts  Ok: Ice cream, frozen yogurt, juice bars, gelatin, cookies and pies, sugar, honey, jelly, hard candy  Avoid: Most pies, cakes and cookies prepared or processed with  salt; instant pudding  Drinks  Ok: Tea, coffee, fizzy (carbonated) drinks, juices  Avoid: Flavored coffees, electrolyte replacement drinks, sports drinks  Meats  Ok: All fresh meat, fish, poultry, low-salt tuna, eggs, egg substitute  Avoid: Smoked, pickled, brine-cured, or salted meats and fish. This includes marquez, chipped beef, corned beef, hot dogs, deli meats, ham, kosher meats, salt pork, sausage, canned tuna, salted codfish, smoked salmon, herring, sardines, or anchovies.  Seasonings and spices  Ok: Most seasonings are okay. Good substitutes for salt include: fresh herb blends, hot sauce, lemon, garlic, [place], vinegar, dry mustard, parsley, cilantro, horseradish, tomato paste, regular margarine, mayonnaise, unsalted butter, cream cheese, vegetable oil, cream, low-salt salad dressing and gravy.  Avoid: Regular ketchup, relishes, pickles, soy sauce, teriyaki sauce, Worcestershire sauce, BBQ sauce, tartar sauce, meat tenderizer, chili sauce, regular gravy, regular salad dressing, salted butter  Soups  Ok: Low-salt soups and broths made with allowed foods  Avoid: Bouillon cubes, soups with smoked or salted meats, regular soup and broth  Vegetables  Ok: Most vegetables are okay; also low-salt tomato and vegetable juices  Avoid: Sauerkraut and other brine-soaked vegetables; pickles and other pickled vegetables; tomato juice, olives  Date Last Reviewed: 8/1/2016 2000-2018 [employer]. [address] [place] [address], [place] [address]. All rights reserved. This information is not intended as a substitute for professional medical care. Always follow your healthcare professional's instructions.

## 2019-09-06 ASSESSMENT — ANXIETY QUESTIONNAIRES: GAD7 TOTAL SCORE: 0

## 2019-09-16 ENCOUNTER — HOSPITAL ENCOUNTER (OUTPATIENT)
Dept: CARDIOLOGY | Facility: OTHER | Age: 69
Discharge: HOME | End: 2019-09-16
Attending: INTERNAL MEDICINE | Admitting: INTERNAL MEDICINE
Payer: MEDICARE

## 2019-09-16 DIAGNOSIS — R07.89 CHEST TIGHTNESS: ICD-10-CM

## 2019-09-16 DIAGNOSIS — I71.20 THORACIC AORTIC ANEURYSM WITHOUT RUPTURE (H): ICD-10-CM

## 2019-09-16 DIAGNOSIS — R06.09 DYSPNEA ON EXERTION: ICD-10-CM

## 2019-09-16 PROCEDURE — 93306 TTE W/DOPPLER COMPLETE: CPT

## 2019-09-16 PROCEDURE — 93306 TTE W/DOPPLER COMPLETE: CPT | Mod: 26 | Performed by: INTERNAL MEDICINE

## 2019-10-01 ENCOUNTER — TELEPHONE (OUTPATIENT)
Dept: INTERNAL MEDICINE | Facility: OTHER | Age: 69
End: 2019-10-01

## 2019-10-01 NOTE — TELEPHONE ENCOUNTER
Patient called she is leaving [facility] on the 10/29/2019 has an appointment on 10/23/2019  She was hoping to get a sooner appointment she will keep her appointment but if she can not get in she stated.

## 2019-10-02 NOTE — TELEPHONE ENCOUNTER
Called and spoke with patient after proper verification.  Patient states she is leaving out of town on 10/29. Is still having the pain in her groin, which patient states Lili Perkins, DO told her was caused by her hip. Patient is wondering if she could possibly get injections in this hip before leaving, does she need more x-rays? Please advise.     Frances Richardson LPN............. October 2, 2019 10:39 AM

## 2019-10-02 NOTE — TELEPHONE ENCOUNTER
Spoke with patient.  She was unable to come in tomorrow.  After speaking with Dr. Perkins, patient was placed in her schedule on 10.14.2019 @ 2:00pm.  Maude Cifuentes on 10/2/2019 at 2:41 PM

## 2019-10-14 ENCOUNTER — OFFICE VISIT (OUTPATIENT)
Dept: INTERNAL MEDICINE | Facility: OTHER | Age: 69
End: 2019-10-14
Attending: INTERNAL MEDICINE
Payer: MEDICARE

## 2019-10-14 ENCOUNTER — HOSPITAL ENCOUNTER (OUTPATIENT)
Dept: GENERAL RADIOLOGY | Facility: OTHER | Age: 69
Discharge: HOME | End: 2019-10-14
Attending: INTERNAL MEDICINE | Admitting: INTERNAL MEDICINE
Payer: MEDICARE

## 2019-10-14 ENCOUNTER — HOSPITAL ENCOUNTER (OUTPATIENT)
Dept: GENERAL RADIOLOGY | Facility: OTHER | Age: 69
End: 2019-10-14
Attending: INTERNAL MEDICINE
Payer: MEDICARE

## 2019-10-14 ENCOUNTER — TELEPHONE (OUTPATIENT)
Dept: INTERNAL MEDICINE | Facility: OTHER | Age: 69
End: 2019-10-14

## 2019-10-14 VITALS
BODY MASS INDEX: 28.67 KG/M2 | HEIGHT: 63 IN | RESPIRATION RATE: 16 BRPM | HEART RATE: 74 BPM | WEIGHT: 161.8 LBS | DIASTOLIC BLOOD PRESSURE: 70 MMHG | TEMPERATURE: 98.9 F | OXYGEN SATURATION: 98 % | SYSTOLIC BLOOD PRESSURE: 132 MMHG

## 2019-10-14 DIAGNOSIS — M53.3 SACROILIAC JOINT PAIN: Primary | ICD-10-CM

## 2019-10-14 DIAGNOSIS — M53.3 PAIN OF RIGHT SACROILIAC JOINT: Primary | ICD-10-CM

## 2019-10-14 DIAGNOSIS — M16.11 PRIMARY OSTEOARTHRITIS OF RIGHT HIP: ICD-10-CM

## 2019-10-14 DIAGNOSIS — F41.9 ANXIETY AND DEPRESSION: ICD-10-CM

## 2019-10-14 DIAGNOSIS — F32.A ANXIETY AND DEPRESSION: ICD-10-CM

## 2019-10-14 DIAGNOSIS — I10 ESSENTIAL HYPERTENSION: ICD-10-CM

## 2019-10-14 DIAGNOSIS — R10.31 RIGHT GROIN PAIN: ICD-10-CM

## 2019-10-14 DIAGNOSIS — M53.3 SACROILIAC JOINT PAIN: ICD-10-CM

## 2019-10-14 PROCEDURE — 73502 X-RAY EXAM HIP UNI 2-3 VIEWS: CPT

## 2019-10-14 PROCEDURE — G0463 HOSPITAL OUTPT CLINIC VISIT: HCPCS | Mod: 25

## 2019-10-14 PROCEDURE — G0008 ADMIN INFLUENZA VIRUS VAC: HCPCS

## 2019-10-14 PROCEDURE — 72202 X-RAY EXAM SI JOINTS 3/> VWS: CPT

## 2019-10-14 PROCEDURE — 99214 OFFICE O/P EST MOD 30 MIN: CPT | Performed by: INTERNAL MEDICINE

## 2019-10-14 PROCEDURE — 90662 IIV NO PRSV INCREASED AG IM: CPT

## 2019-10-14 PROCEDURE — G0463 HOSPITAL OUTPT CLINIC VISIT: HCPCS

## 2019-10-14 RX ORDER — LOSARTAN POTASSIUM 50 MG/1
50 TABLET ORAL DAILY
Qty: 64 TABLET | Refills: 0 | Status: SHIPPED | OUTPATIENT
Start: 2019-10-14 | End: 2019-12-09 | Stop reason: ALTCHOICE

## 2019-10-14 RX ORDER — BUPROPION HYDROCHLORIDE 150 MG/1
TABLET ORAL
Qty: 180 TABLET | Refills: 1 | Status: SHIPPED | OUTPATIENT
Start: 2019-10-14 | End: 2020-07-24 | Stop reason: ALTCHOICE

## 2019-10-14 RX ORDER — DULOXETIN HYDROCHLORIDE 30 MG/1
60 CAPSULE, DELAYED RELEASE ORAL DAILY
Qty: 180 CAPSULE | Refills: 1 | Status: SHIPPED | OUTPATIENT
Start: 2019-10-14 | End: 2020-03-28

## 2019-10-14 RX ORDER — LOSARTAN POTASSIUM 50 MG/1
50 TABLET ORAL DAILY
Qty: 30 TABLET | Refills: 1 | Status: CANCELLED | OUTPATIENT
Start: 2019-10-14

## 2019-10-14 ASSESSMENT — PAIN SCALES - GENERAL: PAINLEVEL: NO PAIN (0)

## 2019-10-14 ASSESSMENT — PATIENT HEALTH QUESTIONNAIRE - PHQ9: SUM OF ALL RESPONSES TO PHQ QUESTIONS 1-9: 12

## 2019-10-14 ASSESSMENT — MIFFLIN-ST. JEOR: SCORE: 1223.05

## 2019-10-14 NOTE — TELEPHONE ENCOUNTER
Please clarify what exactly you would like for follow-up for patient and I will call.     Frances Richardson LPN............. October 14, 2019 3:26 PM

## 2019-10-14 NOTE — PROGRESS NOTES
Chief Complaint   Patient presents with     RECHECK         HPI: Ms. Elizabeth is a 69 year old female who presents today for follow up of mood, blood pressure and pain.    She has a history of anxiety depression.  She is on bupropion which she takes 300 mg daily.  The Cymbalta daily.  She has found this medications to be beneficial.  She is interested in possibly increasing her Cymbalta further.  She denies any side effects.    She has a history of hypertension.  Her blood pressures at home have ranged between 130/78 and 139/87. She is currently on losartan/hydrochlorothiazide 100 mg / 12.5 mg.  This has been beneficial in lowering her blood pressure.  She denies any side effects from the medication.    She has continued to have right groin and hip pain.  She is unsure the etiology of this.  She denies any trauma.  She has not had any imaging.  She does take Tylenol as needed.  She is interested in trying injections for this.    She  reports that she quit smoking about 12 years ago. She has never used smokeless tobacco.    Past medical history reviewed as below:     Past Medical History:   Diagnosis Date     Benign essential hypertension 3/3/2018     Cataract      Chronic pain of right knee 4/11/2018     Diabetic eye exam (H) 06/07/2018     TAYLOR (generalized anxiety disorder) 3/3/2018     History of MI (myocardial infarction) 07/2017    follows yearly with [facility]     Macular degeneration      Major depressive disorder, single episode 1995     Obstructive sleep apnea 2005    BIPAP     Osteopenia 2007    Lumbar spine     Pure hypercholesterolemia 2001     Type 2 diabetes mellitus with complication, without long-term current use of insulin (H) 03/03/2018   .      ROS  Pertinent ROS was performed and was negative, including for fever, chills, chest pain, shortness of breath, increased lower extremity edema, changes in bowel, blood in the stool, difficulty swallowing, sores in the mouth. She has noted a increased yellow of  "urine.  No other concerns, with exception of HPI above.      EXAM:   /70 (BP Location: Right arm, Patient Position: Sitting, Cuff Size: Adult Regular)   Pulse 74   Temp 98.9  F (37.2  C) (Tympanic)   Resp 16   Ht 1.6 m (5' 3\")   Wt 73.4 kg (161 lb 12.8 oz)   SpO2 98%   Breastfeeding? No   BMI 28.66 kg/m      Estimated body mass index is 28.66 kg/m  as calculated from the following:    Height as of this encounter: 1.6 m (5' 3\").    Weight as of this encounter: 73.4 kg (161 lb 12.8 oz).      GEN: Vitals reviewed. Healthy appearing. Patient is in no acute distress. Cooperative with exam.  HEENT: Normocephalic atraumatic.  Pupils equally round.  No scleral icterus, no conjunctival erythema.   CV: Heart regular in rate and rhythm with no murmur.    LUNGS: Lungs clear to auscultation bilaterally.  Chest rise equal bilaterally.  No accessory muscle use.  ABD:  Nondistended  SKIN: Warm and dry to touch.  No rash on face, arms and legs.  EXT: No clubbing or cyanosis.  No peripheral edema.  Some discomfort with internal and external rotation of the right hip.  MSK: SI joint pain is noted.  PSYCH: Mood is good.  Affect appropriate. Speech fluent. Answers questions appropriately and thought process normal.     ASSESSMENT AND PLAN:    Sacroiliac joint pain  -At this time she will continue to monitor symptoms.  If she has continued pain following hip injection we can consider SI joint injection.  - XR Sacroiliac Joint G/E 3 Views    Right groin pain  -X-ray obtained today.  Etiology is unknown although likely reflects hip arthritis.  - XR Hip Right 2-3 Views    Anxiety and depression  -At this time we will increase her Cymbalta to twice daily.  She is to monitor for side effects including tremor.  - DULoxetine (CYMBALTA) 30 MG capsule  Dispense: 180 capsule; Refill: 1  - buPROPion (WELLBUTRIN XL) 150 MG 24 hr tablet  Dispense: 180 tablet; Refill: 1    Essential hypertension  -Losartan sent in for 60 more days.  At " that time she will need the losartan/hydrochlorothiazide sent.  Continue current dosing.  - losartan (COZAAR) 50 MG tablet  Dispense: 64 tablet; Refill: 0    Primary osteoarthritis of right hip  -Given her pain and some arthritis of the hip referral was placed for injection per her request.  If she does not have improvement in her discomfort with this we will consider further evaluation of her back in SI joint injections.  - XR Joint Injection Major Right           Return in about 6 months (around 4/14/2020) for Annual Review.         CHULA ROBBINS DO   10/14/2019 2:43 PM    This document was prepared using voice generated softwear. While every attempt was made for accuracy, grammatical errors may exist.

## 2019-10-14 NOTE — TELEPHONE ENCOUNTER
Patient stopped to check out with questions about a local podiatrist that was discussed during her visit.  She was told that [facility] has a podiatrist as well as [facility] across the street.  On the yellow sheet there was a note about getting a nurse only visit for BP machine check at anytime.  She said that that was done today and another appointment wasn't need.  No appointments were made.  Please call patient to discuss and advise.  Maude Cifuentes on 10/14/2019 at 3:23 PM

## 2019-10-14 NOTE — NURSING NOTE
Patient presents to the clinic for follow-up.     Medication Reconciliation: complete   Frances Richardson LPN............. October 14, 2019 1:56 PM

## 2019-10-14 NOTE — NURSING NOTE
Immunization Documentation    Prior to Immunization administration, verified patients identity using patient's name and date of birth. Please see IMMUNIZATIONS  and order for additional information.  Patient / Parent instructed to remain in clinic for 15 minutes and report any adverse reaction to staff immediately.    Was entire vial of medication used? Yes  Vial/Syringe: Lio Richardson LPN  10/14/2019   3:23 PM

## 2019-10-15 RX ORDER — LOSARTAN POTASSIUM 50 MG/1
TABLET ORAL
Qty: 90 TABLET | Refills: 0 | OUTPATIENT
Start: 2019-10-15

## 2019-10-16 NOTE — TELEPHONE ENCOUNTER
She was instructed to bring her blood pressure machine in to have it compared to a manual blood pressure.  That was not done at her visit as she had not brought in her machine.    As far as podiatry I am fine referring her to however she would like to be seen by.  Not all podiatrists due foot care so she may want to check with them prior to scheduling the appointment.

## 2019-10-18 ENCOUNTER — HOSPITAL ENCOUNTER (OUTPATIENT)
Dept: GENERAL RADIOLOGY | Facility: OTHER | Age: 69
Discharge: HOME | End: 2019-10-18
Attending: INTERNAL MEDICINE | Admitting: INTERNAL MEDICINE
Payer: MEDICARE

## 2019-10-18 DIAGNOSIS — M16.11 PRIMARY OSTEOARTHRITIS OF RIGHT HIP: ICD-10-CM

## 2019-10-18 PROCEDURE — 25000128 H RX IP 250 OP 636: Performed by: RADIOLOGY

## 2019-10-18 PROCEDURE — 25500064 ZZH RX 255 OP 636: Performed by: RADIOLOGY

## 2019-10-18 PROCEDURE — 25000125 ZZHC RX 250: Performed by: RADIOLOGY

## 2019-10-18 PROCEDURE — 20610 DRAIN/INJ JOINT/BURSA W/O US: CPT | Mod: RT

## 2019-10-18 RX ORDER — BUPIVACAINE HYDROCHLORIDE 5 MG/ML
3 INJECTION, SOLUTION EPIDURAL; INTRACAUDAL ONCE
Status: COMPLETED | OUTPATIENT
Start: 2019-10-18 | End: 2019-10-18

## 2019-10-18 RX ORDER — TRIAMCINOLONE ACETONIDE 40 MG/ML
40 INJECTION, SUSPENSION INTRA-ARTICULAR; INTRAMUSCULAR ONCE
Status: COMPLETED | OUTPATIENT
Start: 2019-10-18 | End: 2019-10-18

## 2019-10-18 RX ORDER — LIDOCAINE HYDROCHLORIDE 10 MG/ML
2 INJECTION, SOLUTION EPIDURAL; INFILTRATION; INTRACAUDAL; PERINEURAL ONCE
Status: COMPLETED | OUTPATIENT
Start: 2019-10-18 | End: 2019-10-18

## 2019-10-18 RX ADMIN — BUPIVACAINE HYDROCHLORIDE 3 ML: 5 INJECTION, SOLUTION EPIDURAL; INTRACAUDAL at 11:47

## 2019-10-18 RX ADMIN — TRIAMCINOLONE ACETONIDE 40 MG: 40 INJECTION, SUSPENSION INTRA-ARTICULAR; INTRAMUSCULAR at 11:48

## 2019-10-18 RX ADMIN — IOHEXOL 2 ML: 240 INJECTION, SOLUTION INTRATHECAL; INTRAVASCULAR; INTRAVENOUS; ORAL at 11:48

## 2019-10-18 RX ADMIN — LIDOCAINE HYDROCHLORIDE 2 ML: 10 INJECTION, SOLUTION INFILTRATION; PERINEURAL at 11:48

## 2019-10-18 NOTE — TELEPHONE ENCOUNTER
Called and spoke with patient after proper verification.  Patient states she is very confused. States that Lili Perkins, DO instructed her bring her BP machine in to be calibrated, but can't remember where Lili Perkins, DO infstructed her to go.     About podiatry, patient states she is just fine calling around, but she has no idea how to go about that? Where does she start. Patient states that Lili Perkins, DO recommended two sisters in town, but can't remember who she said.     Please advise.     Frances Richardson LPN............. October 18, 2019 10:07 AM

## 2019-10-18 NOTE — TELEPHONE ENCOUNTER
Please send her a list of foot care people in town.    She needs to see the nurse injection clinic or nursing clinic for a blood pressure comparison.

## 2019-10-21 NOTE — TELEPHONE ENCOUNTER
"Called and spoke with patient after proper verification. Informed patient of the below message. Patient states understanding. Patient also wanted to inform Lili REESERogers Perkins, DO that she had her hip injection on Friday and still has \"absoutly, zero relief\". States the person she saw was \"rude\" and didn't answer her questions during her apt. Patient is wondering if this is normal, or if she should get another injection. Please advise.     Frances Richardson LPN............. October 21, 2019 10:42 AM         "

## 2019-10-21 NOTE — TELEPHONE ENCOUNTER
Left message for patient to call back.  Will leave a list of foot care providers in unit 3 for patient to .   Rama Blevins LPN .......10/21/2019 8:55 AM

## 2019-10-22 NOTE — TELEPHONE ENCOUNTER
I am sorry to hear about her experience.  We could consider an injection into the SI joint (low back/hip area) as she did have moderate arthritis in the area on xray done however I can't guarantee this will solve her pain either.      If she would like to pursue this please let me know other I would recommend physical therapy.

## 2019-10-24 NOTE — TELEPHONE ENCOUNTER
Spoke with patient and relayed message. Patient would like to try another injection. Patient is leaving for vacation on Tuesday. Olesya Salas LPN .............10/24/2019  3:43 PM

## 2019-11-18 ENCOUNTER — TRANSFERRED RECORDS (OUTPATIENT)
Dept: HEALTH INFORMATION MANAGEMENT | Facility: OTHER | Age: 69
End: 2019-11-18

## 2019-11-18 DIAGNOSIS — M25.551 PAIN OF RIGHT HIP JOINT: Primary | ICD-10-CM

## 2019-11-21 ENCOUNTER — HOSPITAL ENCOUNTER (OUTPATIENT)
Dept: MRI IMAGING | Facility: OTHER | Age: 69
Discharge: HOME | End: 2019-11-21
Attending: ORTHOPAEDIC SURGERY | Admitting: ORTHOPAEDIC SURGERY
Payer: MEDICARE

## 2019-11-21 ENCOUNTER — ALLIED HEALTH/NURSE VISIT (OUTPATIENT)
Dept: FAMILY MEDICINE | Facility: OTHER | Age: 69
End: 2019-11-21
Attending: INTERNAL MEDICINE
Payer: MEDICARE

## 2019-11-21 VITALS — DIASTOLIC BLOOD PRESSURE: 80 MMHG | SYSTOLIC BLOOD PRESSURE: 130 MMHG

## 2019-11-21 DIAGNOSIS — M25.551 PAIN OF RIGHT HIP JOINT: ICD-10-CM

## 2019-11-21 DIAGNOSIS — Z01.30 BLOOD PRESSURE CHECK: Primary | ICD-10-CM

## 2019-11-21 PROCEDURE — 73721 MRI JNT OF LWR EXTRE W/O DYE: CPT | Mod: RT

## 2019-11-21 NOTE — NURSING NOTE
Pt presents to clinic today for blood pressure check.       Medication Reconciliation: complete  Peyton Oh LPN

## 2019-11-27 ENCOUNTER — TRANSFERRED RECORDS (OUTPATIENT)
Dept: HEALTH INFORMATION MANAGEMENT | Facility: OTHER | Age: 69
End: 2019-11-27

## 2019-12-09 DIAGNOSIS — I10 ESSENTIAL HYPERTENSION: Primary | ICD-10-CM

## 2019-12-09 RX ORDER — LOSARTAN POTASSIUM AND HYDROCHLOROTHIAZIDE 12.5; 5 MG/1; MG/1
1 TABLET ORAL DAILY
Qty: 90 TABLET | Refills: 1 | Status: SHIPPED | OUTPATIENT
Start: 2019-12-09 | End: 2019-12-17

## 2019-12-17 ENCOUNTER — TELEPHONE (OUTPATIENT)
Dept: INTERNAL MEDICINE | Facility: OTHER | Age: 69
End: 2019-12-17

## 2019-12-17 ENCOUNTER — OFFICE VISIT (OUTPATIENT)
Dept: ORTHOPEDICS | Facility: OTHER | Age: 69
End: 2019-12-17
Attending: ORTHOPAEDIC SURGERY
Payer: MEDICARE

## 2019-12-17 DIAGNOSIS — I10 ESSENTIAL HYPERTENSION: ICD-10-CM

## 2019-12-17 DIAGNOSIS — Z00.00 ROUTINE GENERAL MEDICAL EXAMINATION AT A HEALTH CARE FACILITY: Primary | ICD-10-CM

## 2019-12-17 PROCEDURE — G0463 HOSPITAL OUTPT CLINIC VISIT: HCPCS

## 2019-12-17 RX ORDER — LOSARTAN POTASSIUM AND HYDROCHLOROTHIAZIDE 12.5; 5 MG/1; MG/1
1 TABLET ORAL DAILY
Qty: 5 TABLET | Refills: 0 | Status: SHIPPED | OUTPATIENT
Start: 2019-12-17 | End: 2020-05-29

## 2019-12-17 RX ORDER — LOSARTAN POTASSIUM AND HYDROCHLOROTHIAZIDE 12.5; 1 MG/1; MG/1
1 TABLET ORAL DAILY
Qty: 90 TABLET | Refills: 1 | Status: SHIPPED | OUTPATIENT
Start: 2019-12-17 | End: 2020-03-20

## 2019-12-17 NOTE — TELEPHONE ENCOUNTER
Patient has been on   losartan-hydrochlorothiazide 50/12.5      She states she's supposed to be on a total 100 mg of the Losartan as well as the 12.5 of hydrochlorothiazide now (this is new)       (she needs 5 more pills of the 50/12.5 to finish out what she has right now)    I sarah'd up the 100/12.5 #90 as well as 5 tablet of the 50/12.5      Any questions please let me know.      Simona Martin LPN  12/17/2019  11:16 AM

## 2019-12-17 NOTE — TELEPHONE ENCOUNTER
Pt has questions about her refill on her losartan and thinks that she needs a new prescription written for it.

## 2019-12-23 NOTE — PROGRESS NOTES
CHIEF COMPLAINT: Right Hip Pain Recheck     PROBLEMS:   Patient has no noted problems.    PATIENT REPORTED MEDICATIONS:   Patient has no noted medications.    PATIENT REPORTED ALLERGIES:   Patient has no noted allergies.      HISTORY OF PRESENT ILLNESS:    REASON FOR EVALUATION:  Right hip pain.     HISTORY OF PRESENT ILLNESS:  Jennyfer comes in with regard to her right hip.  She is here for discussion of MRI results.  I did review the MRI, this showed evidence for gluteal tendonitis, some mild labral fraying, as well as some moderate approach of arthrosis inside the joint.  Did undergo injection in the hip joint, really did not give her much in the way of relief here.  She is here to look at other options at this time.      PAST MEDICAL HISTORY:    Diabetes  Heart Disease   Hypertension  Depression     PAST ORTHOPEDIC SURGICAL HISTORY:    No Past Orthopedic Surgical History    PAST SURGICAL HISTORY:    Heart Stent  Gallbladder  Cataracts    FAMILY HISTORY:    Family History Unknown    SOCIAL HISTORY:   Single  Retired     PHYSICAL EXAMINATION:    Examination of the patient's right hip shows pain with hip flexion and internal rotation.  Pain with palpation across the trochanteric bursa.  Mild swelling is seen there, in addition to that.      ASSESSMENT:    IMPRESSION:  Right hip trochanteric bursitis with underlying arthrosis.     PROCEDURES:   Risks and benefits of the procedure were reviewed with the patient. Informed consent was obtained. Blood sugar risks for our diabetic patients were also discussed.    After achieving informed consent and sterile preparation, the patient's right hip trochanteric bursa is injected with 4 cc 1% lidocaine and 40 mg Kenalog under sterile conditions.  The patient did tolerate the procedure well.      PLAN:   Injection as stated above, repeat in the future as appropriate and necessary.  Long-term management will continue to monitor arthritic changes.  If this does progress certainly  consideration for joint reconstruction there would come into play down the road.     Dictated by:  Abhinav Jeronimo MD  Copy to:  Lili Perkins DO     D:  12/17/19  T:  12/23/19    Typed and/or reviewed and corrected by signing  below, and sent to the Physician for final review and signature.      This report was created using voice recording software and computer-generated templates. Although every effort has been made to review for and eliminate errors, some errors may still occur.         Electronically signed by Kristina Barillas on 12/23/2019 at 11:30 AM    Electronically signed by Abhinav Jeronimo MD on 12/23/2019 at 12:04 PM  ________________________________________________________________________

## 2020-01-20 DIAGNOSIS — L98.8 WRINKLES: Primary | ICD-10-CM

## 2020-01-21 NOTE — TELEPHONE ENCOUNTER
[id number] sent Rx request for the following:      TRETINOIN 0.05% TOPICAL CREAM  Sig: APPLY TO AFFECTED AREAS      Last Prescription Date:   unknown  Last Fill Qty/Refills:         n/a, R-n/a    Last Office Visit:              10/14/2019   Future Office visit:           4/27/2020    Routing refill request to provider for review/approval because:  Drug not active on patient's medication list        Tray Gaston RN, BSN  ....................  1/21/2020   11:48 AM

## 2020-02-06 NOTE — TELEPHONE ENCOUNTER
Patient uses Retin-A 0.5% every other day on face. Patient uses this for wrinkles.   Patient uses [facility].   Olesya Salas LPN .............2/6/2020  11:50 AM

## 2020-02-07 RX ORDER — TRETINOIN 0.5 MG/G
CREAM TOPICAL
Qty: 45 G | Refills: 1 | OUTPATIENT
Start: 2020-02-07

## 2020-02-07 RX ORDER — TRETINOIN 0.5 MG/G
1 CREAM TOPICAL AT BEDTIME
Qty: 45 G | Refills: 1 | Status: SHIPPED | OUTPATIENT
Start: 2020-02-07 | End: 2021-02-01

## 2020-03-11 ENCOUNTER — HEALTH MAINTENANCE LETTER (OUTPATIENT)
Age: 70
End: 2020-03-11

## 2020-03-11 ENCOUNTER — OFFICE VISIT (OUTPATIENT)
Dept: ORTHOPEDICS | Facility: OTHER | Age: 70
End: 2020-03-11
Attending: ORTHOPAEDIC SURGERY
Payer: MEDICARE

## 2020-03-11 DIAGNOSIS — M48.062 SPINAL STENOSIS OF LUMBAR REGION WITH NEUROGENIC CLAUDICATION: ICD-10-CM

## 2020-03-11 DIAGNOSIS — M79.604 RIGHT LEG PAIN: Primary | ICD-10-CM

## 2020-03-11 PROCEDURE — G0463 HOSPITAL OUTPT CLINIC VISIT: HCPCS

## 2020-03-11 PROCEDURE — 99213 OFFICE O/P EST LOW 20 MIN: CPT | Performed by: ORTHOPAEDIC SURGERY

## 2020-03-11 NOTE — PROGRESS NOTES
Patient is here for follow up on her right hip pain.  Rylie Emery LPN .....................3/11/2020 2:43 PM

## 2020-03-12 ENCOUNTER — HOSPITAL ENCOUNTER (OUTPATIENT)
Dept: MRI IMAGING | Facility: OTHER | Age: 70
Discharge: HOME | End: 2020-03-12
Attending: ORTHOPAEDIC SURGERY | Admitting: ORTHOPAEDIC SURGERY
Payer: MEDICARE

## 2020-03-12 DIAGNOSIS — M79.604 RIGHT LEG PAIN: ICD-10-CM

## 2020-03-12 PROCEDURE — 72148 MRI LUMBAR SPINE W/O DYE: CPT

## 2020-03-12 NOTE — PROGRESS NOTES
Visit Date:   2020      FOLLOWUP EXAMINATION      REASON FOR EVALUATION:  Right leg pain.      HISTORY:  Jennyfer comes in with regards to her right leg.  It has been bothering her for at least the last several months.  She has got an upcoming vacation on .  She has undergone an intra-articular hip injection as well as a bursal injection, neither of which have given her tremendous relief, maybe a week  or so at best.  She is reporting pain deep within the buttocks.  Occasionally it does travel for her.  It feels hot in nature.  Back occasionally will hurt with this.  We have discussed looking at other sites for this discomfort, including her back, which has not been done yet to date.  I did review her previous back x-rays done, I believe a couple years back, which does show some stenosis at the L5-S1 interspace, particularly more right-sided in nature.  It is worse with standing, better with rest and sitting at this time.      PHYSICAL EXAMINATION:  Examination of her lower back shows increased pain with hyperextension.  No pain with forward flexion, pain more on a straight leg raise on the right side than the left.  Reflexes equal and symmetrical in bilateral lower extremities at this point in time as well.        IMAGING STUDIES:  X-rays reviewed from before.  Does show stenosis at the L5-S1 interspace.      IMPRESSION:  Right leg pain, likely secondary to lumbar stenosis and right leg radiculopathy.      PLAN:  MRI evaluation of lumbar spine has been advised.  The patient will be contacted once that study is complete for further direction which could include, potentially, NELSON L5-S1.         ELPIDIO STANFORD MD             D: 2020   T: 2020   MT: MARIO      Name:     JENNYFER REID   MRN:      [id number]        Account:      [id number]   :      1950           Visit Date:   2020      Document: [id number]

## 2020-03-18 DIAGNOSIS — M48.061 SPINAL STENOSIS OF LUMBAR REGION, UNSPECIFIED WHETHER NEUROGENIC CLAUDICATION PRESENT: Primary | ICD-10-CM

## 2020-03-18 DIAGNOSIS — M79.604 RIGHT LEG PAIN: ICD-10-CM

## 2020-03-26 ENCOUNTER — HOSPITAL ENCOUNTER (OUTPATIENT)
Dept: GENERAL RADIOLOGY | Facility: OTHER | Age: 70
Discharge: HOME | End: 2020-03-26
Attending: ORTHOPAEDIC SURGERY | Admitting: ORTHOPAEDIC SURGERY
Payer: MEDICARE

## 2020-03-26 DIAGNOSIS — M48.061 SPINAL STENOSIS OF LUMBAR REGION, UNSPECIFIED WHETHER NEUROGENIC CLAUDICATION PRESENT: ICD-10-CM

## 2020-03-26 DIAGNOSIS — M79.604 RIGHT LEG PAIN: ICD-10-CM

## 2020-03-26 PROCEDURE — 25000125 ZZHC RX 250: Performed by: RADIOLOGY

## 2020-03-26 PROCEDURE — 62323 NJX INTERLAMINAR LMBR/SAC: CPT

## 2020-03-26 PROCEDURE — 25000128 H RX IP 250 OP 636: Performed by: RADIOLOGY

## 2020-03-26 PROCEDURE — 25500064 ZZH RX 255 OP 636: Performed by: RADIOLOGY

## 2020-03-26 RX ORDER — LIDOCAINE HYDROCHLORIDE 10 MG/ML
2 INJECTION, SOLUTION EPIDURAL; INFILTRATION; INTRACAUDAL; PERINEURAL ONCE
Status: COMPLETED | OUTPATIENT
Start: 2020-03-26 | End: 2020-03-26

## 2020-03-26 RX ORDER — METHYLPREDNISOLONE ACETATE 80 MG/ML
80 INJECTION, SUSPENSION INTRA-ARTICULAR; INTRALESIONAL; INTRAMUSCULAR; SOFT TISSUE ONCE
Status: COMPLETED | OUTPATIENT
Start: 2020-03-26 | End: 2020-03-26

## 2020-03-26 RX ORDER — LIDOCAINE HYDROCHLORIDE 10 MG/ML
2 INJECTION, SOLUTION INFILTRATION; PERINEURAL ONCE
Status: COMPLETED | OUTPATIENT
Start: 2020-03-26 | End: 2020-03-26

## 2020-03-26 RX ADMIN — IOHEXOL 2 ML: 240 INJECTION, SOLUTION INTRATHECAL; INTRAVASCULAR; INTRAVENOUS; ORAL at 12:39

## 2020-03-26 RX ADMIN — METHYLPREDNISOLONE ACETATE 80 MG: 80 INJECTION, SUSPENSION INTRA-ARTICULAR; INTRALESIONAL; INTRAMUSCULAR; SOFT TISSUE at 12:39

## 2020-03-26 RX ADMIN — LIDOCAINE HYDROCHLORIDE 2 ML: 10 INJECTION, SOLUTION INFILTRATION; PERINEURAL at 12:39

## 2020-03-26 RX ADMIN — LIDOCAINE HYDROCHLORIDE 2 ML: 10 INJECTION, SOLUTION EPIDURAL; INFILTRATION; INTRACAUDAL; PERINEURAL at 12:39

## 2020-03-27 DIAGNOSIS — F32.A ANXIETY AND DEPRESSION: ICD-10-CM

## 2020-03-27 DIAGNOSIS — F41.9 ANXIETY AND DEPRESSION: ICD-10-CM

## 2020-03-28 RX ORDER — DULOXETIN HYDROCHLORIDE 30 MG/1
CAPSULE, DELAYED RELEASE ORAL
Qty: 180 CAPSULE | Refills: 1 | Status: SHIPPED | OUTPATIENT
Start: 2020-03-28 | End: 2020-08-13

## 2020-04-16 ENCOUNTER — TRANSFERRED RECORDS (OUTPATIENT)
Dept: HEALTH INFORMATION MANAGEMENT | Facility: OTHER | Age: 70
End: 2020-04-16

## 2020-04-16 DIAGNOSIS — M16.10 ARTHRITIS OF HIP: Primary | ICD-10-CM

## 2020-04-17 ENCOUNTER — HOSPITAL ENCOUNTER (OUTPATIENT)
Dept: GENERAL RADIOLOGY | Facility: OTHER | Age: 70
Discharge: HOME | End: 2020-04-17
Attending: ORTHOPAEDIC SURGERY | Admitting: ORTHOPAEDIC SURGERY
Payer: MEDICARE

## 2020-04-17 DIAGNOSIS — M16.10 ARTHRITIS OF HIP: ICD-10-CM

## 2020-04-17 PROCEDURE — 77002 NEEDLE LOCALIZATION BY XRAY: CPT

## 2020-04-17 PROCEDURE — 25500064 ZZH RX 255 OP 636: Performed by: RADIOLOGY

## 2020-04-17 PROCEDURE — 25000125 ZZHC RX 250: Performed by: RADIOLOGY

## 2020-04-17 PROCEDURE — 25000128 H RX IP 250 OP 636: Performed by: RADIOLOGY

## 2020-04-17 RX ORDER — LIDOCAINE HYDROCHLORIDE 10 MG/ML
2 INJECTION, SOLUTION INFILTRATION; PERINEURAL ONCE
Status: COMPLETED | OUTPATIENT
Start: 2020-04-17 | End: 2020-04-17

## 2020-04-17 RX ORDER — TRIAMCINOLONE ACETONIDE 40 MG/ML
40 INJECTION, SUSPENSION INTRA-ARTICULAR; INTRAMUSCULAR ONCE
Status: COMPLETED | OUTPATIENT
Start: 2020-04-17 | End: 2020-04-17

## 2020-04-17 RX ORDER — BUPIVACAINE HYDROCHLORIDE 5 MG/ML
3 INJECTION, SOLUTION EPIDURAL; INTRACAUDAL ONCE
Status: COMPLETED | OUTPATIENT
Start: 2020-04-17 | End: 2020-04-17

## 2020-04-17 RX ADMIN — BUPIVACAINE HYDROCHLORIDE 3 ML: 5 INJECTION, SOLUTION EPIDURAL; INTRACAUDAL at 11:47

## 2020-04-17 RX ADMIN — TRIAMCINOLONE ACETONIDE 40 MG: 40 INJECTION, SUSPENSION INTRA-ARTICULAR; INTRAMUSCULAR at 11:47

## 2020-04-17 RX ADMIN — LIDOCAINE HYDROCHLORIDE 2 ML: 10 INJECTION, SOLUTION INFILTRATION; PERINEURAL at 11:47

## 2020-04-17 RX ADMIN — IOHEXOL 2 ML: 240 INJECTION, SOLUTION INTRATHECAL; INTRAVASCULAR; INTRAVENOUS; ORAL at 11:46

## 2020-04-22 DIAGNOSIS — F41.1 GAD (GENERALIZED ANXIETY DISORDER): Primary | ICD-10-CM

## 2020-04-23 RX ORDER — BUPROPION HYDROCHLORIDE 300 MG/1
TABLET ORAL
Qty: 90 TABLET | Refills: 0 | Status: SHIPPED | OUTPATIENT
Start: 2020-04-23 | End: 2020-08-13

## 2020-04-23 NOTE — TELEPHONE ENCOUNTER
Prescription approved per [facility] Refill Protocol.  LOV: 10/14/2019  Patient to follow up in 6 months  wellbutrin 150mg take 2 tabs every morning  Wellbutrin 300 mg take 1 tab every morning  Patient has appointment 4/27/2020    Debbie Jackson RN on 4/23/2020 at 7:56 AM

## 2020-04-24 ENCOUNTER — TELEPHONE (OUTPATIENT)
Dept: ORTHOPEDICS | Facility: OTHER | Age: 70
End: 2020-04-24

## 2020-04-24 NOTE — TELEPHONE ENCOUNTER
Patient called and requested to speak to nurse with Dr Jeronimo. She states she had a injection in her hip on the   17th and it has done nothing and she would like to know what the next step is for her. Please call patient to advise.  King Salazar on 4/24/2020 at 1:00 PM

## 2020-04-28 ENCOUNTER — OFFICE VISIT (OUTPATIENT)
Dept: ORTHOPEDICS | Facility: OTHER | Age: 70
End: 2020-04-28
Attending: ORTHOPAEDIC SURGERY
Payer: MEDICARE

## 2020-04-28 ENCOUNTER — HOSPITAL ENCOUNTER (OUTPATIENT)
Dept: PHYSICAL THERAPY | Facility: OTHER | Age: 70
Setting detail: THERAPIES SERIES
End: 2020-04-28
Attending: ORTHOPAEDIC SURGERY
Payer: MEDICARE

## 2020-04-28 DIAGNOSIS — M48.061 SPINAL STENOSIS OF LUMBAR REGION, UNSPECIFIED WHETHER NEUROGENIC CLAUDICATION PRESENT: ICD-10-CM

## 2020-04-28 DIAGNOSIS — M16.10 ARTHRITIS OF HIP: ICD-10-CM

## 2020-04-28 DIAGNOSIS — M48.061 SPINAL STENOSIS OF LUMBAR REGION, UNSPECIFIED WHETHER NEUROGENIC CLAUDICATION PRESENT: Primary | ICD-10-CM

## 2020-04-28 PROCEDURE — 99213 OFFICE O/P EST LOW 20 MIN: CPT | Performed by: ORTHOPAEDIC SURGERY

## 2020-04-28 PROCEDURE — 97161 PT EVAL LOW COMPLEX 20 MIN: CPT | Mod: GP

## 2020-04-28 PROCEDURE — G0463 HOSPITAL OUTPT CLINIC VISIT: HCPCS

## 2020-04-28 PROCEDURE — 97140 MANUAL THERAPY 1/> REGIONS: CPT | Mod: GP

## 2020-04-28 PROCEDURE — 97110 THERAPEUTIC EXERCISES: CPT | Mod: GP

## 2020-04-28 NOTE — PROGRESS NOTES
Patient is here for follow up on her right leg pain.   Rylie Emery LPN .....................4/28/2020 10:29 AM

## 2020-04-28 NOTE — PROGRESS NOTES
[facility]          OUTPATIENT PHYSICAL THERAPY ORTHOPEDIC EVALUATION  PLAN OF TREATMENT FOR OUTPATIENT REHABILITATION  (COMPLETE FOR INITIAL CLAIMS ONLY)  Patient's Last Name, First Name, M.I.  YOB: 1950  GlennJennyfer BHATIA    Provider s Name:  [facility]   Medical Record No.  [id number]   Start of Care Date:  04/28/20   Onset Date:  (P) 10/01/19   Type:     _X__PT   ___OT   ___SLP Medical Diagnosis:  (P) Spinal stenosis of lumbar region     PT Diagnosis:  (P) Low back and right hip pain with associated lumbar segmental dysfunction and SI joint dysfunction,    Visits from SOC:  1      _________________________________________________________________________________  Plan of Treatment/Functional Goals:  (P) joint mobilization, manual therapy, neuromuscular re-education, ROM, strengthening, stretching           Goals  Goal Identifier: (P) Pain   Goal Description: (P) Report the ability to complete yard work tasks with pain at a 2/10 or less  Target Date: (P) 06/09/20    Goal Identifier: (P) walking   Goal Description: (P) Report abilty to walk for 30 minutes to complete shopping tasks with pain at 2/10 or less  Target Date: (P) 06/09/20    Goal Identifier: (P) HEP   Goal Description: (P) Develop independent HEP and management   Target Date: (P) 06/09/20                                                           Therapy Frequency:  (P) (8 visits )  Predicted Duration of Therapy Intervention:  (P) 6 weeks     Shady Bond, PT                 I CERTIFY THE NEED FOR THESE SERVICES FURNISHED UNDER        THIS PLAN OF TREATMENT AND WHILE UNDER MY CARE .             Physician Signature               Date    X_____________________________________________________                             Certification Date From:  (P) 04/28/20   Certification Date To:  (P) 06/09/20    Referring Provider:  Dr. Jeronimo    Initial Assessment        See Epic Evaluation Start of Care Date:  04/28/20

## 2020-04-28 NOTE — PROGRESS NOTES
04/28/20 1300   General Information   Type of Visit Initial OP Ortho PT Evaluation   Start of Care Date 04/28/20   Referring Physician Dr. Jeronimo   Patient/Family Goals Statement decrease pain    Orders Evaluate and Treat   Orders Comment eval and treat lumbar stenosis and right hip bursitis. spine program hip streching 1-2x./week over 4 weeks home program   Date of Order 04/28/20   Certification Required? Yes   Medical Diagnosis Spinal stenosis of lumbar region   Surgical/Medical history reviewed Yes   Body Part(s)   Body Part(s) Lumbar Spine/SI   Presentation and Etiology   Pertinent history of current problem (include personal factors and/or comorbidities that impact the POC) Patient reports right sided low back and hip pain began in the fall of 2019. States she cannot recall any specific mechanism of injury. Since the onset she has had two different right hip injection, and one lumbar injection.  She reports no relief with any of the injections.  She reports she has trouble walking longer distances. Working in her yard is limited by pain. She notes increased pain on stairs.    Impairments H. Impaired gait   Functional Limitations perform activities of daily living;perform required work activities;perform desired leisure / sports activities   Symptom Location right low back, and lateral/posterior right hip   How/Where did it occur From insidious onset   Onset date of current episode/exacerbation 10/01/19   Chronicity Recurrent   Pain rating (0-10 point scale) Best (/10);Worst (/10)   Best (/10) 0   Worst (/10) 8-9/10   Pain quality B. Dull;C. Aching   Frequency of pain/symptoms   (when walking )   Pain/symptoms exacerbated by B. Walking;L. Work tasks   Pain/symptoms eased by D. Nothing   Progression of symptoms since onset: Unchanged   Prior Level of Function   Prior Level of Function-Mobility no limitations    Prior Level of Function-ADLs no limitations    Current Level of Function   Current Community Support  Family/friend caregiver   Patient role/employment history F. Retired  (works as a  for )   Fall Risk Screen   Fall screen completed by PT   Have you fallen 2 or more times in the past year? No   Have you fallen and had an injury in the past year? No   Is patient a fall risk? No   Lumbar Spine/SI Objective Findings   Gait/Locomotion Normal gait pattern   Flexion ROM fingers to ankles-- no pain    Extension ROM 20 degrees-- no pain    Right Side Bending ROM fingers to mid shin- no pain    Left Side Bending ROM fingers to mid shin- no pain    Pelvic Screen left leg shorter, left posterior innominate rotation    Hip Screen full and pain free right hip ROM in all planes    Hip Flexion (L2) Strength 5/5 B   Knee Flexion Strength 5/5 B   Knee Extension (L3) Strength 5/5 B   Ankle Dorsiflexion (L4) Strength 5/5 B   Great Toe Extension (L5) Strength 5/5 B   Ankle Plantar Flexion (S1) Strength 5/5 B   SLR negative   Slump Test negative    Palpation ERS left at L4    Planned Therapy Interventions   Planned Therapy Interventions joint mobilization;manual therapy;neuromuscular re-education;ROM;strengthening;stretching   Clinical Impression   Criteria for Skilled Therapeutic Interventions Met yes, treatment indicated   PT Diagnosis Low back and right hip pain with associated lumbar segmental dysfunction and SI joint dysfunction,    Influenced by the following impairments pain, weakness,    Functional limitations due to impairments walking, yard work, stairs   Clinical Presentation Stable/Uncomplicated   Clinical Presentation Rationale symptoms are consistent with the diagnosis    Clinical Decision Making (Complexity) Low complexity   Therapy Frequency   (8 visits )   Predicted Duration of Therapy Intervention (days/wks) 6 weeks    Risk & Benefits of therapy have been explained Yes   Patient, Family & other staff in agreement with plan of care Yes   Education Assessment   Preferred Learning Style  Listening;Demonstration;Pictures/video   ORTHO GOALS   PT Ortho Eval Goals 1;2;3   Ortho Goal 1   Goal Identifier Pain    Goal Description Report the ability to complete yard work tasks with pain at a 2/10 or less   Target Date 06/09/20   Ortho Goal 2   Goal Identifier walking    Goal Description Report abilty to walk for 30 minutes to complete shopping tasks with pain at 2/10 or less   Target Date 06/09/20   Ortho Goal 3   Goal Identifier HEP    Goal Description Develop independent HEP and management    Target Date 06/09/20   Total Evaluation Time   PT Eval, Low Complexity Minutes (15138) 20   Therapy Certification   Certification date from 04/28/20   Certification date to 06/09/20   Medical Diagnosis Spinal stenosis of lumbar region

## 2020-04-28 NOTE — PROGRESS NOTES
Visit Date:   04/28/2020      REASON FOR EVALUATION:  Right leg pain, lower back pain.      HISTORY:  Jennyfer comes in with right leg/lower back pain.  She has had ongoing issues here for a bit of time.  We have tried lumbar NELSON, right hip intra-articular injection as well as bursal side injections.  The intra-articular injection of the hip seemed to help the most at first, but she had 1 just recently and it gave her absolutely no response.  She continues to describe this pain that starts in the buttocks, runs down the leg, worse with walking particularly, not necessarily standing.  She also occasionally gets pain on the left side consistent with bursitis.  She is here look at her next steps.  She has not had spinal evaluation at this point.  She has had updated MRIs of the spine plus the hip in addition to x-rays on the hip previously as well.  The patient reports the discomfort is intolerable and seems to be getting significantly worse over time and preventing her from doing her activities that she enjoys.      EXAMINATION:  Examination of her hip shows reasonable hip flexion and internal rotation.  Tenderness across the trochanteric bursal side.  Does have pain with resisted hip abduction.  She also has pain with straight leg raise, more so on the right than the left, with mild increased discomfort with hyperextension of the lumbar spine in addition to that.  Neurovascular exam is otherwise intact.      IMPRESSION:     1.  Lumbar stenosis, L5-S1.   2.  Right side bursitis   3.  Mild right hip osteoarthrosis.      PLAN:  At this time, the patient really had no response to the injections.  I really think this is the lesser of the 3 concerns.  I will advocate for therapy for spine program as well as hip bursitis range of motion and stretching.  I also plan to have the patient referred to Dr. Vaughn for evaluation of lower back and needs for other treatment including injections moving forward at this point in time.   She is in agreement with that plan otherwise.         ELPIDIO STANFORD MD             D: 2020   T: 2020   MT: ALISA      Name:     ALVA REID   MRN:      [id number]        Account:      [id number]   :      1950           Visit Date:   2020      Document: [id number]

## 2020-04-30 ENCOUNTER — HOSPITAL ENCOUNTER (OUTPATIENT)
Dept: PHYSICAL THERAPY | Facility: OTHER | Age: 70
Setting detail: THERAPIES SERIES
End: 2020-04-30
Attending: ORTHOPAEDIC SURGERY
Payer: MEDICARE

## 2020-04-30 PROCEDURE — 97110 THERAPEUTIC EXERCISES: CPT | Mod: GP

## 2020-04-30 PROCEDURE — 97140 MANUAL THERAPY 1/> REGIONS: CPT | Mod: GP

## 2020-05-05 ENCOUNTER — HOSPITAL ENCOUNTER (OUTPATIENT)
Dept: PHYSICAL THERAPY | Facility: OTHER | Age: 70
Setting detail: THERAPIES SERIES
End: 2020-05-05
Attending: ORTHOPAEDIC SURGERY
Payer: MEDICARE

## 2020-05-05 PROCEDURE — 97140 MANUAL THERAPY 1/> REGIONS: CPT | Mod: GP

## 2020-05-05 PROCEDURE — 97110 THERAPEUTIC EXERCISES: CPT | Mod: GP

## 2020-05-11 ENCOUNTER — HOSPITAL ENCOUNTER (OUTPATIENT)
Dept: PHYSICAL THERAPY | Facility: OTHER | Age: 70
Setting detail: THERAPIES SERIES
End: 2020-05-11
Attending: ORTHOPAEDIC SURGERY
Payer: MEDICARE

## 2020-05-11 PROCEDURE — 97140 MANUAL THERAPY 1/> REGIONS: CPT | Mod: GP

## 2020-05-11 PROCEDURE — 97110 THERAPEUTIC EXERCISES: CPT | Mod: GP

## 2020-05-19 ENCOUNTER — HOSPITAL ENCOUNTER (OUTPATIENT)
Dept: PHYSICAL THERAPY | Facility: OTHER | Age: 70
Setting detail: THERAPIES SERIES
End: 2020-05-19
Attending: ORTHOPAEDIC SURGERY
Payer: MEDICARE

## 2020-05-19 PROCEDURE — 97110 THERAPEUTIC EXERCISES: CPT | Mod: GP

## 2020-05-19 PROCEDURE — 97140 MANUAL THERAPY 1/> REGIONS: CPT | Mod: GP

## 2020-05-22 ENCOUNTER — HOSPITAL ENCOUNTER (OUTPATIENT)
Dept: PHYSICAL THERAPY | Facility: OTHER | Age: 70
Setting detail: THERAPIES SERIES
End: 2020-05-22
Attending: ORTHOPAEDIC SURGERY
Payer: MEDICARE

## 2020-05-22 PROCEDURE — 97140 MANUAL THERAPY 1/> REGIONS: CPT | Mod: GP

## 2020-05-22 PROCEDURE — 97110 THERAPEUTIC EXERCISES: CPT | Mod: GP

## 2020-05-29 ENCOUNTER — HOSPITAL ENCOUNTER (OUTPATIENT)
Dept: PHYSICAL THERAPY | Facility: OTHER | Age: 70
Setting detail: THERAPIES SERIES
End: 2020-05-29
Attending: ORTHOPAEDIC SURGERY
Payer: MEDICARE

## 2020-05-29 ENCOUNTER — VIRTUAL VISIT (OUTPATIENT)
Dept: INTERNAL MEDICINE | Facility: OTHER | Age: 70
End: 2020-05-29
Attending: INTERNAL MEDICINE
Payer: MEDICARE

## 2020-05-29 DIAGNOSIS — M70.61 TROCHANTERIC BURSITIS OF RIGHT HIP: ICD-10-CM

## 2020-05-29 DIAGNOSIS — E11.9 TYPE 2 DIABETES MELLITUS WITHOUT COMPLICATION, WITHOUT LONG-TERM CURRENT USE OF INSULIN (H): ICD-10-CM

## 2020-05-29 DIAGNOSIS — L65.9 HAIR LOSS: Primary | ICD-10-CM

## 2020-05-29 PROCEDURE — 97110 THERAPEUTIC EXERCISES: CPT | Mod: GP

## 2020-05-29 PROCEDURE — G0463 HOSPITAL OUTPT CLINIC VISIT: HCPCS | Mod: TEL

## 2020-05-29 PROCEDURE — 99443 ZZC PHYSICIAN TELEPHONE EVALUATION 21-30 MIN: CPT | Performed by: INTERNAL MEDICINE

## 2020-05-29 RX ORDER — VITAMIN B COMPLEX
1 CAPSULE ORAL DAILY
COMMUNITY
Start: 2020-05-29 | End: 2022-03-07

## 2020-05-29 RX ORDER — LOSARTAN POTASSIUM 50 MG/1
TABLET ORAL
COMMUNITY
Start: 2019-10-16 | End: 2020-05-29

## 2020-05-29 ASSESSMENT — PAIN SCALES - GENERAL: PAINLEVEL: NO PAIN (0)

## 2020-05-29 NOTE — PATIENT INSTRUCTIONS
Patient Education     For informational purposes only. Not to replace the advice of your health care provider.  Copyright   2006 [facility]. All rights reserved. Localler [id number] - REV 12/15.  Coping with Hair Loss  What may happen during hair loss?  Radiation and some medicines, like chemotherapy, can cause hair loss. You may start to lose your hair two to three weeks after treatment begins.  Your hair may become brittle and break off at the surface of the scalp, or it may simply fall out from the hair follicles. For many people, the head starts to itch or may be tender to the touch as the hair falls out.  Loss of eyebrows, eyelashes, pubic hair and other body hair may also happen, but this is often less severe. Hair growth is less active in these places than in the scalp.  Some people will lose all their hair. Others have only thinning of the hair. Often it depends on the dose and length of your treatment.  Will my hair grow back?  Hair loss caused by medicine will almost always grow back after treatment ends--and sometimes sooner. It may have a different color or texture than before.  Your hair may not grow back if you receive radiation to the head.  What can I do to cope with hair loss?    For some people, hair loss can cause depression or loss of self-confidence. Talk to your loved ones and care team if you are concerned about your hair loss.    Cut your hair short. A shorter style will make your hair look thicker and owusu. And if hair loss occurs, it will be easier to manage.    Use mild shampoo. You may not need to wash your hair every day.    Use a soft hairbrush.    Avoid the hair dryer, or use only the lowest heat setting.    Don't use brush rollers to set your hair.    Don't dye your hair or get a permanent.    Change your linens and pillowcases often. Some people prefer satin.    Cover your head or use sunscreen (SPF 30) when in sunlight.    Cover your head in the winter to prevent heat  "loss.  If you choose to wear a wig or hairpiece:    You may want to get fit for one before you lose a lot of hair. This way you can match your hair color or style.    Check with your care team to see if there is a \"Look Good, Feel Better\" program in your area. They are a resource for hats, turbans, scarves, hairpieces, wigs and make-up.    Your hairpiece may be tax deductible, and insurance may cover part of the cost. Check your policy and get a prescription from your doctor.  When should I call my care team?  Call your care team if:    Emotional distress gets in the way of normal daily living.    You have a rash or small, open sores on your scalp.    You have dry, flaky skin that does not improve with the use of lotion. (Choose alcohol-free lotion.)  Comments:  __________________________________________  __________________________________________  __________________________________________  __________________________________________  __________________________________________  __________________________________________  __________________________________________  For informational purposes only. Not to replace the advice of your health care provider.  Copyright   2018 [facility]. All rights reserved.           "

## 2020-05-29 NOTE — PROGRESS NOTES
"Jennyfer Elizabeth is a 70 year old female who is being evaluated via a billable telephone visit.      The patient has been notified of following:     \"This telephone visit will be conducted via a call between you and your physician/provider. We have found that certain health care needs can be provided without the need for a physical exam.  This service lets us provide the care you need with a short phone conversation.  If a prescription is necessary we can send it directly to your pharmacy.  If lab work is needed we can place an order for that and you can then stop by our lab to have the test done at a later time.    Telephone visits are billed at different rates depending on your insurance coverage. During this emergency period, for some insurers they may be billed the same as an in-person visit.  Please reach out to your insurance provider with any questions.    If during the course of the call the physician/provider feels a telephone visit is not appropriate, you will not be charged for this service.\"    Patient has given verbal consent for Telephone visit?  Yes    What phone number would you like to be contacted at? [phone]    How would you like to obtain your AVS? Mail a copy    Subjective     Jennyfer Elizabeth is a 70 year old female who presents via phone visit today for the following health issues:    She has been losing more hair.  She reports her diet has only changed minimally.  She does have increased stress recently and over the last 6 months.  She is taking a B complex.  She has had issues with hair loss in the past and did see dermatology prior with extensive testing.  All that testing was negative and at that time they determined it was related to stress.    She continues to have hip pain - mostly on the outside.  She had an injection into the bursa with orthopedics last fall however does not recall it being significantly successful however is willing to try one again.    She has a history of diabetes " mellitus.  She is due for repeat labs.  She denies any issues with her medication which include metformin daily.      Reviewed and updated as needed this visit by Provider  Tobacco  Allergies  Meds  Problems  Med Hx  Surg Hx  Fam Hx         Review of Systems   Denies any fevers, chills, SOB, chest pain, increased lower extremity edema, changes in bowel or bladder or other concerns.        Objective   Reported vitals:  Breastfeeding No    healthy, alert and no distress  PSYCH: Alert and oriented times 3; coherent speech, normal rate and volume, able to articulate logical thoughts, able to abstract reason, no tangential thoughts.  Her affect is normal  RESP: No cough, no audible wheezing, able to talk in full sentences  Remainder of exam unable to be completed due to telephone visits    Diagnostic Test Results:  Labs reviewed in Epic        Assessment/Plan:  1. Hair loss  -At this time I suspect that her hair loss is secondary to stress, aging and possibly medications.  We will check a thyroid when she comes in for labs to be sure there is no significant abnormality.  She will be sent information to help cope with hair loss.  She is to call if she has worsening issues and we may need to consider referral to dermatology.  - TSH Reflex GH; Future    2. Type 2 diabetes mellitus without complication, without long-term current use of insulin (H)  At this time we will obtain labs when she comes in.  She will follow-up as scheduled in August.  - Comprehensive Metabolic Panel; Future  - Hemoglobin A1c; Future  - Lipid Panel; Future  - Albumin Random Urine Quantitative with Creat Ratio; Future    3. Trochanteric bursitis of right hip  At this time we will repeat a bursa injection.  Hopefully this will give her some relief.  She is to call with any issues.  - XR Joint Injection Major Right; Future    Return in about 3 months (around 8/29/2020) for Annual Review, as scheduled.      Phone call duration:  21  minutes    Lili Perkins, DO

## 2020-05-29 NOTE — NURSING NOTE
Chief Complaint   Patient presents with     Hair Loss     States that she has been loosing large amounts of hair for a few months now. Notices it when brushing hair, before and after showering. Wondering if this is a side effect of the injections that she is getting for her hip/back.     Medication Reconciliation: complete    Ursula Cartwright LPN

## 2020-06-09 ENCOUNTER — HOSPITAL ENCOUNTER (OUTPATIENT)
Dept: PHYSICAL THERAPY | Facility: OTHER | Age: 70
Setting detail: THERAPIES SERIES
End: 2020-06-09
Attending: ORTHOPAEDIC SURGERY
Payer: MEDICARE

## 2020-06-09 DIAGNOSIS — E11.9 TYPE 2 DIABETES MELLITUS WITHOUT COMPLICATION, WITHOUT LONG-TERM CURRENT USE OF INSULIN (H): ICD-10-CM

## 2020-06-09 DIAGNOSIS — L65.9 HAIR LOSS: ICD-10-CM

## 2020-06-09 LAB
ALBUMIN SERPL-MCNC: 4.7 G/DL (ref 3.5–5.7)
ALP SERPL-CCNC: 79 U/L (ref 34–104)
ALT SERPL W P-5'-P-CCNC: 31 U/L (ref 7–52)
ANION GAP SERPL CALCULATED.3IONS-SCNC: 9 MMOL/L (ref 3–14)
AST SERPL W P-5'-P-CCNC: 34 U/L (ref 13–39)
BILIRUB SERPL-MCNC: 0.6 MG/DL (ref 0.3–1)
BUN SERPL-MCNC: 18 MG/DL (ref 7–25)
CALCIUM SERPL-MCNC: 9.8 MG/DL (ref 8.6–10.3)
CHLORIDE SERPL-SCNC: 102 MMOL/L (ref 98–107)
CHOLEST SERPL-MCNC: 101 MG/DL
CO2 SERPL-SCNC: 29 MMOL/L (ref 21–31)
CREAT SERPL-MCNC: 0.94 MG/DL (ref 0.6–1.2)
GFR SERPL CREATININE-BSD FRML MDRD: 59 ML/MIN/{1.73_M2}
GLUCOSE SERPL-MCNC: 145 MG/DL (ref 70–105)
HBA1C MFR BLD: 6.6 % (ref 4–6)
HDLC SERPL-MCNC: 49 MG/DL (ref 23–92)
LDLC SERPL CALC-MCNC: 22 MG/DL
NONHDLC SERPL-MCNC: 52 MG/DL
POTASSIUM SERPL-SCNC: 3.9 MMOL/L (ref 3.5–5.1)
PROT SERPL-MCNC: 7.6 G/DL (ref 6.4–8.9)
SODIUM SERPL-SCNC: 140 MMOL/L (ref 134–144)
TRIGL SERPL-MCNC: 150 MG/DL
TSH SERPL DL<=0.05 MIU/L-ACNC: 2.99 IU/ML (ref 0.34–5.6)

## 2020-06-09 PROCEDURE — 83036 HEMOGLOBIN GLYCOSYLATED A1C: CPT | Mod: ZL | Performed by: INTERNAL MEDICINE

## 2020-06-09 PROCEDURE — 84443 ASSAY THYROID STIM HORMONE: CPT | Mod: ZL | Performed by: INTERNAL MEDICINE

## 2020-06-09 PROCEDURE — 97110 THERAPEUTIC EXERCISES: CPT | Mod: GP

## 2020-06-09 PROCEDURE — 80053 COMPREHEN METABOLIC PANEL: CPT | Mod: ZL | Performed by: INTERNAL MEDICINE

## 2020-06-09 PROCEDURE — 80061 LIPID PANEL: CPT | Mod: ZL | Performed by: INTERNAL MEDICINE

## 2020-06-09 PROCEDURE — 36415 COLL VENOUS BLD VENIPUNCTURE: CPT | Mod: ZL | Performed by: INTERNAL MEDICINE

## 2020-06-11 ENCOUNTER — HOSPITAL ENCOUNTER (OUTPATIENT)
Dept: GENERAL RADIOLOGY | Facility: OTHER | Age: 70
Discharge: HOME | End: 2020-06-11
Attending: INTERNAL MEDICINE | Admitting: INTERNAL MEDICINE
Payer: MEDICARE

## 2020-06-11 DIAGNOSIS — M70.61 TROCHANTERIC BURSITIS OF RIGHT HIP: ICD-10-CM

## 2020-06-11 PROCEDURE — 25000128 H RX IP 250 OP 636: Performed by: RADIOLOGY

## 2020-06-11 PROCEDURE — 20610 DRAIN/INJ JOINT/BURSA W/O US: CPT | Mod: RT

## 2020-06-11 PROCEDURE — 25000125 ZZHC RX 250: Performed by: RADIOLOGY

## 2020-06-11 PROCEDURE — 25500064 ZZH RX 255 OP 636: Performed by: RADIOLOGY

## 2020-06-11 RX ORDER — LIDOCAINE HYDROCHLORIDE 10 MG/ML
2 INJECTION, SOLUTION INFILTRATION; PERINEURAL ONCE
Status: COMPLETED | OUTPATIENT
Start: 2020-06-11 | End: 2020-06-11

## 2020-06-11 RX ORDER — TRIAMCINOLONE ACETONIDE 40 MG/ML
40 INJECTION, SUSPENSION INTRA-ARTICULAR; INTRAMUSCULAR ONCE
Status: COMPLETED | OUTPATIENT
Start: 2020-06-11 | End: 2020-06-11

## 2020-06-11 RX ORDER — BUPIVACAINE HYDROCHLORIDE 5 MG/ML
3 INJECTION, SOLUTION EPIDURAL; INTRACAUDAL ONCE
Status: COMPLETED | OUTPATIENT
Start: 2020-06-11 | End: 2020-06-11

## 2020-06-11 RX ADMIN — BUPIVACAINE HYDROCHLORIDE 3 ML: 5 INJECTION, SOLUTION EPIDURAL; INTRACAUDAL at 15:40

## 2020-06-11 RX ADMIN — LIDOCAINE HYDROCHLORIDE 2 ML: 10 INJECTION, SOLUTION INFILTRATION; PERINEURAL at 15:41

## 2020-06-11 RX ADMIN — IOHEXOL 2 ML: 240 INJECTION, SOLUTION INTRATHECAL; INTRAVASCULAR; INTRAVENOUS; ORAL at 15:41

## 2020-06-11 RX ADMIN — TRIAMCINOLONE ACETONIDE 40 MG: 40 INJECTION, SUSPENSION INTRA-ARTICULAR; INTRAMUSCULAR at 15:41

## 2020-07-13 ENCOUNTER — TRANSFERRED RECORDS (OUTPATIENT)
Dept: HEALTH INFORMATION MANAGEMENT | Facility: OTHER | Age: 70
End: 2020-07-13

## 2020-07-15 ENCOUNTER — TELEPHONE (OUTPATIENT)
Dept: INTERNAL MEDICINE | Facility: OTHER | Age: 70
End: 2020-07-15

## 2020-07-15 DIAGNOSIS — Z87.891 PERSONAL HISTORY OF TOBACCO USE: Primary | ICD-10-CM

## 2020-07-15 NOTE — PATIENT INSTRUCTIONS

## 2020-07-15 NOTE — TELEPHONE ENCOUNTER
States she has a yearly lung cancer screening. She talked to medicare and they said that [facility] needs to request it. She would like to have it done before her appt on 8/13

## 2020-07-22 ENCOUNTER — TELEPHONE (OUTPATIENT)
Dept: INTERNAL MEDICINE | Facility: OTHER | Age: 70
End: 2020-07-22

## 2020-07-22 DIAGNOSIS — F41.9 ANXIETY: ICD-10-CM

## 2020-07-22 DIAGNOSIS — L65.9 HAIR LOSS: Primary | ICD-10-CM

## 2020-07-22 RX ORDER — LORAZEPAM 0.5 MG/1
0.5 TABLET ORAL
Qty: 15 TABLET | Refills: 3 | Status: SHIPPED | OUTPATIENT
Start: 2020-07-22 | End: 2021-02-01

## 2020-07-22 NOTE — TELEPHONE ENCOUNTER
Spoke with patient and she was very stressed out about her hair loss, she states that she can't sleep and by her appointment with [facility] on August 13th that she will have no hair. Would like a referral placed and also is requesting a prescription for Ativan. [facility] is out of office, will send to teamlet member  Jelena Witt LPN ....................  7/22/2020   11:13 AM

## 2020-07-22 NOTE — TELEPHONE ENCOUNTER
Patient had appt on 5/29 with Maddie for hair loss - had blood work done and did not hear results - also the hair loss is getting worse.  Patient is very distressed over this and getting no sleep.  Please call her.  Nanette Kothari on 7/22/2020 at 10:02 AM

## 2020-07-22 NOTE — TELEPHONE ENCOUNTER
Annamaria Reagan NP is here today, this is probably something that she could address in Dr Perkins's absence.  Maybe she would be able to see patient sooner than she is able to see Dr Perkins on 8/13

## 2020-07-24 ENCOUNTER — OFFICE VISIT (OUTPATIENT)
Dept: FAMILY MEDICINE | Facility: OTHER | Age: 70
End: 2020-07-24
Attending: PHYSICIAN ASSISTANT
Payer: MEDICARE

## 2020-07-24 ENCOUNTER — OFFICE VISIT (OUTPATIENT)
Dept: PODIATRY | Facility: OTHER | Age: 70
End: 2020-07-24
Attending: PODIATRIST
Payer: MEDICARE

## 2020-07-24 VITALS
TEMPERATURE: 98.2 F | HEART RATE: 90 BPM | WEIGHT: 160 LBS | BODY MASS INDEX: 28.34 KG/M2 | RESPIRATION RATE: 18 BRPM | SYSTOLIC BLOOD PRESSURE: 138 MMHG | OXYGEN SATURATION: 98 % | DIASTOLIC BLOOD PRESSURE: 72 MMHG

## 2020-07-24 VITALS
HEART RATE: 96 BPM | BODY MASS INDEX: 28.35 KG/M2 | WEIGHT: 160 LBS | OXYGEN SATURATION: 94 % | HEIGHT: 63 IN | TEMPERATURE: 98.6 F | DIASTOLIC BLOOD PRESSURE: 76 MMHG | SYSTOLIC BLOOD PRESSURE: 134 MMHG

## 2020-07-24 DIAGNOSIS — M20.41 ACQUIRED HAMMER TOE DEFORMITY OF LESSER TOE OF BOTH FEET: ICD-10-CM

## 2020-07-24 DIAGNOSIS — L84 CALLUS OF FOOT: Primary | ICD-10-CM

## 2020-07-24 DIAGNOSIS — E11.9 DIABETES MELLITUS TYPE 2, NONINSULIN DEPENDENT (H): ICD-10-CM

## 2020-07-24 DIAGNOSIS — M21.6X2 PROMINENT METATARSAL HEAD OF LEFT FOOT: ICD-10-CM

## 2020-07-24 DIAGNOSIS — E11.42 DIABETIC POLYNEUROPATHY ASSOCIATED WITH TYPE 2 DIABETES MELLITUS (H): ICD-10-CM

## 2020-07-24 DIAGNOSIS — M20.42 ACQUIRED HAMMER TOE DEFORMITY OF LESSER TOE OF BOTH FEET: ICD-10-CM

## 2020-07-24 DIAGNOSIS — M21.6X1 PROMINENT METATARSAL HEAD OF RIGHT FOOT: ICD-10-CM

## 2020-07-24 DIAGNOSIS — L65.9 LOSS OF HAIR: Primary | ICD-10-CM

## 2020-07-24 DIAGNOSIS — L60.3 ONYCHODYSTROPHY: ICD-10-CM

## 2020-07-24 DIAGNOSIS — L60.2 ONYCHOGRYPHOSIS: ICD-10-CM

## 2020-07-24 DIAGNOSIS — M20.12 HALLUX VALGUS (ACQUIRED), LEFT FOOT: ICD-10-CM

## 2020-07-24 DIAGNOSIS — S09.90XA: Primary | ICD-10-CM

## 2020-07-24 DIAGNOSIS — M20.11 HALLUX VALGUS (ACQUIRED), RIGHT FOOT: ICD-10-CM

## 2020-07-24 LAB — FERRITIN SERPL-MCNC: 79 NG/ML (ref 23.9–336.2)

## 2020-07-24 PROCEDURE — 11721 DEBRIDE NAIL 6 OR MORE: CPT | Mod: 59 | Performed by: PODIATRIST

## 2020-07-24 PROCEDURE — 99213 OFFICE O/P EST LOW 20 MIN: CPT | Performed by: PHYSICIAN ASSISTANT

## 2020-07-24 PROCEDURE — 36415 COLL VENOUS BLD VENIPUNCTURE: CPT | Mod: ZL | Performed by: NURSE PRACTITIONER

## 2020-07-24 PROCEDURE — 99203 OFFICE O/P NEW LOW 30 MIN: CPT | Mod: 25 | Performed by: PODIATRIST

## 2020-07-24 PROCEDURE — 11056 PARNG/CUTG B9 HYPRKR LES 2-4: CPT | Performed by: PODIATRIST

## 2020-07-24 PROCEDURE — G0463 HOSPITAL OUTPT CLINIC VISIT: HCPCS | Mod: 25 | Performed by: PODIATRIST

## 2020-07-24 PROCEDURE — 82728 ASSAY OF FERRITIN: CPT | Mod: ZL | Performed by: NURSE PRACTITIONER

## 2020-07-24 RX ORDER — LANOLIN ALCOHOL/MO/W.PET/CERES
CREAM (GRAM) TOPICAL DAILY
COMMUNITY
End: 2022-03-07

## 2020-07-24 ASSESSMENT — MIFFLIN-ST. JEOR: SCORE: 1209.89

## 2020-07-24 ASSESSMENT — PAIN SCALES - GENERAL: PAINLEVEL: MILD PAIN (3)

## 2020-07-24 NOTE — PROGRESS NOTES
"Chief complaint: Patient presents with:  Diabetic foot exam        History of Present Illness: This 70 year old NIDDM II female is seen at the request of No ref. provider found for evaluation and suggestions of management of bilateral foot care and high risk nail debridement. She says her toenails are difficult to reach. She says she has bunions bilaterally that are not painful although she does develop calluses on her bilateral lesser digits. She is looking to have her bilateral toenails trimmed today.    She has had increased burning, tingling, and numbness in her bilateral 3rd, 4th and 5th digits.    No further pedal complaints today.    Last HbA1C was 6.6% on 06/09/2020.      Patient does not use tobacco products.        /76 (BP Location: Left arm, Patient Position: Sitting, Cuff Size: Adult Regular)   Pulse 96   Temp 98.6  F (37  C) (Tympanic)   Ht 1.6 m (5' 3\")   Wt 72.6 kg (160 lb)   SpO2 94%   BMI 28.34 kg/m      Patient Active Problem List   Diagnosis     Type 2 diabetes mellitus with complication, without long-term current use of insulin (H)     TAYLOR (generalized anxiety disorder)     Chronic pain of right knee       Past Surgical History:   Procedure Laterality Date     BREAST BIOPSY, RT/LT  1986    x2; in [place]     CARDIAC CATH - HIM SCAN  07/10/2017    Mid RCA;  drug eluting stent     CHOLECYSTECTOMY  2008     TONSILLECTOMY, ADENOIDECTOMY, COMBINED  1955       Current Outpatient Medications   Medication     aspirin 81 MG chewable tablet     B Complex CAPS     buPROPion (WELLBUTRIN XL) 300 MG 24 hr tablet     cyanocobalamin (VITAMIN B-12) 1000 MCG tablet     DULoxetine (CYMBALTA) 30 MG capsule     LORazepam (ATIVAN) 0.5 MG tablet     losartan-hydrochlorothiazide (HYZAAR) 100-12.5 MG tablet     metFORMIN (GLUCOPHAGE) 500 MG tablet     Multiple Vitamins-Minerals (PRESERVISION AREDS 2+MULTI VIT PO)     nitroGLYcerin (NITROSTAT) 0.4 MG sublingual tablet     omega 3 1000 MG CAPS     " rosuvastatin (CRESTOR) 40 MG tablet     tretinoin (RETIN-A) 0.05 % external cream     No current facility-administered medications for this visit.         No Known Allergies    Family History   Problem Relation Age of Onset     Hypertension Father      Heart Disease Father         MI     Lung Cancer Mother         smoker     Diabetes Sister      Heart Disease Sister      Kidney Disease Sister 33        ?congenital     Hyperlipidemia Sister      No Known Problems Brother      Breast Cancer No family hx of         Cancer-breast       Social History     Socioeconomic History     Marital status: Single     Spouse name: None     Number of children: 0     Years of education: None     Highest education level: None   Occupational History     None   Social Needs     Financial resource strain: None     Food insecurity     Worry: None     Inability: None     Transportation needs     Medical: None     Non-medical: None   Tobacco Use     Smoking status: Former Smoker     Packs/day: 1.00     Years: 40.00     Pack years: 40.00     Last attempt to quit: 10/1/2007     Years since quittin.8     Smokeless tobacco: Never Used     Tobacco comment: Patient occasionally uses Nicorette gum   Substance and Sexual Activity     Alcohol use: Yes     Alcohol/week: 14.0 standard drinks     Comment: 2-3 beer daily     Drug use: No     Comment: Drug use: No     Sexual activity: Not Currently     Partners: Male   Lifestyle     Physical activity     Days per week: None     Minutes per session: None     Stress: None   Relationships     Social connections     Talks on phone: None     Gets together: None     Attends [employer] service: None     Active member of club or organization: None     Attends meetings of clubs or organizations: None     Relationship status: None     Intimate partner violence     Fear of current or ex partner: None     Emotionally abused: None     Physically abused: None     Forced sexual activity: None   Other Topics Concern      Parent/sibling w/ CABG, MI or angioplasty before 65F 55M? Not Asked   Social History Narrative    Single, no children. Lives alone, own home.   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   Enjoys gardening, reading.       ROS: 10 point ROS neg other than the symptoms noted above in the HPI.  EXAM  Constitutional: healthy, alert and no distress    Psychiatric: mentation appears normal and affect normal/bright    VASCULAR:  -Dorsalis pedis pulse +1/4 RIGHT and +2/4 LEFT   -Posterior tibial pulse +1/4 RIGHT and +2/4 LEFT   -Capillary refill time < 3 seconds to b/l hallux  NEURO:  -Protective sensation intact with SWM +9/10 RIGHT and +10/10 LEFT on 07/24/2020  -Light touch sensation intact to b/l plantar forefoot  DERM:  -Hyperkeratotic lesion on LEFT plantar 4th metatarsal head, deeply seeded core  -Hyperkeratotic lesion to LEFT plantar central heel   -Hyperkeratotic lesion to bilateral medial hallux IPJ  -Hyperkeratotic lesion to RIGHT 2nd metatarsal head, diffuse and superficial   -Skin temperature, texture and turgor WNL b/l  -Toenails elongated, thickened, dystrophic and discolored x 10  ---Lesser digit toenails curving over the distal lesser digits  MSK:  -Lateral deviation of hallux with medial deviation of 1st metatarsal, bilaterally   -Prominent bony prominence to dorsal and medial 1st metatarsal head, bilaterally   -DORSIFLEXION contracture to MTPJ 2-5 b/l with flexion contracture to PIPJ of digits 2-5 b/l   -Prominent metatarsal heads 1-5 bilaterally     -Pain on palpation to LEFT plantar 4th metatarsal head  -Muscle strength of ankles +5/5 for dorsiflexion, plantarflexion, ABDUction and ADDuction b/l    ============================================================    ASSESSMENT:  (L84) Callus of foot  (primary encounter diagnosis)    (L60.3) Onychodystrophy    (L60.2) Onychogryphosis    (E11.9) Diabetes mellitus type 2, noninsulin dependent (H)    (E11.42)  Diabetic polyneuropathy associated with type 2 diabetes mellitus (H)    (M20.11) Hallux valgus (acquired), right foot    (M20.12) Hallux valgus (acquired), left foot    (M20.41,  M20.42) Acquired hammer toe deformity of lesser toe of both feet    (M21.6X1) Prominent metatarsal head of right foot    (M21.6X2) Prominent metatarsal head of left foot      PLAN:  -Patient evaluated and examined. Treatment options discussed with no educational barriers noted.  -Nails debrided x 10 without incident  -Callus pared x 3 to LEFT plantar 4th metatarsal head, LEFT plantar heel, and bilateral medial hallux IPJ without incident  ---Patient reminded that the callus will likely return due to the underlying, prominent bone causing the callus while the patient is walking.    Calluses on the bottom surface of the foot will continue to come back due to the pressure from the bone on the bottom of your foot. Below are some tips for keeping the callus(es) trimmed which often decreases the pain on the bottom of your foot.    -Callus care:  ---Do a daily epsom salt soak in lukewarm water for 20 minutes.   ---After the soak, the apply a moisturizing cream (ammonium lactate) to the callus and let it soak in for about ten minutes  ---Next, take an keena board or nail file to or pumice stone the callus. This should be done daily (minimally lotion and callus paring) to keep the callus well pared.    -Diabetic Foot Education provided. This included checking the feet daily looking for new new blisters or wounds, wearing shoes at all times when walking including around the house, and avoiding lotion application between the toes. Any sign of infection in the foot warrant's the patient presenting to the ED as soon as possible.    -Discussed DM shoes and inserts -- patient is declining this option at this time. She says she will only wear very comfortable shoes. Discussed the purpose and advantage of DM shoes and how they can be used to offload the  plantar metatarsal head calluses. She would still like to wait on this and she is not interested in orthotics at this time.    -Patient in agreement with the above treatment plan and all of patient's questions were answered.      RTC as needed per patient request for diabetic foot exam and high risk nail debridement and callus paring -- offered routine care, but patient would like to call when she thinks her nails are getting long        Starla Cruz DPM

## 2020-07-24 NOTE — PATIENT INSTRUCTIONS
Calluses on the bottom surface of the foot will continue to come back due to the pressure from the bone on the bottom of your foot. Below are some tips for keeping the callus(es) trimmed which often decreases the pain on the bottom of your foot.    -Callus care:  ---Do a daily epsom salt soak in lukewarm water for 20 minutes.   ---After the soak, the apply a moisturizing cream (ammonium lactate) to the callus and let it soak in for about ten minutes  ---Next, take an keena board or nail file to or pumice stone the callus. This should be done daily (minimally lotion and callus paring) to keep the callus well pared.    -Ammonium Lactate will be ordered through your pharmacy today  -Orthotics / shoe inserts can be modified to take the pressure off the calluses which can decrease the rate a which they develop. You will be called within two weeks to schedule this appointment, or you can call the number on the card to schedule the appointment.      Thank you for allowing  and our Podiatry team to participate in your care. Please call our office at [phone] with scheduling questions or with any other questions or concerns.

## 2020-07-24 NOTE — PROGRESS NOTES
HPI:    Jennyfer Elizabeth is a 70 year old female who presents to clinic today for evaluation of scalp wound  Onset 9:30 PM, 15 hours PTA  She was getting something out of the freezer at the grocery store and a pizza box fell on her head. She had a lot of bleeding when she was in the store last evening.   Biopsy of scalp yesterday with a couple sutures    She is otherwise feeling well, no fever, no ongoing bleeding, no headache, dizziness, lightheadedness      Past Medical History:   Diagnosis Date     Benign essential hypertension 3/3/2018     Cataract      Chronic pain of right knee 4/11/2018     Diabetic eye exam (H) 06/07/2018     TAYLOR (generalized anxiety disorder) 3/3/2018     History of MI (myocardial infarction) 07/2017    follows yearly with [facility]     Macular degeneration      Major depressive disorder, single episode 1995     Obstructive sleep apnea 2005    BIPAP     Osteopenia 2007    Lumbar spine     Pure hypercholesterolemia 2001     Type 2 diabetes mellitus with complication, without long-term current use of insulin (H) 03/03/2018       Current Outpatient Medications   Medication Sig Dispense Refill     B Complex CAPS Take 1 capsule by mouth daily       buPROPion (WELLBUTRIN XL) 300 MG 24 hr tablet TAKE 1 TABLET BY MOUTH DAILY 90 tablet 0     cyanocobalamin (VITAMIN B-12) 1000 MCG tablet Take by mouth daily       DULoxetine (CYMBALTA) 30 MG capsule TAKE 2 CAPSULES BY MOUTH DAILY 180 capsule 1     LORazepam (ATIVAN) 0.5 MG tablet Take 1 tablet (0.5 mg) by mouth nightly as needed for anxiety 15 tablet 3     losartan-hydrochlorothiazide (HYZAAR) 100-12.5 MG tablet Take 1 tablet by mouth daily 90 tablet 1     metFORMIN (GLUCOPHAGE) 500 MG tablet TAKE 1/2 TABLET BY MOUTH TWICE DAILY WITH MEALS 90 tablet 2     Multiple Vitamins-Minerals (PRESERVISION AREDS 2+MULTI VIT PO) Take 1 tablet by mouth 2 times daily       rosuvastatin (CRESTOR) 40 MG tablet Take 1 tablet (40 mg) by mouth daily 90 tablet 4     tretinoin  (RETIN-A) 0.05 % external cream Apply 1 g topically At Bedtime 45 g 1     aspirin 81 MG chewable tablet Take 81 mg by mouth daily       nitroGLYcerin (NITROSTAT) 0.4 MG sublingual tablet Place 0.4 mg under the tongue Place 1 tablet under the tongue every 5 mon if needed for chest pain may repeat X 3 doses.       omega 3 1000 MG CAPS Take 1 g by mouth daily 90 capsule        No Known Allergies    ROS:  General: feels well, no fever  Scalp wound - recent biopsy with sutures and injury resulting in bleeding.     EXAM:  Vitals:    07/24/20 1212   BP: 138/72   BP Location: Left arm   Patient Position: Sitting   Cuff Size: Adult Regular   Pulse: 90   Resp: 18   Temp: 98.2  F (36.8  C)   TempSrc: Tympanic   SpO2: 98%   Weight: 72.6 kg (160 lb)     General appearance: well appearing female, in no acute distress  Head: normocephalic, atraumatic  Scalp: dried blood over top of scalp, 2 separate areas were biopsied. Two sutures in tact. No active bleeding  Psychological: normal affect, alert and pleasant      ASSESSMENT AND PLAN:    1. Closed wound of head, initial encounter      Dried blood noted on scalp in area of recent biopsy. Scalp was cleaned gently with a q-tip. No active bleeding noted. No hematoma or new laceration found. I think the pizza box must have hit her in the area of her sutures and it caused the bleeding. Two sutures from recent scalp biopsy are in place. No headache or sign of concerning head injury today. Encouraged her to follow wound care directions as provided when she had her biopsy.   Return to clinic if symptoms persist or worsen      April Woodall PA-C on 7/27/2020 at 10:51 AM        April Woodall PA-C

## 2020-07-24 NOTE — NURSING NOTE
"Chief Complaint   Patient presents with     Callouses       Initial /76 (BP Location: Left arm, Patient Position: Sitting, Cuff Size: Adult Regular)   Pulse 96   Temp 98.6  F (37  C) (Tympanic)   Ht 1.6 m (5' 3\")   Wt 72.6 kg (160 lb)   SpO2 94%   BMI 28.34 kg/m   Estimated body mass index is 28.34 kg/m  as calculated from the following:    Height as of this encounter: 1.6 m (5' 3\").    Weight as of this encounter: 72.6 kg (160 lb).  Medication Reconciliation: complete  Maru Kinney LPN  "

## 2020-07-24 NOTE — NURSING NOTE
Patient has stitches on top of her head from a dermatolgy appointment in [place] yesterday. Was out shopping and a pizza box fell on top of her head and made the sutures bleed again. She was told to get it checked out.  Betty Kong CMA([facility])..................7/24/2020   12:08 PM

## 2020-08-04 ENCOUNTER — HOSPITAL ENCOUNTER (EMERGENCY)
Facility: OTHER | Age: 70
Discharge: HOME | End: 2020-08-04
Payer: MEDICARE

## 2020-08-04 VITALS
WEIGHT: 160 LBS | DIASTOLIC BLOOD PRESSURE: 77 MMHG | HEIGHT: 63 IN | OXYGEN SATURATION: 95 % | SYSTOLIC BLOOD PRESSURE: 150 MMHG | BODY MASS INDEX: 28.35 KG/M2 | RESPIRATION RATE: 18 BRPM

## 2020-08-04 ASSESSMENT — MIFFLIN-ST. JEOR: SCORE: 1209.89

## 2020-08-04 NOTE — PROGRESS NOTES
2 stitches removed from the top of patient head without difficulty.  No bleeding from sites.  No sign of infection.  Shira Patel RN on 8/4/2020 at 3:39 PM

## 2020-08-04 NOTE — ED TRIAGE NOTES
Patient is here to have 2 stitches in her head removed.  They were place 2 years ago.    Shira Patel RN on 8/4/2020 at 3:34 PM

## 2020-08-13 ENCOUNTER — OFFICE VISIT (OUTPATIENT)
Dept: INTERNAL MEDICINE | Facility: OTHER | Age: 70
End: 2020-08-13
Attending: INTERNAL MEDICINE
Payer: MEDICARE

## 2020-08-13 VITALS
WEIGHT: 161 LBS | BODY MASS INDEX: 28.53 KG/M2 | RESPIRATION RATE: 16 BRPM | OXYGEN SATURATION: 95 % | DIASTOLIC BLOOD PRESSURE: 82 MMHG | SYSTOLIC BLOOD PRESSURE: 132 MMHG | HEIGHT: 63 IN | HEART RATE: 98 BPM | TEMPERATURE: 97.9 F

## 2020-08-13 DIAGNOSIS — Z87.891 PERSONAL HISTORY OF TOBACCO USE: ICD-10-CM

## 2020-08-13 DIAGNOSIS — F32.A ANXIETY AND DEPRESSION: ICD-10-CM

## 2020-08-13 DIAGNOSIS — M25.551 HIP PAIN, RIGHT: ICD-10-CM

## 2020-08-13 DIAGNOSIS — E11.9 TYPE 2 DIABETES MELLITUS WITHOUT COMPLICATION, WITHOUT LONG-TERM CURRENT USE OF INSULIN (H): ICD-10-CM

## 2020-08-13 DIAGNOSIS — E78.2 MIXED HYPERLIPIDEMIA: ICD-10-CM

## 2020-08-13 DIAGNOSIS — G89.29 CHRONIC RIGHT-SIDED BACK PAIN, UNSPECIFIED BACK LOCATION: ICD-10-CM

## 2020-08-13 DIAGNOSIS — M54.9 CHRONIC RIGHT-SIDED BACK PAIN, UNSPECIFIED BACK LOCATION: ICD-10-CM

## 2020-08-13 DIAGNOSIS — Z00.00 ENCOUNTER FOR MEDICARE ANNUAL WELLNESS EXAM: Primary | ICD-10-CM

## 2020-08-13 DIAGNOSIS — Z12.11 SCREENING FOR COLON CANCER: ICD-10-CM

## 2020-08-13 DIAGNOSIS — F41.9 ANXIETY AND DEPRESSION: ICD-10-CM

## 2020-08-13 DIAGNOSIS — I10 ESSENTIAL HYPERTENSION: ICD-10-CM

## 2020-08-13 LAB
CREAT UR-MCNC: 153 MG/DL
MICROALBUMIN UR-MCNC: 14 MG/L
MICROALBUMIN/CREAT UR: 9.41 MG/G CR (ref 0–25)

## 2020-08-13 PROCEDURE — G0296 VISIT TO DETERM LDCT ELIG: HCPCS | Performed by: INTERNAL MEDICINE

## 2020-08-13 PROCEDURE — 82043 UR ALBUMIN QUANTITATIVE: CPT | Mod: ZL | Performed by: INTERNAL MEDICINE

## 2020-08-13 PROCEDURE — G0463 HOSPITAL OUTPT CLINIC VISIT: HCPCS | Mod: 25

## 2020-08-13 PROCEDURE — G0463 HOSPITAL OUTPT CLINIC VISIT: HCPCS

## 2020-08-13 PROCEDURE — 99214 OFFICE O/P EST MOD 30 MIN: CPT | Mod: 25 | Performed by: INTERNAL MEDICINE

## 2020-08-13 PROCEDURE — G0439 PPPS, SUBSEQ VISIT: HCPCS | Performed by: INTERNAL MEDICINE

## 2020-08-13 RX ORDER — DULOXETIN HYDROCHLORIDE 30 MG/1
CAPSULE, DELAYED RELEASE ORAL
Qty: 180 CAPSULE | Refills: 3 | Status: SHIPPED | OUTPATIENT
Start: 2020-08-13 | End: 2021-07-28

## 2020-08-13 RX ORDER — LOSARTAN POTASSIUM AND HYDROCHLOROTHIAZIDE 12.5; 1 MG/1; MG/1
1 TABLET ORAL DAILY
Qty: 90 TABLET | Refills: 3 | Status: SHIPPED | OUTPATIENT
Start: 2020-08-13 | End: 2021-09-03

## 2020-08-13 RX ORDER — ROSUVASTATIN CALCIUM 40 MG/1
40 TABLET, COATED ORAL DAILY
Qty: 90 TABLET | Refills: 4 | Status: SHIPPED | OUTPATIENT
Start: 2020-08-13 | End: 2021-09-03

## 2020-08-13 RX ORDER — ASCORBIC ACID/VITAMIN E/BIOTIN 7.5MG-125
2 TABLET,CHEWABLE ORAL DAILY
COMMUNITY
End: 2021-11-10

## 2020-08-13 RX ORDER — BUPROPION HYDROCHLORIDE 300 MG/1
300 TABLET ORAL DAILY
Qty: 90 TABLET | Refills: 4 | Status: SHIPPED | OUTPATIENT
Start: 2020-08-13 | End: 2021-09-03

## 2020-08-13 ASSESSMENT — PATIENT HEALTH QUESTIONNAIRE - PHQ9: SUM OF ALL RESPONSES TO PHQ QUESTIONS 1-9: 7

## 2020-08-13 ASSESSMENT — PAIN SCALES - GENERAL: PAINLEVEL: NO PAIN (0)

## 2020-08-13 ASSESSMENT — MIFFLIN-ST. JEOR: SCORE: 1206.48

## 2020-08-13 NOTE — PATIENT INSTRUCTIONS
Patient Education   Personalized Prevention Plan  You are due for the preventive services outlined below.  Your care team is available to assist you in scheduling these services.  If you have already completed any of these items, please share that information with your care team to update in your medical record.  Health Maintenance Due   Topic Date Due     Depression Action Plan  1950     Colorectal Cancer Screening  02/10/1959     Hepatitis B Vaccine (1 of 3 - Risk 3-dose series) 02/10/1968     Annual Wellness Visit  02/10/2014     Kidney Microalbumin Urine Test  04/03/2020     Lung Cancer Screening (CT Scan)  05/10/2020     Eye Exam  06/25/2020       Depression and Suicide in Older Adults    Nearly 2 million older Americans have some type of depression. Sadly, some of them even take their own lives. Yet depression among older adults is often ignored. Learn the warning signs. You may help spare a loved one needless pain. You may also save a life.  What is depression?  Depression is a serious illness that affects the way you think and feel. It is not a normal part of aging, nor is it a sign of weakness, a character flaw, or something you can snap out of. Most people with depression need treatment to get better. The most common symptom is a feeling of deep sadness. People who are depressed also may seem tired and listless. And nothing seems to give them pleasure. It s normal to grieve or be sad sometimes. But sadness lessens or passes with time. Depression rarely goes away or improves on its own. Other symptoms of depression are:    Sleeping more or less than normal    Eating more or less than normal    Having headaches, stomachaches, or other pains that don t go away    Feeling nervous,  empty,  or worthless    Crying a great deal    Thinking or talking about suicide or death    Feeling confused or forgetful  What causes it?  The causes of depression aren t fully known. But, it is thought to result from a  complex interaction of biochemistry, genetics, environmental factors, and personality. Certain chemicals in the brain play a role. Depression does run in families. And life stresses can also trigger depression in some people. Older adults often face many stressors, such as death of friends or a spouse, health problems, and financial concerns.  How you can help  Often, depressed people may not want to ask for help. When they do, they may be ignored. Or, they may receive the wrong treatment. You can help by showing parents and older friends love and support. If they seem depressed, help them find the right treatment. Talk to your doctor. Or contact a local mental health center, social service agency, or hospital. With modern treatment, no one has to suffer from depression.  If your older friend or family member agrees, you can be an advocate for him or her in the healthcare setting. Many times, older adults have other chronic illnesses such as diabetes, heart disease, or cancer that can cause symptoms of depression. Medicine side effects can also contribute to certain behaviors and feelings. It is important that the older adult's healthcare provider listens and sorts out the causes of any symptoms of depression and makes referrals to mental health specialists when needed. Untreated depression can result in misdiagnosis, including brain disorders such as dementia and Alzheimer's. If the health professional does not take the issue of depression seriously, ask your family member or friend to consider finding another provider.  Resources    National Suicide Prevention Lifeline (crisis hotline)410-745-RBNO (8242)    National [place] of Mental Klgzoj216-119-8829mto.[facility].nih.gov    National [place] on Mental Gysmuzb701-372-3753fyt.[place].org    Mental Health Lhrfchy470-254-4590ypx.[facility].org    National Suicide Rsgjrjw450-687-1467 (800-SUICIDE)   Date Last Reviewed: 2/1/2017 2000-2019 [employer] [address]  [place] [address]. All rights reserved. This information is not intended as a substitute for professional medical care. Always follow your healthcare professional's instructions.           Lung Cancer Screening   Frequently Asked Questions  If you are at high-risk for lung cancer, getting screened with low-dose computed tomography (LDCT) every year can help save your life. This handout offers answers to some of the most common questions about lung cancer screening. If you have other questions, please call [phone] ([phone]).     What is it?  Lung cancer screening uses special X-ray technology to create an image of your lung tissue. The exam is quick and easy and takes less than 10 seconds. We don t give you any medicine or use any needles. You can eat before and after the exam. You don t need to change your clothes as long as the clothing on your chest doesn t contain metal. But, you do need to be able to hold your breath for at least 6 seconds during the exam.    What is the goal of lung cancer screening?  The goal of lung cancer screening is to save lives. Many times, lung cancer is not found until a person starts having physical symptoms. Lung cancer screening can help detect lung cancer in the earliest stages when it may be easier to treat.    Who should be screened for lung cancer?  We suggest lung cancer screening for anyone who is at high-risk for lung cancer. You are in the high-risk group if you:      are between the ages of 55 and 79, and    have smoked at least 1 pack of cigarettes a day for 30 or more years, and    still smoke or have quit within the past 15 years.    However, if you have a new cough or shortness of breath, you should talk to your doctor before being screened.    Some national lung health advocacy groups also recommend screening for people ages 50 to 79 who have smoked an average of 1 pack of cigarettes a day for 20 years. They must also have at least  1 other risk factor for lung cancer, not including exposure to secondhand smoke. Other risk factors are having had cancer in the past, emphysema, pulmonary fibrosis, COPD, a family history of lung cancer, or exposure to certain materials such as arsenic, asbestos, beryllium, cadmium, chromium, diesel fumes, nickel, radon or silica. Your care team can help you know if you have one of these risk factors.     Why does it matter if I have symptoms?  Certain symptoms can be a sign that you have a condition in your lungs that should be checked and treated by your doctor. These symptoms include fever, chest pain, a new or changing cough, shortness of breath that you have never felt before, coughing up blood or unexplained weight loss. Having any of these symptoms can greatly affect the results of lung cancer screening.       Should all smokers get an LDCT lung cancer screening exam?  It depends. Lung cancer screening is for a very specific group of men and women who have a history of heavy smoking over a long period of time (see  Who should be screened for lung cancer  above).  I am in the high-risk group, but have been diagnosed with cancer in the past. Is LDCT lung cancer screening right for me?  In some cases, you should not have LDCT lung screening, such as when your doctor is already following your cancer with CT scan studies. Your doctor will help you decide if LDCT lung screening is right for you.  Do I need to have a screening exam every year?  Yes. If you are in the high-risk group described earlier, you should get an LDCT lung cancer screening exam every year until you are 79, or are no longer willing or able to undergo screening and possible procedures to diagnose and treat lung cancer.  How effective is LDCT at preventing death from lung cancer?  Studies have shown that LDCT lung cancer screening can lower the risk of death from lung cancer by 20 percent in people who are at high-risk.  What are the  risks?  There are some risks and limitations of LDCT lung cancer screening. We want to make sure you understand the risks and benefits, so please let us know if you have any questions. Your doctor may want to talk with you more about these risks.    Radiation exposure: As with any exam that uses radiation, there is a very small increased risk of cancer. The amount of radiation in LDCT is small--about the same amount a person would get from a mammogram. Your doctor orders the exam when he or she feels the potential benefits outweigh the risks.    False negatives: No test is perfect, including LDCT. It is possible that you may have a medical condition, including lung cancer, that is not found during your exam. This is called a false negative result.    False positives and more testing: LDCT very often finds something in the lung that could be cancer, but in fact is not. This is called a false positive result. False positive tests often cause anxiety. To make sure these findings are not cancer, you may need to have more tests. These tests will be done only if you give us permission. Sometimes patients need a treatment that can have side effects, such as a biopsy. For more information on false positives, see  What can I expect from the results?     Findings not related to lung cancer: Your LDCT exam also takes pictures of areas of your body next to your lungs. In a very small number of cases, the CT scan will show an abnormal finding in one of these areas, such as your kidneys, adrenal glands, liver or thyroid. This finding may not be serious, but you may need more tests. Your doctor can help you decide what other tests you may need, if any.  What can I expect from the results?  About 1 out of 4 LDCT exams will find something that may need more tests. Most of the time, these findings are lung nodules. Lung nodules are very small collections of tissue in the lung. These nodules are very common, and the vast  majority--more than 97 percent--are not cancer (benign). Most are normal lymph nodes or small areas of scarring from past infections.  But, if a small lung nodule is found to be cancer, the cancer can be cured more than 90 percent of the time. To know if the nodule is cancer, we may need to get more images before your next yearly screening exam. If the nodule has suspicious features (for example, it is large, has an odd shape or grows over time), we will refer you to a specialist for further testing.  Will my doctor also get the results?  Yes. Your doctor will get a copy of your results.  Is it okay to keep smoking now that there s a cancer screening exam?  No. Tobacco is one of the strongest cancer-causing agents. It causes not only lung cancer, but other cancers and cardiovascular (heart) diseases as well. The damage caused by smoking builds over time. This means that the longer you smoke, the higher your risk of disease. While it is never too late to quit, the sooner you quit, the better.  Where can I find help to quit smoking?  The best way to prevent lung cancer is to stop smoking. If you have already quit smoking, congratulations and keep it up! For help on quitting smoking, please call [facility] at 3-482-389-DQIN (5625) or the American [employer] Society at 1-193.723.4215 to find local resources near you.  One-on-one health coaching:  If you d prefer to work individually with a health care provider on tobacco cessation, we offer:      Medication Therapy Management:  Our specially trained pharmacists work closely with you and your doctor to help you quit smoking.  Call [phone] or [phone] (toll free).     Can Do: Health coaching offered by [facility].  [email or web address]-[email or web address]

## 2020-08-13 NOTE — PROGRESS NOTES
"Medicare Wellness Visit   Ms. Elizabeth is a 70 year old female who presents today who presents for Preventive Visit.    Are you in the first 12 months of your Medicare coverage?  No    Health Risk Assessment     Do you feel safe in your environment? Yes    Physical Health:    In general, how would you rate your overall physical health? good    Outside of work, how many days during the week do you exercise? none, unable to due to pain right now    Outside of work, approximately how many minutes a day do you exercise?not applicable  If you drink alcohol do you typically have >3 drinks per day or >7 drinks per week? Yes - AUDIT SCORE:       Do you usually eat at least 4 servings of fruit and vegetables a day, include whole grains & fiber and avoid regularly eating high fat or \"junk\" foods? NOT REALLY    Do you have any problems taking medications regularly?  No    Do you have any side effects from medications? none    Needs assistance for the following daily activities: no assistance needed    Which of the following safety concerns are present in your home?  lack of grab bars in the bathroom     Hearing impairment: No    In the past 6 months, have you been bothered by leaking of urine? no      Screening     Fall risk  Fallen 2 or more times in the past year?: No  Any fall with injury in the past year?: No    Cognitive Screening   1) Repeat 3 items (Leader, Season, Table)    2) Clock draw: NORMAL  3) 3 item recall: Recalls 3 objects  Results: 3 items recalled: COGNITIVE IMPAIRMENT LESS LIKELY    Mini-CogTM Copyright JO Mendoza. Licensed by the author for use in [facility]; reprinted with permission (claudy@.[facility]). All rights reserved.      Do you have sleep apnea, excessive snoring or daytime drowsiness?: yes, has a CPAP    Breast cancer screening: scheduled 8/19/20    Regular dental visits: about a year ago    Eye exam with in 2 years: due now, will schedule.      End of Life Planning     Have you ever done " Advance Care Planning? (For example, a Health Directive, POLST, or a discussion with a medical provider or your loved ones about your wishes): No, advance care planning information given to patient to review.  Advanced care planning was discussed at today's visit.      End of Life Planning:  Patient currently has an advanced directive: No.  I have verified the patient's ablity to prepare an advanced directive/make health care decisions.  Literature was provided to assist patient in preparing an advanced directive.        Medical Record Update     Reviewed and updated as needed this visit by clinical staff  Tobacco  Allergies  Meds  Med Hx  Surg Hx  Fam Hx  Soc Hx      Reviewed and updated as needed this visit by Provider           Current providers sharing in care for this patient include:   Patient Care Team:  Lili Perkins DO as PCP - General (Internal Medicine)  Lili Perkins DO as Assigned PCP     Subjective:   Patient has concerns today regarding ongoing right-sided back pain and right hip pain.  She has had prior MRI of the hip and back which showed facet degeneration with no nerve impingement and tendinosis of the right hip.  She has undergone injections, physical therapy, evaluation by orthopedics none of which have been beneficial.  She is interested in additional evaluation.    She continues on her chronic medications.  She is on duloxetine and Wellbutrin for her anxiety and depression.  She denies any side effects.  She also uses lorazepam as needed.  She is on losartan/hydrochlorothiazide for her blood pressure.  Her blood pressure overall has been controlled.  She continues on low-dose metformin for type 2 diabetes mellitus.  Her most recent A1c was controlled.  She also continues on rosuvastatin for hyperlipidemia with no obvious side effects.    She is due for lung cancer screening and is interested in pursuing this.  She is also due for colon cancer screening and mammogram which she is  "already scheduled for.      Review of Systems   GEN: -fevers/-chills/-night sweats/-wt change  LUNGS: -sob/-cough  CARDIOVASCULAR: -cp/-palpitations  ABD: -pain/-change in bowels/-bloody stools  : -change in bladder  HEMATOLOGIC/LYMPHATIC: -swollen nodes  SKIN: -rashes/-lesions  MSK/RHEUM: +joint pain/-joint swelling  NEURO: -weakness/-parasthesias  PSYCH: +depression/+anxiety            OBJECTIVE:     Vitals:    08/13/20 1044   BP: 132/82   BP Location: Right arm   Patient Position: Sitting   Cuff Size: Adult Regular   Pulse: 98   Resp: 16   Temp: 97.9  F (36.6  C)   TempSrc: Tympanic   SpO2: 95%   Weight: 73 kg (161 lb)   Height: 1.588 m (5' 2.5\")        Estimated body mass index is 28.98 kg/m  as calculated from the following:    Height as of this encounter: 1.588 m (5' 2.5\").    Weight as of this encounter: 73 kg (161 lb).     Physical Exam  GEN: Vitals reviewed. Healthy appearing. Patient is in no acute distress. Cooperative with exam.  HEENT: Normocephalic atraumatic.  Eyes grossly normal to inspection.  No discharge or erythema, or obvious scleral/conjunctival abnormalities.   CV: Heart regular in rate and rhythm with no murmur.    LUNGS: No audible wheeze, cough, or visible cyanosis.  No visible retractions or increased work of breathing.  Lungs clear to auscultation bilaterally.    ABD:  Nondistended  SKIN: Warm and dry to touch.  Visible skin clear. No significant rash, abnormal pigmentation or lesions.  EXT: No clubbing or cyanosis.  No peripheral edema.    NEURO: Alert and oriented to person, place, and time.  Cranial nerves II-XII grossly intact with no focal or lateralizing deficits.  Muscle tone normal.  Gait normal. No tremor.   MSK: ROM of upper and lower L ext symmetric and full however limited with right lower extremity due to pain in the lateral hip/buttock.    PSYCH: Mood is fair but frustrated.  Mentation appears normal, affect normal/bright, judgement and insight intact, normal speech and " appearance well-groomed.      Labs reviewed in EPIC    ASSESSMENT / PLAN:     Encounter for Medicare annual wellness exam    Chronic right-sided back pain, unspecified back location  -Given patient's ongoing issues we will refer her to physical medicine rehab to see if they can give any better insight as to the underlying etiology of her pain.  She is to call if she has any new or changing symptoms.  - PHYSIATRY REFERRAL    Hip pain, right  See above  - PHYSIATRY REFERRAL    Anxiety and depression  Stable, continue current meds  - buPROPion (WELLBUTRIN XL) 300 MG 24 hr tablet  Dispense: 90 tablet; Refill: 4  - DULoxetine (CYMBALTA) 30 MG capsule  Dispense: 180 capsule; Refill: 3    Essential hypertension  - Blood pressure today of 132/82   is at the goal of <140/90 with no exacerbation.  - Continue current regimen.  Instructed to check BP at home.  - Cautioned patient to monitor with antibiotics, herbals and any OTC medications  - electrolytes and renal function done recently and stable  - losartan-hydrochlorothiazide (HYZAAR) 100-12.5 MG tablet  Dispense: 90 tablet; Refill: 3    Mixed hyperlipidemia  - Stable.  Continue current regimen.    - rosuvastatin (CRESTOR) 40 MG tablet  Dispense: 90 tablet; Refill: 4    Personal history of tobacco use  - patient to pursue CT screening  - Prof Fee: Shared Decision Making Visit for Lung Cancer Screening    Screening for colon cancer  - GASTROENTEROLOGY ADULT REF PROCEDURE ONLY    Type 2 diabetes mellitus without complication, without long-term current use of insulin (H)  - stable, continue current med  - Albumin Random Urine Quantitative with Creat Ratio        Preventative Care Guidelines and Health Counseling     COUNSELING:  Reviewed preventive health counseling  Special attention given to:   Preventative screening  Regular exercise  Healthy diet/nutrition  Immunizations   Reviewed preventive health counseling, as reflected in patient instructions    132/82       Body mass  index is 28.98 kg/m . Weight management plan: Discussed healthy diet and exercise guidelines      Appropriate preventive services were discussed with this patient, including applicable screening as appropriate for cardiovascular disease, diabetes, osteopenia/osteoporosis, and glaucoma.  As appropriate for age/gender, discussed screening for colorectal cancer, prostate cancer, breast cancer, and cervical cancer. Checklist reviewing preventive services available has been given to the patient.    Reviewed patients plan of care and provided an AVS. The Basic Care Plan (routine screening as documented in Health Maintenance) for patient meets the Care Plan requirement. This Care Plan has been established and reviewed with the Patient and any available family members.    Counseling Resources:  ATP IV Guidelines  Pooled Cohorts Equation Calculator  Breast Cancer Risk Calculator  FRAX Risk Assessment  ICSI Preventive Guidelines  Dietary Guidelines for Americans, 2010  [employer] MyPlate  ASA Prophylaxis  Lung CA Screening    Lili Perkins DO  8/13/2020  10:50 AM  [facility] AND [facility]      Lung Cancer Screening Shared Decision Making Visit     Jennyfer Elizabeth is eligible for lung cancer screening on the basis of the information provided in my signed lung cancer screening order.     I have discussed with patient the risks and benefits of screening for lung cancer with low-dose CT.     The risks include:  radiation exposure: one low dose chest CT has as much ionizing radiation as about 15 chest x-rays or 6 months of background radiation living in [place]    false positives: 96% of positive findings/nodules are NOT cancer, but some might still require additional diagnostic evaluation, including biopsy  over-diagnosis: some slow growing cancers that might never have been clinically significant will be detected and treated unnecessarily     The benefit of early detection of lung cancer is contingent upon adherence to  annual screening or more frequent follow up if indicated.     Furthermore, reaping the benefits of screening requires Jennyfer Elizabeth to be willing and physically able to undergo diagnostic procedures, if indicated. Although no specific guide is available for determining severity of comorbidities, it is reasonable to withhold screening in patients who have greater mortality risk from other diseases.     We did discuss that the only way to prevent lung cancer is to not smoke. Smoking cessation assistance was not offered.    I did not offer risk estimation using a calculator such as this one:    ShouldIScreen

## 2020-08-13 NOTE — PROGRESS NOTES
The patient's PHQ-9 score is consistent with mild depression. She was provided with information regarding depression and was advised to schedule a follow up appointment in 6-12 months weeks to further address this issue.

## 2020-08-13 NOTE — NURSING NOTE
Patient presents to clinic today for Medicare wellness visit.  Previous A1C is at goal of <8  Lab Results   Component Value Date    A1C 6.6 06/09/2020    A1C 6.9 08/29/2019    A1C 6.8 04/03/2019    A1C 6.9 08/30/2018    A1C 6.5 02/28/2018     Urine microalbumin:creatine: ordered  Foot exam 7/24/20  Eye exam 6/25/19, due now, will call.    Tobacco User no  Patient is on a daily aspirin  Patient is on a Statin.  Blood pressure today of:     BP Readings from Last 1 Encounters:   07/24/20 134/76      is at the goal of <139/89 for diabetics.    Medication reconciliation completed.  Betty Kong CMA(AAMA)..................8/13/2020   10:42 AM

## 2020-08-19 ENCOUNTER — HOSPITAL ENCOUNTER (OUTPATIENT)
Dept: CT IMAGING | Facility: OTHER | Age: 70
End: 2020-08-19
Attending: INTERNAL MEDICINE
Payer: MEDICARE

## 2020-08-19 ENCOUNTER — HOSPITAL ENCOUNTER (OUTPATIENT)
Dept: MAMMOGRAPHY | Facility: OTHER | Age: 70
End: 2020-08-19
Attending: INTERNAL MEDICINE
Payer: MEDICARE

## 2020-08-19 DIAGNOSIS — Z12.31 VISIT FOR SCREENING MAMMOGRAM: ICD-10-CM

## 2020-08-19 DIAGNOSIS — Z87.891 PERSONAL HISTORY OF TOBACCO USE: ICD-10-CM

## 2020-08-19 PROCEDURE — 77067 SCR MAMMO BI INCL CAD: CPT

## 2020-08-19 PROCEDURE — G0297 LDCT FOR LUNG CA SCREEN: HCPCS

## 2020-08-24 ENCOUNTER — TRANSFERRED RECORDS (OUTPATIENT)
Dept: HEALTH INFORMATION MANAGEMENT | Facility: OTHER | Age: 70
End: 2020-08-24

## 2020-09-09 ENCOUNTER — OFFICE VISIT (OUTPATIENT)
Dept: ORTHOPEDICS | Facility: OTHER | Age: 70
End: 2020-09-09
Attending: ORTHOPAEDIC SURGERY
Payer: MEDICARE

## 2020-09-09 DIAGNOSIS — M79.604 RIGHT LEG PAIN: Primary | ICD-10-CM

## 2020-09-09 DIAGNOSIS — E11.9 TYPE 2 DIABETES MELLITUS WITHOUT COMPLICATION, WITHOUT LONG-TERM CURRENT USE OF INSULIN (H): ICD-10-CM

## 2020-09-09 PROCEDURE — G0463 HOSPITAL OUTPT CLINIC VISIT: HCPCS

## 2020-09-09 PROCEDURE — 99212 OFFICE O/P EST SF 10 MIN: CPT | Performed by: ORTHOPAEDIC SURGERY

## 2020-09-09 NOTE — PROGRESS NOTES
Patient is here for follow up on her right hip.  Rylie Emery LPN .....................9/9/2020 1:30 PM

## 2020-09-10 ENCOUNTER — TELEPHONE (OUTPATIENT)
Dept: INTERNAL MEDICINE | Facility: OTHER | Age: 70
End: 2020-09-10

## 2020-09-10 DIAGNOSIS — M54.9 CHRONIC RIGHT-SIDED BACK PAIN, UNSPECIFIED BACK LOCATION: Primary | ICD-10-CM

## 2020-09-10 DIAGNOSIS — M25.551 HIP PAIN, RIGHT: ICD-10-CM

## 2020-09-10 DIAGNOSIS — G89.29 CHRONIC RIGHT-SIDED BACK PAIN, UNSPECIFIED BACK LOCATION: Primary | ICD-10-CM

## 2020-09-10 NOTE — TELEPHONE ENCOUNTER
Patient called requesting a referral for acupuncture to see Dr. Babb.  Would Dr. Perkins please place?  Thank you!  Maude Cifuentes on 9/10/2020 at 3:05 PM

## 2020-09-10 NOTE — TELEPHONE ENCOUNTER
Notified patient of providers note.  Jelena Witt LPN ....................  9/10/2020   3:51 PM

## 2020-09-10 NOTE — TELEPHONE ENCOUNTER
Order placed for acupuncture.  Regarding naproxen use, she can take it for 3 to 7 days and then should taper off of it.  She should be aware that it can cause stomach upset, elevated blood pressure, kidney damage.

## 2020-09-10 NOTE — TELEPHONE ENCOUNTER
Pt was seen by Dr. Jeronimo yesterday.  Pt would like to know if she could take Aleve for a short time for her inflammation

## 2020-09-10 NOTE — PROGRESS NOTES
Visit Date:   09/09/2020      REASON FOR EVALUATION:  Leg pain.      HISTORY:  Veronica comes in with regards to left leg, here for discussion about next best steps here.  She has undergone a multitude of injections, including both in her NELSON area x 2 as well as in her right hip as well as bursa.  She reports continuation of symptoms at this point in time, is unsure what to do, seems to have the majority of her pain with walking and standing for long distances.  The pain is in the posterior aspect.  She feels like a burning sensation.  It does run up and down her leg at this point in time.  She is here to consider her options moving forward.  Does not seem like she is necessarily getting better with time.  Has been having a little bit of difficulty with regard to the next best steps here in terms of coordination by myself and Dr. Valdivia at this point as well.      PHYSICAL EXAMINATION:  Examination  of her lower back shows a little bit increased pain with hyperextension.  She appears to be equal and symmetric in her motor and sensory examination of the bilateral lower extremity, does not have any significant pain with hip flexor and internal rotation.  Palpation across the bursa also is asymptomatic here in addition to that.  Sensation grossly intact to light touch.  LOUIS testing is mildly symptomatic at this point.      IMAGING:  X-rays reviewed.  I do not see any significant arthritic changes in the hip joint, just some mild bit of arthritis present there.      IMPRESSION:  Right leg pain, I feel secondary to S1 nerve root irritation based on symptom distribution as well as precipitating factors.      PLAN:  At this time, I recommend the patient does revisit with Dr. Rolo Valdivia to determine whether or not she would benefit from decompression of the L5-S1 region.  I do not think there is a hip problem per se that needs any treatment or any further injections, as none of the intraarticular injections or bursa  injections have been beneficial for her, and her arthritis certainly does not warrant any further discussions about joint reconstruction at this point in time.  She is going to set up an appointment with him and then go with the next best steps based upon his recommendation.  She seems to have exhausted all conservative treatment methods without resolution of her current pain at this point in time.         ELPIDIO STANFORD MD             D: 2020   T: 09/10/2020   MT: TESSA      Name:     ALVA REID   MRN:      [id number]        Account:      [id number]   :      1950           Visit Date:   2020      Document: [id number]

## 2020-09-11 NOTE — TELEPHONE ENCOUNTER
"[id number] sent Rx request for the following:   metFORMIN (GLUCOPHAGE) 500 MG tablet   SigTAKE 1/2 TABLET BY MOUTH TWICE DAILY WITH MEALS    Last Prescription Date:   03/20/2020  Last Fill Qty/Refills:         90, R-2    Last Office Visit:              08/13/2020 ([facility])   Future Office visit:           09/30/2020 ([facility])   Biguanide Agents Passed -         Passed - Patient is age 10 or older        Passed - Patient has documented A1c within the specified period of time.     If HgbA1C is 8 or greater, it needs to be on file within the past 3 months.  If less than 8, must be on file within the past 6 months.     Recent Labs   Lab Test 06/09/20  1202  07/07/17  1253   A1C 6.6*   < >  --    [id number]  --   --  6.6*    < > = values in this interval not displayed.             Passed - Patient's CR is NOT>1.4 OR Patient's EGFR is NOT<45 within past 12 mos.     Recent Labs   Lab Test 06/09/20  1202   GFRESTIMATED 59*   GFRESTBLACK 71       Recent Labs   Lab Test 06/09/20  1202   CR 0.94             Passed - Patient does NOT have a diagnosis of CHF.        Passed - Medication is active on med list        Passed - Patient is not pregnant        Passed - Patient has not had a positive pregnancy test within the past 12 mos.         Passed - Recent (6 mo) or future (30 days) visit within the authorizing provider's specialty     Patient had office visit in the last 6 months or has a visit in the next 30 days with authorizing provider or within the authorizing provider's specialty.  See \"Patient Info\" tab in inbasket, or \"Choose Columns\" in Meds & Orders section of the refill encounter.               Prescription approved per [facility] Refill Protocol.  Nelia Coffman RN ....................  9/11/2020   8:11 AM      "

## 2020-09-14 ENCOUNTER — THERAPY VISIT (OUTPATIENT)
Dept: CHIROPRACTIC MEDICINE | Facility: OTHER | Age: 70
End: 2020-09-14
Attending: CHIROPRACTOR
Payer: MEDICARE

## 2020-09-14 DIAGNOSIS — M54.50 LUMBAGO: ICD-10-CM

## 2020-09-14 DIAGNOSIS — M99.04 SEGMENTAL AND SOMATIC DYSFUNCTION OF SACRAL REGION: Primary | ICD-10-CM

## 2020-09-14 DIAGNOSIS — M25.551 HIP PAIN, RIGHT: ICD-10-CM

## 2020-09-14 PROCEDURE — G0463 HOSPITAL OUTPT CLINIC VISIT: HCPCS | Mod: 25

## 2020-09-14 PROCEDURE — 97810 ACUP 1/> WO ESTIM 1ST 15 MIN: CPT | Performed by: CHIROPRACTOR

## 2020-09-14 PROCEDURE — G0463 HOSPITAL OUTPT CLINIC VISIT: HCPCS

## 2020-09-14 PROCEDURE — 99213 OFFICE O/P EST LOW 20 MIN: CPT | Mod: GY | Performed by: CHIROPRACTOR

## 2020-09-14 PROCEDURE — 98940 CHIROPRACT MANJ 1-2 REGIONS: CPT | Mod: AT | Performed by: CHIROPRACTOR

## 2020-09-14 NOTE — PROGRESS NOTES
PATIENT:  Jennyfer Elizabeth is a 70 year old female presenting for right hip and low back pain with sciatica    PROBLEM:   Date of Initial Visit for this Episode:  9/14/2020     Visit #1    SUBJECTIVE / HPI: Patient referred to our office by primary physician Dr. Maddie HALL for evaluation and treatment of ongoing right hip and low back pain symptoms.  Symptoms have been ongoing for well over a year.  In that time patient has been treated and evaluated by numerous health care providers including but not limited to primary physician, orthopedic surgeon, physical therapist.  Patient also scheduled to follow-up with neurology on Thursday.  Undergone numerous treatments such as physical therapy, injections, chiropractic care.  Symptoms continue to be present however with little improvement.    Patient notes that symptoms are not always present.  Symptoms most troublesome when patient is attempting to be active such as with walking, or gardening.  These are recreational activities that she enjoys doing.    Description and onset:  Duration and Frequency of Pain: over a year and frequently achey, burning and dull  Radiation of pain: yes right posterior leg, no specified dermatomal pattern  Pain rated at it's worst: 9/10  Pain rated currently:  0/10  Pain course: Not changing  Worse with:  Standing, walking/hip flexion  Improved by:  Nothing  Additional Features: right sided sciatica  Other Health Care Providers seen for this: Dr. Maddie HALL, Dr. Ant MOODY, Dr. Phani MOODY, Shady Bond PT, Dr. Emmanuel DE SOUZA  Previous treatment: injections, physical therapy, chiropractic        See flowsheets in chart for details.  9/14/2020   Oswestry (ERIC) Questionnaire    OSWESTRY DISABILITY INDEX 9/14/2020   Count 9   Sum 5   Oswestry Score (%) 11.11   Some recent data might be hidden        Functional limitations:  standing, walking, hip flexion motions    Past D.C. Care: yes      PAST MEDICAL HISTORY:  Past Medical History:   Diagnosis Date      Benign essential hypertension 3/3/2018     Cataract      Chronic pain of right knee 4/11/2018     Diabetic eye exam (H) 06/07/2018     TAYLOR (generalized anxiety disorder) 3/3/2018     History of MI (myocardial infarction) 07/2017    follows yearly with [facility]     Macular degeneration      Major depressive disorder, single episode 1995     Obstructive sleep apnea 2005    BIPAP     Osteopenia 2007    Lumbar spine     Pure hypercholesterolemia 2001     Type 2 diabetes mellitus with complication, without long-term current use of insulin (H) 03/03/2018       PAST SURGICAL HISTORY:  Past Surgical History:   Procedure Laterality Date     BREAST BIOPSY, RT/LT  1986    x2; in [place]     CARDIAC CATH - HIM SCAN  07/10/2017    Mid RCA;  drug eluting stent     CHOLECYSTECTOMY  2008     TONSILLECTOMY, ADENOIDECTOMY, COMBINED  1955       ALLERGIES:  No Known Allergies    CURRENT MEDICATIONS:  Current Outpatient Medications   Medication Sig Dispense Refill     aspirin 81 MG chewable tablet Take 81 mg by mouth daily       B Complex CAPS Take 1 capsule by mouth daily       Biotin w/ Vitamins C & E (HAIR SKIN & NAILS GUMMIES) 1250-7.5-7.5 MCG-MG-UNT CHEW Take 2 chew tab by mouth daily       buPROPion (WELLBUTRIN XL) 300 MG 24 hr tablet Take 1 tablet (300 mg) by mouth daily 90 tablet 4     cyanocobalamin (VITAMIN B-12) 1000 MCG tablet Take by mouth daily       DULoxetine (CYMBALTA) 30 MG capsule TAKE 2 CAPSULES BY MOUTH DAILY 180 capsule 3     LORazepam (ATIVAN) 0.5 MG tablet Take 1 tablet (0.5 mg) by mouth nightly as needed for anxiety 15 tablet 3     losartan-hydrochlorothiazide (HYZAAR) 100-12.5 MG tablet Take 1 tablet by mouth daily 90 tablet 3     metFORMIN (GLUCOPHAGE) 500 MG tablet TAKE 1/2 TABLET BY MOUTH TWICE DAILY WITH MEALS 90 tablet 2     Multiple Vitamins-Minerals (PRESERVISION AREDS 2+MULTI VIT PO) Take 1 tablet by mouth 2 times daily       nitroGLYcerin (NITROSTAT) 0.4 MG sublingual tablet Place 0.4 mg under the tongue  Place 1 tablet under the tongue every 5 mon if needed for chest pain may repeat X 3 doses.       omega 3 1000 MG CAPS Take 1 g by mouth daily 90 capsule      rosuvastatin (CRESTOR) 40 MG tablet Take 1 tablet (40 mg) by mouth daily 90 tablet 4     tretinoin (RETIN-A) 0.05 % external cream Apply 1 g topically At Bedtime 45 g 1       SOCIAL HISTORY:  Marital Status: single (never ).  Children: no.  Occupation: Retired .  Alcohol use:yes.  Tobacco use: Smoker: former.      FAMILY HISTORY:  Family History   Problem Relation Age of Onset     Hypertension Father      Heart Disease Father         MI     Lung Cancer Mother         smoker     Diabetes Sister      Heart Disease Sister      Kidney Disease Sister 33        ?congenital     Hyperlipidemia Sister      No Known Problems Brother      Breast Cancer No family hx of         Cancer-breast       Patient Active Problem List   Diagnosis     Type 2 diabetes mellitus with complication, without long-term current use of insulin (H)     TAYLOR (generalized anxiety disorder)     Chronic pain of right knee         ROS:  The patient denies any fevers, chills, nausea, vomiting, diarrhea, constipation,dysuria, hematuria, or urinary hesitancy or incontinence.  No shortness of breath, chest pain, or rashes.    OBJECTIVE:    DIAGNOSTICS:  Study Result     PROCEDURE:  XR HIP RIGHT 2-3 VIEWS     HISTORY: Right groin pain     COMPARISON:  9/5/2019     TECHNIQUE:  2 views of the right hip were obtained.     FINDINGS:  No fracture or dislocation is identified. There are mild  degenerative changes of the sacroiliac joint and right hip.                                                                        IMPRESSION: No acute fracture.  Mild degenerative disease.     RHONA TRINIDAD MD     I was personally able to review patient's x-ray studies and my findings are consistent with those of Dr. Trip MOODY. chiropractic assessment does suggest possibility of segmental/somatic  dysfunction of patient's right SI joint.  This must be correlated clinically.  Addendum     The last sentence of the impression should read: There is mild to  moderate chondromalacia of the femoral acetabular articulation with  mild osteophytosis of the femoral head.     [email or web address]://www.ajronline.org/doi/full/10.2214/[email or web address]     PASCUAL HOOD MD   Addended by Pascual Hood MD on 4/29/2020 10:25 AM       Study Result     MR HIP RIGHT W/O CONTRAST     HISTORY: 69 yearsFemale eval labrun and osteoarthritis; Pain of right  hip joint . Patient reports 2 months of worsening right hip pain but  has had symptoms for 2 years duration total     TECHNIQUE: Coronal STIR, coronal T1, axial T2 STIR imaging of the  pelvis was performed followed by coronal PD fat-sat, coronal proton  density, sagittal T1, axial proton density fat sat, sagittal PD  fat-sat imaging of the right hip.     COMPARISON: Pelvis and right hip plain films dated 10/14/2019     FINDINGS: There is increased T2 signal within the gluteus medius  medius and minimus tendons at their insertion on the greater  trochanter. See series 12 images 9 through 14 and series 9 images 10  through 16.     The right hip joint is congruent. There is cartilage thinning of  mild-to-moderate severity. There is very mild osteophytic change of  the head neck junction of the femur.     There is no evidence of a displaced labral tear.                                                                            IMPRESSION: Rotator cuff tendinosis of the right hip involving the  gluteus medius medius and minimus tendons     There is mild to moderate chondromalacia of the femoral acetabular  articulation with mild osteophytosis of the humeral head.     PASCUAL HOOD MD        Study Result     MR LUMBAR SPINE W/O CONTRAST     HISTORY: L/S-spine stenosis; Right leg pain. .      TECHNIQUE: Sagittal T1, T2 and STIR as well as axial T2 images of the  lumbar spine were  "obtained.     COMPARISON: 9/22/2014.     FINDINGS:       Normal lumbar lordosis is maintained.  No abnormalities of alignment  are identified.  The vertebral body heights are preserved.  The marrow  signal is unremarkable.     The distal cord and conus medullaris have a normal caliber and  morphology.  The conus terminates at .  No abnormal cord signal is  seen in the distal cord or conus.  There are no masses in the spinal  canal or paravertebral soft tissues.     Mild disc bulging is present at T11-12, T12-L1 and L1-2 without spinal  or foraminal stenosis.     At L2-3, the central spinal canal and neural foramina are patent.     At L3-4, there is mild facet degenerative hypertrophy. The central  spinal canal and neural foramina are patent.     At L4-5, there is a mild symmetric disc bulge with moderate facet  degenerative hypertrophy. Spinal stenosis is mild. Foraminal narrowing  is minimal.     At L5-S1, there is a mild symmetric disc bulge with associated  posterior lateral osteophytes and disc height loss as well as mild to  moderate facet degenerative hypertrophy. Spinal narrowing is mild.  Foraminal narrowing is mild.                                                                      IMPRESSION:     Facet degenerative changes primarily at L4-5 and L5-S1. No focal nerve  root impingement is identified.     LEANN HERMAN MD         PHYSICAL EXAM:     GENERAL APPEARANCE: healthy, alert, mild distress and cooperative   GAIT: NORMAL      MUSCULOSKELETAL:   Posture: Anterior head carriage, rounded shoulders.  Low left iliac crest  Gait:  unremarkable.         Thoracic and Lumbar performed actively, measured approximately  ROM:  60/60 flexion 55/60 extension    40/45 RLF    45/45 LLF   45/45 RR      45/45 LR    -Kemps: negative   - Straight leg raise, increased hamstring tension on left side  + LOUIS: no sacroiliac jt pain, restricted ROM on right  FADIR: unremarkable.   +Leg length inequality: right 1/4\" " "  Other:  Ely's: negative bilaterally    +Tenderness: Present right PSIS.  +Muscle spasm: No spasm apparent.  Hypertonic muscles noted of the right quadratus lumborum, right gluteus medius, right TFL, right vastus lateralis.  +Joint asymmetry and restriction: Sacrum P-R listing    ASSESSMENT: Jennyfer Elizabeth is a 70 year old female presenting with primary complaints of ongoing right-sided low back pain and hip pain symptoms.  Patient history quite complex.  There is evidence to suggest segmental and somatic dysfunction of patient's right SI joint along with subsequent muscular/soft tissue involvement.  Patient does appear to be a candidate for acupuncture therapy along with chiropractic care.  Patient has not been under acupuncture therapy in the past.  Because of this techniques to be utilized and all questions were answered prior to providing treatment.  No contraindications present precluding patient from receiving chiropractic acupuncture on today's visit.    As patient has been currently working with other chiropractic office patient was encouraged to continue to work with chiropractor to help with ongoing symptoms [place] our office or with Dr. Benitez.     1. Segmental and somatic dysfunction of sacral region    2. Hip pain, right    3. Lumbago        PLAN    Evaluation and Management:  10742 Moderate exam established patient 20 min    Procedures:  Modalities:  14802: Acupuncture, for 15 minutes:  Points: Bl 23, 24, 25, 46, 47, 49, 50, 51 GB 28, 30   For 15 minutes    CMT:  87227 Chiropractic manipulative treatment 1-2 regions performed   Pelvis: Drop Table and SOT blocks, Sacrum , Prone    Therapeutic procedures:  None    Response to Treatment  Mild improvement of symptoms, repetitive hip flexion did cause TFL region to \"tighten up\" according to the patient    Prognosis: Guarded    9/14/2020 Plan of Care:  6-12 visits of Chiropractic Care including Spinal Adjustments, acupuncture and/or physiotherapy and " active rehabilitation, to include exercises in the office and/or at home to meet care plan goals.     Frequency: 2xweek for up to 4 weeks. A reevaluation would be clinically appropriate in 6-8 visits, to determine progress and further course of care. Initial start of care will be for 2 weeks, if no improvement noted care will be discontinued    POC discussed and patient agreeable to plan of care.      9/14/2020 Goals:      Patient will report improved right hip/lower back pain by 50%.   Patient will report able to walk/stand without painful limits.   Patient will report able to garden/do recreational activities without painful limts.   Patient will demonstrate an improved ability to complete Activities of Daily Living  as shown by a reported 10-30% reduced score on back index.          INSTRUCTIONS   monitor symptoms closely, perform activities as tolerated    Follow-up:  Return to care in 2 days.        Disclaimer: This note consists of symbols derived from keyboarding, dictation and/or voice recognition software. As a result, there may be errors in the script that have gone undetected. Please consider this when interpreting information found in this chart.

## 2020-09-17 ENCOUNTER — TRANSFERRED RECORDS (OUTPATIENT)
Dept: HEALTH INFORMATION MANAGEMENT | Facility: OTHER | Age: 70
End: 2020-09-17

## 2020-09-18 ENCOUNTER — THERAPY VISIT (OUTPATIENT)
Dept: CHIROPRACTIC MEDICINE | Facility: OTHER | Age: 70
End: 2020-09-18
Attending: CHIROPRACTOR
Payer: MEDICARE

## 2020-09-18 DIAGNOSIS — M25.551 HIP PAIN, RIGHT: Primary | ICD-10-CM

## 2020-09-18 DIAGNOSIS — G89.29 CHRONIC RIGHT-SIDED LOW BACK PAIN WITH RIGHT-SIDED SCIATICA: ICD-10-CM

## 2020-09-18 DIAGNOSIS — M54.41 CHRONIC RIGHT-SIDED LOW BACK PAIN WITH RIGHT-SIDED SCIATICA: ICD-10-CM

## 2020-09-18 PROCEDURE — G0463 HOSPITAL OUTPT CLINIC VISIT: HCPCS

## 2020-09-18 PROCEDURE — 97810 ACUP 1/> WO ESTIM 1ST 15 MIN: CPT | Mod: GA | Performed by: CHIROPRACTOR

## 2020-09-18 NOTE — PROGRESS NOTES
Visit #:  2    Subjective:  Jennyfer Elizabeth is a 70 year old female who is seen in f/u up for:        Hip pain, right  Chronic right-sided low back pain with right-sided sciatica.     Since last visit on 9/14/2020,  Jennyfer Elizabeth reports: Undergoing evaluation with neurosurgeon Dr. Valdivia on Thursday.  Patient underwent x-rays which were not available for my review.  X-rays revealed L4 spondylolisthesis on L5.  Prior imaging studies did not show this due to patient being supine and not weightbearing.    Patient was told that she will likely need surgery to correct this.    Following last treatment patient stated she did not notice tremendous improvement of symptoms however patient feels that treatment was beneficial.    Patient still not able to walk due to pain from low back into right anterior hip.  Patient has not been experiencing sciatica symptoms as the symptom is oftentimes only present when she is walking.    Area of chief complaint:  Lumbar :  Symptoms are graded at 0-8/10. The quality is described as achey, and burning frequently.       Objective:  The following was observed:    P: palpatory tenderness none reported:    A: static palpation demonstrates intersegmental asymmetry , none apparent  R: motion palpation notes restricted motion, not present  T: Taut tender fibers noted lumbar paraspinals bilaterally, right quadratus lumborum, right TFL, right vastus lateralis    Segmental spinal dysfunction/restrictions found at:  None apparent.      Assessment: Radiographic evidence not available for my review regarding patient's recent diagnosis of spondylolisthesis at L4.  Patient's symptomology however would fit with this recent diagnosis.  Patient interested in home exercises.  At this time I advised against this until I have a chance to consult with patient's physical therapist Shady Bond, due to recent change in diagnosis.     Diagnoses:      1. Hip pain, right    2. Chronic right-sided low back pain with  right-sided sciatica        Patient's condition:  Patient symptoms are gradually improving    Treatment effectiveness:  Patients symptoms are getting better      Procedures:  CMT:  None performed      Modalities:  Modalities:  90618: Acupuncture, for 15 minutes:  Points: Bl 23, 24, 25, 46, 47, 49, 50, 51 GB 27, 28, 29 30 32  For 15 minutes    Therapeutic procedures:  None    Response to Treatment  No Change in symptoms    Prognosis: Guarded    Progress towards Goals: In progress   Patient will report improved right hip/lower back pain by 50%.              Patient will report able to walk/stand without painful limits.              Patient will report able to garden/do recreational activities without painful limts.              Patient will demonstrate an improved ability to complete Activities of Daily Living   as shown by a reported 10-30% reduced score on back index.                   Recommendations:    Instructions:monitor symptoms closely    Follow-up:  Return to care in 5 days.

## 2020-09-29 ENCOUNTER — TRANSFERRED RECORDS (OUTPATIENT)
Dept: HEALTH INFORMATION MANAGEMENT | Facility: OTHER | Age: 70
End: 2020-09-29

## 2020-09-30 ENCOUNTER — OFFICE VISIT (OUTPATIENT)
Dept: INTERNAL MEDICINE | Facility: OTHER | Age: 70
End: 2020-09-30
Attending: INTERNAL MEDICINE
Payer: MEDICARE

## 2020-09-30 VITALS
TEMPERATURE: 97.8 F | SYSTOLIC BLOOD PRESSURE: 132 MMHG | BODY MASS INDEX: 29.16 KG/M2 | OXYGEN SATURATION: 99 % | RESPIRATION RATE: 16 BRPM | WEIGHT: 162 LBS | DIASTOLIC BLOOD PRESSURE: 78 MMHG | HEART RATE: 90 BPM

## 2020-09-30 DIAGNOSIS — M25.551 HIP PAIN, RIGHT: ICD-10-CM

## 2020-09-30 DIAGNOSIS — M53.3 SI (SACROILIAC) JOINT DYSFUNCTION: ICD-10-CM

## 2020-09-30 DIAGNOSIS — I25.10 ASCVD (ARTERIOSCLEROTIC CARDIOVASCULAR DISEASE): ICD-10-CM

## 2020-09-30 DIAGNOSIS — Z23 NEED FOR INFLUENZA VACCINATION: Primary | ICD-10-CM

## 2020-09-30 PROCEDURE — G0463 HOSPITAL OUTPT CLINIC VISIT: HCPCS | Mod: 25

## 2020-09-30 PROCEDURE — 90662 IIV NO PRSV INCREASED AG IM: CPT

## 2020-09-30 PROCEDURE — G0463 HOSPITAL OUTPT CLINIC VISIT: HCPCS

## 2020-09-30 PROCEDURE — 99214 OFFICE O/P EST MOD 30 MIN: CPT | Performed by: INTERNAL MEDICINE

## 2020-09-30 PROCEDURE — G0008 ADMIN INFLUENZA VIRUS VAC: HCPCS

## 2020-09-30 RX ORDER — NITROGLYCERIN 0.4 MG/1
0.4 TABLET SUBLINGUAL EVERY 5 MIN PRN
Qty: 25 TABLET | Refills: 0 | Status: SHIPPED | OUTPATIENT
Start: 2020-09-30 | End: 2021-11-10

## 2020-09-30 ASSESSMENT — PAIN SCALES - GENERAL: PAINLEVEL: NO PAIN (0)

## 2020-09-30 NOTE — NURSING NOTE
Patient presents to clinic today for follow up on hip. Also saw a physiatrist at [facility] yesterday.  Medication reconciliation completed.  Previous A1C is at goal of <8  Lab Results   Component Value Date    A1C 6.6 06/09/2020    A1C 6.9 08/29/2019    A1C 6.8 04/03/2019    A1C 6.9 08/30/2018    A1C 6.5 02/28/2018     Urine microalbumin:creatine: 8/13/20  Foot exam 7/24/20  Eye exam 6/25/19 scheduled tomorrow.  Tobacco User NO  Patient is on a daily aspirin  Patient is on a Statin.  Blood pressure today of:     BP Readings from Last 1 Encounters:   09/30/20 132/78      is at the goal of <139/89 for diabetics.    Betty Kong CMA([facility])..................9/30/2020   10:52 AM

## 2020-09-30 NOTE — PROGRESS NOTES
Chief Complaint   Patient presents with     RECHECK     hip         HPI: Ms. Elizabeth is a 70 year old female who presents today for follow up of right hip pain.    This has been an ongoing issue for her over the last year.  She has seen multiple subspecialists including orthopedics, neurosurgery and PMR.  She recently saw all of them who have all told her separate things.  Neurosurgery recommended back surgery.  PMR recommended SI belt and SI joint injection.  At this time she is hesitant to pursue any surgical intervention.    She also has a history of cardiac disease.  She had an MI several years ago.  She has had nitroglycerin at home however it has .  She would like a renewal of this.  She denies any recent chest pain or cardiopulmonary symptoms.    She would like a flu shot today.    She  reports that she quit smoking about 13 years ago. She has a 40.00 pack-year smoking history. She has never used smokeless tobacco.    Past medical history reviewed as below:     Past Medical History:   Diagnosis Date     Benign essential hypertension 3/3/2018     Cataract      Chronic pain of right knee 2018     Diabetic eye exam (H) 2018     TAYLOR (generalized anxiety disorder) 3/3/2018     History of MI (myocardial infarction) 2017    follows yearly with [facility]     Macular degeneration      Major depressive disorder, single episode      Obstructive sleep apnea     BIPAP     Osteopenia     Lumbar spine     Pure hypercholesterolemia      Type 2 diabetes mellitus with complication, without long-term current use of insulin (H) 2018   .      ROS  Pertinent ROS was performed and was negative, including for fever, chills, chest pain, shortness of breath, increased lower extremity edema, changes in bowel or bladder, blood in the stool, difficulty swallowing, sores in the mouth. No other concerns, with exception of HPI above.      EXAM:   /78 (BP Location: Right arm, Patient Position:  "Sitting, Cuff Size: Adult Large)   Pulse 90   Temp 97.8  F (36.6  C) (Tympanic)   Resp 16   Wt 73.5 kg (162 lb)   SpO2 99%   BMI 29.16 kg/m      Estimated body mass index is 29.16 kg/m  as calculated from the following:    Height as of 8/13/20: 1.588 m (5' 2.5\").    Weight as of this encounter: 73.5 kg (162 lb).      GEN: Vitals reviewed. Healthy appearing. Patient is in no acute distress. Cooperative with exam.  HEENT: Normocephalic atraumatic.  Eyes grossly normal to inspection.  No discharge or erythema, or obvious scleral/conjunctival abnormalities.    CV: Heart regular in rate and rhythm with no murmur.    LUNGS: No audible wheeze, cough, or visible cyanosis.  No visible retractions or increased work of breathing.  Lungs clear to auscultation bilaterally.    ABD:  nondistended  SKIN: Warm and dry to touch.  Visible skin clear. No significant rash, abnormal pigmentation or lesions.  Hyperpigmented lesion on the left lateral neck c/w SK    EXT: No clubbing or cyanosis.  No peripheral edema.  PSYCH: Mood is good.  Mentation appears normal, affect normal/bright, judgement and insight intact, normal speech and appearance well-groomed.     ASSESSMENT AND PLAN:    Need for influenza vaccination  - [facility] IMM-  FLU VACCINE, INCREASED ANTIGEN, PRESV FREE    Hip pain, right  Etiology remains unknown.  It is possible it could reflect lumbosacral disease versus sacroiliac.  At this time she was encouraged to hold on any surgical intervention.  She will treat the SI joint as below.  We will continue to monitor.    ASCVD (arteriosclerotic cardiovascular disease)  No current symptoms.  Medication renewed.  - nitroGLYcerin (NITROSTAT) 0.4 MG sublingual tablet  Dispense: 25 tablet; Refill: 0    SI (sacroiliac) joint dysfunction  For her SI joint dysfunction she will try the SI belt.  She is interested however in getting a injection scheduled as she knows it may take some time to get in with them.  She was encouraged to cancel " the injection if the belt resolves her pain completely.  She is to send me an update in 2 to 4 weeks after these interventions.  - XR Sacroiliac Diagnostic Injection Right    Follow-up in   August for annual review or sooner as needed.    CHULA ROBBINS,    9/30/2020 11:24 AM    This document was prepared using voice generated softwear. While every attempt was made for accuracy, grammatical errors may exist.

## 2020-10-01 ENCOUNTER — TRANSFERRED RECORDS (OUTPATIENT)
Dept: HEALTH INFORMATION MANAGEMENT | Facility: OTHER | Age: 70
End: 2020-10-01

## 2020-10-01 LAB — RETINOPATHY: NEGATIVE

## 2020-10-16 ENCOUNTER — HOSPITAL ENCOUNTER (OUTPATIENT)
Dept: GENERAL RADIOLOGY | Facility: OTHER | Age: 70
Discharge: HOME | End: 2020-10-16
Attending: INTERNAL MEDICINE | Admitting: INTERNAL MEDICINE
Payer: MEDICARE

## 2020-10-16 DIAGNOSIS — M53.3 SI (SACROILIAC) JOINT DYSFUNCTION: ICD-10-CM

## 2020-10-16 PROCEDURE — 250N000009 HC RX 250: Performed by: RADIOLOGY

## 2020-10-16 PROCEDURE — 255N000002 HC RX 255 OP 636: Performed by: RADIOLOGY

## 2020-10-16 PROCEDURE — 27096 INJECT SACROILIAC JOINT: CPT | Mod: RT

## 2020-10-16 PROCEDURE — 250N000011 HC RX IP 250 OP 636: Performed by: RADIOLOGY

## 2020-10-16 RX ORDER — BUPIVACAINE HYDROCHLORIDE 5 MG/ML
3 INJECTION, SOLUTION EPIDURAL; INTRACAUDAL ONCE
Status: COMPLETED | OUTPATIENT
Start: 2020-10-16 | End: 2020-10-16

## 2020-10-16 RX ORDER — LIDOCAINE HYDROCHLORIDE 10 MG/ML
2 INJECTION, SOLUTION EPIDURAL; INFILTRATION; INTRACAUDAL; PERINEURAL ONCE
Status: COMPLETED | OUTPATIENT
Start: 2020-10-16 | End: 2020-10-16

## 2020-10-16 RX ORDER — TRIAMCINOLONE ACETONIDE 40 MG/ML
40 INJECTION, SUSPENSION INTRA-ARTICULAR; INTRAMUSCULAR ONCE
Status: COMPLETED | OUTPATIENT
Start: 2020-10-16 | End: 2020-10-16

## 2020-10-16 RX ADMIN — IOHEXOL 2 ML: 240 INJECTION, SOLUTION INTRATHECAL; INTRAVASCULAR; INTRAVENOUS; ORAL at 15:31

## 2020-10-16 RX ADMIN — LIDOCAINE HYDROCHLORIDE 2 ML: 10 INJECTION, SOLUTION INFILTRATION; PERINEURAL at 15:32

## 2020-10-16 RX ADMIN — TRIAMCINOLONE ACETONIDE 40 MG: 40 INJECTION, SUSPENSION INTRA-ARTICULAR; INTRAMUSCULAR at 15:32

## 2020-10-16 RX ADMIN — BUPIVACAINE HYDROCHLORIDE 3 ML: 5 INJECTION, SOLUTION EPIDURAL; INTRACAUDAL at 15:31

## 2020-11-12 NOTE — PROGRESS NOTES
Outpatient Physical Therapy Discharge Note     Patient: Jennyfer Elizabeth  : 1950    Beginning/End Dates of Reporting Period:  2020 to 2020    Referring Provider: Dr. Jeronimo    Therapy Diagnosis: Spinal stenosis of lumbar region     Client Self Report on 2020: Patient states the right lateral hip remains painful. She has not been able to complete the strengthening exercises provided last session.  States she was too busy to complete the exercises, but plans to increase compliance with the HEP.  She is scheduled for an injection in the right hip on 2020.     Patient did not schedule additional visits after 20    Objective Measurements: Not assessed due to unplanned discharge     Goals: Not assessed due to unplanned discharge     Plan:  Discharge from therapy.    Discharge:    Reason for Discharge: Patient chooses to discontinue therapy.      Discharge Plan: Patient to continue home program.

## 2020-12-08 ENCOUNTER — TELEPHONE (OUTPATIENT)
Dept: INTERNAL MEDICINE | Facility: OTHER | Age: 70
End: 2020-12-08

## 2020-12-08 NOTE — TELEPHONE ENCOUNTER
After proper verification, patient stated that she has not had any relief from her right hip pain. States that she has tried injections, sciatica belt, and pain clinic. Patient would like to have Provider go over her stuff and recommend anything that would help her.  Nanette Osei LPN on 12/8/2020 at 3:23 PM

## 2020-12-27 ENCOUNTER — HEALTH MAINTENANCE LETTER (OUTPATIENT)
Age: 70
End: 2020-12-27

## 2021-01-12 ENCOUNTER — TELEPHONE (OUTPATIENT)
Dept: FAMILY MEDICINE | Facility: OTHER | Age: 71
End: 2021-01-12

## 2021-01-12 NOTE — TELEPHONE ENCOUNTER
Called patient to discuss if she would like to have Dr. Dawson assess her. Patient would like to proceed with appointment with Dr. Dawson. Transferred patient to appointment line to set up.    Heidi Liao LPN .......  1/12/2021  4:43 PM

## 2021-01-14 ENCOUNTER — TELEPHONE (OUTPATIENT)
Dept: INTERNAL MEDICINE | Facility: OTHER | Age: 71
End: 2021-01-14

## 2021-01-14 DIAGNOSIS — L98.9 SKIN LESION: ICD-10-CM

## 2021-01-14 DIAGNOSIS — L65.9 HAIR LOSS: Primary | ICD-10-CM

## 2021-01-14 DIAGNOSIS — E11.8 TYPE 2 DIABETES MELLITUS WITH COMPLICATION, WITHOUT LONG-TERM CURRENT USE OF INSULIN (H): Primary | ICD-10-CM

## 2021-01-14 NOTE — TELEPHONE ENCOUNTER
Usually goes every year for annual skin check but hasn't been in for a few years.  (There are some old lab orders scanned in from [facility] where she was referred to last July). But patient states she would like to go to [facility].  Betty Kong CMA([facility])..................1/14/2021   4:56 PM

## 2021-01-15 RX ORDER — BLOOD SUGAR DIAGNOSTIC
STRIP MISCELLANEOUS
Qty: 50 EACH | Refills: 3 | Status: SHIPPED | OUTPATIENT
Start: 2021-01-15 | End: 2022-12-21

## 2021-01-15 NOTE — TELEPHONE ENCOUNTER
TW #[id number] sent Rx request for the following:      ACCU-CHEK CHERYL SMARTVIEW STRIP  Sig: NEW TO THRIFTY - 1-2 TIMES DAILY      Last Prescription Date:   historical  Last Fill Qty/Refills:         n/a, R-n/a    Last Office Visit:              9/30/2020   Future Office visit:           none    Routing refill request to provider for review/approval because:  Medication is reported/historical    Unable to complete prescription refill per RN Medication Refill Policy.................... Tray Gaston RN ....................  1/15/2021   9:42 AM

## 2021-01-19 ENCOUNTER — OFFICE VISIT (OUTPATIENT)
Dept: FAMILY MEDICINE | Facility: OTHER | Age: 71
End: 2021-01-19
Attending: FAMILY MEDICINE
Payer: MEDICARE

## 2021-01-19 VITALS
DIASTOLIC BLOOD PRESSURE: 74 MMHG | SYSTOLIC BLOOD PRESSURE: 136 MMHG | HEIGHT: 62 IN | OXYGEN SATURATION: 94 % | TEMPERATURE: 98.6 F | HEART RATE: 104 BPM | WEIGHT: 160 LBS | RESPIRATION RATE: 16 BRPM | BODY MASS INDEX: 29.44 KG/M2

## 2021-01-19 DIAGNOSIS — M25.551 HIP PAIN, RIGHT: Primary | ICD-10-CM

## 2021-01-19 PROCEDURE — 99214 OFFICE O/P EST MOD 30 MIN: CPT | Performed by: FAMILY MEDICINE

## 2021-01-19 PROCEDURE — G0463 HOSPITAL OUTPT CLINIC VISIT: HCPCS

## 2021-01-19 ASSESSMENT — PAIN SCALES - GENERAL: PAINLEVEL: NO PAIN (0)

## 2021-01-19 ASSESSMENT — MIFFLIN-ST. JEOR: SCORE: 1194.14

## 2021-01-19 NOTE — Clinical Note
Hi Dr. Perkins,    I saw your patient in clinic today for her hip pain.  During my evaluation, it seems that more of her pain is coming from the anterior hip.  I know she has been evaluated by orthopedics in the past and it seems that on their evaluation they get the sense that the pain is originating from her low back.  I agree with you, I would be hesitant to send her to a surgeon without exhausting all conservative interventions.  Unfortunately I do not have a lot to offer her and hope that she does well with aquatic PT which sounds like will be starting soon.  In the future we could consider an LFCN block under ultrasound but I would like to see how her therapy goes and I do not have great optimism that it will be the magic intervention to alleviate her pain.  As she has not had an injection like this previously, it is an option we could consider.  Thanks for allowing me to see her.  Please let me know if you have questions.    Naveen

## 2021-01-19 NOTE — PROGRESS NOTES
"HPI:  70-year-old female coming in for initial evaluation of right hip pain that has been present for over 2 years.  Pain came on without inciting event.  Patient reports the pain is worse in the anterior part of the hip.  She does not feel any pain when sitting or standing.  Pain is worse with walking, particularly during the pushoff phase of her gait.  No pain on the left side.  Her pain is rated as 9/10 when she is walking.  She has to stop for 20-30 seconds when she walks.  She characterizes the pain as a burning ache.  The pain will travel from the anterior aspect of her hip to the lateral part of her hip, into the gluteal region, and down the back of her leg.  Denies symptoms traveling below the knee.  Pain is worse with stairs.  She has tried heat, ice, rest, Tylenol, physical therapy, and injections.  She has had the following injections:  -Intra-articular hip: October 2019, April 2020  -TFESI at L5-S1: March 2020  -Trochanteric bursa: June 2020  -SI joint: October 2020  Patient reports that none of these injections have provided any significant relief, not even for 1-2 hours following the injection.  She reports the most improvement from pool therapy that she underwent several years ago.  She has plans to start pool therapy again in the coming week or 2.  In addition to multiple injection she has also had multiple image studies including MRIs of the right hip and lumbar spine.    EXAM:  /74 (BP Location: Right arm, Patient Position: Sitting, Cuff Size: Adult Regular)   Pulse 104   Temp 98.6  F (37  C) (Tympanic)   Resp 16   Ht 1.575 m (5' 2.01\")   Wt 72.6 kg (160 lb)   SpO2 94%   BMI 29.26 kg/m    MUSCULOSKELETAL EXAM:  RIGHT HIP  Inspection:  -No gross deformity    Tenderness to palpation of the:  -Anterior hip joint: Positive  -ASIS:  Negative  -Greater trochanter:  Negative  -Pelvic ala:  Negative  -Gluteus medius/minimus tendinous insertion:  Negative  -Lumbar spinous processes:  " Negative  -Left SI joint:  Negative  -Right SI joint: Very mild pain  -Gluteal musculature/piriformis: Mild pain  -Proximal hamstring origin:  Negative  -Pubic ramus: Negative  -Proximal adductor origin:  Negative    Range of Motion:  -Passive flexion:  130    Strength:  -Knee extension:  5/5  -Knee flexion:  5/5  -Hip abduction:  5/5  -Hip adduction:  5/5  -Hip flexion:  5/5  -Hip extension:  5/5    Special Tests:  -Logrolling maneuver:  Negative  -LUOIS:  Negative  -FADIR:  Negative  -Scour test:  Negative  -Stinchfield test:  Negative  -Circumduction to assess for snapping hip:  Negative    Other:  -Intact sensation to light touch distally.  -No signs of cyanosis. Normal skin temperature of the lower extremity    MUSCULOSKELETAL EXAM:  LOW BACK  Inspection:  -No gross deformity  -No significant scoliosis, kyphosis, or lordosis  -No bruising or swelling    Tenderness to palpation of the:  -Lower thoracic spine:  Negative  -Upper lumbar spine:  Negative  -Lower lumbar spine:  Negative  -Sacral spine:  Negative  -Left lumbar paraspinal musculature:  Negative  -Right lumbar paraspinal musculature:  Negative  -Left SI joint:  Negative  -Right SI joint: Very mild pain  -Left gluteal musculature: Negative  -Right gluteal musculature: As noted above  -Left greater trochanter:  Negative  -Right greater trochanter:  Negative  -Left anterior hip:  Negative  -Right anterior hip: Positive    Range of Motion:  -Forward flexion:  70  -Extension:  25  -Passive right hip flexion:  130    Strength:  -Left hip flexion:  5/5  -Right hip flexion:  5/5  -Left hip abduction:  5/5  -Right hip abduction:  5/5  -Left hip adduction:  5/5  -Right hip adduction:  5/5  -Left knee extension:  5/5  -Right knee extension:  5/5  -Left knee flexion:  5/5  -Right knee flexion:  5/5  -Left ankle plantar flexion:  5/5  -Right ankle plantar flexion:  5/5  -Left ankle dorsiflexion:  5/5  -Right ankle dorsiflexion:  5/5  -Left hallux dorsiflexion:   5/5  -Right hallux dorsiflexion:  5/5    Sensation:  -Intact sensation to light touch in the L2-S1 dermatomes    Special tests:  -Active forward flexion:  Nonpainful  -Active extension:  Nonpainful  -Active side-bending:  Nonpainful bilaterally  -Active twisting:  Nonpainful bilaterally  -Facet loading:  Nonpainful bilaterally  -Stork test:  Nonpainful bilaterally  -LOUIS:  Negative bilaterally  -FADIR:  Negative bilaterally  -Scour test:  Negative bilaterally  -Stinchfield test:  Negative bilaterally  -Straight leg raise: Negative bilaterally      IMAGIN2017: 4 view right hip and pelvis x-ray  -Moderate joint space narrowing with sclerotic bone changes at the superior acetabulum.  Possible osteopenia.  No acute fracture.    10/14/2019: 2 view right hip x-ray  -Moderate joint space narrowing with acetabular sclerosis.  Similar in appearance to previous study.  No acute fracture.    10/14/2019: 3 view pelvis x-ray  -Right hip as described in hip x-rays.  Mild degenerative changes in the SI joints bilaterally.  Moderate-severe degenerative changes in the lower lumbar vertebrae.    2019: MRI right hip  -Moderate degenerative changes of the hip joint    3/12/2020: MRI lumbar spine  -No significant vertebral disc protrusion into the spinal canal.  No significant spinal cord impingement    ASSESSMENT/PLAN:  Diagnoses and all orders for this visit:  Hip pain, right    70-year-old female with chronic pain of the right hip.  Multiple previous imaging studies were personally reviewed during the encounter and my personal interpretation as noted above.  On today's examination, patient seems to be having a majority of her pain coming from the hip region.  We discussed in detail that previous exams have suspected more of a low back etiology.  Patient has had injections into various locations that have not provided any significant relief including the low back, SI joint, greater trochanteric bursa, and  intra-articular hip.  Her pain was most provocative in the anterior hip which would be most suggestive for intra-articular or hip flexor pathology.  Because she did not achieve significant relief with multiple intra-articular hip injections, I am at a loss for where her pain could be originating from.  As she does describe this as a burning sensation over the anterior aspect of the hip, one could consider the possibility of meralgia paresthetica, however, this would not present with pain radiating down the posterior aspect of the thigh.  Chronic conditions like this are best treated with therapy and pain management strategies.  I understand she has visited with a spine surgeon previously and surgery has been recommended.  Based on my examination today, and reviewing previous imaging of the low back, I would advise the patient to be cautious about surgery before making sure she has exhausted all conservative interventions.  -Strongly encourage daily exercise as pain allows  -I am in favor of moving forward with aquatic PT  -Patient may consider yoga or prince chi  -APAP for pain as needed  -Follow-up with PCP after having undergone robust course of aquatic PT  -If not having any relief, consider moving forward with pain management  -If hip pain becomes more isolated to the anterior aspect of the hip and continues to be characterized as a burning sensation, 1 could consider nerve block of the LFCN    Naveen Javier MD  1/19/2021  2:57 PM    Total time spent with this patient was 62 minutes which included chart review, visualization and interpretation of images, time spent with the patient, and documentation.

## 2021-01-19 NOTE — NURSING NOTE
"Chief Complaint   Patient presents with     Hip Pain     right hip, ongoing for 2+ years     Patient is here today for ongoing hip pain on the right side. Pain has been ongoing for over 2 years that has stayed about the same. Patient has had injections with Dr. Cain in the past and has seen Dr. Jeronimo and Dr. Valdivia at [facility] in the past. Patient notes pain can get up to a 9/10 and is hard to walk. Pain is a 0/10 at appointment today.      Initial /74 (BP Location: Right arm, Patient Position: Sitting, Cuff Size: Adult Regular)   Pulse 104   Temp 98.6  F (37  C) (Tympanic)   Resp 16   Ht 1.575 m (5' 2.01\")   Wt 72.6 kg (160 lb)   SpO2 94%   BMI 29.26 kg/m   Estimated body mass index is 29.26 kg/m  as calculated from the following:    Height as of this encounter: 1.575 m (5' 2.01\").    Weight as of this encounter: 72.6 kg (160 lb).         Medication Reconciliation: Complete      Heidi Liao LPN   "

## 2021-01-19 NOTE — NURSING NOTE
"Chief Complaint   Patient presents with     Hip Pain     right hip, ongoing for 2+ years, pain 9/10     Patient is here today for ongoing hip pain on the right side. Pain has been ongoing for over 2 years that has stayed about the same. Patient has had injections with Dr. Cain in the past and has seen Dr. Jeronimo and Dr. Valdivia at [facility] in the past. Patient notes pain can get up to a 9/10 and is hard to walk. Pain is a 0/10 at appointment today.    Initial /74 (BP Location: Right arm, Patient Position: Sitting, Cuff Size: Adult Regular)   Pulse 104   Resp 16   Ht 1.575 m (5' 2.01\")   Wt 72.6 kg (160 lb)   SpO2 94%   BMI 29.26 kg/m   Estimated body mass index is 29.26 kg/m  as calculated from the following:    Height as of this encounter: 1.575 m (5' 2.01\").    Weight as of this encounter: 72.6 kg (160 lb).         Medication Reconciliation: Complete      Heidi Liao LPN   "

## 2021-01-21 NOTE — TELEPHONE ENCOUNTER
"Dermatology referral is still \"pending\"?   It was signed by Dr Perkins on 1/18/21.   Betty Kong CMA([facility])..................1/21/2021   2:55 PM   "

## 2021-01-21 NOTE — TELEPHONE ENCOUNTER
Patient also noted that she is seeing PT tomorrow for a consult and that she will be setting up therapy next week but is concerned about potentially needing something for pain. Mentioned Oxycodone but wasn't aware that was a narcotic nor that opioid prescriptions required a visit.   She mentioned a handicap permit also.     I told her that it may be best to schedule an appointment next week with [facility] to discuss pain management and Handicap permit.  She will call back for that.        Appointment with Derm has been scheduled for 1/28/21.  Betty Kong CMA([facility])..................1/21/2021   3:09 PM

## 2021-01-21 NOTE — TELEPHONE ENCOUNTER
SK-Patient wants a call about her referral  Please calll and advise    Thank You    Sultana Pena on 1/21/2021 at 1:09 PM

## 2021-01-22 ENCOUNTER — TRANSFERRED RECORDS (OUTPATIENT)
Dept: HEALTH INFORMATION MANAGEMENT | Facility: OTHER | Age: 71
End: 2021-01-22

## 2021-01-25 ENCOUNTER — TRANSFERRED RECORDS (OUTPATIENT)
Dept: HEALTH INFORMATION MANAGEMENT | Facility: OTHER | Age: 71
End: 2021-01-25

## 2021-02-01 ENCOUNTER — OFFICE VISIT (OUTPATIENT)
Dept: INTERNAL MEDICINE | Facility: OTHER | Age: 71
End: 2021-02-01
Attending: INTERNAL MEDICINE
Payer: MEDICARE

## 2021-02-01 VITALS
BODY MASS INDEX: 29.44 KG/M2 | RESPIRATION RATE: 18 BRPM | HEART RATE: 96 BPM | OXYGEN SATURATION: 98 % | SYSTOLIC BLOOD PRESSURE: 128 MMHG | TEMPERATURE: 97.5 F | WEIGHT: 161 LBS | DIASTOLIC BLOOD PRESSURE: 86 MMHG

## 2021-02-01 DIAGNOSIS — M43.16 SPONDYLOLISTHESIS OF LUMBAR REGION: ICD-10-CM

## 2021-02-01 DIAGNOSIS — F41.9 ANXIETY: ICD-10-CM

## 2021-02-01 DIAGNOSIS — L98.8 WRINKLES: ICD-10-CM

## 2021-02-01 DIAGNOSIS — M25.551 HIP PAIN, RIGHT: Primary | ICD-10-CM

## 2021-02-01 DIAGNOSIS — I71.20 THORACIC AORTIC ANEURYSM WITHOUT RUPTURE (H): ICD-10-CM

## 2021-02-01 DIAGNOSIS — N18.31 STAGE 3A CHRONIC KIDNEY DISEASE (H): ICD-10-CM

## 2021-02-01 DIAGNOSIS — E11.8 TYPE 2 DIABETES MELLITUS WITH COMPLICATION, WITHOUT LONG-TERM CURRENT USE OF INSULIN (H): ICD-10-CM

## 2021-02-01 PROBLEM — N18.30 CHRONIC KIDNEY DISEASE, STAGE 3 (H): Status: ACTIVE | Noted: 2021-02-01

## 2021-02-01 LAB
ANION GAP SERPL CALCULATED.3IONS-SCNC: 7 MMOL/L (ref 3–14)
BUN SERPL-MCNC: 18 MG/DL (ref 7–25)
CALCIUM SERPL-MCNC: 9.8 MG/DL (ref 8.6–10.3)
CHLORIDE SERPL-SCNC: 102 MMOL/L (ref 98–107)
CO2 SERPL-SCNC: 27 MMOL/L (ref 21–31)
CREAT SERPL-MCNC: 0.9 MG/DL (ref 0.6–1.2)
GFR SERPL CREATININE-BSD FRML MDRD: 62 ML/MIN/{1.73_M2}
GLUCOSE SERPL-MCNC: 153 MG/DL (ref 70–105)
HBA1C MFR BLD: 7 % (ref 4–6)
POTASSIUM SERPL-SCNC: 4.3 MMOL/L (ref 3.5–5.1)
SODIUM SERPL-SCNC: 136 MMOL/L (ref 134–144)

## 2021-02-01 PROCEDURE — 83036 HEMOGLOBIN GLYCOSYLATED A1C: CPT | Mod: ZL | Performed by: INTERNAL MEDICINE

## 2021-02-01 PROCEDURE — 36415 COLL VENOUS BLD VENIPUNCTURE: CPT | Mod: ZL | Performed by: INTERNAL MEDICINE

## 2021-02-01 PROCEDURE — 80048 BASIC METABOLIC PNL TOTAL CA: CPT | Mod: ZL | Performed by: INTERNAL MEDICINE

## 2021-02-01 PROCEDURE — 99214 OFFICE O/P EST MOD 30 MIN: CPT | Performed by: INTERNAL MEDICINE

## 2021-02-01 PROCEDURE — G0463 HOSPITAL OUTPT CLINIC VISIT: HCPCS | Performed by: INTERNAL MEDICINE

## 2021-02-01 RX ORDER — NAPROXEN 250 MG/1
250 TABLET ORAL DAILY PRN
Qty: 10 TABLET | Refills: 1 | Status: SHIPPED | OUTPATIENT
Start: 2021-02-01 | End: 2021-06-29

## 2021-02-01 RX ORDER — LORAZEPAM 0.5 MG/1
0.5 TABLET ORAL
Qty: 15 TABLET | Refills: 5 | Status: SHIPPED | OUTPATIENT
Start: 2021-02-01 | End: 2022-10-31

## 2021-02-01 RX ORDER — TRETINOIN 0.5 MG/G
1 CREAM TOPICAL AT BEDTIME
Qty: 45 G | Refills: 1 | Status: SHIPPED | OUTPATIENT
Start: 2021-02-01 | End: 2021-08-23

## 2021-02-01 ASSESSMENT — PAIN SCALES - GENERAL: PAINLEVEL: NO PAIN (0)

## 2021-02-01 ASSESSMENT — PATIENT HEALTH QUESTIONNAIRE - PHQ9: SUM OF ALL RESPONSES TO PHQ QUESTIONS 1-9: 8

## 2021-02-01 NOTE — PROGRESS NOTES
Chief Complaint   Patient presents with     RECHECK     Pain Management     back and hip pain         HPI: Ms. Elizabeth is a 70 year old female who presents today for follow up of hip pain and diabetes.    She has been doing some PT in the pool and has not noted a ton of improvement yet.  She continues to have pain with shoveling and going upstairs.  She is also seeing chiropractor.  She was seen last September by neurosurgery/orthopedics.  She was told that she may benefit from a decompression and fusion of the lumbar spine.  She is very hesitant to move forward with this.  She is interested in a second opinion.    She is due for a recheck of her A1c given her history of diabetes.  Her hemoglobin A1c was 6.6% when last completed.  She is on low-dose Metformin.  She denies any obvious side effects.    She has a history of chronic kidney disease.  She is due for recheck of her renal function.    She needs a couple of her medications renewed.  This includes lorazepam which she uses for anxiety intermittently.  She denies any side effects.  She also needs a renewal of her topical cream.    She has a history of thoracic aortic aneurysm this was last assessed in 09/2019.      She  reports that she quit smoking about 13 years ago. She has a 40.00 pack-year smoking history. She has never used smokeless tobacco.    Past medical history reviewed as below:     Past Medical History:   Diagnosis Date     Benign essential hypertension 3/3/2018     Cataract      Chronic pain of right knee 4/11/2018     Diabetic eye exam (H) 06/07/2018     TAYLOR (generalized anxiety disorder) 3/3/2018     History of MI (myocardial infarction) 07/2017    follows yearly with [facility]     Macular degeneration      Major depressive disorder, single episode 1995     Obstructive sleep apnea 2005    BIPAP     Osteopenia 2007    Lumbar spine     Pure hypercholesterolemia 2001     Type 2 diabetes mellitus with complication, without long-term current use of insulin  "(H) 03/03/2018   .      ROS  Pertinent ROS was performed and was negative, including for fever, chills, chest pain, shortness of breath, increased lower extremity edema, changes in bowel or bladder, blood in the stool. No other concerns, with exception of HPI above.      EXAM:   /86 (BP Location: Right arm, Patient Position: Sitting, Cuff Size: Adult Regular)   Pulse 96   Temp 97.5  F (36.4  C) (Tympanic)   Resp 18   Wt 73 kg (161 lb)   SpO2 98%   BMI 29.44 kg/m      Estimated body mass index is 29.44 kg/m  as calculated from the following:    Height as of 1/19/21: 1.575 m (5' 2.01\").    Weight as of this encounter: 73 kg (161 lb).      GEN: Vitals reviewed. Healthy appearing. Patient is in no acute distress. Cooperative with exam.  HEENT: Normocephalic atraumatic.  Eyes grossly normal to inspection.  No discharge or erythema, or obvious scleral/conjunctival abnormalities.   CV: Heart regular in rate and rhythm with no murmur.    LUNGS: No audible wheeze, cough, or visible cyanosis.  No visible retractions or increased work of breathing.  Lungs clear to auscultation bilaterally.    ABD: Nondistended  SKIN: Warm and dry to touch.  Visible skin clear. No significant rash, abnormal pigmentation or lesions.  Multiple areas with increased erythema/healing from recent dermatology appointment and treatment with liquid nitrogen  EXT: No clubbing or cyanosis.  No peripheral edema.  PSYCH: Mood is good.  Mentation appears normal, affect normal/bright, judgement and insight intact, normal speech and appearance well-groomed.     ASSESSMENT AND PLAN:    Wrinkles  Prescription prescribed  - tretinoin (RETIN-A) 0.05 % external cream  Dispense: 45 g; Refill: 1    Anxiety  -Controlled on current medications, renewal sent in  - LORazepam (ATIVAN) 0.5 MG tablet  Dispense: 15 tablet; Refill: 5    Stage 3a chronic kidney disease  Recheck today stable  - Basic Metabolic Panel    Hip pain, right  -Overall naproxen is higher " risk given her history of chronic kidney disease and diabetes we will trial a short course to see if we can give her some relief.  We discussed use of narcotics and given the requirements of this she would like to wait at this time.  - naproxen (NAPROSYN) 250 MG tablet  Dispense: 10 tablet; Refill: 1    Spondylolisthesis of lumbar region  -Given her ongoing pain along with prior recommendation for lumbar surgery and abnormality seen on imaging she will be referred to neurosurgery for second opinion.  - NEUROSURGERY REFERRAL    Type 2 diabetes mellitus with complication, without long-term current use of insulin (H)  -Recheck of her A1c shows increased from prior.  Encouraged to consider increasing Metformin to a full tab twice daily to try to better control this.  Plan for recheck in 3 to 6 months.  - Hemoglobin A1c    Thoracic aortic aneurysm without rupture (H)  Plan to discuss at follow-up and recheck at annual physical.    Follow-up in approximately 4 weeks to discuss pain.    CHULA ROBBINS DO   2/1/2021 11:15 AM    This document was prepared using voice generated softwear. While every attempt was made for accuracy, grammatical errors may exist.

## 2021-02-01 NOTE — NURSING NOTE
Patient presents to clinic today for pain management and diabetic follow up.  Previous A1C is at goal of <8  Lab Results   Component Value Date    A1C 6.6 06/09/2020    A1C 6.9 08/29/2019    A1C 6.8 04/03/2019    A1C 6.9 08/30/2018    A1C 6.5 02/28/2018     Urine microalbumin:creatine: 8/13/20  Foot exam 7/24/20  Eye exam 10/1/20    Tobacco User no  Patient is on a daily aspirin  Patient is on a Statin.  Blood pressure today of:     BP Readings from Last 1 Encounters:   02/01/21 128/86     is at the goal of <139/89 for diabetics.      Medication reconciliation completed.  Betty Kong CMA([facility])..................2/1/2021   10:51 AM

## 2021-02-01 NOTE — LETTER
My Depression Action Plan  Name: Jennyfer Elizabeth   Date of Birth 1950  Date: 2/1/2021    My doctor: Lili Perkins   My clinic: [facility]  [address]  [place] [id number] [phone]          GREEN    ZONE   Good Control    What it looks like:     Things are going generally well. You have normal ups and downs. You may even feel depressed from time to time, but bad moods usually last less than a day.   What you need to do:  1. Continue to care for yourself (see self care plan)  2. Check your depression survival kit and update it as needed  3. Follow your physician s recommendations including any medication.  4. Do not stop taking medication unless you consult with your physician first.           YELLOW         ZONE Getting Worse    What it looks like:     Depression is starting to interfere with your life.     It may be hard to get out of bed; you may be starting to isolate yourself from others.    Symptoms of depression are starting to last most all day and this has happened for several days.     You may have suicidal thoughts but they are not constant.   What you need to do:     1. Call your care team. Your response to treatment will improve if you keep your care team informed of your progress. Yellow periods are signs an adjustment may need to be made.     2. Continue your self-care.  Just get dressed and ready for the day.  Don't give yourself time to talk yourself out of it.    3. Talk to someone in your support network.    4. Open up your Depression Self-Care Plan/Wellness Kit.           RED    ZONE Medical Alert - Get Help    What it looks like:     Depression is seriously interfering with your life.     You may experience these or other symptoms: You can t get out of bed most days, can t work or engage in other necessary activities, you have trouble taking care of basic hygiene, or basic responsibilities, thoughts of suicide or death that will not go  away, self-injurious behavior.     What you need to do:  1. Call your care team and request a same-day appointment. If they are not available (weekends or after hours) call your local crisis line, emergency room or 911.          Depression Self-Care Plan / Wellness Kit    Many people find that medication and therapy are helpful treatments for managing depression. In addition, making small changes to your everyday life can help to boost your mood and improve your wellbeing. Below are some tips for you to consider. Be sure to talk with your medical provider and/or behavioral health consultant if your symptoms are worsening or not improving.     Sleep   Sleep hygiene  means all of the habits that support good, restful sleep. It includes maintaining a consistent bedtime and wake time, using your bedroom only for sleeping or sex, and keeping the bedroom dark and free of distractions like a computer, smartphone, or television.     Develop a Healthy Routine  Maintain good hygiene. Get out of bed in the morning, make your bed, brush your teeth, take a shower, and get dressed. Don t spend too much time viewing media that makes you feel stressed. Find time to relax each day.    Exercise  Get some form of exercise every day. This will help reduce pain and release endorphins, the  feel good  chemicals in your brain. It can be as simple as just going for a walk or doing some gardening, anything that will get you moving.      Diet  Strive to eat healthy foods, including fruits and vegetables. Drink plenty of water. Avoid excessive sugar, caffeine, alcohol, and other mood-altering substances.     Stay Connected with Others  Stay in touch with friends and family members.    Manage Your Mood  Try deep breathing, massage therapy, biofeedback, or meditation. Take part in fun activities when you can. Try to find something to smile about each day.     Psychotherapy  Be open to working with a therapist if your provider recommends it.      Medication  Be sure to take your medication as prescribed. Most anti-depressants need to be taken every day. It usually takes several weeks for medications to work. Not all medicines work for all people. It is important to follow-up with your provider to make sure you have a treatment plan that is working for you. Do not stop your medication abruptly without first discussing it with your provider.    Crisis Resources   These hotlines are for both adults and children. They and are open 24 hours a day, 7 days a week unless noted otherwise.      National Suicide Prevention Lifeline   8-676-691-TALK (5402)      Crisis Text Line    www.crisistextline.org  Text HOME to 247486 from anywhere in the [place], anytime, about any type of crisis. A live, trained crisis counselor will receive the text and respond quickly.      [facility] Lifeline for LGBTQ Youth  A national crisis intervention and suicide lifeline for LGBTQ youth under 25. Provides a safe place to talk without judgement. Call 1-819.719.3945; text START to 422380 or visit www.thetrevorproject.org to talk to a trained counselor.      For [facility] crisis numbers, visit the [facility] website at:  https://[place].gov/dhs/people-we-serve/adults/health-care/mental-health/resources/crisis-contacts.jsp

## 2021-02-25 ENCOUNTER — TRANSFERRED RECORDS (OUTPATIENT)
Dept: HEALTH INFORMATION MANAGEMENT | Facility: OTHER | Age: 71
End: 2021-02-25

## 2021-02-26 ENCOUNTER — OFFICE VISIT (OUTPATIENT)
Dept: INTERNAL MEDICINE | Facility: OTHER | Age: 71
End: 2021-02-26
Attending: INTERNAL MEDICINE
Payer: MEDICARE

## 2021-02-26 VITALS
WEIGHT: 161.2 LBS | SYSTOLIC BLOOD PRESSURE: 118 MMHG | DIASTOLIC BLOOD PRESSURE: 70 MMHG | HEART RATE: 96 BPM | RESPIRATION RATE: 16 BRPM | HEIGHT: 62 IN | TEMPERATURE: 98.8 F | BODY MASS INDEX: 29.66 KG/M2

## 2021-02-26 DIAGNOSIS — M25.551 HIP PAIN, RIGHT: Primary | ICD-10-CM

## 2021-02-26 PROCEDURE — G0463 HOSPITAL OUTPT CLINIC VISIT: HCPCS

## 2021-02-26 PROCEDURE — 99214 OFFICE O/P EST MOD 30 MIN: CPT | Performed by: INTERNAL MEDICINE

## 2021-02-26 RX ORDER — HYDROCODONE BITARTRATE AND ACETAMINOPHEN 5; 325 MG/1; MG/1
1 TABLET ORAL DAILY PRN
Qty: 7 TABLET | Refills: 0 | Status: SHIPPED | OUTPATIENT
Start: 2021-02-26 | End: 2021-03-05

## 2021-02-26 ASSESSMENT — MIFFLIN-ST. JEOR: SCORE: 1194.58

## 2021-02-26 ASSESSMENT — PAIN SCALES - GENERAL: PAINLEVEL: EXTREME PAIN (8)

## 2021-02-26 NOTE — PROGRESS NOTES
"Chief Complaint   Patient presents with     Pain         HPI: Ms. Elizabeth is a 71 year old female who presents today for follow up of pain.    She has an appointment with Dr Mitchell March 15.   No help with 250mg Naproxen.  She has been doing PT and feels she is getting stronger.  She is not sure how much it has helped the pain itself.  She is curious what other meds/options there are for pain.    She  reports that she quit smoking about 13 years ago. She has a 40.00 pack-year smoking history. She has never used smokeless tobacco.    Past medical history reviewed as below:     Past Medical History:   Diagnosis Date     Benign essential hypertension 3/3/2018     Cataract      Chronic pain of right knee 4/11/2018     Diabetic eye exam (H) 06/07/2018     TAYLOR (generalized anxiety disorder) 3/3/2018     History of MI (myocardial infarction) 07/2017    follows yearly with [facility]     Macular degeneration      Major depressive disorder, single episode 1995     Obstructive sleep apnea 2005    BIPAP     Osteopenia 2007    Lumbar spine     Pure hypercholesterolemia 2001     Type 2 diabetes mellitus with complication, without long-term current use of insulin (H) 03/03/2018   .      ROS  Pertinent ROS was performed and was negative, including for fever, chills, chest pain, shortness of breath, increased lower extremity edema. No other concerns, with exception of HPI above.      EXAM:   /70 (BP Location: Right arm, Patient Position: Sitting, Cuff Size: Adult Regular)   Pulse 96   Temp 98.8  F (37.1  C) (Tympanic)   Resp 16   Ht 1.575 m (5' 2.01\")   Wt 73.1 kg (161 lb 3.2 oz)   Breastfeeding No   BMI 29.48 kg/m      Estimated body mass index is 29.48 kg/m  as calculated from the following:    Height as of this encounter: 1.575 m (5' 2.01\").    Weight as of this encounter: 73.1 kg (161 lb 3.2 oz).      GEN: Vitals reviewed. Healthy appearing. Patient is in no acute distress. Cooperative with exam.  HEENT: Normocephalic " atraumatic.  Eyes grossly normal to inspection.  No discharge or erythema, or obvious scleral/conjunctival abnormalities.   SKIN: Warm and dry to touch.  Visible skin clear. No significant rash, abnormal pigmentation or lesions.  EXT: No clubbing or cyanosis.  No peripheral edema.  PSYCH: Mood is frustrated.  Mentation appears normal, affect normal/bright, judgement and insight intact, normal speech and appearance well-groomed.     ASSESSMENT AND PLAN:    Hip pain, right  - we had a long conversation today regarding pain options.  She is hesitant to be on any stronger pain medications for prolonged periods of time.  She does however feel that something is needed to help manage her pain so that she can do things that she needs to.  After discussing various options it was decided that she will try taking 2 naproxen daily and see how this helps.  If it does not help she can use low-dose Norco.  She is cautioned not to use the Norco at the same time as her Ativan.  She will follow-up in a few weeks to let me know how this is.  She is to call sooner if she needs a refill on the naproxen.  - HYDROcodone-acetaminophen (NORCO) 5-325 MG tablet  Dispense: 7 tablet; Refill: 0       Return in about 3 weeks (around 3/19/2021) for Recheck, medications.    30 minutes spent on the date of the encounter doing chart review, history and exam, documentation and further activities as noted above      CHULA ROBBINS DO   2/26/2021 12:29 PM    This document was prepared using voice generated softwear. While every attempt was made for accuracy, grammatical errors may exist.

## 2021-02-26 NOTE — NURSING NOTE
"Patient presents to the clinic today for pain management.  Mery Sterling LPN 2/26/2021   12:05 PM    Chief Complaint   Patient presents with     Pain       Initial /70 (BP Location: Right arm, Patient Position: Sitting, Cuff Size: Adult Regular)   Pulse 96   Temp 98.8  F (37.1  C) (Tympanic)   Resp 16   Ht 1.575 m (5' 2.01\")   Wt 73.1 kg (161 lb 3.2 oz)   Breastfeeding No   BMI 29.48 kg/m   Estimated body mass index is 29.48 kg/m  as calculated from the following:    Height as of this encounter: 1.575 m (5' 2.01\").    Weight as of this encounter: 73.1 kg (161 lb 3.2 oz).  Medication Reconciliation: complete  Mery Sterling LPN    "

## 2021-03-15 ENCOUNTER — TELEPHONE (OUTPATIENT)
Dept: INTERNAL MEDICINE | Facility: OTHER | Age: 71
End: 2021-03-15

## 2021-03-15 ENCOUNTER — TRANSFERRED RECORDS (OUTPATIENT)
Dept: HEALTH INFORMATION MANAGEMENT | Facility: OTHER | Age: 71
End: 2021-03-15

## 2021-03-15 DIAGNOSIS — G89.29 CHRONIC RIGHT-SIDED BACK PAIN, UNSPECIFIED BACK LOCATION: ICD-10-CM

## 2021-03-15 DIAGNOSIS — M54.9 CHRONIC RIGHT-SIDED BACK PAIN, UNSPECIFIED BACK LOCATION: ICD-10-CM

## 2021-03-15 DIAGNOSIS — M43.16 SPONDYLOLISTHESIS OF LUMBAR REGION: Primary | ICD-10-CM

## 2021-03-15 RX ORDER — GABAPENTIN 100 MG/1
100-300 CAPSULE ORAL 3 TIMES DAILY
Qty: 45 CAPSULE | Refills: 1 | Status: SHIPPED | OUTPATIENT
Start: 2021-03-15 | End: 2021-11-10

## 2021-03-15 NOTE — TELEPHONE ENCOUNTER
Patient met with doctor at [facility]. They want another MRI of back. Patient states that they said that she needs surgery. Patient felt that he had his mind made up for surgery before talking with her before due to him being a surgeon.  Patient states that naproxen did not help. Patient tried hydrocodone with no relief of pain. Patient states they offered gabapentin.  Patient would like to know if she should try Gabapentin and if it would react to any of her other medications. Patient has a follow up with them on the 24th. She states that if Starla Perkins, DO feels that she should try this could a prescription be sent in to [facility].  Olesya Salas LPN .............3/15/2021  3:22 PM

## 2021-03-15 NOTE — TELEPHONE ENCOUNTER
Based on the lack of improvement with all the other things we have tried, surgery may be reasonable. Depending on what she thought of the surgeon we can send her back to Dr. Valdivia here at the clinic or she can return to the [facility]. I think gabapentin would be reasonable and I will send in a low-dose that she can titrate up. She should follow-up next week as scheduled to see how she is doing on this.

## 2021-03-15 NOTE — TELEPHONE ENCOUNTER
Patient wants to follow up on her [facility] appointment with the doctor.  Also has questions on meds he is recommending. Please call    Hanane Taveras on 3/15/2021 at 1:51 PM

## 2021-03-18 NOTE — TELEPHONE ENCOUNTER
After verifying name and birth date, patient was notified of provider's response.   Betty Estevez([facility])........3/18/2021  9:15 AM

## 2021-03-23 ENCOUNTER — HOSPITAL ENCOUNTER (OUTPATIENT)
Dept: MRI IMAGING | Facility: OTHER | Age: 71
Discharge: HOME | End: 2021-03-23
Admitting: FAMILY MEDICINE
Payer: MEDICARE

## 2021-03-23 DIAGNOSIS — M48.062 SPINAL STENOSIS, LUMBAR REGION, WITH NEUROGENIC CLAUDICATION: ICD-10-CM

## 2021-03-23 PROCEDURE — 72148 MRI LUMBAR SPINE W/O DYE: CPT

## 2021-03-24 ENCOUNTER — ALLIED HEALTH/NURSE VISIT (OUTPATIENT)
Dept: FAMILY MEDICINE | Facility: OTHER | Age: 71
End: 2021-03-24
Attending: INTERNAL MEDICINE
Payer: MEDICARE

## 2021-03-24 ENCOUNTER — OFFICE VISIT (OUTPATIENT)
Dept: INTERNAL MEDICINE | Facility: OTHER | Age: 71
End: 2021-03-24
Attending: INTERNAL MEDICINE
Payer: MEDICARE

## 2021-03-24 VITALS
RESPIRATION RATE: 18 BRPM | BODY MASS INDEX: 29.77 KG/M2 | WEIGHT: 162.8 LBS | SYSTOLIC BLOOD PRESSURE: 124 MMHG | OXYGEN SATURATION: 97 % | DIASTOLIC BLOOD PRESSURE: 72 MMHG | HEART RATE: 96 BPM | TEMPERATURE: 98.3 F

## 2021-03-24 DIAGNOSIS — R52 BODY ACHES: Primary | ICD-10-CM

## 2021-03-24 DIAGNOSIS — M25.551 HIP PAIN, RIGHT: ICD-10-CM

## 2021-03-24 LAB
BASOPHILS # BLD AUTO: 0 10E9/L (ref 0–0.2)
BASOPHILS NFR BLD AUTO: 0.6 %
CRP SERPL-MCNC: 2.2 MG/L
DIFFERENTIAL METHOD BLD: NORMAL
EOSINOPHIL # BLD AUTO: 0.4 10E9/L (ref 0–0.7)
EOSINOPHIL NFR BLD AUTO: 5.1 %
ERYTHROCYTE [DISTWIDTH] IN BLOOD BY AUTOMATED COUNT: 13.2 % (ref 10–15)
ERYTHROCYTE [SEDIMENTATION RATE] IN BLOOD BY WESTERGREN METHOD: 20 MM/H (ref 1–15)
HCT VFR BLD AUTO: 40.1 % (ref 35–47)
HGB BLD-MCNC: 13.2 G/DL (ref 11.7–15.7)
IMM GRANULOCYTES # BLD: 0 10E9/L (ref 0–0.4)
IMM GRANULOCYTES NFR BLD: 0.3 %
LYMPHOCYTES # BLD AUTO: 2.4 10E9/L (ref 0.8–5.3)
LYMPHOCYTES NFR BLD AUTO: 34.4 %
MCH RBC QN AUTO: 29.1 PG (ref 26.5–33)
MCHC RBC AUTO-ENTMCNC: 32.9 G/DL (ref 31.5–36.5)
MCV RBC AUTO: 89 FL (ref 78–100)
MONOCYTES # BLD AUTO: 0.7 10E9/L (ref 0–1.3)
MONOCYTES NFR BLD AUTO: 9.3 %
NEUTROPHILS # BLD AUTO: 3.6 10E9/L (ref 1.6–8.3)
NEUTROPHILS NFR BLD AUTO: 50.3 %
PLATELET # BLD AUTO: 287 10E9/L (ref 150–450)
RBC # BLD AUTO: 4.53 10E12/L (ref 3.8–5.2)
SARS-COV-2 RNA RESP QL NAA+PROBE: NORMAL
SPECIMEN SOURCE: NORMAL
WBC # BLD AUTO: 7.1 10E9/L (ref 4–11)

## 2021-03-24 PROCEDURE — C9803 HOPD COVID-19 SPEC COLLECT: HCPCS

## 2021-03-24 PROCEDURE — 99214 OFFICE O/P EST MOD 30 MIN: CPT | Performed by: INTERNAL MEDICINE

## 2021-03-24 PROCEDURE — G0463 HOSPITAL OUTPT CLINIC VISIT: HCPCS

## 2021-03-24 PROCEDURE — U0003 INFECTIOUS AGENT DETECTION BY NUCLEIC ACID (DNA OR RNA); SEVERE ACUTE RESPIRATORY SYNDROME CORONAVIRUS 2 (SARS-COV-2) (CORONAVIRUS DISEASE [COVID-19]), AMPLIFIED PROBE TECHNIQUE, MAKING USE OF HIGH THROUGHPUT TECHNOLOGIES AS DESCRIBED BY CMS-2020-01-R: HCPCS | Mod: ZL | Performed by: INTERNAL MEDICINE

## 2021-03-24 PROCEDURE — 86618 LYME DISEASE ANTIBODY: CPT | Mod: ZL | Performed by: INTERNAL MEDICINE

## 2021-03-24 PROCEDURE — U0005 INFEC AGEN DETEC AMPLI PROBE: HCPCS | Mod: ZL | Performed by: INTERNAL MEDICINE

## 2021-03-24 PROCEDURE — 85025 COMPLETE CBC W/AUTO DIFF WBC: CPT | Mod: ZL | Performed by: INTERNAL MEDICINE

## 2021-03-24 PROCEDURE — 36415 COLL VENOUS BLD VENIPUNCTURE: CPT | Mod: ZL | Performed by: INTERNAL MEDICINE

## 2021-03-24 PROCEDURE — 86140 C-REACTIVE PROTEIN: CPT | Mod: ZL | Performed by: INTERNAL MEDICINE

## 2021-03-24 PROCEDURE — 85652 RBC SED RATE AUTOMATED: CPT | Mod: ZL | Performed by: INTERNAL MEDICINE

## 2021-03-24 ASSESSMENT — PAIN SCALES - GENERAL: PAINLEVEL: NO PAIN (0)

## 2021-03-24 NOTE — NURSING NOTE
Patient presents to clinic today for follow up on pain. She has had a headache and body aches and  hasn't felt well for quite some time (month or so). She was just screened for COVID today.  Medication reconciliation completed.  Betty Kong CMA([facility])..................3/24/2021   12:06 PM

## 2021-03-24 NOTE — PROGRESS NOTES
Chief Complaint   Patient presents with     RECHECK     pain management         HPI: Ms. Elizabeth is a 71 year old female who presents today for follow up of right hip pain.    She has continued to have hip pain.  She feels that her pain starts in the upper groin and radiates down the right leg.  She has been through an extensive work-up and see multiple specialist.  She has continued pain however.  She has been tried on narcotics, gabapentin, NSAIDs, none of which seem to help significantly.  It was recommended she consider back surgery although she is hesitant to pursue this.  She does feel that she gets improvement with leaning forward on a shopping cart and can do this for a prolonged period of time.  She otherwise has pain with walking.  She does not have any pain at rest.    She has been having some body aches off and on for the last month or 2.  She does not recall any specific illness.  She denies any obvious tick bites over the last year.    Past medical history reviewed as below:     Past Medical History:   Diagnosis Date     Benign essential hypertension 3/3/2018     Cataract      Chronic pain of right knee 4/11/2018     Diabetic eye exam (H) 06/07/2018     TAYLOR (generalized anxiety disorder) 3/3/2018     History of MI (myocardial infarction) 07/2017    follows yearly with [facility]     Macular degeneration      Major depressive disorder, single episode 1995     Obstructive sleep apnea 2005    BIPAP     Osteopenia 2007    Lumbar spine     Pure hypercholesterolemia 2001     Type 2 diabetes mellitus with complication, without long-term current use of insulin (H) 03/03/2018   .      ROS  Pertinent ROS was performed and was negative, including for fever, chills, chest pain, shortness of breath, increased lower extremity edema, changes in bowel or bladder, blood in the stool, difficulty swallowing, sores in the mouth. No other concerns, with exception of HPI above.      EXAM:   /72 (BP Location: Right arm,  "Patient Position: Sitting, Cuff Size: Adult Regular)   Pulse 96   Temp 98.3  F (36.8  C) (Tympanic)   Resp 18   Wt 73.8 kg (162 lb 12.8 oz)   SpO2 97%   BMI 29.77 kg/m      Estimated body mass index is 29.77 kg/m  as calculated from the following:    Height as of 2/26/21: 1.575 m (5' 2.01\").    Weight as of this encounter: 73.8 kg (162 lb 12.8 oz).      GEN: Vitals reviewed. Healthy appearing. Patient is in no acute distress. Cooperative with exam.  HEENT: Normocephalic atraumatic.  Eyes grossly normal to inspection.  No discharge or erythema, or obvious scleral/conjunctival abnormalities.   LUNGS: No audible wheeze, cough, or visible cyanosis.  No visible retractions or increased work of breathing.   SKIN: Warm and dry to touch.  Visible skin clear. No significant rash, abnormal pigmentation or lesions.  EXT: No clubbing or cyanosis.  No peripheral edema.  PSYCH: Mood is good.  Mentation appears normal, affect normal/bright, judgement and insight intact, normal speech and appearance well-groomed.     ASSESSMENT AND PLAN:    Body aches  -Exact cause of her recent body aches is unknown.  Inflammatory marker is increased slightly however CBC and Lyme testing are negative for any infection.  - Lyme Disease Ab with reflex to WB Serum  - CRP inflammation  - Sedimentation Rate (ESR)  - CBC and Differential    Hip pain, right  We had a long discussion today regarding her hip pain and the pros and cons of surgical intervention.  She is hesitant to pursue surgery for her back as she is unsure how much this may be beneficial.  She is interested in possibly getting additional imaging of her hip.  We discussed that likely it would be best to discuss this further with orthopedics.  I will contact them when they are in office next week to determine whether repeat MRI versus arthrogram would be more beneficial.  We discussed that it may still reflect back pain but we may not know until surgical intervention is undertaken.  - " CBC and Differential         Return if symptoms worsen or fail to improve.    33 minutes spent on the date of the encounter doing chart review, history and exam, documentation and further activities as noted above      CHULA ROBBINS DO   3/24/2021 12:12 PM    This document was prepared using voice generated softwear. While every attempt was made for accuracy, grammatical errors may exist.

## 2021-03-25 LAB
B BURGDOR IGG+IGM SER QL: 0.17 (ref 0–0.89)
LABORATORY COMMENT REPORT: NORMAL
SARS-COV-2 RNA RESP QL NAA+PROBE: NEGATIVE
SPECIMEN SOURCE: NORMAL

## 2021-04-22 ENCOUNTER — TRANSFERRED RECORDS (OUTPATIENT)
Dept: HEALTH INFORMATION MANAGEMENT | Facility: OTHER | Age: 71
End: 2021-04-22

## 2021-05-07 ENCOUNTER — HOSPITAL ENCOUNTER (OUTPATIENT)
Dept: GENERAL RADIOLOGY | Facility: OTHER | Age: 71
Discharge: HOME | End: 2021-05-07
Admitting: FAMILY MEDICINE
Payer: MEDICARE

## 2021-05-07 DIAGNOSIS — M54.50 ACUTE LOW BACK PAIN: ICD-10-CM

## 2021-05-07 PROCEDURE — 72100 X-RAY EXAM L-S SPINE 2/3 VWS: CPT

## 2021-05-14 ENCOUNTER — TELEPHONE (OUTPATIENT)
Dept: INTERNAL MEDICINE | Facility: OTHER | Age: 71
End: 2021-05-14

## 2021-05-14 NOTE — TELEPHONE ENCOUNTER
Spoke to patient, notified of information to get form.   Polly Landeros LPN .............5/14/2021     4:23 PM

## 2021-05-14 NOTE — TELEPHONE ENCOUNTER
Pt. Called requesting response from nurse regarding paperwork status.    Diana Peterson on 5/14/2021 at 3:24 PM

## 2021-05-14 NOTE — TELEPHONE ENCOUNTER
Patient states she dropped off paperwork a week ago, forms were supposed to be mailed in but found out that she should bring them to the [employer] herself. Notified form has been completed and scanned in her chart. She would like to  a copy notified to go to release of information. Patient lost service or was disconnected. Tried to call back, left a message to return call.  Polly Landeros LPN .............5/14/2021     4:12 PM

## 2021-05-28 DIAGNOSIS — F32.A ANXIETY AND DEPRESSION: ICD-10-CM

## 2021-05-28 DIAGNOSIS — F41.9 ANXIETY AND DEPRESSION: ICD-10-CM

## 2021-05-28 RX ORDER — DULOXETIN HYDROCHLORIDE 30 MG/1
CAPSULE, DELAYED RELEASE ORAL
Qty: 180 CAPSULE | Refills: 3 | OUTPATIENT
Start: 2021-05-28

## 2021-05-28 NOTE — TELEPHONE ENCOUNTER
DULoxetine (CYMBALTA) 30 MG capsule 180 capsule 3 8/13/2020  No   Sig: TAKE 2 CAPSULES BY MOUTH DAILY     To [facility]    Debbie Jackson RN on 5/28/2021 at 11:04 AM

## 2021-06-08 DIAGNOSIS — F32.A ANXIETY AND DEPRESSION: ICD-10-CM

## 2021-06-08 DIAGNOSIS — F41.9 ANXIETY AND DEPRESSION: ICD-10-CM

## 2021-06-09 RX ORDER — DULOXETIN HYDROCHLORIDE 30 MG/1
CAPSULE, DELAYED RELEASE ORAL
Qty: 180 CAPSULE | Refills: 3 | OUTPATIENT
Start: 2021-06-09

## 2021-06-09 NOTE — TELEPHONE ENCOUNTER
DULoxetine (CYMBALTA) 30 MG capsule 180 capsule 3 8/13/2020  No   Sig: TAKE 2 CAPSULES BY MOUTH DAILY     To [facility]    Debbie Jackson RN on 6/9/2021 at 3:58 PM

## 2021-06-28 DIAGNOSIS — G47.00 INSOMNIA, UNSPECIFIED TYPE: Primary | ICD-10-CM

## 2021-06-29 DIAGNOSIS — M25.551 HIP PAIN, RIGHT: ICD-10-CM

## 2021-06-29 RX ORDER — NAPROXEN 250 MG/1
250 TABLET ORAL DAILY PRN
Qty: 10 TABLET | Refills: 1 | Status: SHIPPED | OUTPATIENT
Start: 2021-06-29 | End: 2021-09-09

## 2021-06-29 NOTE — TELEPHONE ENCOUNTER
Routing refill request to provider for review/approval because:  Normal ALT on file in past 12 months Protocol Details      Normal AST on file in past 12 months     Patient is age 6-64 years     LOV: 3/24/2021    Debbie Jackson RN on 6/29/2021 at 11:37 AM

## 2021-06-30 RX ORDER — TRAZODONE HYDROCHLORIDE 50 MG/1
50-100 TABLET, FILM COATED ORAL AT BEDTIME
Qty: 60 TABLET | Refills: 1 | Status: SHIPPED | OUTPATIENT
Start: 2021-06-30 | End: 2021-09-09

## 2021-06-30 NOTE — TELEPHONE ENCOUNTER
Given patient has not used it in a long time, I would recommend starting with the lower dose and increasing to 100mg nightly if needed.  She should dispose of any medications older than 3-4 years appropriately.

## 2021-06-30 NOTE — TELEPHONE ENCOUNTER
Called and informed patient of Dr. Perkins's response and patient verbalized understanding.    Debbie Jackson RN on 6/30/2021 at 4:21 PM

## 2021-06-30 NOTE — TELEPHONE ENCOUNTER
Attempted to contact patient and no answer , left message to return call.  Debbie Jackson RN on 6/30/2021 at 2:11 PM

## 2021-07-07 ENCOUNTER — ALLIED HEALTH/NURSE VISIT (OUTPATIENT)
Dept: FAMILY MEDICINE | Facility: OTHER | Age: 71
End: 2021-07-07
Attending: FAMILY MEDICINE
Payer: MEDICARE

## 2021-07-07 DIAGNOSIS — R43.2 LOSS OF TASTE: Primary | ICD-10-CM

## 2021-07-07 LAB
SARS-COV-2 RNA RESP QL NAA+PROBE: NORMAL
SPECIMEN SOURCE: NORMAL

## 2021-07-07 PROCEDURE — U0003 INFECTIOUS AGENT DETECTION BY NUCLEIC ACID (DNA OR RNA); SEVERE ACUTE RESPIRATORY SYNDROME CORONAVIRUS 2 (SARS-COV-2) (CORONAVIRUS DISEASE [COVID-19]), AMPLIFIED PROBE TECHNIQUE, MAKING USE OF HIGH THROUGHPUT TECHNOLOGIES AS DESCRIBED BY CMS-2020-01-R: HCPCS | Mod: ZL | Performed by: FAMILY MEDICINE

## 2021-07-07 PROCEDURE — C9803 HOPD COVID-19 SPEC COLLECT: HCPCS

## 2021-07-07 PROCEDURE — G0463 HOSPITAL OUTPT CLINIC VISIT: HCPCS

## 2021-07-07 PROCEDURE — U0005 INFEC AGEN DETEC AMPLI PROBE: HCPCS | Mod: ZL | Performed by: FAMILY MEDICINE

## 2021-07-08 LAB
LABORATORY COMMENT REPORT: NORMAL
SARS-COV-2 RNA RESP QL NAA+PROBE: NEGATIVE
SPECIMEN SOURCE: NORMAL

## 2021-07-28 DIAGNOSIS — F41.9 ANXIETY AND DEPRESSION: ICD-10-CM

## 2021-07-28 DIAGNOSIS — F32.A ANXIETY AND DEPRESSION: ICD-10-CM

## 2021-07-28 RX ORDER — DULOXETIN HYDROCHLORIDE 30 MG/1
CAPSULE, DELAYED RELEASE ORAL
Qty: 180 CAPSULE | Refills: 0 | Status: SHIPPED | OUTPATIENT
Start: 2021-07-28 | End: 2021-09-09

## 2021-07-28 NOTE — TELEPHONE ENCOUNTER
Routing refill request to provider for review/approval because:  Labs out of range:  PHQ9    LOV:3/24/2021  Patient noted to have appointment on 9/9 with Dr. Maddie Jackson, RN on 7/28/2021 at 11:45 AM

## 2021-08-01 DIAGNOSIS — F41.9 ANXIETY AND DEPRESSION: ICD-10-CM

## 2021-08-01 DIAGNOSIS — F32.A ANXIETY AND DEPRESSION: ICD-10-CM

## 2021-08-03 RX ORDER — DULOXETIN HYDROCHLORIDE 30 MG/1
CAPSULE, DELAYED RELEASE ORAL
Qty: 180 CAPSULE | Refills: 0 | OUTPATIENT
Start: 2021-08-03

## 2021-08-03 NOTE — TELEPHONE ENCOUNTER
[id number] sent Rx request for the following:      Requested Prescriptions   Pending Prescriptions Disp Refills     DULoxetine (CYMBALTA) 30 MG capsule [Pharmacy Med Name: DULOXETINE 30MG DR CAPSULE] 180 capsule 0     Sig: TAKE 2 CAPSULES BY MOUTH DAILY       Serotonin-Norepinephrine Reuptake Inhibitors  Failed - 8/3/2021 10:45 AM        Failed - PHQ-9 score of less than 5 in past 6 months     Please review last PHQ-9 score.             Last Prescription Date:   7/28/21  Last Fill Qty/Refills:         180, R-0    Last Office Visit:              3/24/21  Future Office visit:           9/9/21  Routing refill request to provider for review/approval because:  Outpatient Medication Detail     Disp Refills Start End SAVANAH   DULoxetine (CYMBALTA) 30 MG capsule 180 capsule 0 7/28/2021  No   Sig: TAKE 2 CAPSULES BY MOUTH DAILY   Sent to pharmacy as: DULoxetine HCl 30 MG Oral Capsule Delayed Release Particles (CYMBALTA)   Class: E-Prescribe   Order: [id number]   E-Prescribing Status: Receipt confirmed by pharmacy (7/28/2021 12:28 PM CDT       PHQ -9 sent via my chart for completion. Refill request to soon, Just refill on 7/28/21.  Pily Powell RN on 8/3/2021 at 10:53 AM

## 2021-08-14 ENCOUNTER — HEALTH MAINTENANCE LETTER (OUTPATIENT)
Age: 71
End: 2021-08-14

## 2021-08-18 ENCOUNTER — TELEPHONE (OUTPATIENT)
Dept: INTERNAL MEDICINE | Facility: OTHER | Age: 71
End: 2021-08-18

## 2021-08-23 ENCOUNTER — TELEPHONE (OUTPATIENT)
Dept: INTERNAL MEDICINE | Facility: OTHER | Age: 71
End: 2021-08-23
Payer: MEDICARE

## 2021-08-23 DIAGNOSIS — Z87.891 PERSONAL HISTORY OF TOBACCO USE: Primary | ICD-10-CM

## 2021-08-23 DIAGNOSIS — L98.8 WRINKLES: ICD-10-CM

## 2021-08-23 RX ORDER — TRETINOIN 0.5 MG/G
1 CREAM TOPICAL AT BEDTIME
Qty: 45 G | Refills: 1 | Status: SHIPPED | OUTPATIENT
Start: 2021-08-23 | End: 2022-11-08

## 2021-08-23 NOTE — TELEPHONE ENCOUNTER
Please send new prescription to [facility] for Retin A as [facility] and [facility] is too expensive.    Also needs an order for CT Lung for cancer screen ordered.  Betty Kong CMA([facility])..................8/23/2021   5:13 PM

## 2021-08-23 NOTE — TELEPHONE ENCOUNTER
[facility]-is too expensive for the Tretinoin Cream 45oz Patient weight not available.% [facility] wants you to call so they can send over to [facility]-today

## 2021-08-23 NOTE — TELEPHONE ENCOUNTER
Prescription sent in, CT order placed    See [facility] message for shared decision making    I did not offer risk estimation using a calculator such as this one:    ShouldIScreen

## 2021-09-01 DIAGNOSIS — F32.A ANXIETY AND DEPRESSION: ICD-10-CM

## 2021-09-01 DIAGNOSIS — F41.9 ANXIETY AND DEPRESSION: ICD-10-CM

## 2021-09-01 DIAGNOSIS — I10 ESSENTIAL HYPERTENSION: ICD-10-CM

## 2021-09-01 DIAGNOSIS — E78.2 MIXED HYPERLIPIDEMIA: ICD-10-CM

## 2021-09-01 DIAGNOSIS — E11.9 TYPE 2 DIABETES MELLITUS WITHOUT COMPLICATION, WITHOUT LONG-TERM CURRENT USE OF INSULIN (H): ICD-10-CM

## 2021-09-03 RX ORDER — BUPROPION HYDROCHLORIDE 300 MG/1
300 TABLET ORAL DAILY
Qty: 90 TABLET | Refills: 3 | Status: SHIPPED | OUTPATIENT
Start: 2021-09-03 | End: 2022-08-29

## 2021-09-03 RX ORDER — LOSARTAN POTASSIUM AND HYDROCHLOROTHIAZIDE 12.5; 1 MG/1; MG/1
1 TABLET ORAL DAILY
Qty: 90 TABLET | Refills: 2 | Status: SHIPPED | OUTPATIENT
Start: 2021-09-03 | End: 2021-09-09

## 2021-09-03 RX ORDER — ROSUVASTATIN CALCIUM 40 MG/1
40 TABLET, COATED ORAL DAILY
Qty: 90 TABLET | Refills: 3 | Status: ON HOLD | OUTPATIENT
Start: 2021-09-03 | End: 2022-03-23

## 2021-09-03 NOTE — TELEPHONE ENCOUNTER
Requested Prescriptions   Pending Prescriptions Disp Refills     metFORMIN (GLUCOPHAGE) 500 MG tablet [Pharmacy Med Name: METFORMIN 500MG TABLET] 90 tablet 1     Sig: TAKE 1/2 TABLET BY MOUTH TWICE A DAY WITH MEALS       Biguanide Agents Failed - 9/1/2021  1:18 AM        Failed - Patient has documented A1c within the specified period of time.     If HgbA1C is 8 or greater, it needs to be on file within the past 3 months.  If less than 8, must be on file within the past 6 months.     Recent Labs   Lab Test 02/01/21  1143 07/07/17  1253   A1C 7.0*  --    [id number]  --  6.6*          Last Written Prescription Date:  9/11/20  Last Fill Quantity: 90,   # refills: 2  Last Office Visit: 3/4/21 Maddie  Future Office visit:    Next 5 appointments (look out 90 days)    Sep 09, 2021 10:20 AM  PHYSICAL with Lili Perkins, [facility] ([facility] ) [address]  [place] [id number] [phone]        Routing refill request to provider for review/approval because:  Unable to fill prescription refills per RN Med Refill Policy.  Brenda J. Goodell, RN on 9/3/2021 at 10:55 AM

## 2021-09-03 NOTE — TELEPHONE ENCOUNTER
[id number] sent Rx request for the following:      Requested Prescriptions   Pending Prescriptions Disp Refills     buPROPion (WELLBUTRIN XL) 300 MG 24 hr tablet [Pharmacy Med Name: BUPROPION 300MG ER (XL) TABLET] 90 tablet 3     Sig: TAKE 1 TABLET (300 MG) BY MOUTH DAILY       SSRIs Protocol Failed - 9/3/2021 11:09 AM        Failed - PHQ-9 score less than 5 in past 6 months     Please review last PHQ-9 score.      Last Prescription Date:   8/13/20  Last Fill Qty/Refills:         90, R-4          losartan-hydrochlorothiazide (HYZAAR) 100-12.5 MG tablet [Pharmacy Med Name: LOSARTAN/HCTZ 100MG-12.5MG TAB] 90 tablet 2     Sig: TAKE 1 TABLET BY MOUTH DAILY       Angiotensin-II Receptors Passed - 9/3/2021 11:10 AM   Last Prescription Date:   8/13/20  Last Fill Qty/Refills:         90, R-3       rosuvastatin (CRESTOR) 40 MG tablet [Pharmacy Med Name: ROSUVASTATIN 40MG TABLET] 90 tablet 3     Sig: TAKE 1 TABLET (40 MG) BY MOUTH DAILY       Statins Protocol Failed - 9/3/2021 11:10 AM        Failed - LDL on file in past 12 months     Recent Labs   Lab Test 06/09/20  1202   LDL 22           Last Prescription Date:   8/13/20  Last Fill Qty/Refills:         90, R-4    Last Office Visit:              3/24/21  Future Office visit:           9/9/21  Routing refill request to provider for review/approval because:    Does not meet Rn refill criteria. Will route to provider for review and consideration. Pily Poewll RN on 9/3/2021 at 11:20 AM

## 2021-09-07 ENCOUNTER — HOSPITAL ENCOUNTER (OUTPATIENT)
Dept: MAMMOGRAPHY | Facility: OTHER | Age: 71
End: 2021-09-07
Attending: INTERNAL MEDICINE
Payer: MEDICARE

## 2021-09-07 ENCOUNTER — HOSPITAL ENCOUNTER (OUTPATIENT)
Dept: CT IMAGING | Facility: OTHER | Age: 71
End: 2021-09-07
Attending: INTERNAL MEDICINE
Payer: MEDICARE

## 2021-09-07 DIAGNOSIS — Z87.891 PERSONAL HISTORY OF TOBACCO USE: ICD-10-CM

## 2021-09-07 DIAGNOSIS — Z12.31 VISIT FOR SCREENING MAMMOGRAM: ICD-10-CM

## 2021-09-07 PROCEDURE — 71271 CT THORAX LUNG CANCER SCR C-: CPT | Mod: ME

## 2021-09-07 PROCEDURE — 77063 BREAST TOMOSYNTHESIS BI: CPT

## 2021-09-09 ENCOUNTER — HOSPITAL ENCOUNTER (OUTPATIENT)
Dept: GENERAL RADIOLOGY | Facility: OTHER | Age: 71
End: 2021-09-09
Attending: INTERNAL MEDICINE
Payer: MEDICARE

## 2021-09-09 ENCOUNTER — OFFICE VISIT (OUTPATIENT)
Dept: INTERNAL MEDICINE | Facility: OTHER | Age: 71
End: 2021-09-09
Attending: INTERNAL MEDICINE
Payer: MEDICARE

## 2021-09-09 VITALS
HEART RATE: 88 BPM | WEIGHT: 159.4 LBS | TEMPERATURE: 97.9 F | DIASTOLIC BLOOD PRESSURE: 62 MMHG | HEIGHT: 62 IN | SYSTOLIC BLOOD PRESSURE: 126 MMHG | BODY MASS INDEX: 29.33 KG/M2 | RESPIRATION RATE: 16 BRPM

## 2021-09-09 DIAGNOSIS — M53.9 MULTILEVEL DEGENERATIVE DISC DISEASE: ICD-10-CM

## 2021-09-09 DIAGNOSIS — M54.9 CHRONIC RIGHT-SIDED BACK PAIN, UNSPECIFIED BACK LOCATION: ICD-10-CM

## 2021-09-09 DIAGNOSIS — F32.A ANXIETY AND DEPRESSION: ICD-10-CM

## 2021-09-09 DIAGNOSIS — F41.9 ANXIETY AND DEPRESSION: ICD-10-CM

## 2021-09-09 DIAGNOSIS — G47.00 INSOMNIA, UNSPECIFIED TYPE: ICD-10-CM

## 2021-09-09 DIAGNOSIS — M54.2 NECK PAIN: ICD-10-CM

## 2021-09-09 DIAGNOSIS — G89.29 CHRONIC RIGHT-SIDED BACK PAIN, UNSPECIFIED BACK LOCATION: ICD-10-CM

## 2021-09-09 DIAGNOSIS — Z00.00 ENCOUNTER FOR MEDICARE ANNUAL WELLNESS EXAM: Primary | ICD-10-CM

## 2021-09-09 DIAGNOSIS — M43.16 SPONDYLOLISTHESIS OF LUMBAR REGION: ICD-10-CM

## 2021-09-09 DIAGNOSIS — R20.0 NUMBNESS OF TOES: ICD-10-CM

## 2021-09-09 DIAGNOSIS — E11.9 TYPE 2 DIABETES MELLITUS WITHOUT COMPLICATION, WITHOUT LONG-TERM CURRENT USE OF INSULIN (H): ICD-10-CM

## 2021-09-09 DIAGNOSIS — I10 ESSENTIAL HYPERTENSION: ICD-10-CM

## 2021-09-09 DIAGNOSIS — R09.89 DECREASED PULSES IN FEET: ICD-10-CM

## 2021-09-09 DIAGNOSIS — I77.819 AORTIC DILATATION (H): ICD-10-CM

## 2021-09-09 DIAGNOSIS — F41.9 ANXIETY: ICD-10-CM

## 2021-09-09 DIAGNOSIS — I25.10 ASCVD (ARTERIOSCLEROTIC CARDIOVASCULAR DISEASE): ICD-10-CM

## 2021-09-09 DIAGNOSIS — Z12.11 SCREENING FOR COLON CANCER: ICD-10-CM

## 2021-09-09 LAB
ALBUMIN SERPL-MCNC: 4.8 G/DL (ref 3.5–5.7)
ALP SERPL-CCNC: 89 U/L (ref 34–104)
ALT SERPL W P-5'-P-CCNC: 42 U/L (ref 7–52)
ANION GAP SERPL CALCULATED.3IONS-SCNC: 9 MMOL/L (ref 3–14)
AST SERPL W P-5'-P-CCNC: 41 U/L (ref 13–39)
BILIRUB SERPL-MCNC: 0.6 MG/DL (ref 0.3–1)
BUN SERPL-MCNC: 21 MG/DL (ref 7–25)
CALCIUM SERPL-MCNC: 10.2 MG/DL (ref 8.6–10.3)
CHLORIDE BLD-SCNC: 99 MMOL/L (ref 98–107)
CHOLEST SERPL-MCNC: 116 MG/DL
CO2 SERPL-SCNC: 27 MMOL/L (ref 21–31)
CREAT SERPL-MCNC: 1.19 MG/DL (ref 0.6–1.2)
ERYTHROCYTE [DISTWIDTH] IN BLOOD BY AUTOMATED COUNT: 13.7 % (ref 10–15)
FASTING STATUS PATIENT QL REPORTED: YES
GFR SERPL CREATININE-BSD FRML MDRD: 46 ML/MIN/1.73M2
GLUCOSE BLD-MCNC: 143 MG/DL (ref 70–105)
HBA1C MFR BLD: 7.3 % (ref 4–6.2)
HCT VFR BLD AUTO: 44.4 % (ref 35–47)
HDLC SERPL-MCNC: 46 MG/DL (ref 23–92)
HGB BLD-MCNC: 14.7 G/DL (ref 11.7–15.7)
LDLC SERPL CALC-MCNC: 36 MG/DL
MAGNESIUM SERPL-MCNC: 2.2 MG/DL (ref 1.9–2.7)
MCH RBC QN AUTO: 29.3 PG (ref 26.5–33)
MCHC RBC AUTO-ENTMCNC: 33.1 G/DL (ref 31.5–36.5)
MCV RBC AUTO: 89 FL (ref 78–100)
NONHDLC SERPL-MCNC: 70 MG/DL
PLATELET # BLD AUTO: 328 10E3/UL (ref 150–450)
POTASSIUM BLD-SCNC: 3.8 MMOL/L (ref 3.5–5.1)
PROT SERPL-MCNC: 7.9 G/DL (ref 6.4–8.9)
RBC # BLD AUTO: 5.01 10E6/UL (ref 3.8–5.2)
SODIUM SERPL-SCNC: 135 MMOL/L (ref 134–144)
TRIGL SERPL-MCNC: 168 MG/DL
VIT B12 SERPL-MCNC: >1500 PG/ML (ref 180–914)
WBC # BLD AUTO: 9.6 10E3/UL (ref 4–11)

## 2021-09-09 PROCEDURE — 99214 OFFICE O/P EST MOD 30 MIN: CPT | Mod: 25 | Performed by: INTERNAL MEDICINE

## 2021-09-09 PROCEDURE — 72040 X-RAY EXAM NECK SPINE 2-3 VW: CPT

## 2021-09-09 PROCEDURE — 82607 VITAMIN B-12: CPT | Mod: ZL | Performed by: INTERNAL MEDICINE

## 2021-09-09 PROCEDURE — 82043 UR ALBUMIN QUANTITATIVE: CPT | Mod: ZL | Performed by: INTERNAL MEDICINE

## 2021-09-09 PROCEDURE — 85027 COMPLETE CBC AUTOMATED: CPT | Mod: ZL | Performed by: INTERNAL MEDICINE

## 2021-09-09 PROCEDURE — 83735 ASSAY OF MAGNESIUM: CPT | Mod: ZL | Performed by: INTERNAL MEDICINE

## 2021-09-09 PROCEDURE — G0439 PPPS, SUBSEQ VISIT: HCPCS | Performed by: INTERNAL MEDICINE

## 2021-09-09 PROCEDURE — 36415 COLL VENOUS BLD VENIPUNCTURE: CPT | Mod: ZL | Performed by: INTERNAL MEDICINE

## 2021-09-09 PROCEDURE — 83036 HEMOGLOBIN GLYCOSYLATED A1C: CPT | Mod: ZL | Performed by: INTERNAL MEDICINE

## 2021-09-09 PROCEDURE — G0463 HOSPITAL OUTPT CLINIC VISIT: HCPCS | Mod: 25

## 2021-09-09 PROCEDURE — 80053 COMPREHEN METABOLIC PANEL: CPT | Mod: ZL | Performed by: INTERNAL MEDICINE

## 2021-09-09 PROCEDURE — 80061 LIPID PANEL: CPT | Mod: ZL | Performed by: INTERNAL MEDICINE

## 2021-09-09 RX ORDER — DULOXETIN HYDROCHLORIDE 30 MG/1
CAPSULE, DELAYED RELEASE ORAL
Qty: 180 CAPSULE | Refills: 3 | Status: SHIPPED | OUTPATIENT
Start: 2021-09-09 | End: 2022-10-31

## 2021-09-09 RX ORDER — TRAZODONE HYDROCHLORIDE 50 MG/1
50-100 TABLET, FILM COATED ORAL AT BEDTIME
Qty: 180 TABLET | Refills: 0 | Status: SHIPPED | OUTPATIENT
Start: 2021-09-09 | End: 2022-03-07

## 2021-09-09 RX ORDER — MULTIVIT-MIN/IRON/FOLIC ACID/K 18-600-40
1 CAPSULE ORAL DAILY
COMMUNITY

## 2021-09-09 RX ORDER — LOSARTAN POTASSIUM AND HYDROCHLOROTHIAZIDE 12.5; 1 MG/1; MG/1
1 TABLET ORAL DAILY
Qty: 90 TABLET | Refills: 4 | Status: SHIPPED | OUTPATIENT
Start: 2021-09-09 | End: 2022-10-31

## 2021-09-09 RX ORDER — ASCORBIC ACID 100 MG
TABLET,CHEWABLE ORAL
COMMUNITY
End: 2021-11-10

## 2021-09-09 ASSESSMENT — ANXIETY QUESTIONNAIRES
7. FEELING AFRAID AS IF SOMETHING AWFUL MIGHT HAPPEN: NOT AT ALL
3. WORRYING TOO MUCH ABOUT DIFFERENT THINGS: NOT AT ALL
5. BEING SO RESTLESS THAT IT IS HARD TO SIT STILL: NOT AT ALL
6. BECOMING EASILY ANNOYED OR IRRITABLE: NOT AT ALL
GAD7 TOTAL SCORE: 0
GAD7 TOTAL SCORE: 0
1. FEELING NERVOUS, ANXIOUS, OR ON EDGE: NOT AT ALL
7. FEELING AFRAID AS IF SOMETHING AWFUL MIGHT HAPPEN: NOT AT ALL
4. TROUBLE RELAXING: NOT AT ALL
2. NOT BEING ABLE TO STOP OR CONTROL WORRYING: NOT AT ALL
8. IF YOU CHECKED OFF ANY PROBLEMS, HOW DIFFICULT HAVE THESE MADE IT FOR YOU TO DO YOUR WORK, TAKE CARE OF THINGS AT HOME, OR GET ALONG WITH OTHER PEOPLE?: NOT DIFFICULT AT ALL
GAD7 TOTAL SCORE: 0

## 2021-09-09 ASSESSMENT — PATIENT HEALTH QUESTIONNAIRE - PHQ9
SUM OF ALL RESPONSES TO PHQ QUESTIONS 1-9: 9
10. IF YOU CHECKED OFF ANY PROBLEMS, HOW DIFFICULT HAVE THESE PROBLEMS MADE IT FOR YOU TO DO YOUR WORK, TAKE CARE OF THINGS AT HOME, OR GET ALONG WITH OTHER PEOPLE: SOMEWHAT DIFFICULT
SUM OF ALL RESPONSES TO PHQ QUESTIONS 1-9: 9

## 2021-09-09 ASSESSMENT — PAIN SCALES - GENERAL: PAINLEVEL: NO PAIN (0)

## 2021-09-09 ASSESSMENT — MIFFLIN-ST. JEOR: SCORE: 1190.25

## 2021-09-09 NOTE — NURSING NOTE
"Chief Complaint   Patient presents with     Medicare Visit     annual wellness       Initial /62 (BP Location: Right arm, Patient Position: Sitting, Cuff Size: Adult Regular)   Pulse 88   Temp 97.9  F (36.6  C) (Tympanic)   Resp 16   Ht 1.581 m (5' 2.25\")   Wt 72.3 kg (159 lb 6.4 oz)   LMP  (LMP Unknown)   Breastfeeding No   BMI 28.92 kg/m   Estimated body mass index is 28.92 kg/m  as calculated from the following:    Height as of this encounter: 1.581 m (5' 2.25\").    Weight as of this encounter: 72.3 kg (159 lb 6.4 oz).  Medication Reconciliation: Completed     Advanced Care Directive Reviewed    Norma Mayes LPN  "

## 2021-09-09 NOTE — PROGRESS NOTES
Medicare Wellness Visit   Ms. Elizabeth is a 71 year old female who presents today who presents for Preventive Visit.      Are you in the first 12 months of your Medicare coverage?  No    Health Risk Assessment     Do you feel safe in your environment? Yes    Physical Health:    In general, how would you rate your overall physical health? good    Outside of work, how many days during the week do you exercise? none    Outside of work, approximately how many minutes a day do you exercise?not applicable  If you drink alcohol do you typically have >3 drinks per day or >7 drinks per week? Yes - AUDIT SCORE:  4  AUDIT - Alcohol Use Disorders Identification Test - Reproduced from the World Health Organization Audit 2001 (Second Edition) 9/9/2021   1.  How often do you have a drink containing alcohol? 4 or more times a week   2.  How many drinks containing alcohol do you have on a typical day when you are drinking? 1 or 2   3.  How often do you have five or more drinks on one occasion? Never   4.  How often during the last year have you found that you were not able to stop drinking once you had started? Never   5.  How often during the last year have you failed to do what was normally expected of you because of drinking? Never   6.  How often during the last year have you needed a first drink in the morning to get yourself going after a heavy drinking session? Never   7.  How often during the last year have you had a feeling of guilt or remorse after drinking? Never   8.  How often during the last year have you been unable to remember what happened the night before because of your drinking? Never   9.  Have you or someone else been injured because of your drinking? No   10. Has a relative, friend, doctor or other health care worker been concerned about your drinking or suggested you cut down? No   TOTAL SCORE 4       Do you usually eat at least 4 servings of fruit and vegetables a day, include whole grains & fiber and avoid  "regularly eating high fat or \"junk\" foods? NO    Do you have any problems taking medications regularly?  No    Do you have any side effects from medications? none    Needs assistance for the following daily activities: no assistance needed    Which of the following safety concerns are present in your home?  none identified     Hearing impairment: No    In the past 6 months, have you been bothered by leaking of urine? no      Screening     Fall risk  Fallen 2 or more times in the past year?: (P) No  Any fall with injury in the past year?: (P) No    Cognitive Screening   1) Repeat 3 items (Leader, Season, Table)    2) Clock draw: NORMAL  3) 3 item recall: Recalls 3 objects  Results: 3 items recalled: COGNITIVE IMPAIRMENT LESS LIKELY    Mini-CogTM Copyright S Kelly. Licensed by the author for use in [employer]; reprinted with permission (claudy@[facility]). All rights reserved.      Do you have sleep apnea, excessive snoring or daytime drowsiness?: yes    Breast cancer screening: done this week    Regular dental visits: within last month    Eye exam with in 2 years: Schedule in 11/2021      End of Life Planning     Have you ever done Advance Care Planning? (For example, a Health Directive, POLST, or a discussion with a medical provider or your loved ones about your wishes): No, advance care planning information given to patient to review.  Advanced care planning was discussed at today's visit.      End of Life Planning:  Patient currently has an advanced directive: No.  I have verified the patient's ablity to prepare an advanced directive/make health care decisions.  Literature was provided to assist patient in preparing an advanced directive.        Medical Record Update     Reviewed and updated as needed this visit by clinical staff  Tobacco  Allergies  Meds  Med Hx  Surg Hx  Fam Hx  Soc Hx      Reviewed and updated as needed this visit by Provider  Tobacco  Allergies  Meds  Problems  Med Hx  Surg " Hx  Fam Hx            Current providers sharing in care for this patient include:   Patient Care Team:  Lili Perkins DO as PCP - General (Internal Medicine)  Lili Perkins DO as Assigned PCP  Annamaria Reagan NP as Nurse Practitioner (Internal Medicine)  Abhinav Jeronimo MD as Assigned Musculoskeletal Provider     Subjective:   She has a multitude of medical issues and is due for renewal of her medications.  She denies any side effects from the medications that she is aware of.    She has a history of depression and anxiety and continues on bupropion and duloxetine.  She also takes trazodone for sleep.  She denies any obvious side effects.  She is on Metformin twice daily for her diabetes.  It has traditionally been controlled.  She continues on losartan/hydrochlorothiazide for her blood pressure.  Her blood pressure today is controlled.  She is on rosuvastatin for cholesterol and denies any obvious muscle aching that she is aware of.  She does continue on a baby aspirin.    She has had ongoing right neck pain.  She is taking Tylenol before bed that helps somewhat.  She at times needs additional Tylenol in the morning.  She has had this over a year.  She has done therapy.  She has not been able to sleep lately because of this.  She also continues to have back pain in the lower back and right hip.  She has tried a multitude of medications including Tylenol 3, gabapentin, NSAIDs with minimal improvement.  She has been seen by orthopedics and neurosurgery.  She is due to get back to them.    She has had some numbness in her feet.  Specifically the toes.  She also has a history of aortic dilatation and is due for a reevaluation of this.    She has had some concern for underlying coronary disease with ongoing fatigue and some SOB and with her history would be at risk of this.  She had a stress test in 2019 that did not show any abnormality.    Review of Systems     GEN: -fevers/-chills/+chronic night sweats/-wt  "change  NEURO: +headaches because of pain/-vision changes  EARS: -hearing changes/-tinnitus  NOSE: -drainage/-congestion  MOUTH/THROAT: - sore throat/-dysphagia/-sores  LUNGS: -sob/-cough  CARDIOVASCULAR: -cp/-palpitations  ABD: -pain but some indigestion/-change in bowels/-bloody stools  : -change in bladder/-sores/-vaginal discharge or bleeding  HEMATOLOGIC/LYMPHATIC: -swollen nodes  SKIN: -rashes/+lesions on back with cutaneous horn  MSK/RHEUM: +joint pain/-joint swelling  NEURO: -weakness/+parasthesias  PSYCH: +depression related to medical issues/-anxiety          OBJECTIVE:     Vitals:    09/09/21 1031   BP: 126/62   BP Location: Right arm   Patient Position: Sitting   Cuff Size: Adult Regular   Pulse: 88   Resp: 16   Temp: 97.9  F (36.6  C)   TempSrc: Tympanic   Weight: 72.3 kg (159 lb 6.4 oz)   Height: 1.581 m (5' 2.25\")        Estimated body mass index is 28.92 kg/m  as calculated from the following:    Height as of this encounter: 1.581 m (5' 2.25\").    Weight as of this encounter: 72.3 kg (159 lb 6.4 oz).     Physical Exam  GEN: Vitals reviewed. Healthy appearing. Patient is in no acute distress. Cooperative with exam.  HEENT: Normocephalic atraumatic.  Eyes grossly normal to inspection.  No discharge or erythema, or obvious scleral/conjunctival abnormalities.   NECK: Supple; no thyromegaly or masses noted.  No cervical or supraclavicular lymphadenopathy.  CV: Heart regular in rate and rhythm with no murmur.    LUNGS: No audible wheeze, cough, or visible cyanosis.  No visible retractions or increased work of breathing.  Lungs clear to auscultation bilaterally.    ABD:  Nondistended  SKIN: Warm and dry to touch.  Visible skin clear. No significant rash, abnormal pigmentation or lesions.  Cutaneous horn on left side of back, no erythema  EXT: No clubbing or cyanosis.  No peripheral edema.  DIABETIC FOOT EXAM: No sores or lesions on feet bilaterally, no fungus noted, no erythema or ulceration, pulses " diminished bilaterally, monofilament with no decrease in sensation bilaterally.  Mild to moderate deformity noted.  Mild callus formation noted.  NEURO: Alert and oriented to person, place, and time.  Cranial nerves II-XII grossly intact with no focal or lateralizing deficits.  Muscle tone normal.  Gait normal. No tremor.   MSK: ROM of upper and lower ext symmetric and full.  PSYCH: Mood is good.  Mentation appears normal, affect normal/bright, judgement and insight intact, normal speech and appearance well-groomed.      Labs reviewed in EPIC    ASSESSMENT / PLAN:     Encounter for Medicare annual wellness exam    Type 2 diabetes mellitus without complication, without long-term current use of insulin (H)  - continue current medication  - CTA Angiogram coronary artery  - metFORMIN (GLUCOPHAGE) 500 MG tablet  Dispense: 90 tablet; Refill: 4  - Comprehensive metabolic panel  - Hemoglobin A1c  - Albumin Random Urine Quantitative with Creat Ratio    ASCVD (arteriosclerotic cardiovascular disease)  - with history of ascvd and vagus symptoms along with negative stress test within 2 years - will send for CTA  - CTA Angiogram coronary artery  - Lipid Profile  - Magnesium  - Echocardiogram Complete  - Lipid Profile  - Magnesium    Multilevel degenerative disc disease  - plan for imaging of the neck to determine if injections would be beneficial  - MR Cervical Spine w/o Contrast  - XR Cervical Spine 2/3 Views    Neck pain  - see above  - MR Cervical Spine w/o Contrast    Anxiety and depression  - Controlled.  Continue current regimen.    - DULoxetine (CYMBALTA) 30 MG capsule  Dispense: 180 capsule; Refill: 3    Spondylolisthesis of lumbar region  See above    Chronic right-sided back pain, unspecified back location  - follow up with Neurosurg/ortho    Anxiety  - see above    Essential hypertension  BP controlled, continue with current meds  - losartan-hydrochlorothiazide (HYZAAR) 100-12.5 MG tablet  Dispense: 90 tablet; Refill:  4  - CBC with platelets  - CBC with platelets    Insomnia, unspecified type  - Controlled.  Continue current regimen.    - traZODone (DESYREL) 50 MG tablet  Dispense: 180 tablet; Refill: 0    Screening for colon cancer  - Adult Gastro Ref - Procedure Only    Numbness of toes  TOMMY to be obtained along with B12.    - Vitamin B12    Aortic dilatation (H)  Repeat echo to assess for changes  - Echocardiogram Complete    Decreased pulses in feet  - with decreased pulses will check TOMMY  - US TOMMY Doppler No Exercise 1-2 Levels Bilateral        Preventative Care Guidelines and Health Counseling     COUNSELING:  Reviewed preventive health counseling  Special attention given to:   Preventative screening  Regular exercise  Healthy diet/nutrition  Bladder control   Immunizations   Reviewed preventive health counseling, as reflected in patient instructions    126/62       Body mass index is 28.92 kg/m .         Appropriate preventive services were discussed with this patient, including applicable screening as appropriate for cardiovascular disease, diabetes, osteopenia/osteoporosis, and glaucoma.  As appropriate for age/gender, discussed screening for colorectal cancer, prostate cancer, breast cancer, and cervical cancer. Checklist reviewing preventive services available has been given to the patient.    Reviewed patients plan of care and provided an AVS. The Basic Care Plan (routine screening as documented in Health Maintenance) for patient meets the Care Plan requirement. This Care Plan has been established and reviewed with the Patient and any available family members.    Counseling Resources:  ATP IV Guidelines  Pooled Cohorts Equation Calculator  Breast Cancer Risk Calculator  FRAX Risk Assessment  ICSI Preventive Guidelines  Dietary Guidelines for Americans, 2010  USDA's MyPlate  ASA Prophylaxis  Lung CA Screening    Lili Perkins DO  9/9/2021  10:40 AM  [facility]  Answers for HPI/ROS submitted by  the patient on 9/9/2021  If you checked off any problems, how difficult have these problems made it for you to do your work, take care of things at home, or get along with other people?: Somewhat difficult  PHQ9 TOTAL SCORE: 9  TAYLOR 7 TOTAL SCORE: 0

## 2021-09-09 NOTE — PATIENT INSTRUCTIONS
Patient Education   Health Maintenance Due   Topic Date Due     URINE DRUG SCREEN  Never done     Colorectal Cancer Screening  Never done     Cholesterol Lab  06/09/2021     A1C Lab  08/01/2021     Depression Assessment  08/01/2021     Kidney Microalbumin Urine Test  08/13/2021     Flu Vaccine (1) 09/01/2021     Annual Wellness Visit  08/13/2021     Eye Exam  10/01/2021     Personalized Prevention Plan  You are due for the preventive services outlined below.  Your care team is available to assist you in scheduling these services.  If you have already completed any of these items, please share that information with your care team to update in your medical record.  Health Maintenance Due   Topic Date Due     URINE DRUG SCREEN  Never done     Colorectal Cancer Screening  Never done     Cholesterol Lab  06/09/2021     A1C Lab  08/01/2021     Depression Assessment  08/01/2021     Kidney Microalbumin Urine Test  08/13/2021     Flu Vaccine (1) 09/01/2021     Annual Wellness Visit  08/13/2021     Eye Exam  10/01/2021

## 2021-09-10 ENCOUNTER — TELEPHONE (OUTPATIENT)
Dept: INTERNAL MEDICINE | Facility: OTHER | Age: 71
End: 2021-09-10

## 2021-09-10 DIAGNOSIS — I25.10 ASCVD (ARTERIOSCLEROTIC CARDIOVASCULAR DISEASE): Primary | ICD-10-CM

## 2021-09-10 LAB
CREAT UR-MCNC: 265 MG/DL
MICROALBUMIN UR-MCNC: 38 MG/L
MICROALBUMIN/CREAT UR: 14.34 MG/G CR (ref 0–25)

## 2021-09-10 ASSESSMENT — PATIENT HEALTH QUESTIONNAIRE - PHQ9: SUM OF ALL RESPONSES TO PHQ QUESTIONS 1-9: 9

## 2021-09-10 ASSESSMENT — ANXIETY QUESTIONNAIRES: GAD7 TOTAL SCORE: 0

## 2021-09-10 NOTE — TELEPHONE ENCOUNTER
Patient is scheduled for a CTA Angiogram on 10/13 but will need a current creatinine (within 30 days of scan). Please place lab order.    Mojgan Blkae on 9/10/2021 at 12:03 PM

## 2021-09-16 DIAGNOSIS — Z12.11 ENCOUNTER FOR SCREENING COLONOSCOPY: Primary | ICD-10-CM

## 2021-09-16 NOTE — TELEPHONE ENCOUNTER
Screening Questions for the Scheduling of Screening Colonoscopies   (If Colonoscopy is diagnostic, Provider should review the chart before scheduling.)  Are you younger than 50 or older than 80?   NO   Do you take aspirin or fish oil?  YES - ASPIRIN   (if yes, tell patient to stop 1 week prior to Colonoscopy)  Do you take warfarin (Coumadin), clopidogrel (Plavix), apixaban (Eliquis), dabigatram (Pradaxa), rivaroxaban (Xarelto) or any blood thinner? NO   Do you use oxygen at home?  YES - CPAP   Do you have kidney disease? NO   Are you on dialysis? NO   Have you had a stroke or heart attack in the last year? NO   Have you had a stent in your heart or any blood vessel in the last year? NO   Have you had a transplant of any organ? NO   Have you had a colonoscopy or upper endoscopy (EGD) before? YES          When?  OVER 10 YRS   Date of scheduled Colonoscopy. 11/18/2021  Provider MARINELLI   [facility]

## 2021-09-20 ENCOUNTER — HOSPITAL ENCOUNTER (OUTPATIENT)
Dept: MRI IMAGING | Facility: OTHER | Age: 71
End: 2021-09-20
Attending: INTERNAL MEDICINE
Payer: MEDICARE

## 2021-09-20 ENCOUNTER — HOSPITAL ENCOUNTER (OUTPATIENT)
Dept: ULTRASOUND IMAGING | Facility: OTHER | Age: 71
End: 2021-09-20
Attending: INTERNAL MEDICINE
Payer: MEDICARE

## 2021-09-20 DIAGNOSIS — M53.9 MULTILEVEL DEGENERATIVE DISC DISEASE: ICD-10-CM

## 2021-09-20 DIAGNOSIS — I25.10 ASCVD (ARTERIOSCLEROTIC CARDIOVASCULAR DISEASE): ICD-10-CM

## 2021-09-20 DIAGNOSIS — R68.89 ABNORMAL ANKLE BRACHIAL INDEX (ABI): ICD-10-CM

## 2021-09-20 DIAGNOSIS — R09.89 DECREASED PULSES IN FEET: ICD-10-CM

## 2021-09-20 DIAGNOSIS — R09.89 DECREASED PULSES IN FEET: Primary | ICD-10-CM

## 2021-09-20 DIAGNOSIS — M54.2 NECK PAIN: ICD-10-CM

## 2021-09-20 PROCEDURE — 72141 MRI NECK SPINE W/O DYE: CPT | Mod: ME

## 2021-09-20 PROCEDURE — 93922 UPR/L XTREMITY ART 2 LEVELS: CPT

## 2021-09-20 PROCEDURE — G1004 CDSM NDSC: HCPCS

## 2021-09-20 RX ORDER — POLYETHYLENE GLYCOL 3350, SODIUM CHLORIDE, SODIUM BICARBONATE, POTASSIUM CHLORIDE 420; 11.2; 5.72; 1.48 G/4L; G/4L; G/4L; G/4L
4000 POWDER, FOR SOLUTION ORAL ONCE
Qty: 4000 ML | Refills: 0 | Status: SHIPPED | OUTPATIENT
Start: 2021-09-20 | End: 2021-09-20

## 2021-09-20 RX ORDER — BISACODYL 5 MG/1
TABLET, DELAYED RELEASE ORAL
Qty: 2 TABLET | Refills: 0 | Status: SHIPPED | OUTPATIENT
Start: 2021-09-20 | End: 2021-11-10

## 2021-09-24 ENCOUNTER — TELEPHONE (OUTPATIENT)
Dept: INTERNAL MEDICINE | Facility: OTHER | Age: 71
End: 2021-09-24
Payer: MEDICARE

## 2021-09-24 DIAGNOSIS — M79.661 PAIN IN BOTH LOWER LEGS: Primary | ICD-10-CM

## 2021-09-24 DIAGNOSIS — M79.662 PAIN IN BOTH LOWER LEGS: Primary | ICD-10-CM

## 2021-09-24 NOTE — TELEPHONE ENCOUNTER
Patient gabapentin (NEURONTIN) 100 MG capsule is taking 300mg TID and is not getting relief. Patient is out of the medication.   Patient would like to know if Lyrica would work.  Olesya Salas LPN .............9/24/2021  4:46 PM

## 2021-09-27 RX ORDER — PREGABALIN 25 MG/1
25 CAPSULE ORAL 2 TIMES DAILY
Qty: 60 CAPSULE | Refills: 1 | Status: SHIPPED | OUTPATIENT
Start: 2021-09-27 | End: 2021-10-11

## 2021-09-27 NOTE — TELEPHONE ENCOUNTER
Left message to call back.  Betty Kong CMA([facility]) ....................  9/27/2021   4:44 PM

## 2021-09-27 NOTE — TELEPHONE ENCOUNTER
I would be okay trying Lyrica.  I will send in a prescription.  Patient follow-up in November as scheduled.  She should stop the gabapentin.

## 2021-10-05 NOTE — TELEPHONE ENCOUNTER
Left message to call back.  Betty Kong CMA([facility]) ....................  10/5/2021   11:05 AM

## 2021-10-07 ENCOUNTER — TELEPHONE (OUTPATIENT)
Dept: CARDIOLOGY | Facility: OTHER | Age: 71
End: 2021-10-07

## 2021-10-07 ENCOUNTER — TELEPHONE (OUTPATIENT)
Dept: INTERNAL MEDICINE | Facility: OTHER | Age: 71
End: 2021-10-07

## 2021-10-07 DIAGNOSIS — M25.551 HIP PAIN, RIGHT: Primary | ICD-10-CM

## 2021-10-07 NOTE — TELEPHONE ENCOUNTER
Please call the patient.  She would like an MRI ordered for her RT Hip.  Before her appointment with Dr. Valdivia on 10/19/2021.      Sandra Mo on 10/7/2021 at 4:20 PM

## 2021-10-07 NOTE — TELEPHONE ENCOUNTER
Patient appears to have been contacted regarding canceling of her CTA due to her history of stents.  We will discuss additional evaluation at her appointment in November after echocardiogram is complete.

## 2021-10-08 ENCOUNTER — TELEPHONE (OUTPATIENT)
Dept: CARDIOLOGY | Facility: OTHER | Age: 71
End: 2021-10-08

## 2021-10-08 NOTE — TELEPHONE ENCOUNTER
Returned patients call.  Message left with our direct number [phone].  Message that appointment at 0930 and 1030 on 10/13/21 have been cancelled per .  She still has the appointment on 10/13/21 at 1:30.

## 2021-10-08 NOTE — TELEPHONE ENCOUNTER
States she is seeing [facility] on the 19th. They have been working with [facility]. States she cannot even lay on her hip, has been going on for three years now. They are going back and forth saying she needs surgery, and now she doesn't. They are thinking her back is what is causing her hip problem, so wants a MRI of her Hip to see if it is her hip that is hurting her and not her back. She doesn't want to go thru back surgery, if it can be avoided.    Stephanie Sexton, PATRIZIA on 10/8/2021 at 11:17 AM

## 2021-10-08 NOTE — TELEPHONE ENCOUNTER
Jennyfer returned our call.  Confirmed that the 930 and 1030 appointments on 10/13/21 are cancelled for the CCTA and lab.  She confirmed the 130 appointment for the echo on 10/13/21.  Dr. Perkins will advise her after the echo if other tests are needed.

## 2021-10-09 ENCOUNTER — HEALTH MAINTENANCE LETTER (OUTPATIENT)
Age: 71
End: 2021-10-09

## 2021-10-10 NOTE — TELEPHONE ENCOUNTER
I will place the order for this however I would defer to Dr. Valdivia and orthopedics about the cause of her pain being back versus hip.  I will see her November 1 to discuss at all further.

## 2021-10-11 NOTE — TELEPHONE ENCOUNTER
Patient notified and is hoping to have the MRI before her appointment with Dr Valdivia.  Also see previous note regarding Gabapentin/Lyrica change.  Betty Kong CMA([facility])..................10/11/2021   5:04 PM

## 2021-10-11 NOTE — TELEPHONE ENCOUNTER
She will be out of Lyrica before 11/1/21. Not really helping much at the current dose. She did stop the gabapentin. Will you increase the dose and fill till she sees you?  Betty Kong CMA([facility])..................10/11/2021   5:03 PM

## 2021-10-12 NOTE — TELEPHONE ENCOUNTER
She was calling back for Peyton in Cardiology.  Betty Kong CMA([facility])..................10/12/2021   11:49 AM

## 2021-10-13 ENCOUNTER — HOSPITAL ENCOUNTER (OUTPATIENT)
Dept: CARDIOLOGY | Facility: OTHER | Age: 71
Discharge: HOME | End: 2021-10-13
Attending: INTERNAL MEDICINE | Admitting: INTERNAL MEDICINE
Payer: MEDICARE

## 2021-10-13 DIAGNOSIS — I77.819 AORTIC DILATATION (H): ICD-10-CM

## 2021-10-13 DIAGNOSIS — I25.10 ASCVD (ARTERIOSCLEROTIC CARDIOVASCULAR DISEASE): ICD-10-CM

## 2021-10-13 LAB — LVEF ECHO: NORMAL

## 2021-10-13 PROCEDURE — 93306 TTE W/DOPPLER COMPLETE: CPT

## 2021-10-13 PROCEDURE — 93306 TTE W/DOPPLER COMPLETE: CPT | Mod: 26 | Performed by: INTERNAL MEDICINE

## 2021-10-13 RX ORDER — PREGABALIN 50 MG/1
50 CAPSULE ORAL 2 TIMES DAILY
Qty: 60 CAPSULE | Refills: 1 | Status: SHIPPED | OUTPATIENT
Start: 2021-10-13 | End: 2021-11-01

## 2021-10-13 NOTE — TELEPHONE ENCOUNTER
Called and gave the below information to the patient.   Nelia Rivera LPN on 10/13/2021 at 9:51 AM

## 2021-10-14 DIAGNOSIS — M54.9 CHRONIC RIGHT-SIDED BACK PAIN, UNSPECIFIED BACK LOCATION: Primary | ICD-10-CM

## 2021-10-14 DIAGNOSIS — M53.3 SACROILIAC JOINT PAIN: ICD-10-CM

## 2021-10-14 DIAGNOSIS — G89.29 CHRONIC RIGHT-SIDED BACK PAIN, UNSPECIFIED BACK LOCATION: Primary | ICD-10-CM

## 2021-10-15 ENCOUNTER — HOSPITAL ENCOUNTER (OUTPATIENT)
Dept: MRI IMAGING | Facility: OTHER | Age: 71
Discharge: HOME | End: 2021-10-15
Attending: INTERNAL MEDICINE | Admitting: INTERNAL MEDICINE
Payer: MEDICARE

## 2021-10-15 DIAGNOSIS — M25.551 HIP PAIN, RIGHT: ICD-10-CM

## 2021-10-15 PROCEDURE — 73721 MRI JNT OF LWR EXTRE W/O DYE: CPT | Mod: RT,ME

## 2021-10-18 ENCOUNTER — HOSPITAL ENCOUNTER (OUTPATIENT)
Dept: GENERAL RADIOLOGY | Facility: OTHER | Age: 71
Discharge: HOME | End: 2021-10-18
Attending: INTERNAL MEDICINE | Admitting: INTERNAL MEDICINE
Payer: MEDICARE

## 2021-10-18 DIAGNOSIS — M53.3 SACROILIAC JOINT PAIN: ICD-10-CM

## 2021-10-18 DIAGNOSIS — G89.29 CHRONIC RIGHT-SIDED BACK PAIN, UNSPECIFIED BACK LOCATION: ICD-10-CM

## 2021-10-18 DIAGNOSIS — M48.02 CERVICAL SPINAL STENOSIS: Primary | ICD-10-CM

## 2021-10-18 DIAGNOSIS — M54.9 CHRONIC RIGHT-SIDED BACK PAIN, UNSPECIFIED BACK LOCATION: ICD-10-CM

## 2021-10-18 PROCEDURE — 255N000002 HC RX 255 OP 636: Performed by: RADIOLOGY

## 2021-10-18 PROCEDURE — 250N000011 HC RX IP 250 OP 636: Performed by: RADIOLOGY

## 2021-10-18 PROCEDURE — 250N000009 HC RX 250: Performed by: RADIOLOGY

## 2021-10-18 PROCEDURE — 20610 DRAIN/INJ JOINT/BURSA W/O US: CPT | Mod: RT

## 2021-10-18 RX ORDER — LIDOCAINE HYDROCHLORIDE 10 MG/ML
2 INJECTION, SOLUTION INFILTRATION; PERINEURAL ONCE
Status: COMPLETED | OUTPATIENT
Start: 2021-10-18 | End: 2021-10-18

## 2021-10-18 RX ORDER — BUPIVACAINE HYDROCHLORIDE 5 MG/ML
3 INJECTION, SOLUTION EPIDURAL; INTRACAUDAL ONCE
Status: COMPLETED | OUTPATIENT
Start: 2021-10-18 | End: 2021-10-18

## 2021-10-18 RX ORDER — TRIAMCINOLONE ACETONIDE 40 MG/ML
40 INJECTION, SUSPENSION INTRA-ARTICULAR; INTRAMUSCULAR ONCE
Status: COMPLETED | OUTPATIENT
Start: 2021-10-18 | End: 2021-10-18

## 2021-10-18 RX ADMIN — TRIAMCINOLONE ACETONIDE 40 MG: 40 INJECTION, SUSPENSION INTRA-ARTICULAR; INTRAMUSCULAR at 15:09

## 2021-10-18 RX ADMIN — IOHEXOL 2 ML: 240 INJECTION, SOLUTION INTRATHECAL; INTRAVASCULAR; INTRAVENOUS; ORAL at 15:09

## 2021-10-18 RX ADMIN — LIDOCAINE HYDROCHLORIDE 2 ML: 10 INJECTION, SOLUTION INFILTRATION; PERINEURAL at 15:09

## 2021-10-18 RX ADMIN — BUPIVACAINE HYDROCHLORIDE 3 ML: 5 INJECTION, SOLUTION EPIDURAL; INTRACAUDAL at 15:09

## 2021-10-19 ENCOUNTER — HOSPITAL ENCOUNTER (OUTPATIENT)
Dept: GENERAL RADIOLOGY | Facility: OTHER | Age: 71
End: 2021-10-19
Attending: STUDENT IN AN ORGANIZED HEALTH CARE EDUCATION/TRAINING PROGRAM
Payer: MEDICARE

## 2021-10-19 ENCOUNTER — OFFICE VISIT (OUTPATIENT)
Dept: ORTHOPEDICS | Facility: OTHER | Age: 71
End: 2021-10-19
Attending: STUDENT IN AN ORGANIZED HEALTH CARE EDUCATION/TRAINING PROGRAM
Payer: MEDICARE

## 2021-10-19 DIAGNOSIS — M47.892 OTHER SPONDYLOSIS, CERVICAL REGION: ICD-10-CM

## 2021-10-19 DIAGNOSIS — M43.16 SPONDYLOLISTHESIS OF LUMBAR REGION: Primary | ICD-10-CM

## 2021-10-19 DIAGNOSIS — M48.02 CERVICAL SPINAL STENOSIS: ICD-10-CM

## 2021-10-19 DIAGNOSIS — Z11.59 ENCOUNTER FOR SCREENING FOR OTHER VIRAL DISEASES: ICD-10-CM

## 2021-10-19 PROCEDURE — 99214 OFFICE O/P EST MOD 30 MIN: CPT | Performed by: STUDENT IN AN ORGANIZED HEALTH CARE EDUCATION/TRAINING PROGRAM

## 2021-10-19 PROCEDURE — G0463 HOSPITAL OUTPT CLINIC VISIT: HCPCS

## 2021-10-19 PROCEDURE — 72040 X-RAY EXAM NECK SPINE 2-3 VW: CPT

## 2021-10-19 NOTE — PROGRESS NOTES
Patient is here for follow up on her low back pain.  Rlyie Emery LPN .....................10/19/2021 9:56 AM

## 2021-10-19 NOTE — PROGRESS NOTES
Visit Date: 10/19/2021    HISTORY OF PRESENT ILLNESS:  Jennyfer is a 71-year-old female who presents for evaluation of both neck and lumbar spine issues.  Regarding her neck, this has been only ongoing for a short period of time.  She has not sought any care other than chiropractic care and has not had injections yet.  It is isolated right sided neck pain with no significant radiation into her arms.  Regarding her low back, she continues to have bilateral lower extremity pain, the pain that radiates down posterior buttock and posterior thighs in a pain pattern consistent with neurogenic claudication when she is upright and ambulating, and severe mechanical back pain consistent with her mobile spondylolisthesis.  She had sought out a second opinion at [facility], but was more interested in coming back here for further discussions about her lumbar spine surgery.    PHYSICAL EXAMINATION:    GENERAL:  Well-appearing, well-nourished.  MUSCULOSKELETAL AND NEUROLOGIC:  She has 5/5 strength in bilateral upper and lower extremity including deltoid, biceps, triceps, wrist extension, flexion, hand , hand intrinsics, hip flexion, knee extension, flexion, ankle dorsiflexion, plantarflexion, and EHL.  She has normal sensation to light touch throughout bilateral upper and lower extremities.    IMAGING:  Available for review today includes an MRI of the cervical spine, which shows multilevel degenerative changes predominantly with facet arthropathy involving the right sided C1-C2 joint and multiple levels in the subaxial cervical spine as well.  She has no significant spinal canal or nerve root compression, mild to moderate in nature at varying levels.  She has flexion, extension films of the cervical spine obtained in clinic today, which shows no gross abnormal motion in flexed or extended position.  She also has an MRI of her lumbar spine, which shows evidence of an L4-L5 spondylolisthesis, which has worsened in her  upright flexion films, which were obtained in the previous clinic visit with greater than 5 mm of anterolisthesis in the upright flexed position compared to her supine MRI.    ASSESSMENT AND PLAN:  Jennyfer is a 71-year-old female with arthritic related cervical neck pain and mobile spondylolisthesis at the level of L4-L5 with instability-related pain and bilateral lower extremity pain.  Risks, benefits and alternatives were discussed with the patient.  She has elected to undergo an L4-L5 [place] procedure, which will be performed at [facility] once we can coordinate the schedule.  She will be referred to [facility] for cervical facet injections and potentially radiofrequency ablation as needed for her chronic neck pain.    This clinic visit took 30 minutes.    Rajesh Valdivia MD        D: 10/19/2021   T: 10/19/2021   MT: SHADE    Name:     JENNYFER REID  MRN:      [id number]        Account:    [id number]   :      1950           Visit Date: 10/19/2021     Document: [id number]

## 2021-10-20 ENCOUNTER — VIRTUAL VISIT (OUTPATIENT)
Dept: VASCULAR SURGERY | Facility: CLINIC | Age: 71
End: 2021-10-20
Payer: MEDICARE

## 2021-10-20 ENCOUNTER — VIRTUAL VISIT (OUTPATIENT)
Dept: CARDIOLOGY | Facility: OTHER | Age: 71
End: 2021-10-20
Attending: SURGERY
Payer: MEDICARE

## 2021-10-20 VITALS
OXYGEN SATURATION: 96 % | WEIGHT: 162.2 LBS | RESPIRATION RATE: 16 BRPM | SYSTOLIC BLOOD PRESSURE: 138 MMHG | HEART RATE: 83 BPM | DIASTOLIC BLOOD PRESSURE: 82 MMHG | BODY MASS INDEX: 29.43 KG/M2 | TEMPERATURE: 97.1 F

## 2021-10-20 VITALS
TEMPERATURE: 97.1 F | DIASTOLIC BLOOD PRESSURE: 82 MMHG | BODY MASS INDEX: 29.43 KG/M2 | SYSTOLIC BLOOD PRESSURE: 138 MMHG | OXYGEN SATURATION: 96 % | WEIGHT: 162.2 LBS | RESPIRATION RATE: 16 BRPM | HEART RATE: 83 BPM

## 2021-10-20 DIAGNOSIS — I73.9 PAD (PERIPHERAL ARTERY DISEASE) (H): Primary | ICD-10-CM

## 2021-10-20 DIAGNOSIS — I25.10 ASCVD (ARTERIOSCLEROTIC CARDIOVASCULAR DISEASE): Primary | ICD-10-CM

## 2021-10-20 DIAGNOSIS — I73.9 CLAUDICATION OF RIGHT LOWER EXTREMITY (H): ICD-10-CM

## 2021-10-20 PROCEDURE — 99205 OFFICE O/P NEW HI 60 MIN: CPT | Performed by: SURGERY

## 2021-10-20 PROCEDURE — 99207 PR SATISFY VISIT NUMBER: CPT | Performed by: SURGERY

## 2021-10-20 PROCEDURE — G0463 HOSPITAL OUTPT CLINIC VISIT: HCPCS | Mod: GT

## 2021-10-20 ASSESSMENT — PAIN SCALES - GENERAL
PAINLEVEL: MODERATE PAIN (4)
PAINLEVEL: MODERATE PAIN (4)

## 2021-10-20 NOTE — NURSING NOTE
"Chief Complaint   Patient presents with     Telemedicine consult     decreased pulses in feet, ASCVD, abnormal TOMMY       Initial /82 (BP Location: Right arm, Patient Position: Sitting, Cuff Size: Adult Regular)   Pulse 83   Temp 97.1  F (36.2  C) (Temporal)   Resp 16   Wt 73.6 kg (162 lb 3.2 oz)   LMP  (LMP Unknown)   SpO2 96%   BMI 29.43 kg/m   Estimated body mass index is 29.43 kg/m  as calculated from the following:    Height as of 9/9/21: 1.581 m (5' 2.25\").    Weight as of this encounter: 73.6 kg (162 lb 3.2 oz).  Medication Reconciliation: complete    Peyton Moran RN  "

## 2021-10-20 NOTE — NURSING NOTE
"Chief Complaint   Patient presents with     Consult     decreased pulses in feet, ASCVD, abnormal TOMMY       Initial /82 (BP Location: Right arm, Patient Position: Sitting, Cuff Size: Adult Regular)   Pulse 83   Temp 97.1  F (36.2  C) (Temporal)   Resp 16   Wt 73.6 kg (162 lb 3.2 oz)   LMP  (LMP Unknown)   SpO2 96%   BMI 29.43 kg/m   Estimated body mass index is 29.43 kg/m  as calculated from the following:    Height as of 9/9/21: 1.581 m (5' 2.25\").    Weight as of this encounter: 73.6 kg (162 lb 3.2 oz).  Medication Reconciliation: complete    Peyton Moran RN  "

## 2021-10-20 NOTE — PATIENT INSTRUCTIONS
Thank you so much for choosing us for your care. It was a pleasure to see you at the vascular clinic today.     Follow-up recommendations: Dr. Sewell would like to to have a CT scan completed to visualize your abdomen and pelvis, as well as the blood vessels in your legs. Once she reviews the imaging, we will determine follow-up.    Additional testing/imaging ordered today: CT scan of abdomen/pelvis with leg runoff. This will be completed at [facility].      Our scheduling team will get in touch with you to set up any follow-up testing/imaging and/or appointments. Please be aware that any testing/imaging recommended today will need to completed prior to your next visit with the provider. If testing/imaging is not completed prior to your next visit, your visit may be rescheduled.        If you have any questions, please call Carlee Moran RN at [phone]. We also encourage the use of [employer] to communicate with your HealthCare Provider.      If you have an urgent need after business hours (8:00 am to 4:30 pm) please call [phone], option 4, and ask for the vascular fellow on call. For non-urgent after hours needs, please call the vascular nurse at [phone] and leave a detailed voicemail. For scheduling needs, please call our scheduling line at [phone].      Patient Education     Peripheral Artery Disease (PAD)   Peripheral artery disease (PAD) occurs when the arteries that carry blood to the limbs are narrowed or blocked. This is usually due to a buildup of a fatty substance called plaque in the walls of the arteries.  PAD most often affects the arteries in the legs. When these arteries are narrowed or blocked, blood flow to the legs is reduced. This can cause leg and foot pain and other symptoms. If severe enough, reduced blood flow to the legs can lead to tissue death (gangrene) and the loss of a toe, foot, or leg. Having PAD also makes it more likely that arteries in other body areas are  blocked. For instance, arteries that carry blood to the heart or brain may be affected. This raises the chances of heart attack and stroke.  Risk factors  Certain factors can make PAD more likely. They include:    Smoking    Diabetes    High blood pressure    Unhealthy cholesterol levels    Obesity    Inactive lifestyle    Older age    Family history of PAD  Symptoms  Many people with PAD have no symptoms. If symptoms do occur, they can include:    Pain in the muscles of the calves, thighs, or hips that gets worse with activity and better with rest (intermittent claudication)    Achy, tired, or heavy feeling in the legs    Weakness, numbness, tingling, or loss of feeling in the legs    Changes in skin color of the legs    Sores on the legs and feet    Cold leg, feet, or toes    Pain the feet or toes even when lying down (rest pain)  Home care  PAD is a chronic (lifelong) condition. Treatment is focused on managing your condition and lowering your health risks. This may include doing the following:    If you smoke, quit. This helps prevent further damage to your arteries and lowers your health risks. Ask your provider about medicines or products that can help you quit smoking. Also consider joining a stop-smoking program or support group.    Be more active. This helps you lose weight and manage problems such as high blood pressure and unhealthy cholesterol levels. Start a walking program if advised to by your provider. Your provider may also help you form a safe exercise program that is right for your needs.    Make healthy eating changes. This includes eating less fat, salt, and sugar.    Take medicines for high blood pressure, unhealthy cholesterol levels, and diabetes as directed.    Have your blood pressure and cholesterol levels checked as often as directed.    If you have diabetes, try to keep your blood sugar well controlled. Test your blood sugar as directed.    If you are overweight, talk to your provider  about a weight-loss plan.    Watch for cuts, scrapes, or open sores on your feet. Poor blood flow to the feet may slow healing and increase the risk of infection from these problems.   Follow-up care  Follow up with your healthcare provider, or as advised. If imaging tests such as ultrasound were done, they will be reviewed by a doctor. You will be told the results and any new findings that may affect your care.  When to seek medical advice   Call your healthcare provider right away if any of these occur:    Sudden severe pain in the legs or feet    Sudden cold, pale or blue color in the legs or feet    Weakness or numbness in the legs or feet that worsens    Any sore or wound in the legs or feet that won t heal    Weak pulse in your legs or feet  Know the signs of heart attack and stroke  People with PAD are at high risk for heart attack and stroke. Knowing the signs of these problems can help you protect your health and get help when you need it. Call 911 right away if you have any of the following:    Chest discomfort, such as pain, aching, tightness, or pressure that lasts more than a few minutes, or that comes and goes    Pain or discomfort in the arms, back, shoulders, neck, or jaw    Shortness of breath    Sweating (often a cold, clammy sweat)    Nausea    Lightheadedness    Sudden numbness or weakness of the face, arms, or legs, especially on one side    Sudden confusion or trouble speaking or understanding    Sudden trouble seeing in one or both eyes    Sudden trouble walking, dizziness, or loss of balance    Sudden, severe headache with no known cause  [employer] last reviewed this educational content on 3/1/2018    3230-9349 [employer]. All rights reserved. This information is not intended as a substitute for professional medical care. Always follow your healthcare professional's instructions.

## 2021-10-20 NOTE — LETTER
10/20/2021       RE: Jennyfer Elizabeth  [address]  [place] [address]     Dear Colleague,    Thank you for referring your patient, Jennyfer Elizabeth, to the [facility] at [facility]. Please see a copy of my visit note below.    Vascular Surgery Consultation Note     Patient:  Jennyfer Elizabeth   Date of birth 1950, Medical record number [id number]  Date of Visit:  10/20/2021  Consult Requester:No att. providers found            Assessment and Recommendations:   ASSESSMENT / RECOMMENDATION:    70 y/o female with longstanding right hip and right lower extremity pain with ambulation along with spine issues. Her TOMMY of 0.72 on the right leg at rest suggests an element of vascular claudication in addition to her bilateral spine issues. She is looking forward to her upcoming spine surgery, however, I have suggested that it will likely not correct her right hip / lower extremity claudication. Given her smoking history until 2008, I would suspect there is right iliac artery disease present. Will check a CTA of abdomen/pelvis with runoff     Many thanks for involving me in the care of this very pleasant patient. Should any questions or concerns arise, please don't hesitate to contact me.    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 Will need right common femoral endarterectomy and right external iliac artery intervention once back surgery is complete. Will arrange an appointment with me once recovered.      60 minutes spent on the date of the encounter doing chart review, review of outside records, review of test results, interpretation of  tests, patient visit, documentation and discussion with other provider(s)           HPI: Ms Elizabeth is a very pleasant 72 y/o female seen today for right lower extremity hip pain and claudication. She has been dealing with back issues for quite some time and recently underwent an injection for her right hip that helped a small amount. This has been going on for many years. One of her physicians suggested this was possible vascular disease. She was a smoker in the past - anywhere from a 1/2 ppd or possibly more but quit in 2008. She had an MI in 2017 and has a h/o diabetes as well as HTN. Denies h/o stroke though has eye issues and macular degeneration. Family h/o premature CVD with death of her father in his 50s.       Review of Systems   Constitutional, HEENT, cardiovascular, pulmonary, GI, , musculoskeletal, neuro, skin, endocrine and psych systems are negative, except as otherwise noted.    Physical Exam   GENERAL: Healthy, alert and no distress  EYES: Eyes grossly normal to inspection.  No discharge or erythema, or obvious scleral/conjunctival abnormalities.  RESP: No audible wheeze, cough, or visible cyanosis.  No visible retractions or increased work of breathing.    SKIN: Visible skin clear. No significant rash, abnormal pigmentation or lesions.  NEURO: Cranial nerves grossly intact.  Mentation and speech appropriate for age.  PSYCH: Mentation appears normal, affect normal/bright, judgement and insight intact, normal speech and appearance well-groomed.    TOMMY R 0.7 and L 1.0        Again, thank you for allowing me to participate in the care of your patient.      Sincerely,    Rama Sewell MD

## 2021-10-20 NOTE — NURSING NOTE
Patient is going to have a CTA of abdomen/pelvis with runoff on Friday at 12:30PM.  Dr[facility] Sewell [facility] these [facility] and the plan going forward.  Peyton Moran RN......October 20, 2021...10:43 AM

## 2021-10-21 ENCOUNTER — LAB (OUTPATIENT)
Dept: LAB | Facility: OTHER | Age: 71
End: 2021-10-21
Attending: NURSE PRACTITIONER
Payer: MEDICARE

## 2021-10-21 DIAGNOSIS — I25.10 ASCVD (ARTERIOSCLEROTIC CARDIOVASCULAR DISEASE): ICD-10-CM

## 2021-10-21 LAB
CREAT SERPL-MCNC: 1.03 MG/DL (ref 0.6–1.2)
GFR SERPL CREATININE-BSD FRML MDRD: 54 ML/MIN/1.73M2

## 2021-10-21 PROCEDURE — 82565 ASSAY OF CREATININE: CPT | Mod: ZL

## 2021-10-21 PROCEDURE — 36415 COLL VENOUS BLD VENIPUNCTURE: CPT | Mod: ZL

## 2021-10-22 ENCOUNTER — HOSPITAL ENCOUNTER (OUTPATIENT)
Dept: CT IMAGING | Facility: OTHER | Age: 71
Discharge: HOME | End: 2021-10-22
Attending: SURGERY | Admitting: SURGERY
Payer: MEDICARE

## 2021-10-22 DIAGNOSIS — I73.9 CLAUDICATION OF RIGHT LOWER EXTREMITY (H): ICD-10-CM

## 2021-10-22 DIAGNOSIS — I73.9 PAD (PERIPHERAL ARTERY DISEASE) (H): ICD-10-CM

## 2021-10-22 PROCEDURE — 250N000011 HC RX IP 250 OP 636: Performed by: SURGERY

## 2021-10-22 PROCEDURE — 76377 3D RENDER W/INTRP POSTPROCES: CPT | Mod: ME

## 2021-10-22 RX ORDER — IOPAMIDOL 755 MG/ML
100 INJECTION, SOLUTION INTRAVASCULAR ONCE
Status: COMPLETED | OUTPATIENT
Start: 2021-10-22 | End: 2021-10-22

## 2021-10-22 RX ADMIN — IOPAMIDOL 100 ML: 755 INJECTION, SOLUTION INTRAVENOUS at 12:55

## 2021-10-22 NOTE — PROGRESS NOTES
Vascular Surgery Consultation Note     Patient:  Jennyfer Elizabeth   Date of birth 1950, Medical record number [id number]  Date of Visit:  10/20/2021  Consult Requester:No att. providers found            Assessment and Recommendations:   ASSESSMENT / RECOMMENDATION:    72 y/o female with longstanding right hip and right lower extremity pain with ambulation along with spine issues. Her TOMMY of 0.72 on the right leg at rest suggests an element of vascular claudication in addition to her bilateral spine issues. She is looking forward to her upcoming spine surgery, however, I have suggested that it will likely not correct her right hip / lower extremity claudication. Given her smoking history until 2008, I would suspect there is right iliac artery disease present. Will check a CTA of abdomen/pelvis with runoff     Many thanks for involving me in the care of this very pleasant patient. Should any questions or concerns arise, please don't hesitate to contact me.    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 Will need right common femoral endarterectomy and right external iliac artery intervention once back surgery is complete. Will arrange an appointment with me once recovered.      60 minutes spent on the date of the encounter doing chart review, review of outside records, review of test results, interpretation of tests, patient visit, documentation and discussion with other provider(s)           HPI: Ms Elizabeth is a very pleasant 72 y/o female seen today for right lower extremity hip pain and claudication. She has been dealing with back issues for quite some time and recently underwent an injection for her right hip that  helped a small amount. This has been going on for many years. One of her physicians suggested this was possible vascular disease. She was a smoker in the past - anywhere from a 1/2 ppd or possibly more but quit in 2008. She had an MI in 2017 and has a h/o diabetes as well as HTN. Denies h/o stroke though has eye issues and macular degeneration. Family h/o premature CVD with death of her father in his 50s.       Review of Systems   Constitutional, HEENT, cardiovascular, pulmonary, GI, , musculoskeletal, neuro, skin, endocrine and psych systems are negative, except as otherwise noted.    Physical Exam   GENERAL: Healthy, alert and no distress  EYES: Eyes grossly normal to inspection.  No discharge or erythema, or obvious scleral/conjunctival abnormalities.  RESP: No audible wheeze, cough, or visible cyanosis.  No visible retractions or increased work of breathing.    SKIN: Visible skin clear. No significant rash, abnormal pigmentation or lesions.  NEURO: Cranial nerves grossly intact.  Mentation and speech appropriate for age.  PSYCH: Mentation appears normal, affect normal/bright, judgement and insight intact, normal speech and appearance well-groomed.    TOMMY R 0.7 and L 1.0

## 2021-10-22 NOTE — PROGRESS NOTES

## 2021-10-29 ENCOUNTER — TELEPHONE (OUTPATIENT)
Dept: INTERNAL MEDICINE | Facility: OTHER | Age: 71
End: 2021-10-29
Payer: MEDICARE

## 2021-10-29 NOTE — TELEPHONE ENCOUNTER
Spoke to patient, she said a nurse in [place] asked her to call and have ct results done on 10/22/21 sent to Dr Valdivia in [place].   Gave her a fax number [phone]. Att Lore.   Polly Landeros LPN .............10/29/2021     4:19 PM    Can KATY/HIM send the ct results CTA Abdomen Pelvis Bilat Leg Runoff w Contr

## 2021-10-29 NOTE — TELEPHONE ENCOUNTER
Left message to call back. I did confirm with Ortho, Dr Valdivia is able to see results and shouldn't need them sent.   Diana ACHARYA ....................  10/29/2021   12:06 PM

## 2021-11-01 ENCOUNTER — OFFICE VISIT (OUTPATIENT)
Dept: INTERNAL MEDICINE | Facility: OTHER | Age: 71
End: 2021-11-01
Attending: INTERNAL MEDICINE
Payer: MEDICARE

## 2021-11-01 VITALS
RESPIRATION RATE: 14 BRPM | BODY MASS INDEX: 29.03 KG/M2 | WEIGHT: 160 LBS | TEMPERATURE: 98.2 F | HEART RATE: 91 BPM | DIASTOLIC BLOOD PRESSURE: 64 MMHG | OXYGEN SATURATION: 94 % | SYSTOLIC BLOOD PRESSURE: 122 MMHG

## 2021-11-01 DIAGNOSIS — N18.31 STAGE 3A CHRONIC KIDNEY DISEASE (H): ICD-10-CM

## 2021-11-01 DIAGNOSIS — Z23 NEED FOR DIPHTHERIA-TETANUS-PERTUSSIS (TDAP) VACCINE: ICD-10-CM

## 2021-11-01 DIAGNOSIS — M25.551 HIP PAIN, RIGHT: Primary | ICD-10-CM

## 2021-11-01 PROCEDURE — 99214 OFFICE O/P EST MOD 30 MIN: CPT | Performed by: INTERNAL MEDICINE

## 2021-11-01 PROCEDURE — G0463 HOSPITAL OUTPT CLINIC VISIT: HCPCS

## 2021-11-01 RX ORDER — CLOSTRIDIUM TETANI TOXOID ANTIGEN (FORMALDEHYDE INACTIVATED), CORYNEBACTERIUM DIPHTHERIAE TOXOID ANTIGEN (FORMALDEHYDE INACTIVATED), BORDETELLA PERTUSSIS TOXOID ANTIGEN (GLUTARALDEHYDE INACTIVATED), BORDETELLA PERTUSSIS FILAMENTOUS HEMAGGLUTININ ANTIGEN (FORMALDEHYDE INACTIVATED), BORDETELLA PERTUSSIS PERTACTIN ANTIGEN, AND BORDETELLA PERTUSSIS FIMBRIAE 2/3 ANTIGEN 5; 2; 2.5; 5; 3; 5 [LF]/.5ML; [LF]/.5ML; UG/.5ML; UG/.5ML; UG/.5ML; UG/.5ML
0.5 INJECTION, SUSPENSION INTRAMUSCULAR ONCE
Qty: 0.5 ML | Refills: 0 | Status: SHIPPED | OUTPATIENT
Start: 2021-11-01 | End: 2021-11-01

## 2021-11-01 ASSESSMENT — ANXIETY QUESTIONNAIRES
8. IF YOU CHECKED OFF ANY PROBLEMS, HOW DIFFICULT HAVE THESE MADE IT FOR YOU TO DO YOUR WORK, TAKE CARE OF THINGS AT HOME, OR GET ALONG WITH OTHER PEOPLE?: SOMEWHAT DIFFICULT
6. BECOMING EASILY ANNOYED OR IRRITABLE: SEVERAL DAYS
3. WORRYING TOO MUCH ABOUT DIFFERENT THINGS: SEVERAL DAYS
5. BEING SO RESTLESS THAT IT IS HARD TO SIT STILL: NOT AT ALL
4. TROUBLE RELAXING: NOT AT ALL
GAD7 TOTAL SCORE: 3
2. NOT BEING ABLE TO STOP OR CONTROL WORRYING: NOT AT ALL
1. FEELING NERVOUS, ANXIOUS, OR ON EDGE: NOT AT ALL
7. FEELING AFRAID AS IF SOMETHING AWFUL MIGHT HAPPEN: SEVERAL DAYS
GAD7 TOTAL SCORE: 3
GAD7 TOTAL SCORE: 3
7. FEELING AFRAID AS IF SOMETHING AWFUL MIGHT HAPPEN: SEVERAL DAYS

## 2021-11-01 ASSESSMENT — PAIN SCALES - GENERAL: PAINLEVEL: SEVERE PAIN (7)

## 2021-11-01 ASSESSMENT — PATIENT HEALTH QUESTIONNAIRE - PHQ9
SUM OF ALL RESPONSES TO PHQ QUESTIONS 1-9: 6
SUM OF ALL RESPONSES TO PHQ QUESTIONS 1-9: 6
10. IF YOU CHECKED OFF ANY PROBLEMS, HOW DIFFICULT HAVE THESE PROBLEMS MADE IT FOR YOU TO DO YOUR WORK, TAKE CARE OF THINGS AT HOME, OR GET ALONG WITH OTHER PEOPLE: SOMEWHAT DIFFICULT

## 2021-11-01 NOTE — NURSING NOTE
"Coming in to go over tests results    Chief Complaint   Patient presents with     RECHECK     tests       Initial /64   Pulse 91   Temp 98.2  F (36.8  C)   Resp 14   Wt 72.6 kg (160 lb)   LMP  (LMP Unknown)   SpO2 94%   BMI 29.03 kg/m   Estimated body mass index is 29.03 kg/m  as calculated from the following:    Height as of 9/9/21: 1.581 m (5' 2.25\").    Weight as of this encounter: 72.6 kg (160 lb).  Medication Reconciliation: complete.  FOOD SECURITY SCREENING QUESTIONS  Hunger Vital Signs:  Within the past 12 months we worried whether our food would run out before we got money to buy more. Never  Within the past 12 months the food we bought just didn't last and we didn't have money to get more. Never  Nirmala Reyes LPN 11/1/2021 11:40 AM      Nirmala Reyes LPN  "

## 2021-11-01 NOTE — PROGRESS NOTES
Chief Complaint   Patient presents with     RECHECK     tests         HPI: Ms. Elizabeth is a 71 year old female who presents today for follow up of recent testing and referrals.    She was referred to neurosurgery for back surgery however is not able to get in with them until after the new year.    Due to abnormal TOMMY she was referred to vascular surgery who completed testing that showed blockages in the right femoral and right internal iliac arteries.  Vascular surgery recommended endarterectomy and surgical intervention for this.  Patient is uncertain about the timing of all of this.  She is also scheduled for a colonoscopy in a couple weeks.    She continues to have significant pain with minimal improvement with most medications.    She  reports that she quit smoking about 13 years ago. She has a 40.00 pack-year smoking history. She has never used smokeless tobacco.     She is due for her tetanus vaccine.    Past medical history reviewed as below:     Past Medical History:   Diagnosis Date     Benign essential hypertension 3/3/2018     Cataract      Chronic pain of right knee 4/11/2018     Diabetic eye exam (H) 06/07/2018     TAYLOR (generalized anxiety disorder) 3/3/2018     History of MI (myocardial infarction) 07/2017    follows yearly with [facility]     Macular degeneration      Major depressive disorder, single episode 1995     Obstructive sleep apnea 2005    BIPAP     Osteopenia 2007    Lumbar spine     Pure hypercholesterolemia 2001     Type 2 diabetes mellitus with complication, without long-term current use of insulin (H) 03/03/2018   .      ROS  Pertinent ROS was performed and was negative, including for fever, chills. No other concerns, with exception of HPI above.      EXAM:   /64   Pulse 91   Temp 98.2  F (36.8  C)   Resp 14   Wt 72.6 kg (160 lb)   LMP  (LMP Unknown)   SpO2 94%   BMI 29.03 kg/m      Estimated body mass index is 29.03 kg/m  as calculated from the following:    Height as of 9/9/21:  "1.581 m (5' 2.25\").    Weight as of this encounter: 72.6 kg (160 lb).      GEN: Vitals reviewed. Healthy appearing. Patient is in no acute distress. Cooperative with exam.  HEENT: Normocephalic atraumatic.  Eyes grossly normal to inspection.  No discharge or erythema, or obvious scleral/conjunctival abnormalities.  LUNGS: No audible wheeze, cough, or visible cyanosis.  No visible retractions or increased work of breathing.    PSYCH: Mood is good.  Mentation appears normal, affect normal/bright, judgement and insight intact, normal speech and appearance well-groomed.     ASSESSMENT AND PLAN:    Hip pain, right  -We had a long discussion today regarding her testing, right hip pain and steps forward.  At this time I think it is reasonable to move forward with vascular surgery if she is able to get in before the end of the year and then follow-up with back surgery after the new year.  I will reach out to Dr. Sewell to contact the patient to pursue this.    Need for diphtheria-tetanus-pertussis (Tdap) vaccine  Prescription provided  - Tdap, tetanus-diphtheria-acell pertussis, (ADACEL) 5-2-15.5 LF-MCG/0.5 injection  Dispense: 0.5 mL; Refill: 0    Stage 3a chronic kidney disease (H)  -Continue to monitor with recent testing.         Return in about 2 months (around 1/1/2022) for Preop.    32 minutes spent on the date of the encounter doing chart review, history and exam, documentation and further activities as noted above      CHULA ROBBINS, DO   11/1/2021 11:48 AM      Answers for HPI/ROS submitted by the patient on 11/1/2021  If you checked off any problems, how difficult have these problems made it for you to do your work, take care of things at home, or get along with other people?: Somewhat difficult  PHQ9 TOTAL SCORE: 6  TAYLOR 7 TOTAL SCORE: 3      "

## 2021-11-01 NOTE — PATIENT INSTRUCTIONS
Ice every 3-4 hours, gentle exercise/rest as much as possible.    Caution with NSAIDS (ibuprofen, aspirin, naproxen, aleve, advil) due to risk for increased blood pressure,stomach pain/nausea/ulcers and kidney damage; use minimal amount necessary    Tylenol 1300 mg three times a day as needed; Maximum of 4000mg daily    Decrease Lyrica to 50mg once nightly for 5-7 days and then decrease to 25mg nightly for 5-7 days and then stop    - Return/call as needed for follow-up should any new symptoms develop, for worsening of current symptoms or if symptoms do not resolve with above plan.

## 2021-11-02 ASSESSMENT — ANXIETY QUESTIONNAIRES: GAD7 TOTAL SCORE: 3

## 2021-11-02 ASSESSMENT — PATIENT HEALTH QUESTIONNAIRE - PHQ9: SUM OF ALL RESPONSES TO PHQ QUESTIONS 1-9: 6

## 2021-11-10 ENCOUNTER — VIRTUAL VISIT (OUTPATIENT)
Dept: VASCULAR SURGERY | Facility: CLINIC | Age: 71
End: 2021-11-10
Payer: MEDICARE

## 2021-11-10 DIAGNOSIS — I73.9 PAD (PERIPHERAL ARTERY DISEASE) (H): ICD-10-CM

## 2021-11-10 DIAGNOSIS — I73.9 CLAUDICATION OF RIGHT LOWER EXTREMITY (H): Primary | ICD-10-CM

## 2021-11-10 PROCEDURE — 99215 OFFICE O/P EST HI 40 MIN: CPT | Mod: GT | Performed by: SURGERY

## 2021-11-10 RX ORDER — SENNOSIDES 8.6 MG
1300 CAPSULE ORAL
COMMUNITY
End: 2023-10-16

## 2021-11-10 RX ORDER — PREGABALIN 50 MG/1
50 CAPSULE ORAL 3 TIMES DAILY
COMMUNITY
End: 2022-03-07

## 2021-11-10 NOTE — PROGRESS NOTES
Vascular Surgery Consultation Note     Patient:  Jennyfer Elizabeth   Date of birth 1950, Medical record number [id number]  Date of Visit:  11/10/2021  Consult Requester:No att. 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 I have recommended a right femoral endarterectomy and concommittant right iliac artery angiogram with possible intervention. She will consider the recommendation and let us know if she would like to proceed.    Many thanks for involving me in the care of this very pleasant patient. Should any questions or concerns arise, please don't hesitate to contact me.    Warm Regards,    Rama Sewell MD, DFSVS, RPVI  Director, [facility] Vascular Services  Professor and Chief, Vascular and Endovascular Surgery  [facility]  [email or web address]          45 minutes spent on the date of the encounter doing chart review, review of outside records, review of test results, interpretation of tests, patient visit and documentation           HPI: Following up today after recent CTA to evaluate arterial circulation in pelvis and legs. Still with right hip pain and short distance claudication with calf tightness and right leg fatigue. Former smoker. Has had to consider wheelchair for some events.      Review of Systems   Constitutional, HEENT, cardiovascular, pulmonary, gi and gu systems are negative, except as otherwise noted.    Physical Exam   GENERAL: Healthy, alert and no distress  EYES: Eyes grossly normal to inspection.  No discharge or erythema, or obvious scleral/conjunctival abnormalities.  RESP: No audible wheeze, cough, or visible cyanosis.  No visible retractions or increased work of breathing.    SKIN: Visible skin clear. No significant rash, abnormal pigmentation or lesions.  NEURO: Cranial nerves grossly intact.  Mentation  and speech appropriate for age.  PSYCH: Mentation appears normal, affect normal/bright, judgement and insight intact, normal speech and appearance well-groomed.    CTA with heavy calcification of distal right common and proximal right external iliac arteries. Near-occlusion of right common femoral artery. Less disease appreciated in left iliac arterial system.        Jennyfer is a 71 year old who is being evaluated via a billable video visit.      How would you like to obtain your AVS? MyChart  If the video visit is dropped, the invitation should be resent by  Will anyone else be joining your video visit?     Video Start Time: 230        Video-Visit Details    Type of service:  Video Visit    Video End Time:300    Originating Location (pt. Location): Home    Distant Location (provider location):  [facility] [place]     Platform used for Video Visit: Native

## 2021-11-10 NOTE — LETTER
11/10/2021       RE: Jennyfer Elizabeth  [address]  [facility] [address]     Dear Colleague,    Thank you for referring your patient, Jennyfer Elizabeth, to the [facility] [place] at [facility]. Please see a copy of my visit note below.    Vascular Surgery Consultation Note     Patient:  Jennyfer Elizabeth   Date of birth 1950, Medical record number [id number]  Date of Visit:  11/10/2021  Consult Requester:No att. 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 I have recommended a right femoral endarterectomy and concommittant right iliac artery angiogram with possible intervention. She will consider the recommendation and let us know if she would like to proceed.    Many thanks for involving me in the care of this very pleasant patient. Should any questions or concerns arise, please don't hesitate to contact me.    Warm Regards,    Rama Sewell MD, DFSVS, RPVI  Director, [facility] Vascular Services  Professor and Chief, Vascular and Endovascular Surgery  [facility]  [email or web address]          45 minutes spent on the date of the encounter doing chart review, review of outside records, review of test results, interpretation of tests, patient visit and documentation           HPI: Following up today after recent CTA to evaluate arterial circulation in pelvis and legs. Still with right hip pain and short distance claudication with calf tightness and right leg fatigue. Former smoker. Has had to consider wheelchair for some events.      Review of Systems   Constitutional, HEENT, cardiovascular, pulmonary, gi and gu systems are negative, except as otherwise noted.    Physical Exam   GENERAL: Healthy, alert and no distress  EYES: Eyes grossly normal to inspection.  No  discharge or erythema, or obvious scleral/conjunctival abnormalities.  RESP: No audible wheeze, cough, or visible cyanosis.  No visible retractions or increased work of breathing.    SKIN: Visible skin clear. No significant rash, abnormal pigmentation or lesions.  NEURO: Cranial nerves grossly intact.  Mentation and speech appropriate for age.  PSYCH: Mentation appears normal, affect normal/bright, judgement and insight intact, normal speech and appearance well-groomed.    CTA with heavy calcification of distal right common and proximal right external iliac arteries. Near-occlusion of right common femoral artery. Less disease appreciated in left iliac arterial system.      Again, thank you for allowing me to participate in the care of your patient.      Sincerely,    Rama Sewell MD

## 2021-11-10 NOTE — NURSING NOTE
Chief Complaint   Patient presents with     Follow Up     PAD follow up.  CTA Abdomen Pelvis Bilat Leg Runoff w Contrast completed 10/22.  Pt would like more information on the potentially needed procedure along with recovery times.  Pt noted occasionally her toes feel cold and she has intermittent pain in her right hip.         Medications and allergies reconciled.    Nicolasa Jackson LPN

## 2021-11-11 NOTE — PATIENT INSTRUCTIONS
Thank you so much for choosing us for your care. It was a pleasure to see you at the vascular clinic today.     Follow-up recommendations: Please let us know if/when you would like to proceed with surgery. You can send us a [employer] message or call the vascular nurse directly at [phone].    Additional testing/imaging ordered today: None    Pre-op Instructions    You will be contacted with the official time and date of surgery by our surgery scheduler. Surgery will be at the [facility] at [address] [facility] in [place].    Please do not eat 8 hours prior to your surgery. You may have clear liquids until your check in time. You should have a physical done by your PCP no more than 30 days prior to surgery. You will also be contacted by the COVID testing team to coordinate a COVID test 72-96 hours prior to surgery. Please shower the night before with a non scented antibacterial soap to reduce your risk of surgical site infection. Let us know if you are not feeling well the week of surgery as this would impact our decision to put you under general anesthesia.    You will receive a phone call 24 hours prior to your surgery from a pre-op nurse who will help answer any last minute questions and provide further instructions for your day of surgery.    If you are on anticoagulants (Eliquis, Plavix, Coumadin, etc), a nurse will be contacting you with instructions on holding these medications prior to surgery. If you take a daily baby aspirin, you will CONTINUE your aspirin, including on the day of your surgery.        Our scheduling team will get in touch with you to set up any follow-up testing/imaging and/or appointments. Please be aware that any testing/imaging recommended today will need to completed prior to your next visit with the provider. If testing/imaging is not completed prior to your next visit, your visit may be rescheduled.        If you have any questions, please call  Carlee Moran RN at [phone]. We also encourage the use of [employer] to communicate with your HealthCare Provider.      If you have an urgent need after business hours (8:00 am to 4:30 pm) please call [phone], option 4, and ask for the vascular attending on call. For non-urgent after hours needs, please call the vascular nurse at [phone] and leave a detailed voicemail. For scheduling needs, please call our scheduling line at [phone].    Patient Education     Having Carotid Endarterectomy  Endarterectomy is the removal of plaque from the carotid artery through a cut (incision) in the neck. This surgery has a low risk of stroke or complication (1% to 3%). It typically involves a quick recovery with little pain. You may be asleep under general anesthesia during surgery, or awake with local anesthesia to control pain. This will be discussed with you before surgery.   How the endarterectomy is done     A skin incision is made over the carotid artery.      A shunt helps keep blood flowing during the procedure.   1. Make the skin incision. The surgeon makes an incision in the skin over the carotid artery.  2. Open the artery. The surgeon places clamps on the artery above and below the blockage. This temporarily stops blood flow. The brain receives blood from the carotid artery on the other side of your neck. The surgeon then makes an incision in the artery itself.  3. Place shunt. A shunt may be used to preserve blood flow to the brain during the procedure. After the shunt is in place, the clamps are removed from the internal carotid artery. In some cases a shunt is not needed because the brain is receiving enough blood through other arteries (from the carotid artery on the other side of your neck).   4. Remove plaque. The surgeon loosens plaque from the artery wall. The plaque is then removed, often in a single piece. The surgeon inspects the artery to confirm that all of the plaque has been  removed.  5. Close the incision. The surgeon closes the incision using either sutures or a patch. The clamps are then removed. Next, the skin incision is sutured closed. A tube or drain may be put in place to keep fluids from collecting around the area.  The surgery usually takes around 2 hours, but may be longer depending on the anesthetic and your situation.   [employer] last reviewed this educational content on 12/1/2019 2000-2021 [employer]. All rights reserved. This information is not intended as a substitute for professional medical care. Always follow your healthcare professional's instructions.           Patient Education     Peripheral Angioplasty    Peripheral angioplasty is a procedure that helps open blocked arteries in the legs and arms (peripheral arteries). These blood vessels may become blocked by plaque. This prevents blood from flowing normally. The procedure can help improve the blood flow.  Before the procedure  Do the following:     Tell your healthcare provider about all medicines you take. This includes over-the-counter medicines. It also includes vitamins and herbal supplements. Tell your provider about any allergies you may have. Tell him or her if you have an allergy to iodine.     Follow any directions you re given for not eating or drinking before the procedure.    Arrange for a family member or friend to drive you home.  During the procedure    You may get medicine through an IV (intravenous) line to relax you. You will then have an injection to numb the area on your body where the procedure is done. This is usually an artery in the groin. Or, an artery in your wrist or arm may be used instead.    The provider makes a tiny skin cut (incision) near the artery.    He or she puts a thin, flexible tube (catheter) through the incision. He or she then threads the catheter into the blocked artery. This is done with X-ray monitoring. The X-rays are used to help show where to put  the catheter.    Contrast dye is injected into the catheter. X-rays are taken. These X-rays are called angiograms.    A tiny balloon is pushed through the catheter to the blockage. Your healthcare provider inflates and deflates the balloon a few times. This compresses the plaque. A small metal or mesh tube (stent) may be put in the artery. This is done to help keep it open. The balloon and catheter are then taken out.    After the procedure  You ll be taken to a recovery area. Pressure is put on the incision site for 30 to 45 minutes. Your healthcare provider will tell you how long to lie down. You will need to keep the incision site still. This is to prevent bleeding. You will likely go home that day. Or you may spend the night in the facility. You will be given instructions for when you go home.   When to call your healthcare provider  Call your healthcare provider right away or get medical help if:    You notice a lump or bleeding at the site where the catheter was inserted    You feel increasing pain at the insertion site    You become lightheaded or dizzy    You have leg pain or numbness    Your leg turns blue or feels cold    You get a fever more than 100.4 F (38 C).    You get a skin rash    You can't pee after the procedure    You have chest pain or shortness of breath  [employer] last reviewed this educational content on 12/1/2019 2000-2021 [employer]. All rights reserved. This information is not intended as a substitute for professional medical care. Always follow your healthcare professional's instructions.         Patient Education     Peripheral Arterial Angioplasty and Stent  Peripheral arterial angioplasty is a procedure done to treat a narrowed or blocked artery in your arm or leg. It brings blood flow back to your arm or leg. It also helps ease symptoms. Sometimes a metal mesh tube called a stent may be put in your artery. It holds your artery open. The procedure is done by a  specially trained healthcare provider called an interventional radiologist. This healthcare provider is trained and certified by the American Board of Radiology to use minimally invasive image-guided procedures to diagnose and treat diseases. A vascular surgeon may also do this procedure.     Before your procedure  Follow any instructions you are given on how to get ready, such as not eating or drinking before the procedure.   Tell your healthcare provider if you:  Are allergic to X-ray dye (contrast medium) or other medicines  Are breastfeeding  Are pregnant or think you may be pregnant  Have had any recent illnesses  Have any health conditions  Tell your healthcare provider about any medicines you are taking. You may need to stop taking all or some of these before the procedure. This includes:   All prescription medicines  Herbs, vitamins, and other supplements  Over-the-counter medicines such as aspirin or ibuprofen  Illegal drugs  During your procedure  An IV line is put into your vein. It gives you fluids and medicines. You may be given medicine to help you relax and make you sleepy (sedation). Medicine will be put on your skin where the cut (incision) will be done. This is to keep you from feeling pain at the site.  A very small incision is made at the insertion site. A thin, flexible tube (catheter) is put through the incision into your artery. The radiologist watches the catheter s movement on a screen.  X-ray dye is injected through the catheter into your artery. It helps the radiologist see the artery more clearly on the X-rays. The radiologist uses these images as a guide. He or she moves the catheter to the narrowed or blocked part of your artery.  When the catheter reaches the narrowed or blocked part, the radiologist inflates a special balloon attached to the catheter. This is called angioplasty. Inflating the balloon widens the passage through the artery.  Sometimes the artery won't stay open after  the angioplasty. In this case, a stent is needed. A catheter with a stent attached is threaded through the artery. When the stent is in the right place, it is opened.  When the procedure is done, all catheters and balloons are removed. The stent stays in place. Pressure is put on the insertion site for 15 minutes to stop bleeding.    After your procedure  Your healthcare provider will tell you how long to lie down and keep the insertion site still.  You may stay in the hospital for a few hours or overnight.  Drink plenty of fluids to help flush the X-ray dye from your body.  After you go home, care for the insertion site as directed.  You may need to take aspirin or a blood-thinning medicine (anticoagulant) after the procedure. It can help prevent blood clots in the stent. Talk with your healthcare provider about this.    Possible risks  All procedures have some risk. Possible risks of peripheral arterial angioplasty and stent are:   Bleeding or infection at the insertion site  Damage to the artery, including worsening of the blockage  Problems because of X-ray dye, such as allergic reaction or kidney damage  Artery becoming blocked again (restenosis), which often happens within 6 to 18 months  Low-level exposure to X-ray radiation, which is considered a safe level  [employer] last reviewed this educational content on 8/1/2020 2000-2021 [employer]. All rights reserved. This information is not intended as a substitute for professional medical care. Always follow your healthcare professional's instructions.

## 2021-11-14 ENCOUNTER — ALLIED HEALTH/NURSE VISIT (OUTPATIENT)
Dept: FAMILY MEDICINE | Facility: OTHER | Age: 71
End: 2021-11-14
Attending: SURGERY
Payer: MEDICARE

## 2021-11-14 DIAGNOSIS — Z12.11 ENCOUNTER FOR SCREENING COLONOSCOPY: ICD-10-CM

## 2021-11-14 DIAGNOSIS — Z20.822 COVID-19 RULED OUT: Primary | ICD-10-CM

## 2021-11-14 PROCEDURE — C9803 HOPD COVID-19 SPEC COLLECT: HCPCS

## 2021-11-14 PROCEDURE — U0003 INFECTIOUS AGENT DETECTION BY NUCLEIC ACID (DNA OR RNA); SEVERE ACUTE RESPIRATORY SYNDROME CORONAVIRUS 2 (SARS-COV-2) (CORONAVIRUS DISEASE [COVID-19]), AMPLIFIED PROBE TECHNIQUE, MAKING USE OF HIGH THROUGHPUT TECHNOLOGIES AS DESCRIBED BY CMS-2020-01-R: HCPCS | Mod: ZL

## 2021-11-14 NOTE — PROGRESS NOTES
Patient is here for [facility] testing for surgery.  Sonali Sinclair LPN on 11/14/2021 at 11:04 AM

## 2021-11-15 LAB — SARS-COV-2 RNA RESP QL NAA+PROBE: NEGATIVE

## 2021-11-17 ENCOUNTER — HOSPITAL ENCOUNTER (OUTPATIENT)
Facility: OTHER | Age: 71
End: 2021-11-17
Attending: STUDENT IN AN ORGANIZED HEALTH CARE EDUCATION/TRAINING PROGRAM | Admitting: STUDENT IN AN ORGANIZED HEALTH CARE EDUCATION/TRAINING PROGRAM
Payer: MEDICARE

## 2021-11-18 ENCOUNTER — TELEPHONE (OUTPATIENT)
Dept: INTERNAL MEDICINE | Facility: OTHER | Age: 71
End: 2021-11-18
Payer: MEDICARE

## 2021-11-18 ENCOUNTER — ALLIED HEALTH/NURSE VISIT (OUTPATIENT)
Dept: FAMILY MEDICINE | Facility: OTHER | Age: 71
End: 2021-11-18
Attending: INTERNAL MEDICINE
Payer: MEDICARE

## 2021-11-18 DIAGNOSIS — Z20.822 COVID-19 RULED OUT: Primary | ICD-10-CM

## 2021-11-18 PROCEDURE — U0005 INFEC AGEN DETEC AMPLI PROBE: HCPCS | Mod: ZL

## 2021-11-18 PROCEDURE — C9803 HOPD COVID-19 SPEC COLLECT: HCPCS

## 2021-11-18 NOTE — TELEPHONE ENCOUNTER
SHAWANDA Perkins-pt supposed to have back surgery on Monday-not FDA approved. Please call. Thank you.  Savannah Fierro

## 2021-11-19 LAB — SARS-COV-2 RNA RESP QL NAA+PROBE: NEGATIVE

## 2021-11-19 RX ORDER — ONDANSETRON 4 MG/1
4 TABLET, ORALLY DISINTEGRATING ORAL EVERY 30 MIN PRN
Status: CANCELLED | OUTPATIENT
Start: 2021-11-19

## 2021-11-19 RX ORDER — LIDOCAINE 40 MG/G
CREAM TOPICAL
Status: CANCELLED | OUTPATIENT
Start: 2021-11-19

## 2021-11-19 RX ORDER — ONDANSETRON 2 MG/ML
4 INJECTION INTRAMUSCULAR; INTRAVENOUS EVERY 30 MIN PRN
Status: CANCELLED | OUTPATIENT
Start: 2021-11-19

## 2021-11-19 RX ORDER — FENTANYL CITRATE 50 UG/ML
25 INJECTION, SOLUTION INTRAMUSCULAR; INTRAVENOUS
Status: CANCELLED | OUTPATIENT
Start: 2021-11-19

## 2021-11-19 RX ORDER — HYDROMORPHONE HYDROCHLORIDE 1 MG/ML
0.2 INJECTION, SOLUTION INTRAMUSCULAR; INTRAVENOUS; SUBCUTANEOUS EVERY 5 MIN PRN
Status: CANCELLED | OUTPATIENT
Start: 2021-11-19

## 2021-11-19 RX ORDER — SODIUM CHLORIDE, SODIUM LACTATE, POTASSIUM CHLORIDE, CALCIUM CHLORIDE 600; 310; 30; 20 MG/100ML; MG/100ML; MG/100ML; MG/100ML
INJECTION, SOLUTION INTRAVENOUS CONTINUOUS
Status: CANCELLED | OUTPATIENT
Start: 2021-11-19

## 2021-11-19 RX ORDER — FENTANYL CITRATE 50 UG/ML
25 INJECTION, SOLUTION INTRAMUSCULAR; INTRAVENOUS EVERY 5 MIN PRN
Status: CANCELLED | OUTPATIENT
Start: 2021-11-19

## 2021-11-19 RX ORDER — OXYCODONE HYDROCHLORIDE 5 MG/1
5 TABLET ORAL EVERY 4 HOURS PRN
Status: CANCELLED | OUTPATIENT
Start: 2021-11-19

## 2021-11-19 RX ORDER — MEPERIDINE HYDROCHLORIDE 50 MG/ML
12.5 INJECTION INTRAMUSCULAR; INTRAVENOUS; SUBCUTANEOUS
Status: CANCELLED | OUTPATIENT
Start: 2021-11-19

## 2021-11-19 NOTE — TELEPHONE ENCOUNTER
Patient states she was talking to Zaria the FA, she has been helping but her insurance is not wanting to cover the surgery because its not FDA approved and considered experimental.   She is wondering if [facility] is able to help, or send in a letter or documentation explaining that she does need this and why she is needing this.   She is scheduled for Monday as of now.   Polly Landeros LPN .............11/19/2021     9:49 AM

## 2021-11-30 ENCOUNTER — TELEPHONE (OUTPATIENT)
Dept: INTERNAL MEDICINE | Facility: OTHER | Age: 71
End: 2021-11-30
Payer: MEDICARE

## 2021-11-30 ENCOUNTER — TRANSFERRED RECORDS (OUTPATIENT)
Dept: HEALTH INFORMATION MANAGEMENT | Facility: OTHER | Age: 71
End: 2021-11-30
Payer: MEDICARE

## 2021-11-30 LAB — RETINOPATHY: NEGATIVE

## 2021-11-30 NOTE — TELEPHONE ENCOUNTER
Jennyfer stopped in today and asked what she can do about her arm following her Boostix shot on 11/1/21. Complained area was black and blue for 3 weeks, cannot lift her arm above her head without assisting. Has not made an appointment with a provider to have it looked at and does not seem like she wants to do that. Sat with her and filled out a VAERS report.

## 2021-12-01 NOTE — PROGRESS NOTES
"Jennyfer Elizabeth  : 1950 Age: 71 year old Sex: female MRN: [id number]    Nursing Notes:   Awilda Cummins LPN  2021 10:47 AM  Signed  Chief Complaint   Patient presents with     Consult     foot care       Initial LMP  (LMP Unknown)  Estimated body mass index is 29.03 kg/m  as calculated from the following:    Height as of 21: 1.581 m (5' 2.25\").    Weight as of 21: 72.6 kg (160 lb).  Medication Reconciliation: complete    Awilda Cummins LPN       Nursing note reviewed with patient.  Accuracy and completeness verified.     Encounter Date:  2021    Patient Name:  Jennyfer Elizabeth  Patient MRN:   [id number]     Last Footcare Appt: Visit date not found    PCP: Lili Perkins    Other Providers Seen for Foot/Nail Care (name/location/date): [] Yes [x] No List (if applicable):    ABN Given to patient and signed in clinic today:    [x] Yes [] No    SUBJECTIVE:    Patient Reported Symptoms: RIGHT  LEFT   Neuropathic symptoms []  []  DESCRIBE:   Pain in foot/toes at rest []  []  DESCRIBE:   Intermittent claudication []  []  DESCRIBE:   Previous foot ulcer/injury []  []  DESCRIBE:   Amputation []  []  DESCRIBE:    OBJECTIVE:    Nurse/Provider Inspection of Feet/Nails: RIGHT  LEFT   Infection []  []  DESCRIBE:   Ulcerations []  []  DESCRIBE:   Calluses/Corns [x]  [x]  DESCRIBE: bilateral MTP joints and great toes   Fissures/Cracks []  []  DESCRIBE:   Nail Disorders/Changes [x]  [x]  DESCRIBE: all ten toenails   Other []      []  DESCRIBE:  VASCULAR TESTING:    Foot Pulses:  RIGHT  LEFT   Dorsalis pedis [x]  [x]    Posterior tibial [x]  [x]   Capillary refill of the hallux (<3 seconds): [] []  Digital hair present: [] []  Varicosities (+/- presents): [] []  Edema (pitting vs. non pitting): [] []     DESCRIBE: [] 1+ minimal   [] 2+ moderate   [] 3+ severe  Skin Temperature:  [x] [x]     DESCRIBE: [] Cold   [] Hot    [x] Warm to touch    NEUROLOGIC TESTING:        RIGHT LEFT  Sharp/dull Testing: (the blunt " end and sharp end of swab stick) [x] [x]  Proprioception: (moves hallux up/down in space) [x] [x]  Superficial Reflexes: (Babinski sign) [x] [x]    Monofilament Testing: RIGHT  LEFT  RIGHT LEFT    Site 1 [x]  [x]  Site 6 [x] [x]    Site 2 [x]  [x]  Site 7 [x] [x]    Site 3 [x]  [x]  Site 8 [x] [x]    Site 4 [x]  [x]  Site 9 [x] [x]    Site 5 [x]  [x]  Site 10 [x] [x]      MUSCULOSKELETAL: RIGHT LEFT  Foot Type: [x] [x]  DESCRIBE: [x] Pes cavus (high arch) [] pes rectus (normal) [] pes planus (flat)  Digital deformity: [x] [x]  DESCRIBE: [x] bunion [] claw toe [x] hammer toe [] mallet toe [] hallux limitus [] other  Joint ROM: [x] [x]  DESCRIBE: [x] adequate ROM to MTPH, STJ, and AJ without crepitus  Adequate Muscle Strength: [x] [x]   Other [] []  DESCRIBE:     DERMATOLOGIC:      Nails: RIGHT LEFT  Onychauxis (thickened, long nail): [x] [x]  DESCRIBE: all 10 toenails  Onycholysis ( nail): [] []  DESCRIBE:   Onychocryptosis (ingrown toenail): [] []  DESCRIBE:    Onychogryphosis (faizan's horn nail): [x] [x]  DESCRIBE: bilateral 2nd toenail  Onchomycosis (fungal nail): [x] [x]  DESCRIBE: bilateral great toneails    Skin: RIGHT LEFT  [x] Hyperkeratotic lesions [] verruca tissue [] foreign body: [x] [x]  DESCRIBE: bilateral mtp joints and great toes  [] Mild edema [] erythema [] open lesions [] interdigit maceration: [] []  DESCRIBE:   [] Varicosities [] telangiectasis [] pigmented lesion [] venous stasis:  [] []  DESCRIBE:   Adequate padding to the plantar aspect of the foot: [x] [x]  DESCRIBE:     Patient has abnormal skin lesion on left mid shin. I recommend she be seen by General Surgery for removal and pathology. Referral placed today.    Patient able to put on shoes and socks on independently with no other musculoskeletal concerns reported today.    Footwear Assessment:  Type: boots   Condition Good [x] Fair [] Poor []   Fit Good [x] Fair [] Poor []            Yes No  Does the patient use an ambulatory  aid?      [] [x] DESCRIBE:  Does the patient understand the effects of diabetes on foot health? [x] []  Can the patient identify appropriate foot care practices?    [x] []  Are the patient's feet adequately cared for?      [x] []            Yes No  Does the patient have impaired vision?      [] [x]  Can the patient reach own feet for safe self care?     [x] []  Are there other factors influencing ability to safely care for own feet? [x] []     Pertinent Past Medical History:  Patient Active Problem List   Diagnosis     Type 2 diabetes mellitus with complication, without long-term current use of insulin (H)     TAYLOR (generalized anxiety disorder)     Chronic pain of right knee     Blepharochalasis     Dermatochalasia     MOSHE on CPAP     Chronic kidney disease, stage 3 (H)     Thoracic aortic aneurysm without rupture (H)     Past Medical History:   Diagnosis Date     Benign essential hypertension 3/3/2018     Cataract      Chronic pain of right knee 4/11/2018     Diabetic eye exam (H) 06/07/2018     TAYLOR (generalized anxiety disorder) 3/3/2018     History of MI (myocardial infarction) 07/2017    follows yearly with [facility]     Macular degeneration      Major depressive disorder, single episode 1995     Obstructive sleep apnea 2005    BIPAP     Osteopenia 2007    Lumbar spine     Pure hypercholesterolemia 2001     Type 2 diabetes mellitus with complication, without long-term current use of insulin (H) 03/03/2018       Diagnostics:  Hemoglobin A1C   Date Value Ref Range Status   09/09/2021 7.3 (H) 4.0 - 6.2 % Final   02/01/2021 7.0 (H) 4.0 - 6.0 % Final       ASSESSMENT/PLAN:    Type 2 diabetes mellitus with complication, without long-term current use of insulin (H)    Skin lesion  - Adult General Surg Referral    Onychogryphosis  Bunion, left  Bunion, right  Corn or callus  Nail dystrophy  Onychomycosis  Hammertoe, bilateral  All toenails filed down to manageable thickness with Dremel tool and angled Brinson.  Toenails were  then trimmed with clippers.  Lotion applied to bilateral lower extremities.  Patient tolerated well. Time spent doing foot/nail care was 35 minutes.  - TRIMMING DYSTROPHIC NAILS,ANY NUMBER    [x] Patient is on chronic anticoagulation and chronic use of this medication poses a increased risk of bleeding should patient sustain a nail/skin injury.    [x] Patient has noted onychomycosis for some time now and has tried various OTC treatments without success.     Educated Patient On:   [x] Proper healthy diet. Eliminate soda, high fructose corn syrup products, artificial sweeteners, and high sodium, high cholesterol foods. Encouraged more fruits and   vegetables and an increase in daily water intake.  [x] Educated on importance of diet and exercise for weight loss and reduction on symptoms.   [x] Daily exercise for at least 30 minutes is recommended. This can be walking, riding a bike, low impact aerobic activity, or yoga.    [x] Importance of daily skin assessments.   [x] Importance of not walking barefoot. Recommend wearing Crocs in house versus going barefoot.   [x] Importance of good, supportive shoes. Handout given today.  [x] Importance of smoking cessation. Greater than 3 minutes were spent on smoking cessation including encouragement to reduce cigarette use and discussion of modalities to assist with this. Cessation was encouraged in order to improve pain, reduce mortality, improve quality of life, and long term outcomes.     Recommended:      YES NO   Diabetic socks:      [x] [] [] Not applicable   Compression stockings/sleeves:   [x] [] [] Not applicable   Diabetic/supportive shoegear with wide toe box:  [x] [] [] Not applicable   Use of Shadi's VapoRub daily to feet and/or toenails: [x] [] [] Not applicable   Smoking Cessation:     [] [] [x] Not applicable    FOLLOW-UP:    Return to clinic: [] One week [] 30 Days (wounds) [x] 63+ Days   [] As directed by Provider    I explained my diagnostic considerations and  recommendations to the patient, who voiced understanding and agreement with the treatment plan. All questions were answered. We discussed potential side effects of any prescribed or recommended therapies, as well as expectations for response to treatments.     DAVID Parker, TAB-C  Internal Medicine  [facility]    12/02/2021 11:32 AM    Total time spent with this patient was 40 minutes which included chart review, procedures, patient education, visualization and interpretation of labs/images, time spent with the patient and documentation.     [x]  TRIMMING DYSTROPHIC NAILS,ANY NUMBER  [] 96837 TRIM HYPERKERATOTIC SKIN LESION, 1  [] 59771 TRIM HYPERKERATOTIC SKIN LESION,2-4  [] 95762 TRIM HYPERKERATOTIC SKIN LESION,>4  [] 85553 TRIM NON DYSTROPHIC NAIL(S)  [] 69648 DEBRIDEMENT OF NAIL(S) 1-5  [] 34712 DEBRIDEMENT OF NAIL(S) 6 OR MORE  [] 80705 REMOVAL NAIL/NAIL BED, PARTIAL OR COMPLETE

## 2021-12-02 ENCOUNTER — OFFICE VISIT (OUTPATIENT)
Dept: INTERNAL MEDICINE | Facility: OTHER | Age: 71
End: 2021-12-02
Attending: NURSE PRACTITIONER
Payer: MEDICARE

## 2021-12-02 VITALS
HEART RATE: 96 BPM | SYSTOLIC BLOOD PRESSURE: 112 MMHG | BODY MASS INDEX: 28.6 KG/M2 | WEIGHT: 161.4 LBS | DIASTOLIC BLOOD PRESSURE: 78 MMHG | HEIGHT: 63 IN | TEMPERATURE: 98.4 F | RESPIRATION RATE: 16 BRPM

## 2021-12-02 DIAGNOSIS — L84 CORN OR CALLUS: Chronic | ICD-10-CM

## 2021-12-02 DIAGNOSIS — M21.612 BUNION, LEFT: Chronic | ICD-10-CM

## 2021-12-02 DIAGNOSIS — L60.3 NAIL DYSTROPHY: Chronic | ICD-10-CM

## 2021-12-02 DIAGNOSIS — M20.41 HAMMERTOE, BILATERAL: Chronic | ICD-10-CM

## 2021-12-02 DIAGNOSIS — M21.611 BUNION, RIGHT: Chronic | ICD-10-CM

## 2021-12-02 DIAGNOSIS — E11.8 TYPE 2 DIABETES MELLITUS WITH COMPLICATION, WITHOUT LONG-TERM CURRENT USE OF INSULIN (H): Primary | ICD-10-CM

## 2021-12-02 DIAGNOSIS — L98.9 SKIN LESION: ICD-10-CM

## 2021-12-02 DIAGNOSIS — L60.2 ONYCHOGRYPHOSIS: Chronic | ICD-10-CM

## 2021-12-02 DIAGNOSIS — B35.1 ONYCHOMYCOSIS: Chronic | ICD-10-CM

## 2021-12-02 DIAGNOSIS — M20.42 HAMMERTOE, BILATERAL: Chronic | ICD-10-CM

## 2021-12-02 PROCEDURE — G0463 HOSPITAL OUTPT CLINIC VISIT: HCPCS | Mod: 25

## 2021-12-02 PROCEDURE — G0463 HOSPITAL OUTPT CLINIC VISIT: HCPCS

## 2021-12-02 PROCEDURE — G0127 TRIM NAIL(S): HCPCS | Mod: Q8,GA | Performed by: NURSE PRACTITIONER

## 2021-12-02 PROCEDURE — 99212 OFFICE O/P EST SF 10 MIN: CPT | Mod: 25 | Performed by: NURSE PRACTITIONER

## 2021-12-02 ASSESSMENT — MIFFLIN-ST. JEOR: SCORE: 1203.3

## 2021-12-02 ASSESSMENT — PAIN SCALES - GENERAL: PAINLEVEL: NO PAIN (0)

## 2021-12-02 NOTE — NURSING NOTE
"Chief Complaint   Patient presents with     Consult     foot care       Initial LMP  (LMP Unknown)  Estimated body mass index is 29.03 kg/m  as calculated from the following:    Height as of 9/9/21: 1.581 m (5' 2.25\").    Weight as of 11/1/21: 72.6 kg (160 lb).  Medication Reconciliation: complete    Awilda Cummins LPN  "

## 2021-12-08 ENCOUNTER — HOSPITAL ENCOUNTER (OUTPATIENT)
Dept: GENERAL RADIOLOGY | Facility: OTHER | Age: 71
Discharge: HOME | End: 2021-12-08
Attending: STUDENT IN AN ORGANIZED HEALTH CARE EDUCATION/TRAINING PROGRAM | Admitting: STUDENT IN AN ORGANIZED HEALTH CARE EDUCATION/TRAINING PROGRAM
Payer: MEDICARE

## 2021-12-08 DIAGNOSIS — M48.02 CERVICAL SPINAL STENOSIS: ICD-10-CM

## 2021-12-08 DIAGNOSIS — M47.892 OTHER SPONDYLOSIS, CERVICAL REGION: ICD-10-CM

## 2021-12-08 PROCEDURE — 250N000011 HC RX IP 250 OP 636: Performed by: RADIOLOGY

## 2021-12-08 PROCEDURE — 64491 INJ PARAVERT F JNT C/T 2 LEV: CPT

## 2021-12-08 PROCEDURE — 250N000009 HC RX 250: Performed by: RADIOLOGY

## 2021-12-08 PROCEDURE — 255N000002 HC RX 255 OP 636: Performed by: RADIOLOGY

## 2021-12-08 RX ORDER — TRIAMCINOLONE ACETONIDE 40 MG/ML
1 INJECTION, SUSPENSION INTRA-ARTICULAR; INTRAMUSCULAR ONCE
Status: COMPLETED | OUTPATIENT
Start: 2021-12-08 | End: 2021-12-08

## 2021-12-08 RX ORDER — BUPIVACAINE HYDROCHLORIDE 5 MG/ML
3 INJECTION, SOLUTION PERINEURAL ONCE
Status: COMPLETED | OUTPATIENT
Start: 2021-12-08 | End: 2021-12-08

## 2021-12-08 RX ORDER — LIDOCAINE HYDROCHLORIDE 10 MG/ML
5 INJECTION, SOLUTION INFILTRATION; PERINEURAL ONCE
Status: COMPLETED | OUTPATIENT
Start: 2021-12-08 | End: 2021-12-08

## 2021-12-08 RX ADMIN — IOHEXOL 1 ML: 240 INJECTION, SOLUTION INTRATHECAL; INTRAVASCULAR; INTRAVENOUS; ORAL at 11:34

## 2021-12-08 RX ADMIN — LIDOCAINE HYDROCHLORIDE 1 ML: 10 INJECTION, SOLUTION INFILTRATION; PERINEURAL at 11:35

## 2021-12-08 RX ADMIN — TRIAMCINOLONE ACETONIDE 40 MG: 40 INJECTION, SUSPENSION INTRA-ARTICULAR; INTRAMUSCULAR at 11:36

## 2021-12-08 RX ADMIN — BUPIVACAINE HYDROCHLORIDE 2 ML: 5 INJECTION, SOLUTION EPIDURAL; INTRACAUDAL at 11:35

## 2021-12-14 ENCOUNTER — OFFICE VISIT (OUTPATIENT)
Dept: SURGERY | Facility: OTHER | Age: 71
End: 2021-12-14
Attending: SURGERY
Payer: MEDICARE

## 2021-12-14 VITALS
SYSTOLIC BLOOD PRESSURE: 118 MMHG | DIASTOLIC BLOOD PRESSURE: 68 MMHG | WEIGHT: 161 LBS | RESPIRATION RATE: 16 BRPM | TEMPERATURE: 97.9 F | BODY MASS INDEX: 28.98 KG/M2 | HEART RATE: 73 BPM

## 2021-12-14 DIAGNOSIS — L98.9 SKIN LESION: ICD-10-CM

## 2021-12-14 PROCEDURE — 11402 EXC TR-EXT B9+MARG 1.1-2 CM: CPT | Performed by: SURGERY

## 2021-12-14 PROCEDURE — 12042 INTMD RPR N-HF/GENIT2.6-7.5: CPT | Performed by: SURGERY

## 2021-12-14 PROCEDURE — 11602 EXC TR-EXT MAL+MARG 1.1-2 CM: CPT | Performed by: SURGERY

## 2021-12-14 PROCEDURE — 12032 INTMD RPR S/A/T/EXT 2.6-7.5: CPT | Performed by: SURGERY

## 2021-12-14 PROCEDURE — 88305 TISSUE EXAM BY PATHOLOGIST: CPT

## 2021-12-14 PROCEDURE — G0463 HOSPITAL OUTPT CLINIC VISIT: HCPCS

## 2021-12-14 ASSESSMENT — PAIN SCALES - GENERAL: PAINLEVEL: NO PAIN (0)

## 2021-12-14 NOTE — NURSING NOTE
"Chief Complaint   Patient presents with     Derm Problem     lump on left lower extremity       Initial /68 (BP Location: Right arm, Patient Position: Sitting)   Pulse 73   Temp 97.9  F (36.6  C) (Tympanic)   Resp 16   Wt 73 kg (161 lb)   LMP  (LMP Unknown)   BMI 28.98 kg/m   Estimated body mass index is 28.98 kg/m  as calculated from the following:    Height as of 12/2/21: 1.588 m (5' 2.5\").    Weight as of this encounter: 73 kg (161 lb).  Medication Reconciliation: Completed     Advanced Care Directive Reviewed    Prior to the start of the procedure and with procedural staff participation, I verbally confirmed the patient s identity using two indicators, relevant allergies, that the procedure was appropriate and matched the consent or emergent situation, and that the correct equipment/implants were available. Immediately prior to starting the procedure I conducted the Time Out with the procedural staff and re-confirmed the patient s name, procedure, and site/side. (The Joint Commission universal protocol was followed.)  Yes    Sedation (Moderate or Deep): None      Norma Mayes LPN  "

## 2021-12-14 NOTE — PROGRESS NOTES
Procedure Note      Pre/Post Operative Diagnosis:  Left leg nodular skin lesion     Procedure:   Removal of Left leg nodular skin lesion      Surgeon: Calixto Oh MD    Local Anesthesia: 1% lidocaine with 0.25% Marcaine with epinephrine    Indication for the procedure:  This is a 71 year old female  patient referred by Lili Perkins with a Left leg nodular skin lesion .       After explaining the risks to include bleeding, infection, recurrence or need for reexcision, and scarring the patient wished to proceed.    Procedure:   The area was prepped and draped in usual sterile fashion with ChloraPrep.   After, adequate local anesthesia, an 3.5 cm X 1.3 cm elliptical skin incision was made to encompass the  1.1 cm lesion with margins. The deep tissue were approximated with 3-0 vicryl.  The skin was closed with 4-0 Monocryl and Dermabond.      Plan:  The patient will be called to review the pathology. Patient will follow up if there any problems with the wound including redness or drainage.      Calixto Oh MD....................  12/14/2021   4:44 PM

## 2021-12-16 ENCOUNTER — ALLIED HEALTH/NURSE VISIT (OUTPATIENT)
Dept: FAMILY MEDICINE | Facility: OTHER | Age: 71
End: 2021-12-16
Attending: FAMILY MEDICINE
Payer: MEDICARE

## 2021-12-16 DIAGNOSIS — Z20.822 COVID-19 RULED OUT: Primary | ICD-10-CM

## 2021-12-16 PROCEDURE — C9803 HOPD COVID-19 SPEC COLLECT: HCPCS

## 2021-12-16 PROCEDURE — U0005 INFEC AGEN DETEC AMPLI PROBE: HCPCS | Mod: ZL

## 2021-12-17 LAB
PATH REPORT.COMMENTS IMP SPEC: NORMAL
PATH REPORT.FINAL DX SPEC: NORMAL
PHOTO IMAGE: NORMAL
SARS-COV-2 RNA RESP QL NAA+PROBE: NEGATIVE

## 2021-12-20 ENCOUNTER — ANESTHESIA EVENT (OUTPATIENT)
Dept: SURGERY | Facility: OTHER | Age: 71
End: 2021-12-20
Payer: MEDICARE

## 2021-12-20 ENCOUNTER — ANESTHESIA (OUTPATIENT)
Dept: SURGERY | Facility: OTHER | Age: 71
End: 2021-12-20
Payer: MEDICARE

## 2021-12-20 ENCOUNTER — HOSPITAL ENCOUNTER (OUTPATIENT)
Facility: OTHER | Age: 71
Discharge: HOME | End: 2021-12-20
Attending: SURGERY | Admitting: SURGERY
Payer: MEDICARE

## 2021-12-20 VITALS
WEIGHT: 161 LBS | SYSTOLIC BLOOD PRESSURE: 139 MMHG | BODY MASS INDEX: 28.98 KG/M2 | HEART RATE: 83 BPM | OXYGEN SATURATION: 94 % | RESPIRATION RATE: 12 BRPM | DIASTOLIC BLOOD PRESSURE: 103 MMHG | TEMPERATURE: 98.4 F

## 2021-12-20 PROCEDURE — 45385 COLONOSCOPY W/LESION REMOVAL: CPT | Mod: PT | Performed by: SURGERY

## 2021-12-20 PROCEDURE — 45385 COLONOSCOPY W/LESION REMOVAL: CPT | Performed by: NURSE ANESTHETIST, CERTIFIED REGISTERED

## 2021-12-20 PROCEDURE — 250N000011 HC RX IP 250 OP 636: Performed by: NURSE ANESTHETIST, CERTIFIED REGISTERED

## 2021-12-20 PROCEDURE — 258N000003 HC RX IP 258 OP 636: Performed by: SURGERY

## 2021-12-20 PROCEDURE — 88305 TISSUE EXAM BY PATHOLOGIST: CPT

## 2021-12-20 PROCEDURE — 45380 COLONOSCOPY AND BIOPSY: CPT | Performed by: SURGERY

## 2021-12-20 PROCEDURE — 999N000010 HC STATISTIC ANES STAT CODE-CRNA PER MINUTE: Performed by: SURGERY

## 2021-12-20 PROCEDURE — 99100 ANES PT EXTEME AGE<1 YR&>70: CPT | Performed by: NURSE ANESTHETIST, CERTIFIED REGISTERED

## 2021-12-20 RX ORDER — NALOXONE HYDROCHLORIDE 0.4 MG/ML
0.2 INJECTION, SOLUTION INTRAMUSCULAR; INTRAVENOUS; SUBCUTANEOUS
Status: DISCONTINUED | OUTPATIENT
Start: 2021-12-20 | End: 2021-12-20 | Stop reason: HOSPADM

## 2021-12-20 RX ORDER — NALOXONE HYDROCHLORIDE 0.4 MG/ML
0.4 INJECTION, SOLUTION INTRAMUSCULAR; INTRAVENOUS; SUBCUTANEOUS
Status: DISCONTINUED | OUTPATIENT
Start: 2021-12-20 | End: 2021-12-20 | Stop reason: HOSPADM

## 2021-12-20 RX ORDER — SODIUM CHLORIDE, SODIUM LACTATE, POTASSIUM CHLORIDE, CALCIUM CHLORIDE 600; 310; 30; 20 MG/100ML; MG/100ML; MG/100ML; MG/100ML
INJECTION, SOLUTION INTRAVENOUS CONTINUOUS
Status: DISCONTINUED | OUTPATIENT
Start: 2021-12-20 | End: 2021-12-20 | Stop reason: HOSPADM

## 2021-12-20 RX ORDER — LIDOCAINE 40 MG/G
CREAM TOPICAL
Status: DISCONTINUED | OUTPATIENT
Start: 2021-12-20 | End: 2021-12-20 | Stop reason: HOSPADM

## 2021-12-20 RX ORDER — FLUMAZENIL 0.1 MG/ML
0.2 INJECTION, SOLUTION INTRAVENOUS
Status: DISCONTINUED | OUTPATIENT
Start: 2021-12-20 | End: 2021-12-20 | Stop reason: HOSPADM

## 2021-12-20 RX ORDER — PROPOFOL 10 MG/ML
INJECTION, EMULSION INTRAVENOUS CONTINUOUS PRN
Status: DISCONTINUED | OUTPATIENT
Start: 2021-12-20 | End: 2021-12-20

## 2021-12-20 RX ADMIN — SODIUM CHLORIDE, POTASSIUM CHLORIDE, SODIUM LACTATE AND CALCIUM CHLORIDE: 600; 310; 30; 20 INJECTION, SOLUTION INTRAVENOUS at 08:53

## 2021-12-20 RX ADMIN — PROPOFOL 300 MCG/KG/MIN: 10 INJECTION, EMULSION INTRAVENOUS at 09:03

## 2021-12-20 ASSESSMENT — LIFESTYLE VARIABLES: TOBACCO_USE: 1

## 2021-12-20 NOTE — ANESTHESIA PREPROCEDURE EVALUATION
Anesthesia Pre-Procedure Evaluation    Patient: Jennyfer Elizabeth   MRN: [id number] : 1950        Preoperative Diagnosis: Encounter for screening colonoscopy [Z12.11]    Procedure : Procedure(s):  COLONOSCOPY          Past Medical History:   Diagnosis Date     Benign essential hypertension 3/3/2018     Cataract      Chronic pain of right knee 2018     Diabetic eye exam (H) 2018     TAYLOR (generalized anxiety disorder) 3/3/2018     History of MI (myocardial infarction) 2017    follows yearly with [facility]     Macular degeneration      Major depressive disorder, single episode      Obstructive sleep apnea     BIPAP     Osteopenia     Lumbar spine     Pure hypercholesterolemia      Type 2 diabetes mellitus with complication, without long-term current use of insulin (H) 2018      Past Surgical History:   Procedure Laterality Date     BREAST BIOPSY, RT/LT  1986    x2; in [place]     CARDIAC CATH - HIM SCAN  07/10/2017    Mid RCA;  drug eluting stent     CHOLECYSTECTOMY       TONSILLECTOMY, ADENOIDECTOMY, COMBINED        No Known Allergies   Social History     Tobacco Use     Smoking status: Former Smoker     Packs/day: 1.00     Years: 40.00     Pack years: 40.00     Quit date: 10/1/2008     Years since quittin.2     Smokeless tobacco: Never Used     Tobacco comment: Patient occasionally uses Nicorette gum   Substance Use Topics     Alcohol use: Yes     Alcohol/week: 14.0 standard drinks     Comment: 2-3 beer daily or less      Wt Readings from Last 1 Encounters:   21 73 kg (161 lb)        Anesthesia Evaluation   Pt has had prior anesthetic.         ROS/MED HX  ENT/Pulmonary:     (+) sleep apnea, uses CPAP, tobacco use, Past use,     Neurologic:  - neg neurologic ROS     Cardiovascular: Comment: H/O MI    (+) Dyslipidemia hypertension--CAD ---    METS/Exercise Tolerance: 4 - Raking leaves, gardening    Hematologic:       Musculoskeletal: Comment: C/O hip pain  (+)  arthritis,     GI/Hepatic:       Renal/Genitourinary: Comment: Stage III renal disease     (+) renal disease, type: CRI,     Endo:     (+) type I DM, Last HgA1c: 135, date: 12/20/21,     Psychiatric/Substance Use:     (+) psychiatric history anxiety     Infectious Disease:  - neg infectious disease ROS     Malignancy:  - neg malignancy ROS     Other:  - neg other ROS          Physical Exam    Airway      Comment: C/O pain with extension     Mallampati: II   TM distance: > 3 FB   Neck ROM: full   Mouth opening: > 3 cm    Respiratory Devices and Support         Dental     Comment: Implants    (+) missing      Cardiovascular   cardiovascular exam normal       Rhythm and rate: regular and normal     Pulmonary   pulmonary exam normal        breath sounds clear to auscultation           OUTSIDE LABS:  CBC:   Lab Results   Component Value Date    WBC 9.6 09/09/2021    WBC 7.1 03/24/2021    HGB 14.7 09/09/2021    HGB 13.2 03/24/2021    HCT 44.4 09/09/2021    HCT 40.1 03/24/2021     09/09/2021     03/24/2021     BMP:   Lab Results   Component Value Date     09/09/2021     02/01/2021    POTASSIUM 3.8 09/09/2021    POTASSIUM 4.3 02/01/2021    CHLORIDE 99 09/09/2021    CHLORIDE 102 02/01/2021    CO2 27 09/09/2021    CO2 27 02/01/2021    BUN 21 09/09/2021    BUN 18 02/01/2021    CR 1.03 10/21/2021    CR 1.19 09/09/2021     (H) 09/09/2021     (H) 02/01/2021     COAGS:   Lab Results   Component Value Date    PTT 59 (H) 07/08/2017    INR 1.1 07/07/2017     POC: No results found for: BGM, HCG, HCGS  HEPATIC:   Lab Results   Component Value Date    ALBUMIN 4.8 09/09/2021    PROTTOTAL 7.9 09/09/2021    ALT 42 09/09/2021    AST 41 (H) 09/09/2021    ALKPHOS 89 09/09/2021    BILITOTAL 0.6 09/09/2021    BILIDIRECT 0.10 09/25/2014     OTHER:   Lab Results   Component Value Date    A1C 7.3 (H) 09/09/2021    GHASSAN 10.2 09/09/2021    PHOS 3.8 07/20/2017    MAG 2.2 09/09/2021    T4 7.90 09/09/2014    CRP  2.2 03/24/2021    SED 20 (H) 03/24/2021       Anesthesia Plan    ASA Status:  3   NPO Status:  NPO Appropriate    Anesthesia Type: MAC.     - Reason for MAC: chronic cardiopulmonary disease, straight local not clinically adequate              Consents    Anesthesia Plan(s) and associated risks, benefits, and realistic alternatives discussed. Questions answered and patient/representative(s) expressed understanding.    - Discussed:     - Discussed with:  Patient         Postoperative Care            Comments:                David Kellerman, APRN CRNA

## 2021-12-20 NOTE — ANESTHESIA POSTPROCEDURE EVALUATION
Patient: Jennyfer Elizabeth    Procedure: Procedure(s):  COLONOSCOPY, WITH POLYPECTOMY AND BIOPSY       Diagnosis:Encounter for screening colonoscopy [Z12.11]  Diagnosis Additional Information: No value filed.    Anesthesia Type:  MAC    Note:  Disposition: Outpatient   Postop Pain Control:            Sign Out: Well controlled pain   PONV: No   Neuro/Psych:             Sign Out: Acceptable/Baseline neuro status   Airway/Respiratory:             Sign Out: Acceptable/Baseline resp. status   CV/Hemodynamics:             Sign Out: Acceptable CV status   Other NRE: NONE   DID A NON-ROUTINE EVENT OCCUR? No           Last vitals:  Vitals Value Taken Time   /103 12/20/21 1045   Temp     Pulse 80 12/20/21 1039   Resp 12 12/20/21 1045   SpO2 94 % 12/20/21 1045   Vitals shown include unvalidated device data.    Electronically Signed By: David Kellerman, APRN CRNA  December 20, 2021  11:34 AM

## 2021-12-20 NOTE — H&P
PRE-PROCEDURE NOTE    CHIEFCOMPLAINT / REASON FOR PROCEDURE:  Screening for polyps and colorectal cancer.    PERTINENT HISTORY   Patient is due for colonoscopy. Previous colonoscopy none. No family history of colon polyps or colon cancer.    Past Medical History:   Diagnosis Date     Benign essential hypertension 3/3/2018     Cataract      Chronic pain of right knee 4/11/2018     Diabetic eye exam (H) 06/07/2018     TAYLOR (generalized anxiety disorder) 3/3/2018     History of MI (myocardial infarction) 07/2017    follows yearly with [facility]     Macular degeneration      Major depressive disorder, single episode 1995     Obstructive sleep apnea 2005    BIPAP     Osteopenia 2007    Lumbar spine     Pure hypercholesterolemia 2001     Type 2 diabetes mellitus with complication, without long-term current use of insulin (H) 03/03/2018       Past Surgical History:   Procedure Laterality Date     BREAST BIOPSY, RT/LT  1986    x2; in [place]     CARDIAC CATH - HIM SCAN  07/10/2017    Mid RCA;  drug eluting stent     CHOLECYSTECTOMY  2008     TONSILLECTOMY, ADENOIDECTOMY, COMBINED  1955         Other:  None  Bleeding tendencies: No     Relevant Family History:  None     Relevant Social History:  None     10 point ROS of systems including Constitutional, Eyes, Respiratory, Cardiovascular, Gastroenterology, Genitourinary, Integumentary, Muscularskeletal, Psychiatric were all negative except for pertinent positives noted in my HPI.      ALLERGIES/SENSITIVITIES: No Known Allergies     CURRENT MEDICATIONS:    No current facility-administered medications on file prior to encounter.  ACCU-CHEK SMARTVIEW test strip, NEW TO THRIFT - 1-2 TIMES DAILY  acetaminophen-codeine (TYLENOL #3) 300-30 MG tablet, TAKE 1 TABLET BY MOUTH EVERY 4-6 HOURS AS NEEDED FOR PAIN  aspirin 81 MG chewable tablet, Take 81 mg by mouth daily  B Complex CAPS, Take 1 capsule by mouth daily  buPROPion (WELLBUTRIN XL) 300 MG 24 hr tablet, TAKE 1 TABLET (300 MG) BY  MOUTH DAILY  cyanocobalamin (VITAMIN B-12) 1000 MCG tablet, Take by mouth daily  DULoxetine (CYMBALTA) 30 MG capsule, TAKE 2 CAPSULES BY MOUTH DAILY  LORazepam (ATIVAN) 0.5 MG tablet, Take 1 tablet (0.5 mg) by mouth nightly as needed for anxiety  losartan-hydrochlorothiazide (HYZAAR) 100-12.5 MG tablet, Take 1 tablet by mouth daily  metFORMIN (GLUCOPHAGE) 500 MG tablet, Take 0.5 tablets (250 mg) by mouth 2 times daily (with meals)  Multiple Vitamins-Minerals (PRESERVISION AREDS 2+MULTI VIT PO), Take 1 tablet by mouth 2 times daily  rosuvastatin (CRESTOR) 40 MG tablet, TAKE 1 TABLET (40 MG) BY MOUTH DAILY  traZODone (DESYREL) 50 MG tablet, Take 1-2 tablets ( mg) by mouth At Bedtime  tretinoin (RETIN-A) 0.05 % external cream, Apply 1 g topically At Bedtime  Vitamin D, Cholecalciferol, 25 MCG (1000 UT) TABS,             PRE-SEDATION ASSESSMENT:    LUNGS:  CTA B/L, no wheezing or crackles.  Heart & CV:  RRR no murmur.  Intact distal pulses, good cap refill.    Comment(s):      IMPRESSION: 71 year old female in need of screening colonoscopy.    PLAN:  I discussed screening colonoscopy with the patient. Anesthesia coverage requested due to age over 69.    Calixto Oh MD    12/20/2021 8:40 AM

## 2021-12-20 NOTE — ANESTHESIA CARE TRANSFER NOTE
Patient: Jennyfer Elizabeth    Procedure: Procedure(s):  COLONOSCOPY, WITH POLYPECTOMY AND BIOPSY       Diagnosis: Encounter for screening colonoscopy [Z12.11]  Diagnosis Additional Information: No value filed.    Anesthesia Type:   MAC     Note:    Oropharynx: oropharynx clear of all foreign objects  Level of Consciousness: awake  Oxygen Supplementation: room air    Independent Airway: airway patency satisfactory and stable  Dentition: dentition unchanged  Vital Signs Stable: post-procedure vital signs reviewed and stable  Report to RN Given: handoff report given  Patient transferred to: Phase II    Handoff Report: Identifed the Patient, Identified the Reponsible Provider, Reviewed the pertinent medical history, Discussed the surgical course, Reviewed Intra-OP anesthesia mangement and issues during anesthesia, Set expectations for post-procedure period and Allowed opportunity for questions and acknowledgement of understanding      Vitals:  Vitals Value Taken Time   BP     Temp     Pulse     Resp     SpO2         Electronically Signed By: DAVID Currie CRNA  December 20, 2021  9:37 AM

## 2021-12-20 NOTE — OR NURSING
The patient was unable or refused to remove jewelry prior to their surgical procedure. The patient has been instructed on the risk of injury and possible infection. In the event jewelry or piercings are obstructing the surgical process or impeding circulation, the jewelry or piercings may need to be cut off. The surgeon and anesthesia provider have been notified that an item cannot be removed, or that the patient refuses to remove the jewelry or piercing. The surgeon and anesthesia provider will determine if it is in the patient's best interest to proceed with the procedure with the jewelry or piercings remaining in contact with the patient's body.     Patient has ring on right thumb that is taped.  Earrings at bedside.

## 2021-12-20 NOTE — OP NOTE
PROCEDURE NOTE    SURGEON:Calixto Oh MD    PRE-OP DIAGNOSIS:  Screening Colonoscopy      POST-OP DIAGNOSIS: Colon polyps diverticulosis    PROCEDURE: Colonoscopy with cold snare polypectomy    SPECIMEN:    ID Type Source Tests Collected by Time Destination   1 : ascending colon polyp Tissue Large Intestine, Colon, Ascending SURGICAL PATHOLOGY EXAM Calixto Oh MD 12/20/2021  9:23 AM    2 : transverse colon polyp Tissue Large Intestine, Colon, Transverse SURGICAL PATHOLOGY EXAM Calixto Oh MD 12/20/2021  9:28 AM    3 : sigmoid polyp Tissue Large Intestine, Colon, Sigmoid SURGICAL PATHOLOGY EXAM Calixto Oh MD 12/20/2021  9:31 AM           ANESTHESIA:  Monitor Anesthesia Care No anesthesia staff entered.   Coverage requested due to age over 70    ESTIMATED BLOOD LOSS: none    COMPLICATIONS:  None    INDICATION FOR THE PROCEDURE: The patient is a 71 year old female. The patient presents with need for screening. I explained to the patient the risks, benefits and alternatives to screening colonoscopy for evaluating for cancer or polyps. We discussed the risks including bleeding, perforation, potential inability to reach the cecum and the risks of sedation. The patient's questions were answered and the patient wished to proceed. Informed consent paperwork was completed.    PROCEDURE: The patient was taken to the endoscopy suite. Appropriate monitors were attached. The patient was placed in the left lateral decubitus position. Timeout was performed confirming the patient's identity and procedure to be performed.  After appropriate sedation was confirmed, digital rectal exam was performed.  There was normal tone and no gross abnormality was noted.  The lubricated colonoscope was introduced into the anus the colon was insufflated with air. The prep quality was adequate. Under direct visualization the scope was advanced to the cecum. The mucosa of the colon was inspected while withdrawing the scope.  In the  ascending colon just outside the cecum a 3 mm polyp was removed cold snare.  In the transverse colon a 6 mm polyp was removed with cold snare.  In the sigmoid colon a sessile 8 mm polyp was removed with cold snare. The scope was returned to a neutral position and the colon was decompressed. The scope was removed. The patient tolerated the procedure with no immediately apparent complication. The patient was taken to recovery in stable condition.    FOLLOW UP: RECOMMEND high fiber diet, will call with pathology results.  Follow-up pathology    Calixto Oh MD on 12/20/2021 at 9:35 AM

## 2021-12-20 NOTE — DISCHARGE INSTRUCTIONS
[facility]  Adult Discharge Orders & Instructions    ________________________________________________________________          For 12 hours after surgery  1. Get plenty of rest.  A responsible adult must stay with you for at least 12 hours after you leave the hospital.   2. You may feel lightheaded.  IF so, sit for a few minutes before standing.  Have someone help you get up.   3. You may have a slight fever. Call the doctor if your fever is over 101 F (38.3 C) (taken under the tongue) or lasts longer than 24 hours.  4. You may have a dry mouth, a sore throat, muscle aches or trouble sleeping.  These should go away after 24 hours.  5. Do not make important or legal decisions.  6.   Do not drive or use heavy equipment.  If you have weakness or tingling, don't drive or use heavy equipment until this feeling goes away.    To contact a doctor, call   [phone]_______________________      Your doctor recommends that you eat 25 to 30 Grams of fiber daily. The following are some examples of fiber amounts in different foods.    Fruits: Apple (with skin) 1 medium = 4.4 Grams   Banana      1 medium = 3.1 Grams   Oranges     1 orange = 3.1 Grams   Prunes     1 cup, pitted = 12.4 Grams    Juices: Apple, unsweetened w/ added ascorbic acid  1 cup = 0.5 Grams    Grapefruit, white, canned,sweetened  1 cup = 0.2 Grams    Grape, unsweetened w/ added ascorbic acid  1 cup = 0.5 Grams    Orange     1 cup = 0.7 Grams    Vegetables:   Cooked: Green Beans   1 cup = 4.0 Grams       Carrots   1/2 cup sliced = 2.3 Grams       Peas       1 cup = 8.8 Grams       Potato (baked, with skin)  1 medium = 3.8 Grams    Raw: Cucumber (with peel)  1 cucumber = 1.5 Grams            Lettuce     1 cup shredded = 0.5 Grams            Tomato   1 medium tomato = 1.5 Grams            Spinach  1 cup = 0.7 Grams    Legumes: Baked beans, canned, no salt added  1 cup = 13.9 Grams         Kidney Beans, canned  1 cup = 13.6 Grams         [place]  Beans, canned     1 cup = 11.6 Grams         Lentils, boiled   1 cup = 15.6 Grams    Breads, Pastas, Flours: Bran muffins   1 medium muffin = 5.2 Grams           Oatmeal, cooked  1 cup = 4.0 Grams           White Bread   1 slice = 0.6 Grams           Whole- wheat bread = 1.9 Grams    Pasta and rice, cooked: Macaroni  1 cup = 2.5 Grams           Rice, Brown  1 cup = 3.5 Grams           Rice, white   1 cup = 0.6 Grams           Spaghetti (regular) 1 cup = 2.5 Grams    Nuts: Almonds   1 cup = 17.4 Grams            Peanuts    1 cup = 12.4 Grams

## 2021-12-21 LAB
PATH REPORT.COMMENTS IMP SPEC: NORMAL
PATH REPORT.FINAL DX SPEC: NORMAL
PHOTO IMAGE: NORMAL

## 2022-01-03 ENCOUNTER — HOSPITAL ENCOUNTER (OUTPATIENT)
Dept: GENERAL RADIOLOGY | Facility: OTHER | Age: 72
End: 2022-01-03
Attending: INTERNAL MEDICINE
Payer: MEDICARE

## 2022-01-03 ENCOUNTER — TELEPHONE (OUTPATIENT)
Dept: VASCULAR SURGERY | Facility: CLINIC | Age: 72
End: 2022-01-03
Payer: MEDICARE

## 2022-01-03 ENCOUNTER — OFFICE VISIT (OUTPATIENT)
Dept: INTERNAL MEDICINE | Facility: OTHER | Age: 72
End: 2022-01-03
Attending: INTERNAL MEDICINE
Payer: MEDICARE

## 2022-01-03 VITALS
HEART RATE: 87 BPM | OXYGEN SATURATION: 95 % | RESPIRATION RATE: 12 BRPM | TEMPERATURE: 98 F | SYSTOLIC BLOOD PRESSURE: 124 MMHG | WEIGHT: 161 LBS | BODY MASS INDEX: 28.98 KG/M2 | DIASTOLIC BLOOD PRESSURE: 80 MMHG

## 2022-01-03 DIAGNOSIS — N18.31 STAGE 3A CHRONIC KIDNEY DISEASE (H): ICD-10-CM

## 2022-01-03 DIAGNOSIS — F33.1 DEPRESSION, MAJOR, RECURRENT, MODERATE (H): ICD-10-CM

## 2022-01-03 DIAGNOSIS — I73.9 PAD (PERIPHERAL ARTERY DISEASE) (H): ICD-10-CM

## 2022-01-03 DIAGNOSIS — M25.511 RIGHT SHOULDER PAIN, UNSPECIFIED CHRONICITY: ICD-10-CM

## 2022-01-03 DIAGNOSIS — M54.2 NECK PAIN: Primary | ICD-10-CM

## 2022-01-03 DIAGNOSIS — M54.2 NECK PAIN: ICD-10-CM

## 2022-01-03 DIAGNOSIS — E11.8 TYPE 2 DIABETES MELLITUS WITH COMPLICATION, WITHOUT LONG-TERM CURRENT USE OF INSULIN (H): ICD-10-CM

## 2022-01-03 PROCEDURE — G0463 HOSPITAL OUTPT CLINIC VISIT: HCPCS

## 2022-01-03 PROCEDURE — G0463 HOSPITAL OUTPT CLINIC VISIT: HCPCS | Mod: 25

## 2022-01-03 PROCEDURE — 99214 OFFICE O/P EST MOD 30 MIN: CPT | Performed by: INTERNAL MEDICINE

## 2022-01-03 PROCEDURE — 73030 X-RAY EXAM OF SHOULDER: CPT | Mod: RT

## 2022-01-03 ASSESSMENT — PATIENT HEALTH QUESTIONNAIRE - PHQ9
10. IF YOU CHECKED OFF ANY PROBLEMS, HOW DIFFICULT HAVE THESE PROBLEMS MADE IT FOR YOU TO DO YOUR WORK, TAKE CARE OF THINGS AT HOME, OR GET ALONG WITH OTHER PEOPLE: SOMEWHAT DIFFICULT
SUM OF ALL RESPONSES TO PHQ QUESTIONS 1-9: 4
SUM OF ALL RESPONSES TO PHQ QUESTIONS 1-9: 4

## 2022-01-03 ASSESSMENT — ANXIETY QUESTIONNAIRES
7. FEELING AFRAID AS IF SOMETHING AWFUL MIGHT HAPPEN: NOT AT ALL
4. TROUBLE RELAXING: NOT AT ALL
6. BECOMING EASILY ANNOYED OR IRRITABLE: NOT AT ALL
3. WORRYING TOO MUCH ABOUT DIFFERENT THINGS: NOT AT ALL
7. FEELING AFRAID AS IF SOMETHING AWFUL MIGHT HAPPEN: NOT AT ALL
2. NOT BEING ABLE TO STOP OR CONTROL WORRYING: NOT AT ALL
5. BEING SO RESTLESS THAT IT IS HARD TO SIT STILL: NOT AT ALL
1. FEELING NERVOUS, ANXIOUS, OR ON EDGE: NOT AT ALL
GAD7 TOTAL SCORE: 0

## 2022-01-03 ASSESSMENT — PAIN SCALES - GENERAL: PAINLEVEL: MODERATE PAIN (5)

## 2022-01-03 NOTE — TELEPHONE ENCOUNTER
I contacted pt to discuss moving forward with surgery w/ Dr. Sewell. I was unable to reach her, but I did request she call me back at her convenience.    VINI LedezmaN, RN  [facility] - [facility]  Ph: [phone]  Fax: [phone]

## 2022-01-03 NOTE — PROGRESS NOTES
Chief Complaint   Patient presents with     Pain         HPI: Ms. Elizabeth is a 71 year old female who presents today for follow up of multiple issues.    She has questions about surgery and has had issues figuring out housing to get this done.  She is not currently scheduled for surgery.  She did talk with Dr. Sewell earlier this winter.  It was recommended that she undergo intervention for her arterial disease.    She continues to have neck pain.  This has radiated into her right neck.  Heat did not help.  Biofreeze seems to help.  She had an injection which helped minimally.        She has had shoulder pain since getting her TDAP in 11/2021.  She had black/blue discoloration initially however this has improved.  She continues to have decrease ROM due to pain.      She has a history of type 2 diabetes mellitus.  She will be due for an A1c with next labs during her preop.  She also be due for recheck of her renal function given her stage III chronic kidney disease.  She does have a history of depression however this has overall been controlled on her medications.    She  reports that she quit smoking about 13 years ago. She has a 40.00 pack-year smoking history. She has never used smokeless tobacco.    Past medical history reviewed as below:     Past Medical History:   Diagnosis Date     Benign essential hypertension 03/03/2018     Cataract      Chronic pain of right knee 04/11/2018     Diabetic eye exam (H) 06/07/2018     TAYLOR (generalized anxiety disorder) 03/03/2018     History of MI (myocardial infarction) 07/2017    follows yearly with [facility]     Macular degeneration      Major depressive disorder, single episode 1995     Obstructive sleep apnea 2005    BIPAP     Osteopenia 2007    Lumbar spine     Pure hypercholesterolemia 2001     Squamous cell carcinoma, leg, left      Type 2 diabetes mellitus with complication, without long-term current use of insulin (H) 03/03/2018   .      ROS  Pertinent ROS was performed and  "was negative, including for fever, chills, chest pain, shortness of breath, increased lower extremity edema, changes in bowel or bladder. No other concerns, with exception of HPI above.      EXAM:   /80 (BP Location: Right arm, Patient Position: Sitting, Cuff Size: Adult Regular)   Pulse 87   Temp 98  F (36.7  C) (Tympanic)   Resp 12   Wt 73 kg (161 lb)   LMP  (LMP Unknown)   SpO2 95%   BMI 28.98 kg/m      Estimated body mass index is 28.98 kg/m  as calculated from the following:    Height as of 12/2/21: 1.588 m (5' 2.5\").    Weight as of this encounter: 73 kg (161 lb).      GEN: Vitals reviewed. Healthy appearing. Patient is in no acute distress. Cooperative with exam.  HEENT: Normocephalic atraumatic.  Eyes grossly normal to inspection.  No discharge or erythema, or obvious scleral/conjunctival abnormalities.   EXT: No clubbing or cyanosis.  No peripheral edema.  Right shoulder with decreased range of motion in abduction and flexion secondary to pain.  Slightly tender to palpation.  PSYCH: Mood is good.  Mentation appears normal, affect normal/bright, judgement and insight intact, normal speech and appearance well-groomed.     ASSESSMENT AND PLAN:    Neck pain  -Neck pain is likely secondary to degenerative disease.  Ultrasound of the carotids will be obtained given her known peripheral disease.  She will be referred to physical therapy assuming her ultrasound is negative.  - US Carotid Bilateral  - XR Shoulder Right G/E 3 Views  - Physical Therapy Referral    Right shoulder pain, unspecified chronicity  At this time for her shoulder I question if she has developed adhesive capsulitis.  We will obtain imaging and refer her to physical therapy.  She is to call for continued issues and we may need to consider evaluation through orthopedics along with MRI imaging.  - XR Shoulder Right G/E 3 Views  - Physical Therapy Referral    Depression, major, recurrent, moderate (H)  Overall stable on current " medications    Type 2 diabetes mellitus with complication, without long-term current use of insulin (H)  Plan for repeat A1c and follow-up    PAD (peripheral artery disease) (H)  Encouraged to reach out to Dr. Sewell and surgery to schedule her intervention.    Stage 3a chronic kidney disease (H)  Plan for repeat test with upcoming preop.    Follow-up as needed for preop     30 minutes spent on the date of the encounter doing chart review, history and exam, documentation and further activities as noted above      CHULA ROBBINS, DO   1/3/2022 10:09 AM      Answers for HPI/ROS submitted by the patient on 1/3/2022  If you checked off any problems, how difficult have these problems made it for you to do your work, take care of things at home, or get along with other people?: Somewhat difficult  PHQ9 TOTAL SCORE: 4  TAYLOR 7 TOTAL SCORE: 0  Frequency of checking blood sugars:: a few times a month  What time of day are you checking your blood sugars : before meals  Have you had any blood sugars above 200?: No  Have you had any blood sugars below 70?: No  Hypoglycemia symptoms:: none  Diabetic concerns:: none  Paraesthesia present:: numbness in feet

## 2022-01-03 NOTE — NURSING NOTE
Patient presents to clinic today for follow up on shoulder, neck and spot on her back (left side).   Medication reconciliation completed.    ACP on file? No, form previously provided.     FOOD SECURITY SCREENING QUESTIONS    The next two questions are to help us understand your food security.  If you are feeling you need any assistance in this area, we have resources available to support you today.    Hunger Vital Signs:  Within the past 12 months we worried whether our food would run out before we got money to buy more. Never  Within the past 12 months the food we bought just didn't last and we didn't have money to get more. Never    Betty Kong CMA([facility])..................1/3/2022   9:55 AM

## 2022-01-04 ASSESSMENT — ANXIETY QUESTIONNAIRES: GAD7 TOTAL SCORE: 0

## 2022-01-04 ASSESSMENT — PATIENT HEALTH QUESTIONNAIRE - PHQ9: SUM OF ALL RESPONSES TO PHQ QUESTIONS 1-9: 4

## 2022-01-05 NOTE — TELEPHONE ENCOUNTER
I again attempted to contact Jennyfer about getting set up for surgery with Dr. Sewell. I was unable to reach her, but I left another voicemail with my direct callback.    VINI LedezmaN, RN  [facility] - [facility]  Ph: [phone]  Fax: [phone]

## 2022-01-06 ENCOUNTER — PREP FOR PROCEDURE (OUTPATIENT)
Dept: VASCULAR SURGERY | Facility: CLINIC | Age: 72
End: 2022-01-06
Payer: MEDICARE

## 2022-01-06 ENCOUNTER — TELEPHONE (OUTPATIENT)
Dept: VASCULAR SURGERY | Facility: CLINIC | Age: 72
End: 2022-01-06
Payer: MEDICARE

## 2022-01-06 DIAGNOSIS — I70.219 ATHEROSCLEROSIS OF LOWER EXTREMITY WITH CLAUDICATION (H): Primary | ICD-10-CM

## 2022-01-06 RX ORDER — CEFAZOLIN SODIUM 2 G/50ML
2 SOLUTION INTRAVENOUS
Status: CANCELLED | OUTPATIENT
Start: 2022-01-06

## 2022-01-06 RX ORDER — CEFAZOLIN SODIUM 2 G/50ML
2 SOLUTION INTRAVENOUS SEE ADMIN INSTRUCTIONS
Status: CANCELLED | OUTPATIENT
Start: 2022-01-06

## 2022-01-06 NOTE — TELEPHONE ENCOUNTER
I received a call from Jennyfer stating she is ready to proceed with her R femoral endarterectomy and R iliac angioplasty procedure with Dr. Sewell. She is hoping to have her procedure soon so that she can be recovered in time for a vacation to [place] on Feb 15th.     I will update Dr. Sewell that she is ready to proceed and update our surgical scheduler Sherly.    IVAN Ledezma, RN  RN - P Vascular Surgery  Ph: [phone]  Fax: [phone]

## 2022-01-07 ENCOUNTER — HOSPITAL ENCOUNTER (OUTPATIENT)
Dept: ULTRASOUND IMAGING | Facility: OTHER | Age: 72
Discharge: HOME | End: 2022-01-07
Attending: INTERNAL MEDICINE | Admitting: INTERNAL MEDICINE
Payer: MEDICARE

## 2022-01-07 DIAGNOSIS — M54.2 NECK PAIN: ICD-10-CM

## 2022-01-07 PROCEDURE — 93880 EXTRACRANIAL BILAT STUDY: CPT

## 2022-01-10 PROBLEM — F33.1 DEPRESSION, MAJOR, RECURRENT, MODERATE (H): Status: ACTIVE | Noted: 2022-01-10

## 2022-01-12 ENCOUNTER — TELEPHONE (OUTPATIENT)
Dept: VASCULAR SURGERY | Facility: CLINIC | Age: 72
End: 2022-01-12
Payer: MEDICARE

## 2022-01-12 NOTE — TELEPHONE ENCOUNTER
I contacted pt to discuss her upcoming surgery. She is questioning if she should wait until after her vacation to [facility] she has planned for mid-Feb. She is wondering if having surgery Feb 3rd will allow for enough recovery time.    We discussed this at length, and determined that she would like to wait to have her procedure until after she returns from her trip.     She is wondering if there are any medications that may help with her claudication symptoms in the meantime, especially while she is on vacation.    I will update Dr. Sewell and our surgical scheduler.    IVAN Ledezma, RN  [facility]  Ph: [phone]  Fax: [phone]

## 2022-01-13 DIAGNOSIS — I73.9 PAD (PERIPHERAL ARTERY DISEASE) (H): ICD-10-CM

## 2022-01-13 DIAGNOSIS — I73.9 CLAUDICATION OF RIGHT LOWER EXTREMITY (H): Primary | ICD-10-CM

## 2022-01-13 RX ORDER — CILOSTAZOL 50 MG/1
TABLET ORAL
Qty: 134 TABLET | Refills: 0 | Status: SHIPPED | OUTPATIENT
Start: 2022-01-13 | End: 2022-03-07

## 2022-01-17 ENCOUNTER — TELEPHONE (OUTPATIENT)
Dept: INTERNAL MEDICINE | Facility: OTHER | Age: 72
End: 2022-01-17
Payer: MEDICARE

## 2022-01-17 DIAGNOSIS — M54.2 NECK PAIN: Primary | ICD-10-CM

## 2022-01-17 DIAGNOSIS — M53.9 MULTILEVEL DEGENERATIVE DISC DISEASE: ICD-10-CM

## 2022-01-17 DIAGNOSIS — M50.13 CERVICAL DISC DISORDER WITH RADICULOPATHY, CERVICOTHORACIC REGION: ICD-10-CM

## 2022-01-17 NOTE — TELEPHONE ENCOUNTER
Her Physical Therapist suggest she get another shot from Dr. Bishop in her neck-she discussed with  needs orders in

## 2022-01-19 ENCOUNTER — TELEPHONE (OUTPATIENT)
Dept: VASCULAR SURGERY | Facility: CLINIC | Age: 72
End: 2022-01-19
Payer: MEDICARE

## 2022-01-19 NOTE — TELEPHONE ENCOUNTER
Called patient to schedule procedure with Dr. Rama Sewell, there was no answer.  Left message with my direct line [phone].

## 2022-01-26 ENCOUNTER — TRANSFERRED RECORDS (OUTPATIENT)
Dept: HEALTH INFORMATION MANAGEMENT | Facility: OTHER | Age: 72
End: 2022-01-26
Payer: MEDICARE

## 2022-01-27 ENCOUNTER — TELEPHONE (OUTPATIENT)
Dept: VASCULAR SURGERY | Facility: CLINIC | Age: 72
End: 2022-01-27
Payer: MEDICARE

## 2022-01-27 NOTE — TELEPHONE ENCOUNTER
I contacted Jennyfer to see how she is responding to the cilostazol. She has not started this medication yet, but is planning to start it tomorrow. She will update me on how she is doing in a week or 2.    IVAN Ledezma, RN  [facility] - [facility] Vascular Surgery  Ph: [phone]  Fax: [phone]

## 2022-02-10 ENCOUNTER — HOSPITAL ENCOUNTER (OUTPATIENT)
Dept: GENERAL RADIOLOGY | Facility: OTHER | Age: 72
Discharge: HOME | End: 2022-02-10
Attending: INTERNAL MEDICINE | Admitting: INTERNAL MEDICINE
Payer: MEDICARE

## 2022-02-10 DIAGNOSIS — M50.13 CERVICAL DISC DISORDER WITH RADICULOPATHY, CERVICOTHORACIC REGION: ICD-10-CM

## 2022-02-10 DIAGNOSIS — M53.9 MULTILEVEL DEGENERATIVE DISC DISEASE: ICD-10-CM

## 2022-02-10 DIAGNOSIS — M54.2 NECK PAIN: ICD-10-CM

## 2022-02-10 PROCEDURE — 255N000002 HC RX 255 OP 636: Performed by: RADIOLOGY

## 2022-02-10 PROCEDURE — 250N000011 HC RX IP 250 OP 636: Performed by: RADIOLOGY

## 2022-02-10 PROCEDURE — 62321 NJX INTERLAMINAR CRV/THRC: CPT

## 2022-02-10 PROCEDURE — 250N000009 HC RX 250: Performed by: RADIOLOGY

## 2022-02-10 RX ORDER — LIDOCAINE HYDROCHLORIDE 10 MG/ML
5 INJECTION, SOLUTION INFILTRATION; PERINEURAL ONCE
Status: COMPLETED | OUTPATIENT
Start: 2022-02-10 | End: 2022-02-10

## 2022-02-10 RX ORDER — BETAMETHASONE SODIUM PHOSPHATE AND BETAMETHASONE ACETATE 3; 3 MG/ML; MG/ML
2 INJECTION, SUSPENSION INTRA-ARTICULAR; INTRALESIONAL; INTRAMUSCULAR; SOFT TISSUE ONCE
Status: COMPLETED | OUTPATIENT
Start: 2022-02-10 | End: 2022-02-10

## 2022-02-10 RX ADMIN — IOHEXOL 1 ML: 240 INJECTION, SOLUTION INTRATHECAL; INTRAVASCULAR; INTRAVENOUS; ORAL at 15:57

## 2022-02-10 RX ADMIN — LIDOCAINE HYDROCHLORIDE 2 ML: 10 INJECTION, SOLUTION INFILTRATION; PERINEURAL at 15:58

## 2022-02-10 RX ADMIN — BETAMETHASONE SODIUM PHOSPHATE AND BETAMETHASONE ACETATE 2 ML: 3; 3 INJECTION, SUSPENSION INTRA-ARTICULAR; INTRALESIONAL; INTRAMUSCULAR at 15:58

## 2022-02-11 ENCOUNTER — TELEPHONE (OUTPATIENT)
Dept: VASCULAR SURGERY | Facility: CLINIC | Age: 72
End: 2022-02-11
Payer: MEDICARE

## 2022-02-11 NOTE — TELEPHONE ENCOUNTER
[facility] Call Center    Phone Message    May a detailed message be left on voicemail: no     Reason for Call: Other: Pt, Jennyfer calling, she stopped taking: starting back up: cilostazol (PLETAL) 50 MG tablet/ Aspirin due to injection on 2/10. Wants to know about Low Dose Aspirin- Can that be started up again too?  Please call: [phone]     Action Taken: Message routed to:  [facility]    Travel Screening: Not Applicable

## 2022-02-23 ENCOUNTER — TELEPHONE (OUTPATIENT)
Dept: INTERNAL MEDICINE | Facility: OTHER | Age: 72
End: 2022-02-23
Payer: MEDICARE

## 2022-02-23 NOTE — TELEPHONE ENCOUNTER
Patient just returned from her vacation. She states she had an injection by Dr. Cain in her neck and it did not take. She is wondering if he would be willing to do another injection or what Dr. Perkins would recommend. She is in constant pain.  Please advise.    Janeth Bowman on 2/23/2022 at 10:36 AM

## 2022-02-28 NOTE — TELEPHONE ENCOUNTER
His name is Hernandez. I left a message for him to call us back and fill us in as to what is possibly the next step.  Betty Kong CMA([facility])..................2/28/2022   3:45 PM

## 2022-02-28 NOTE — TELEPHONE ENCOUNTER
"\"Rajesh\" who works with Dr Cain told her that she could possibly get something else done for her neck but didn't say what. I will call [facility] and ask what this means. Jennyfer does have an appointment set up for 3/7/22 with Dr Perkins. She wonders about a possible muscle relaxer prescription.    Betty Kong CMA([facility])..................2/28/2022   2:09 PM   "

## 2022-02-28 NOTE — TELEPHONE ENCOUNTER
Returning call to nurse.  The name was Daren.    Thank you   Amarilis Nam on 2/28/2022 at 2:07 PM

## 2022-02-28 NOTE — TELEPHONE ENCOUNTER
I unfortunately do not have any great options.  I am not sure what the time interval is for getting repeat injections and would recommend she call [facility].  We can trial a short course of pain medications.

## 2022-03-02 NOTE — TELEPHONE ENCOUNTER
Hernandez stated that he only expressed that if she wasn't getting enough relief that she should reach out to her PCP to inquire about other options. Nothing specific to this patient regarding her individual care or treatment.  He apologizes for giving out the doctor's desk phone number. I did let him know that he should only use the [phone] for any [facility] contacts, not anyone's desk phone.    Patient has an appointment on 3/7/22. We can follow up with her then.  She is ok with this plan.  Betty Kong CMA([facility])..................3/2/2022   12:35 PM

## 2022-03-04 ENCOUNTER — TELEPHONE (OUTPATIENT)
Dept: INTERNAL MEDICINE | Facility: OTHER | Age: 72
End: 2022-03-04
Payer: MEDICARE

## 2022-03-04 ENCOUNTER — TELEPHONE (OUTPATIENT)
Dept: VASCULAR SURGERY | Facility: CLINIC | Age: 72
End: 2022-03-04
Payer: MEDICARE

## 2022-03-04 DIAGNOSIS — Z11.59 ENCOUNTER FOR SCREENING FOR OTHER VIRAL DISEASES: Primary | ICD-10-CM

## 2022-03-04 DIAGNOSIS — I70.219 ATHEROSCLEROSIS OF LOWER EXTREMITY WITH CLAUDICATION (H): ICD-10-CM

## 2022-03-04 DIAGNOSIS — I73.9 PAD (PERIPHERAL ARTERY DISEASE) (H): ICD-10-CM

## 2022-03-04 DIAGNOSIS — I73.9 CLAUDICATION OF RIGHT LOWER EXTREMITY (H): Primary | ICD-10-CM

## 2022-03-04 DIAGNOSIS — M25.511 RIGHT SHOULDER PAIN, UNSPECIFIED CHRONICITY: Primary | ICD-10-CM

## 2022-03-04 NOTE — TELEPHONE ENCOUNTER
Spoke with patient to schedule procedure with Dr. Rama Sewell   Procedure was scheduled on 03/22 at [facility]  Patient will have H&P with (PCP)     Patient is aware a COVID-19 test is needed before their procedure. The test should be with-in 4 days of their procedure.   Test Details: Date 03/18 [facility] Visit scheduled on:     4-6 week 04/20    Patient is aware a / is needed day of surgery.   Surgery letter was sent via [employer], patient has my direct contact information for any further questions.     Vendor requested: no

## 2022-03-04 NOTE — PROGRESS NOTES
Date: 3/4/2022    Time of Call: 1:48 PM     Diagnosis:  PAD, Aterosclerosis of the RLE w/ claudication     [ VORB ] Ordering provider: Dr Rama Sewell  Order: US LE TOMMY no exercise, bilateral     Order received by: Nicolasa Jackson LPN     Follow-up/additional notes:

## 2022-03-04 NOTE — TELEPHONE ENCOUNTER
Patient called to let you know she has an apptmt here on Monday and also the Vascular in the [facility] has scheduled her surgery.    Hanane Taveras on 3/4/2022 at 12:26 PM

## 2022-03-07 ENCOUNTER — OFFICE VISIT (OUTPATIENT)
Dept: INTERNAL MEDICINE | Facility: OTHER | Age: 72
End: 2022-03-07
Attending: INTERNAL MEDICINE
Payer: MEDICARE

## 2022-03-07 VITALS
TEMPERATURE: 98.3 F | OXYGEN SATURATION: 95 % | RESPIRATION RATE: 16 BRPM | SYSTOLIC BLOOD PRESSURE: 136 MMHG | WEIGHT: 162 LBS | DIASTOLIC BLOOD PRESSURE: 86 MMHG | BODY MASS INDEX: 29.16 KG/M2 | HEART RATE: 110 BPM

## 2022-03-07 DIAGNOSIS — M25.511 CHRONIC RIGHT SHOULDER PAIN: ICD-10-CM

## 2022-03-07 DIAGNOSIS — G47.33 OSA (OBSTRUCTIVE SLEEP APNEA): ICD-10-CM

## 2022-03-07 DIAGNOSIS — M50.13 CERVICAL DISC DISORDER WITH RADICULOPATHY, CERVICOTHORACIC REGION: ICD-10-CM

## 2022-03-07 DIAGNOSIS — E11.8 TYPE 2 DIABETES MELLITUS WITH COMPLICATION, WITHOUT LONG-TERM CURRENT USE OF INSULIN (H): ICD-10-CM

## 2022-03-07 DIAGNOSIS — N18.31 STAGE 3A CHRONIC KIDNEY DISEASE (H): ICD-10-CM

## 2022-03-07 DIAGNOSIS — G89.29 CHRONIC RIGHT SHOULDER PAIN: ICD-10-CM

## 2022-03-07 DIAGNOSIS — Z01.818 PREOP GENERAL PHYSICAL EXAM: Primary | ICD-10-CM

## 2022-03-07 DIAGNOSIS — G47.00 INSOMNIA, UNSPECIFIED TYPE: ICD-10-CM

## 2022-03-07 DIAGNOSIS — G47.33 OSA ON CPAP: ICD-10-CM

## 2022-03-07 LAB
ALBUMIN SERPL-MCNC: 4.7 G/DL (ref 3.5–5.7)
ANION GAP SERPL CALCULATED.3IONS-SCNC: 9 MMOL/L (ref 3–14)
ATRIAL RATE - MUSE: 102 BPM
BUN SERPL-MCNC: 18 MG/DL (ref 7–25)
CALCIUM SERPL-MCNC: 10.2 MG/DL (ref 8.6–10.3)
CHLORIDE BLD-SCNC: 101 MMOL/L (ref 98–107)
CO2 SERPL-SCNC: 27 MMOL/L (ref 21–31)
CREAT SERPL-MCNC: 1.05 MG/DL (ref 0.6–1.2)
DIASTOLIC BLOOD PRESSURE - MUSE: NORMAL MMHG
ERYTHROCYTE [DISTWIDTH] IN BLOOD BY AUTOMATED COUNT: 13.8 % (ref 10–15)
GFR SERPL CREATININE-BSD FRML MDRD: 56 ML/MIN/1.73M2
GLUCOSE BLD-MCNC: 109 MG/DL (ref 70–105)
HBA1C MFR BLD: 7.4 % (ref 4–6.2)
HCT VFR BLD AUTO: 42.2 % (ref 35–47)
HGB BLD-MCNC: 13.8 G/DL (ref 11.7–15.7)
INTERPRETATION ECG - MUSE: NORMAL
MCH RBC QN AUTO: 29.9 PG (ref 26.5–33)
MCHC RBC AUTO-ENTMCNC: 32.7 G/DL (ref 31.5–36.5)
MCV RBC AUTO: 91 FL (ref 78–100)
P AXIS - MUSE: 52 DEGREES
PHOSPHATE SERPL-MCNC: 4.2 MG/DL (ref 2.5–5)
PLATELET # BLD AUTO: 386 10E3/UL (ref 150–450)
POTASSIUM BLD-SCNC: 4 MMOL/L (ref 3.5–5.1)
PR INTERVAL - MUSE: 152 MS
QRS DURATION - MUSE: 90 MS
QT - MUSE: 360 MS
QTC - MUSE: 469 MS
R AXIS - MUSE: -11 DEGREES
RBC # BLD AUTO: 4.62 10E6/UL (ref 3.8–5.2)
SODIUM SERPL-SCNC: 137 MMOL/L (ref 134–144)
SYSTOLIC BLOOD PRESSURE - MUSE: NORMAL MMHG
T AXIS - MUSE: 81 DEGREES
VENTRICULAR RATE- MUSE: 102 BPM
WBC # BLD AUTO: 8.9 10E3/UL (ref 4–11)

## 2022-03-07 PROCEDURE — G0463 HOSPITAL OUTPT CLINIC VISIT: HCPCS

## 2022-03-07 PROCEDURE — 93010 ELECTROCARDIOGRAM REPORT: CPT | Performed by: INTERNAL MEDICINE

## 2022-03-07 PROCEDURE — 99215 OFFICE O/P EST HI 40 MIN: CPT | Performed by: INTERNAL MEDICINE

## 2022-03-07 PROCEDURE — 83036 HEMOGLOBIN GLYCOSYLATED A1C: CPT | Mod: ZL | Performed by: INTERNAL MEDICINE

## 2022-03-07 PROCEDURE — 93005 ELECTROCARDIOGRAM TRACING: CPT | Performed by: INTERNAL MEDICINE

## 2022-03-07 PROCEDURE — 85048 AUTOMATED LEUKOCYTE COUNT: CPT | Mod: ZL | Performed by: INTERNAL MEDICINE

## 2022-03-07 PROCEDURE — 36415 COLL VENOUS BLD VENIPUNCTURE: CPT | Mod: ZL | Performed by: INTERNAL MEDICINE

## 2022-03-07 PROCEDURE — 85014 HEMATOCRIT: CPT | Mod: ZL | Performed by: INTERNAL MEDICINE

## 2022-03-07 PROCEDURE — 80069 RENAL FUNCTION PANEL: CPT | Mod: ZL | Performed by: INTERNAL MEDICINE

## 2022-03-07 RX ORDER — TRAZODONE HYDROCHLORIDE 50 MG/1
50-100 TABLET, FILM COATED ORAL AT BEDTIME
Qty: 180 TABLET | Refills: 0 | Status: SHIPPED | OUTPATIENT
Start: 2022-03-07 | End: 2022-10-31

## 2022-03-07 RX ORDER — HYDROCODONE BITARTRATE AND ACETAMINOPHEN 5; 325 MG/1; MG/1
1-2 TABLET ORAL 3 TIMES DAILY PRN
Qty: 20 TABLET | Refills: 0 | Status: ON HOLD | OUTPATIENT
Start: 2022-03-07 | End: 2022-03-23

## 2022-03-07 RX ORDER — VITAMIN B COMPLEX
1 CAPSULE ORAL DAILY
COMMUNITY
Start: 2022-03-07

## 2022-03-07 ASSESSMENT — PAIN SCALES - GENERAL: PAINLEVEL: EXTREME PAIN (8)

## 2022-03-07 NOTE — NURSING NOTE
Patient presents to clinic today for follow up on neck pain and several other issues.     Medication reconciliation completed.    ACP on file? No, form previously provided.     FOOD SECURITY SCREENING QUESTIONS    The next two questions are to help us understand your food security.  If you are feeling you need any assistance in this area, we have resources available to support you today.    Hunger Vital Signs:  Within the past 12 months we worried whether our food would run out before we got money to buy more. Never  Within the past 12 months the food we bought just didn't last and we didn't have money to get more. Never    Betty Kong CMA([facility])..................3/7/2022   1:36 PM

## 2022-03-07 NOTE — PROGRESS NOTES
{PROVIDER CHARTING PREFERENCE:675974}    Jose Burger is a 72 year old who presents for the following health issues     History of Present Illness       Reason for visit:  Pre op/ extreme pain in shoulder and neck/mol  Symptom onset:  More than a month  Symptoms include:  Can t lift arm or turn neck  Symptom progression:  Staying the same  Had these symptoms before:  Yes  Has tried/received treatment for these symptoms:  Yes  Previous treatment was successful:  No  What makes it worse:  Daily activities  What makes it better:  At times Bio freeze    She eats 2-3 servings of fruits and vegetables daily.She consumes 0 sweetened beverage(s) daily.She exercises with enough effort to increase her heart rate 9 or less minutes per day.  She exercises with enough effort to increase her heart rate 3 or less days per week.   She is taking medications regularly.       {SUPERLIST (Optional):169052}  {additonal problems for provider to add (Optional):091851}    Review of Systems   {ROS COMP (Optional):836434}      Objective    /86 (BP Location: Right arm, Patient Position: Sitting, Cuff Size: Adult Regular)   Pulse 110   Temp 98.3  F (36.8  C) (Tympanic)   Resp 16   Wt 73.5 kg (162 lb)   LMP  (LMP Unknown)   SpO2 95%   BMI 29.16 kg/m    Body mass index is 29.16 kg/m .  Physical Exam   {Exam List (Optional):377138}    {Diagnostic Test Results (Optional):712154}    {AMBULATORY ATTESTATION (Optional):506686}

## 2022-03-07 NOTE — PATIENT INSTRUCTIONS
Preparing for Your Surgery  Getting started  A nurse will call you to review your health history and instructions. They will give you an arrival time based on your scheduled surgery time. Please be ready to share:    Your doctor's clinic name and phone number    Your medical, surgical and anesthesia history    A list of allergies and sensitivities    A list of medicines, including herbal treatments and over-the-counter drugs    Whether the patient has a legal guardian (ask how to send us the papers in advance)  Please tell us if you're pregnant--or if there's any chance you might be pregnant. Some surgeries may injure a fetus (unborn baby), so they require a pregnancy test. Surgeries that are safe for a fetus don't always need a test, and you can choose whether to have one.   If you have a child who's having surgery, please ask for a copy of Preparing for Your Child's Surgery.    Preparing for surgery    Within 30 days of surgery: Have a pre-op exam (sometimes called an H&P, or History and Physical). This can be done at a clinic or pre-operative center.  ? If you're having a , you may not need this exam. Talk to your care team.    At your pre-op exam, talk to your care team about all medicines you take. If you need to stop any medicines before surgery, ask when to start taking them again.  ? We do this for your safety. Many medicines can make you bleed too much during surgery. Some change how well surgery (anesthesia) drugs work.    Call your insurance company to let them know you're having surgery. (If you don't have insurance, call [phone].)    Call your clinic if there's any change in your health. This includes signs of a cold or flu (sore throat, runny nose, cough, rash, fever). It also includes a scrape or scratch near the surgery site.    If you have questions on the day of surgery, call your hospital or surgery center.  COVID testing  You may need to be tested for COVID-19 before having surgery.  If so, your surgical team will give you instructions for scheduling this test, separate from your preoperative history and physical.  Eating and drinking guidelines  For your safety: Unless your surgeon tells you otherwise, follow the guidelines below.    Eat and drink as usual until 8 hours before surgery. After that, no food or milk.    Drink clear liquids until 2 hours before surgery. These are liquids you can see through, like water, Gatorade and Propel Water. You may also have black coffee and tea (no cream or milk).    Nothing by mouth within 2 hours of surgery. This includes gum, candy and breath mints.    If you drink alcohol: Stop drinking it the night before surgery.    If your care team tells you to take medicine on the morning of surgery, it's okay to take it with a sip of water.  Preventing infection    Shower or bathe the night before and morning of your surgery. Follow the instructions your clinic gave you. (If no instructions, use regular soap.)    Don't shave or clip hair near your surgery site. We'll remove the hair if needed.    Don't smoke or vape the morning of surgery. You may chew nicotine gum up to 2 hours before surgery. A nicotine patch is okay.  ? Note: Some surgeries require you to completely quit smoking and nicotine. Check with your surgeon.    Your care team will make every effort to keep you safe from infection. We will:  ? Clean our hands often with soap and water (or an alcohol-based hand rub).  ? Clean the skin at your surgery site with a special soap that kills germs.  ? Give you a special gown to keep you warm. (Cold raises the risk of infection.)  ? Wear special hair covers, masks, gowns and gloves during surgery.  ? Give antibiotic medicine, if prescribed. Not all surgeries need antibiotics.  What to bring on the day of surgery    Photo ID and insurance card    Copy of your health care directive, if you have one    Glasses and hearing aides (bring cases)  ? You can't wear  contacts during surgery    Inhaler and eye drops, if you use them (tell us about these when you arrive)    CPAP machine or breathing device, if you use them    A few personal items, if spending the night    If you have . . .  ? A pacemaker, ICD (cardiac defibrillator) or other implant: Bring the ID card.  ? An implanted stimulator: Bring the remote control.  ? A legal guardian: Bring a copy of the certified (court-stamped) guardianship papers.  Please remove any jewelry, including body piercings. Leave jewelry and other valuables at home.  If you're going home the day of surgery    You must have a responsible adult drive you home. They should stay with you overnight as well.    If you don't have someone to stay with you, and you aren't safe to go home alone, we may keep you overnight. Insurance often won't pay for this.  After surgery  If it's hard to control your pain or you need more pain medicine, please call your surgeon's office.  Questions?   If you have any questions for your care team, list them here: _________________________________________________________________________________________________________________________________________________________________________ ____________________________________ ____________________________________ ____________________________________  For informational purposes only. Not to replace the advice of your health care provider. Copyright   2003, 2019 [facility]. All rights reserved. Clinically reviewed by Elena Connolly MD. [employer] [id number] - REV 07/21.

## 2022-03-07 NOTE — H&P (VIEW-ONLY)
[facility] [place]  [address]  [place] [address]  Phone: [phone]  Primary Provider: Chula Perkins  Pre-op Performing Provider: CHULA PERKINS      PREOPERATIVE EVALUATION:  Today's date: 3/7/2022    Jennyfer Elizabeth is a 72 year old female who presents for a preoperative evaluation.    Surgical Information:  Surgery/Procedure: Right femoral endarterectomy with right iliac angiogram  Surgery Location: [facility]  Surgeon: Dr Rama Sewell  Surgery Date: 3/22/2022  Time of Surgery: 8am  Where patient plans to recover: At home with family  Fax number for surgical facility: Note does not need to be faxed, will be available electronically in Epic.    Type of Anesthesia Anticipated: General    Assessment & Plan     The proposed surgical procedure is considered INTERMEDIATE risk.    Preop general physical exam  - EKG 12-lead, tracing only    MOSHE (obstructive sleep apnea)  - supplies updated per patient request, encouraged to bring with to surgery  - Sleep DME    Insomnia, unspecified type  - Controlled.  Continue current regimen.    - traZODone (DESYREL) 50 MG tablet; Take 1-2 tablets ( mg) by mouth At Bedtime    Stage 3a chronic kidney disease (H)  -  CBC W PLT No Diff  - Renal Function Panel    MOSHE on CPAP  See above    Type 2 diabetes mellitus with complication, without long-term current use of insulin (H)  - overall stable, relatively controlled; continue to monitor  - Hemoglobin A1c    Cervical disc disorder with radiculopathy, cervicothoracic region  - we had a long discussion today regarding her pain.  We will try hydrocodone to see what kind of benefit she gets.  It is recommended we consider an EMG after surgery when she is seen in April.  She is encouraged to follow back up with Dr. Valdivia and orthopedic spine surgery.  - HYDROcodone-acetaminophen (NORCO) 5-325 MG tablet; Take 1-2 tablets by mouth 3 times daily as needed for severe pain    Chronic right shoulder  pain  -Unsure if this is related to primary shoulder pain versus radicular pain.  Plan for EMG after surgery.  Continue to monitor.  - HYDROcodone-acetaminophen (NORCO) 5-325 MG tablet; Take 1-2 tablets by mouth 3 times daily as needed for severe pain         Risks and Recommendations:  The patient has the following additional risks and recommendations for perioperative complications:   - No identified additional risk factors other than previously addressed      RECOMMENDATION:  APPROVAL GIVEN to proceed with proposed procedure, without further diagnostic evaluation.    43 minutes spent on the date of the encounter doing chart review, history and exam, documentation and further activities as noted above      Subjective     HPI related to upcoming procedure:     Preop Questions 3/7/2022   1. Have you ever had a heart attack or stroke? YES - MI in 2017   2. Have you ever had surgery on your heart or blood vessels, such as a stent placement, a coronary artery bypass, or surgery on an artery in your head, neck, heart, or legs? YES - with prior MI   3. Do you have chest pain with activity? No   4. Do you have a history of  heart failure? No   5. Do you currently have a cold, bronchitis or symptoms of other infection? No   6. Do you have a cough, shortness of breath, or wheezing? No   7. Do you or anyone in your family have previous history of blood clots? No   8. Do you or does anyone in your family have a serious bleeding problem such as prolonged bleeding following surgeries or cuts? No   9. Have you ever had problems with anemia or been told to take iron pills? No   10. Have you had any abnormal blood loss such as black, tarry or bloody stools, or abnormal vaginal bleeding? No   11. Have you ever had a blood transfusion? No   12. Are you willing to have a blood transfusion if it is medically needed before, during, or after your surgery? Yes   13. Have you or any of your relatives ever had problems with anesthesia? No    14. Do you have sleep apnea, excessive snoring or daytime drowsiness? YES - only using CPAP occasionally;    14a. Do you have a CPAP machine? Yes   15. Do you have any artifical heart valves or other implanted medical devices like a pacemaker, defibrillator, or continuous glucose monitor? No   16. Do you have artificial joints? No   17. Are you allergic to latex? No       Health Care Directive:  Patient does not have a Health Care Directive or Living Will: Discussed advance care planning with patient; information given to patient to review.    Preoperative Review of :   reviewed - controlled substances reflected in medication list.      Review of Systems  Denies any fevers, chills, SOB, chest pain, increased lower extremity edema, changes in bowel or bladder or other concerns.        Past Medical History:   Diagnosis Date     Benign essential hypertension 03/03/2018     Cataract      Chronic pain of right knee 04/11/2018     Diabetic eye exam (H) 06/07/2018     TAYLOR (generalized anxiety disorder) 03/03/2018     History of MI (myocardial infarction) 07/2017    follows yearly with [facility]     Macular degeneration      Major depressive disorder, single episode 1995     Obstructive sleep apnea 2005    BIPAP     Osteopenia 2007    Lumbar spine     Pure hypercholesterolemia 2001     Squamous cell carcinoma, leg, left      Type 2 diabetes mellitus with complication, without long-term current use of insulin (H) 03/03/2018     Past Surgical History:   Procedure Laterality Date     BREAST BIOPSY, RT/LT  1986    x2; in [place]     CARDIAC CATH - HIM SCAN  07/10/2017    Mid RCA;  drug eluting stent     CHOLECYSTECTOMY  2008     COLONOSCOPY N/A 12/20/2021    Tubular Adenoma F/U 2024     TONSILLECTOMY, ADENOIDECTOMY, COMBINED  1955     Current Outpatient Medications   Medication Sig Dispense Refill     acetaminophen (RA ACETAMINOPHEN) 650 MG CR tablet Take 1,300 mg by mouth 3 times daily       acetaminophen-codeine (TYLENOL  #3) 300-30 MG tablet TAKE 1 TABLET BY MOUTH EVERY 4-6 HOURS AS NEEDED FOR PAIN       aspirin 81 MG chewable tablet Take 81 mg by mouth daily       buPROPion (WELLBUTRIN XL) 300 MG 24 hr tablet TAKE 1 TABLET (300 MG) BY MOUTH DAILY 90 tablet 3     cyanocobalamin (VITAMIN B-12) 1000 MCG tablet Take by mouth daily       DULoxetine (CYMBALTA) 30 MG capsule TAKE 2 CAPSULES BY MOUTH DAILY 180 capsule 3     LORazepam (ATIVAN) 0.5 MG tablet Take 1 tablet (0.5 mg) by mouth nightly as needed for anxiety 15 tablet 5     losartan-hydrochlorothiazide (HYZAAR) 100-12.5 MG tablet Take 1 tablet by mouth daily 90 tablet 4     metFORMIN (GLUCOPHAGE) 500 MG tablet Take 0.5 tablets (250 mg) by mouth 2 times daily (with meals) 90 tablet 4     Multiple Vitamins-Minerals (PRESERVISION AREDS 2+MULTI VIT PO) Take 1 tablet by mouth 2 times daily (Preservision Areds)       rosuvastatin (CRESTOR) 40 MG tablet TAKE 1 TABLET (40 MG) BY MOUTH DAILY 90 tablet 3     traZODone (DESYREL) 50 MG tablet Take 1-2 tablets ( mg) by mouth At Bedtime 180 tablet 0     tretinoin (RETIN-A) 0.05 % external cream Apply 1 g topically At Bedtime 45 g 1     Vitamin D, Cholecalciferol, 25 MCG (1000 UT) TABS Take 1 tablet by mouth daily        ACCU-CHEK SMARTVIEW test strip NEW TO THRIFTY - 1-2 TIMES DAILY 50 each 3     B Complex CAPS Take 1 capsule by mouth daily (Patient not taking: Reported on 3/7/2022)       cilostazol (PLETAL) 50 MG tablet Take 1 tablet (50 mg) by mouth 2 times daily for 7 days, THEN 2 tablets (100 mg) 2 times daily. 134 tablet 0     pregabalin (LYRICA) 50 MG capsule Take 50 mg by mouth 3 times daily Pt is titration off medication (Patient not taking: Reported on 3/7/2022)         No Known Allergies     Social History     Tobacco Use     Smoking status: Former Smoker     Packs/day: 1.00     Years: 40.00     Pack years: 40.00     Quit date: 10/1/2008     Years since quittin.4     Smokeless tobacco: Never Used     Tobacco comment:  Patient occasionally uses Nicorette gum   Substance Use Topics     Alcohol use: Yes     Alcohol/week: 14.0 standard drinks     Comment: 2-3 beer daily or less     Family History   Problem Relation Age of Onset     Hypertension Father      Heart Disease Father         MI     Lung Cancer Mother         smoker     Diabetes Sister      Heart Disease Sister      Kidney Disease Sister 33        ?congenital     Hyperlipidemia Sister      No Known Problems Brother      Breast Cancer No family hx of         Cancer-breast     History   Drug Use No         Objective     /86 (BP Location: Right arm, Patient Position: Sitting, Cuff Size: Adult Regular)   Pulse 110   Temp 98.3  F (36.8  C) (Tympanic)   Resp 16   Wt 73.5 kg (162 lb)   LMP  (LMP Unknown)   SpO2 95%   BMI 29.16 kg/m      Physical Exam  GEN: Vitals reviewed. Healthy appearing. Patient is in no acute distress. Cooperative with exam.  HEENT: Normocephalic atraumatic.  Eyes grossly normal to inspection.  No discharge or erythema, or obvious scleral/conjunctival abnormalities.   NECK: Supple; no thyromegaly or masses noted.  No cervical or supraclavicular lymphadenopathy.  CV: Heart regular in rate and rhythm with no murmur.    LUNGS: No audible wheeze, cough, or visible cyanosis.  No visible retractions or increased work of breathing.  Lungs clear to auscultation bilaterally.    ABD:  nondistended  SKIN: Warm and dry to touch.  Visible skin clear. No significant rash, abnormal pigmentation or lesions.  EXT: No clubbing or cyanosis.  No peripheral edema.        Recent Labs   Lab Test 10/21/21  1141 09/09/21  1149 03/24/21  1236 02/01/21  1143   HGB  --  14.7 13.2  --    PLT  --  328 287  --    NA  --  135  --  136   POTASSIUM  --  3.8  --  4.3   CR 1.03 1.19  --  0.90   A1C  --  7.3*  --  7.0*        Diagnostics:  Recent Results (from the past 240 hour(s))   EKG 12-lead, tracing only    Collection Time: 03/07/22  2:49 PM   Result Value Ref Range    Systolic  Blood Pressure  mmHg    Diastolic Blood Pressure  mmHg    Ventricular Rate 102 BPM    Atrial Rate 102 BPM    NY Interval 152 ms    QRS Duration 90 ms     ms    QTc 469 ms    P Axis 52 degrees    R AXIS -11 degrees    T Axis 81 degrees    Interpretation ECG       Sinus tachycardia  Otherwise normal ECG  No previous ECGs available  Confirmed by MD PATRICIA KEITH ([id number]) on 3/7/2022 4:53:31 PM     Hemoglobin A1c    Collection Time: 03/07/22  2:54 PM   Result Value Ref Range    Hemoglobin A1C 7.4 (H) 4.0 - 6.2 %   CBC W PLT No Diff    Collection Time: 03/07/22  2:54 PM   Result Value Ref Range    WBC Count 8.9 4.0 - 11.0 10e3/uL    RBC Count 4.62 3.80 - 5.20 10e6/uL    Hemoglobin 13.8 11.7 - 15.7 g/dL    Hematocrit 42.2 35.0 - 47.0 %    MCV 91 78 - 100 fL    MCH 29.9 26.5 - 33.0 pg    MCHC 32.7 31.5 - 36.5 g/dL    RDW 13.8 10.0 - 15.0 %    Platelet Count 386 150 - 450 10e3/uL   Renal Function Panel    Collection Time: 03/07/22  2:54 PM   Result Value Ref Range    Sodium 137 134 - 144 mmol/L    Potassium 4.0 3.5 - 5.1 mmol/L    Chloride 101 98 - 107 mmol/L    Carbon Dioxide (CO2) 27 21 - 31 mmol/L    Anion Gap 9 3 - 14 mmol/L    Urea Nitrogen 18 7 - 25 mg/dL    Creatinine 1.05 0.60 - 1.20 mg/dL    Calcium 10.2 8.6 - 10.3 mg/dL    Glucose 109 (H) 70 - 105 mg/dL    Albumin 4.7 3.5 - 5.7 g/dL    Phosphorus 4.2 2.5 - 5.0 mg/dL    GFR Estimate 56 (L) >60 mL/min/1.73m2      EKG: appears normal, NSR, no LVH by voltage criteria, unchanged from previous tracings    Revised Cardiac Risk Index (RCRI):  The patient has the following serious cardiovascular risks for perioperative complications:   - No serious cardiac risks = 0 points     RCRI Interpretation: 0 points: Class I (very low risk - 0.4% complication rate)           Signed Electronically by: Lili Perkins DO  Copy of this evaluation report is provided to requesting physician.

## 2022-03-07 NOTE — PROGRESS NOTES
[facility] AND [facility]  [address]  [place] [address]  Phone: [phone]  Primary Provider: Chula Perkins  Pre-op Performing Provider: CHULA PERKINS      PREOPERATIVE EVALUATION:  Today's date: 3/7/2022    Jennyfer Elizabeth is a 72 year old female who presents for a preoperative evaluation.    Surgical Information:  Surgery/Procedure: Right femoral endarterectomy with right iliac angiogram  Surgery Location: [facility]  Surgeon: Dr Rama Sewell  Surgery Date: 3/22/2022  Time of Surgery: 8am  Where patient plans to recover: At home with family  Fax number for surgical facility: Note does not need to be faxed, will be available electronically in Epic.    Type of Anesthesia Anticipated: General    Assessment & Plan     The proposed surgical procedure is considered INTERMEDIATE risk.    Preop general physical exam  - EKG 12-lead, tracing only    MOSHE (obstructive sleep apnea)  - supplies updated per patient request, encouraged to bring with to surgery  - Sleep DME    Insomnia, unspecified type  - Controlled.  Continue current regimen.    - traZODone (DESYREL) 50 MG tablet; Take 1-2 tablets ( mg) by mouth At Bedtime    Stage 3a chronic kidney disease (H)  -  CBC W PLT No Diff  - Renal Function Panel    MOSHE on CPAP  See above    Type 2 diabetes mellitus with complication, without long-term current use of insulin (H)  - overall stable, relatively controlled; continue to monitor  - Hemoglobin A1c    Cervical disc disorder with radiculopathy, cervicothoracic region  - we had a long discussion today regarding her pain.  We will try hydrocodone to see what kind of benefit she gets.  It is recommended we consider an EMG after surgery when she is seen in April.  She is encouraged to follow back up with Dr. Valdivia and orthopedic spine surgery.  - HYDROcodone-acetaminophen (NORCO) 5-325 MG tablet; Take 1-2 tablets by mouth 3 times daily as needed for severe pain    Chronic right shoulder  pain  -Unsure if this is related to primary shoulder pain versus radicular pain.  Plan for EMG after surgery.  Continue to monitor.  - HYDROcodone-acetaminophen (NORCO) 5-325 MG tablet; Take 1-2 tablets by mouth 3 times daily as needed for severe pain         Risks and Recommendations:  The patient has the following additional risks and recommendations for perioperative complications:   - No identified additional risk factors other than previously addressed      RECOMMENDATION:  APPROVAL GIVEN to proceed with proposed procedure, without further diagnostic evaluation.    43 minutes spent on the date of the encounter doing chart review, history and exam, documentation and further activities as noted above      Subjective     HPI related to upcoming procedure:     Preop Questions 3/7/2022   1. Have you ever had a heart attack or stroke? YES - MI in 2017   2. Have you ever had surgery on your heart or blood vessels, such as a stent placement, a coronary artery bypass, or surgery on an artery in your head, neck, heart, or legs? YES - with prior MI   3. Do you have chest pain with activity? No   4. Do you have a history of  heart failure? No   5. Do you currently have a cold, bronchitis or symptoms of other infection? No   6. Do you have a cough, shortness of breath, or wheezing? No   7. Do you or anyone in your family have previous history of blood clots? No   8. Do you or does anyone in your family have a serious bleeding problem such as prolonged bleeding following surgeries or cuts? No   9. Have you ever had problems with anemia or been told to take iron pills? No   10. Have you had any abnormal blood loss such as black, tarry or bloody stools, or abnormal vaginal bleeding? No   11. Have you ever had a blood transfusion? No   12. Are you willing to have a blood transfusion if it is medically needed before, during, or after your surgery? Yes   13. Have you or any of your relatives ever had problems with anesthesia? No    14. Do you have sleep apnea, excessive snoring or daytime drowsiness? YES - only using CPAP occasionally;    14a. Do you have a CPAP machine? Yes   15. Do you have any artifical heart valves or other implanted medical devices like a pacemaker, defibrillator, or continuous glucose monitor? No   16. Do you have artificial joints? No   17. Are you allergic to latex? No       Health Care Directive:  Patient does not have a Health Care Directive or Living Will: Discussed advance care planning with patient; information given to patient to review.    Preoperative Review of :   reviewed - controlled substances reflected in medication list.      Review of Systems  Denies any fevers, chills, SOB, chest pain, increased lower extremity edema, changes in bowel or bladder or other concerns.        Past Medical History:   Diagnosis Date     Benign essential hypertension 03/03/2018     Cataract      Chronic pain of right knee 04/11/2018     Diabetic eye exam (H) 06/07/2018     TAYLOR (generalized anxiety disorder) 03/03/2018     History of MI (myocardial infarction) 07/2017    follows yearly with [facility]     Macular degeneration      Major depressive disorder, single episode 1995     Obstructive sleep apnea 2005    BIPAP     Osteopenia 2007    Lumbar spine     Pure hypercholesterolemia 2001     Squamous cell carcinoma, leg, left      Type 2 diabetes mellitus with complication, without long-term current use of insulin (H) 03/03/2018     Past Surgical History:   Procedure Laterality Date     BREAST BIOPSY, RT/LT  1986    x2; in [place]     CARDIAC CATH - HIM SCAN  07/10/2017    Mid RCA;  drug eluting stent     CHOLECYSTECTOMY  2008     COLONOSCOPY N/A 12/20/2021    Tubular Adenoma F/U 2024     TONSILLECTOMY, ADENOIDECTOMY, COMBINED  1955     Current Outpatient Medications   Medication Sig Dispense Refill     acetaminophen (RA ACETAMINOPHEN) 650 MG CR tablet Take 1,300 mg by mouth 3 times daily       acetaminophen-codeine (TYLENOL  #3) 300-30 MG tablet TAKE 1 TABLET BY MOUTH EVERY 4-6 HOURS AS NEEDED FOR PAIN       aspirin 81 MG chewable tablet Take 81 mg by mouth daily       buPROPion (WELLBUTRIN XL) 300 MG 24 hr tablet TAKE 1 TABLET (300 MG) BY MOUTH DAILY 90 tablet 3     cyanocobalamin (VITAMIN B-12) 1000 MCG tablet Take by mouth daily       DULoxetine (CYMBALTA) 30 MG capsule TAKE 2 CAPSULES BY MOUTH DAILY 180 capsule 3     LORazepam (ATIVAN) 0.5 MG tablet Take 1 tablet (0.5 mg) by mouth nightly as needed for anxiety 15 tablet 5     losartan-hydrochlorothiazide (HYZAAR) 100-12.5 MG tablet Take 1 tablet by mouth daily 90 tablet 4     metFORMIN (GLUCOPHAGE) 500 MG tablet Take 0.5 tablets (250 mg) by mouth 2 times daily (with meals) 90 tablet 4     Multiple Vitamins-Minerals (PRESERVISION AREDS 2+MULTI VIT PO) Take 1 tablet by mouth 2 times daily (Preservision Areds)       rosuvastatin (CRESTOR) 40 MG tablet TAKE 1 TABLET (40 MG) BY MOUTH DAILY 90 tablet 3     traZODone (DESYREL) 50 MG tablet Take 1-2 tablets ( mg) by mouth At Bedtime 180 tablet 0     tretinoin (RETIN-A) 0.05 % external cream Apply 1 g topically At Bedtime 45 g 1     Vitamin D, Cholecalciferol, 25 MCG (1000 UT) TABS Take 1 tablet by mouth daily        ACCU-CHEK SMARTVIEW test strip NEW TO THRIFTY - 1-2 TIMES DAILY 50 each 3     B Complex CAPS Take 1 capsule by mouth daily (Patient not taking: Reported on 3/7/2022)       cilostazol (PLETAL) 50 MG tablet Take 1 tablet (50 mg) by mouth 2 times daily for 7 days, THEN 2 tablets (100 mg) 2 times daily. 134 tablet 0     pregabalin (LYRICA) 50 MG capsule Take 50 mg by mouth 3 times daily Pt is titration off medication (Patient not taking: Reported on 3/7/2022)         No Known Allergies     Social History     Tobacco Use     Smoking status: Former Smoker     Packs/day: 1.00     Years: 40.00     Pack years: 40.00     Quit date: 10/1/2008     Years since quittin.4     Smokeless tobacco: Never Used     Tobacco comment:  Patient occasionally uses Nicorette gum   Substance Use Topics     Alcohol use: Yes     Alcohol/week: 14.0 standard drinks     Comment: 2-3 beer daily or less     Family History   Problem Relation Age of Onset     Hypertension Father      Heart Disease Father         MI     Lung Cancer Mother         smoker     Diabetes Sister      Heart Disease Sister      Kidney Disease Sister 33        ?congenital     Hyperlipidemia Sister      No Known Problems Brother      Breast Cancer No family hx of         Cancer-breast     History   Drug Use No         Objective     /86 (BP Location: Right arm, Patient Position: Sitting, Cuff Size: Adult Regular)   Pulse 110   Temp 98.3  F (36.8  C) (Tympanic)   Resp 16   Wt 73.5 kg (162 lb)   LMP  (LMP Unknown)   SpO2 95%   BMI 29.16 kg/m      Physical Exam  GEN: Vitals reviewed. Healthy appearing. Patient is in no acute distress. Cooperative with exam.  HEENT: Normocephalic atraumatic.  Eyes grossly normal to inspection.  No discharge or erythema, or obvious scleral/conjunctival abnormalities.   NECK: Supple; no thyromegaly or masses noted.  No cervical or supraclavicular lymphadenopathy.  CV: Heart regular in rate and rhythm with no murmur.    LUNGS: No audible wheeze, cough, or visible cyanosis.  No visible retractions or increased work of breathing.  Lungs clear to auscultation bilaterally.    ABD:  nondistended  SKIN: Warm and dry to touch.  Visible skin clear. No significant rash, abnormal pigmentation or lesions.  EXT: No clubbing or cyanosis.  No peripheral edema.        Recent Labs   Lab Test 10/21/21  1141 09/09/21  1149 03/24/21  1236 02/01/21  1143   HGB  --  14.7 13.2  --    PLT  --  328 287  --    NA  --  135  --  136   POTASSIUM  --  3.8  --  4.3   CR 1.03 1.19  --  0.90   A1C  --  7.3*  --  7.0*        Diagnostics:  Recent Results (from the past 240 hour(s))   EKG 12-lead, tracing only    Collection Time: 03/07/22  2:49 PM   Result Value Ref Range    Systolic  Blood Pressure  mmHg    Diastolic Blood Pressure  mmHg    Ventricular Rate 102 BPM    Atrial Rate 102 BPM    NC Interval 152 ms    QRS Duration 90 ms     ms    QTc 469 ms    P Axis 52 degrees    R AXIS -11 degrees    T Axis 81 degrees    Interpretation ECG       Sinus tachycardia  Otherwise normal ECG  No previous ECGs available  Confirmed by MD PATRICIA KEITH ([id number]) on 3/7/2022 4:53:31 PM     Hemoglobin A1c    Collection Time: 03/07/22  2:54 PM   Result Value Ref Range    Hemoglobin A1C 7.4 (H) 4.0 - 6.2 %   CBC W PLT No Diff    Collection Time: 03/07/22  2:54 PM   Result Value Ref Range    WBC Count 8.9 4.0 - 11.0 10e3/uL    RBC Count 4.62 3.80 - 5.20 10e6/uL    Hemoglobin 13.8 11.7 - 15.7 g/dL    Hematocrit 42.2 35.0 - 47.0 %    MCV 91 78 - 100 fL    MCH 29.9 26.5 - 33.0 pg    MCHC 32.7 31.5 - 36.5 g/dL    RDW 13.8 10.0 - 15.0 %    Platelet Count 386 150 - 450 10e3/uL   Renal Function Panel    Collection Time: 03/07/22  2:54 PM   Result Value Ref Range    Sodium 137 134 - 144 mmol/L    Potassium 4.0 3.5 - 5.1 mmol/L    Chloride 101 98 - 107 mmol/L    Carbon Dioxide (CO2) 27 21 - 31 mmol/L    Anion Gap 9 3 - 14 mmol/L    Urea Nitrogen 18 7 - 25 mg/dL    Creatinine 1.05 0.60 - 1.20 mg/dL    Calcium 10.2 8.6 - 10.3 mg/dL    Glucose 109 (H) 70 - 105 mg/dL    Albumin 4.7 3.5 - 5.7 g/dL    Phosphorus 4.2 2.5 - 5.0 mg/dL    GFR Estimate 56 (L) >60 mL/min/1.73m2      EKG: appears normal, NSR, no LVH by voltage criteria, unchanged from previous tracings    Revised Cardiac Risk Index (RCRI):  The patient has the following serious cardiovascular risks for perioperative complications:   - No serious cardiac risks = 0 points     RCRI Interpretation: 0 points: Class I (very low risk - 0.4% complication rate)           Signed Electronically by: Lili Perkins DO  Copy of this evaluation report is provided to requesting physician.

## 2022-03-09 ENCOUNTER — HOSPITAL ENCOUNTER (OUTPATIENT)
Dept: GENERAL RADIOLOGY | Facility: OTHER | Age: 72
Discharge: HOME | End: 2022-03-09
Attending: INTERNAL MEDICINE | Admitting: INTERNAL MEDICINE
Payer: MEDICARE

## 2022-03-09 DIAGNOSIS — M25.511 RIGHT SHOULDER PAIN, UNSPECIFIED CHRONICITY: ICD-10-CM

## 2022-03-09 PROCEDURE — 255N000002 HC RX 255 OP 636: Performed by: RADIOLOGY

## 2022-03-09 PROCEDURE — 77002 NEEDLE LOCALIZATION BY XRAY: CPT

## 2022-03-09 PROCEDURE — 250N000011 HC RX IP 250 OP 636: Performed by: RADIOLOGY

## 2022-03-09 PROCEDURE — 250N000009 HC RX 250: Performed by: RADIOLOGY

## 2022-03-09 RX ORDER — LIDOCAINE HYDROCHLORIDE 10 MG/ML
1 INJECTION, SOLUTION INFILTRATION; PERINEURAL ONCE
Status: COMPLETED | OUTPATIENT
Start: 2022-03-09 | End: 2022-03-09

## 2022-03-09 RX ORDER — BUPIVACAINE HYDROCHLORIDE 5 MG/ML
3 INJECTION, SOLUTION PERINEURAL ONCE
Status: COMPLETED | OUTPATIENT
Start: 2022-03-09 | End: 2022-03-09

## 2022-03-09 RX ORDER — TRIAMCINOLONE ACETONIDE 40 MG/ML
1 INJECTION, SUSPENSION INTRA-ARTICULAR; INTRAMUSCULAR ONCE
Status: COMPLETED | OUTPATIENT
Start: 2022-03-09 | End: 2022-03-09

## 2022-03-09 RX ADMIN — TRIAMCINOLONE ACETONIDE 40 MG: 40 INJECTION, SUSPENSION INTRA-ARTICULAR; INTRAMUSCULAR at 08:37

## 2022-03-09 RX ADMIN — BUPIVACAINE HYDROCHLORIDE 15 MG: 5 INJECTION, SOLUTION EPIDURAL; INTRACAUDAL; PERINEURAL at 08:37

## 2022-03-09 RX ADMIN — IOHEXOL 1 ML: 240 INJECTION, SOLUTION INTRATHECAL; INTRAVASCULAR; INTRAVENOUS; ORAL at 08:36

## 2022-03-09 RX ADMIN — LIDOCAINE HYDROCHLORIDE 1 ML: 10 INJECTION, SOLUTION INFILTRATION; PERINEURAL at 08:37

## 2022-03-19 ENCOUNTER — ALLIED HEALTH/NURSE VISIT (OUTPATIENT)
Dept: FAMILY MEDICINE | Facility: OTHER | Age: 72
End: 2022-03-19
Attending: FAMILY MEDICINE
Payer: MEDICARE

## 2022-03-19 DIAGNOSIS — Z11.59 ENCOUNTER FOR SCREENING FOR OTHER VIRAL DISEASES: ICD-10-CM

## 2022-03-19 PROCEDURE — C9803 HOPD COVID-19 SPEC COLLECT: HCPCS

## 2022-03-19 PROCEDURE — U0003 INFECTIOUS AGENT DETECTION BY NUCLEIC ACID (DNA OR RNA); SEVERE ACUTE RESPIRATORY SYNDROME CORONAVIRUS 2 (SARS-COV-2) (CORONAVIRUS DISEASE [COVID-19]), AMPLIFIED PROBE TECHNIQUE, MAKING USE OF HIGH THROUGHPUT TECHNOLOGIES AS DESCRIBED BY CMS-2020-01-R: HCPCS | Mod: ZL

## 2022-03-19 NOTE — PROGRESS NOTES
Patient swabbed for COVID-19 testing for surgery at [facility] on 03/22/2022. Fax [phone].  Mitzi Andujar LPN on 3/19/2022 at 11:47 AM

## 2022-03-20 LAB — SARS-COV-2 RNA RESP QL NAA+PROBE: NEGATIVE

## 2022-03-21 ENCOUNTER — ANESTHESIA EVENT (OUTPATIENT)
Dept: SURGERY | Facility: CLINIC | Age: 72
DRG: 254 | End: 2022-03-21
Payer: MEDICARE

## 2022-03-22 ENCOUNTER — HOSPITAL ENCOUNTER (INPATIENT)
Facility: CLINIC | Age: 72
LOS: 1 days | Discharge: HOME | DRG: 254 | End: 2022-03-23
Attending: SURGERY | Admitting: SURGERY
Payer: MEDICARE

## 2022-03-22 ENCOUNTER — ANESTHESIA (OUTPATIENT)
Dept: SURGERY | Facility: CLINIC | Age: 72
DRG: 254 | End: 2022-03-22
Payer: MEDICARE

## 2022-03-22 ENCOUNTER — APPOINTMENT (OUTPATIENT)
Dept: INTERVENTIONAL RADIOLOGY/VASCULAR | Facility: CLINIC | Age: 72
DRG: 254 | End: 2022-03-22
Attending: SURGERY
Payer: MEDICARE

## 2022-03-22 DIAGNOSIS — I70.219 ATHEROSCLEROSIS OF LOWER EXTREMITY WITH CLAUDICATION (H): ICD-10-CM

## 2022-03-22 LAB
ABO/RH(D): NORMAL
ALBUMIN SERPL-MCNC: 3.3 G/DL (ref 3.4–5)
ALP SERPL-CCNC: 81 U/L (ref 40–150)
ALT SERPL W P-5'-P-CCNC: 48 U/L (ref 0–50)
ANION GAP SERPL CALCULATED.3IONS-SCNC: 9 MMOL/L (ref 3–14)
ANTIBODY SCREEN: NEGATIVE
AST SERPL W P-5'-P-CCNC: 45 U/L (ref 0–45)
BILIRUB DIRECT SERPL-MCNC: 0.1 MG/DL (ref 0–0.2)
BILIRUB SERPL-MCNC: 0.4 MG/DL (ref 0.2–1.3)
BUN SERPL-MCNC: 20 MG/DL (ref 7–30)
CALCIUM SERPL-MCNC: 9.1 MG/DL (ref 8.5–10.1)
CHLORIDE BLD-SCNC: 109 MMOL/L (ref 94–109)
CK SERPL-CCNC: 72 U/L (ref 30–225)
CO2 SERPL-SCNC: 22 MMOL/L (ref 20–32)
CREAT SERPL-MCNC: 0.81 MG/DL (ref 0.52–1.04)
ERYTHROCYTE [DISTWIDTH] IN BLOOD BY AUTOMATED COUNT: 13.6 % (ref 10–15)
GFR SERPL CREATININE-BSD FRML MDRD: 76 ML/MIN/1.73M2
GLUCOSE BLD-MCNC: 167 MG/DL (ref 70–99)
GLUCOSE BLDC GLUCOMTR-MCNC: 162 MG/DL (ref 70–99)
GLUCOSE BLDC GLUCOMTR-MCNC: 167 MG/DL (ref 70–99)
GLUCOSE BLDC GLUCOMTR-MCNC: 169 MG/DL (ref 70–99)
GLUCOSE BLDC GLUCOMTR-MCNC: 182 MG/DL (ref 70–99)
GLUCOSE BLDC GLUCOMTR-MCNC: 188 MG/DL (ref 70–99)
GLUCOSE BLDC GLUCOMTR-MCNC: 211 MG/DL (ref 70–99)
HCT VFR BLD AUTO: 37.9 % (ref 35–47)
HGB BLD-MCNC: 12.2 G/DL (ref 11.7–15.7)
MCH RBC QN AUTO: 30 PG (ref 26.5–33)
MCHC RBC AUTO-ENTMCNC: 32.2 G/DL (ref 31.5–36.5)
MCV RBC AUTO: 93 FL (ref 78–100)
PLATELET # BLD AUTO: 241 10E3/UL (ref 150–450)
PLATELET # BLD AUTO: 248 10E3/UL (ref 150–450)
POTASSIUM BLD-SCNC: 3.9 MMOL/L (ref 3.4–5.3)
PROT SERPL-MCNC: 6.5 G/DL (ref 6.8–8.8)
RBC # BLD AUTO: 4.07 10E6/UL (ref 3.8–5.2)
SODIUM SERPL-SCNC: 140 MMOL/L (ref 133–144)
SPECIMEN EXPIRATION DATE: NORMAL
WBC # BLD AUTO: 8.3 10E3/UL (ref 4–11)

## 2022-03-22 PROCEDURE — 36415 COLL VENOUS BLD VENIPUNCTURE: CPT | Performed by: SURGERY

## 2022-03-22 PROCEDURE — 999N000083 HC STATISTIC IR STAFF TIME IN THE OR

## 2022-03-22 PROCEDURE — 250N000013 HC RX MED GY IP 250 OP 250 PS 637: Performed by: STUDENT IN AN ORGANIZED HEALTH CARE EDUCATION/TRAINING PROGRAM

## 2022-03-22 PROCEDURE — 278N000051 HC OR IMPLANT GENERAL: Performed by: SURGERY

## 2022-03-22 PROCEDURE — 360N000084 HC SURGERY LEVEL 4 W/ FLUORO, PER MIN: Performed by: SURGERY

## 2022-03-22 PROCEDURE — 85049 AUTOMATED PLATELET COUNT: CPT | Performed by: STUDENT IN AN ORGANIZED HEALTH CARE EDUCATION/TRAINING PROGRAM

## 2022-03-22 PROCEDURE — 120N000002 HC R&B MED SURG/OB UMMC

## 2022-03-22 PROCEDURE — 047H3ZZ DILATION OF RIGHT EXTERNAL ILIAC ARTERY, PERCUTANEOUS APPROACH: ICD-10-PCS | Performed by: SURGERY

## 2022-03-22 PROCEDURE — 250N000012 HC RX MED GY IP 250 OP 636 PS 637: Performed by: STUDENT IN AN ORGANIZED HEALTH CARE EDUCATION/TRAINING PROGRAM

## 2022-03-22 PROCEDURE — C1894 INTRO/SHEATH, NON-LASER: HCPCS | Performed by: SURGERY

## 2022-03-22 PROCEDURE — 047C3EZ DILATION OF RIGHT COMMON ILIAC ARTERY WITH TWO INTRALUMINAL DEVICES, PERCUTANEOUS APPROACH: ICD-10-PCS | Performed by: SURGERY

## 2022-03-22 PROCEDURE — C1769 GUIDE WIRE: HCPCS | Performed by: SURGERY

## 2022-03-22 PROCEDURE — 999N000141 HC STATISTIC PRE-PROCEDURE NURSING ASSESSMENT: Performed by: SURGERY

## 2022-03-22 PROCEDURE — 250N000011 HC RX IP 250 OP 636: Performed by: STUDENT IN AN ORGANIZED HEALTH CARE EDUCATION/TRAINING PROGRAM

## 2022-03-22 PROCEDURE — 36415 COLL VENOUS BLD VENIPUNCTURE: CPT

## 2022-03-22 PROCEDURE — 255N000002 HC RX 255 OP 636: Performed by: SURGERY

## 2022-03-22 PROCEDURE — 250N000013 HC RX MED GY IP 250 OP 250 PS 637

## 2022-03-22 PROCEDURE — 250N000009 HC RX 250: Performed by: SURGERY

## 2022-03-22 PROCEDURE — 04UK0KZ SUPPLEMENT RIGHT FEMORAL ARTERY WITH NONAUTOLOGOUS TISSUE SUBSTITUTE, OPEN APPROACH: ICD-10-PCS | Performed by: SURGERY

## 2022-03-22 PROCEDURE — 36415 COLL VENOUS BLD VENIPUNCTURE: CPT | Performed by: STUDENT IN AN ORGANIZED HEALTH CARE EDUCATION/TRAINING PROGRAM

## 2022-03-22 PROCEDURE — 04CK0ZZ EXTIRPATION OF MATTER FROM RIGHT FEMORAL ARTERY, OPEN APPROACH: ICD-10-PCS | Performed by: SURGERY

## 2022-03-22 PROCEDURE — C1887 CATHETER, GUIDING: HCPCS | Performed by: SURGERY

## 2022-03-22 PROCEDURE — 250N000013 HC RX MED GY IP 250 OP 250 PS 637: Performed by: SURGERY

## 2022-03-22 PROCEDURE — 710N000010 HC RECOVERY PHASE 1, LEVEL 2, PER MIN: Performed by: SURGERY

## 2022-03-22 PROCEDURE — 258N000003 HC RX IP 258 OP 636: Performed by: ANESTHESIOLOGY

## 2022-03-22 PROCEDURE — 250N000011 HC RX IP 250 OP 636

## 2022-03-22 PROCEDURE — C1763 CONN TISS, NON-HUMAN: HCPCS | Performed by: SURGERY

## 2022-03-22 PROCEDURE — 250N000011 HC RX IP 250 OP 636: Performed by: SURGERY

## 2022-03-22 PROCEDURE — 250N000009 HC RX 250

## 2022-03-22 PROCEDURE — 82248 BILIRUBIN DIRECT: CPT

## 2022-03-22 PROCEDURE — 272N000001 HC OR GENERAL SUPPLY STERILE: Performed by: SURGERY

## 2022-03-22 PROCEDURE — 258N000003 HC RX IP 258 OP 636

## 2022-03-22 PROCEDURE — 370N000017 HC ANESTHESIA TECHNICAL FEE, PER MIN: Performed by: SURGERY

## 2022-03-22 PROCEDURE — 250N000025 HC SEVOFLURANE, PER MIN: Performed by: SURGERY

## 2022-03-22 PROCEDURE — C1725 CATH, TRANSLUMIN NON-LASER: HCPCS | Performed by: SURGERY

## 2022-03-22 PROCEDURE — 86850 RBC ANTIBODY SCREEN: CPT | Performed by: ANESTHESIOLOGY

## 2022-03-22 PROCEDURE — 80053 COMPREHEN METABOLIC PANEL: CPT | Performed by: SURGERY

## 2022-03-22 PROCEDURE — 85027 COMPLETE CBC AUTOMATED: CPT | Performed by: SURGERY

## 2022-03-22 PROCEDURE — 82550 ASSAY OF CK (CPK): CPT

## 2022-03-22 DEVICE — IMPLANTABLE DEVICE: Type: IMPLANTABLE DEVICE | Site: ILIAC/FEMORALS | Status: FUNCTIONAL

## 2022-03-22 DEVICE — XENOSURE BIOLOGIC PATCH, 0.8CM X 8CM, EIFU
Type: IMPLANTABLE DEVICE | Site: LEG | Status: FUNCTIONAL
Brand: XENOSURE BIOLOGIC PATCH

## 2022-03-22 RX ORDER — FENTANYL CITRATE 50 UG/ML
50 INJECTION, SOLUTION INTRAMUSCULAR; INTRAVENOUS EVERY 5 MIN PRN
Status: DISCONTINUED | OUTPATIENT
Start: 2022-03-22 | End: 2022-03-22 | Stop reason: HOSPADM

## 2022-03-22 RX ORDER — NALOXONE HYDROCHLORIDE 0.4 MG/ML
0.2 INJECTION, SOLUTION INTRAMUSCULAR; INTRAVENOUS; SUBCUTANEOUS
Status: DISCONTINUED | OUTPATIENT
Start: 2022-03-22 | End: 2022-03-23 | Stop reason: HOSPADM

## 2022-03-22 RX ORDER — ROSUVASTATIN CALCIUM 10 MG/1
40 TABLET, COATED ORAL DAILY
Status: DISCONTINUED | OUTPATIENT
Start: 2022-03-22 | End: 2022-03-22

## 2022-03-22 RX ORDER — CEFAZOLIN SODIUM 1 G/3ML
1 INJECTION, POWDER, FOR SOLUTION INTRAMUSCULAR; INTRAVENOUS EVERY 8 HOURS
Status: COMPLETED | OUTPATIENT
Start: 2022-03-22 | End: 2022-03-23

## 2022-03-22 RX ORDER — ONDANSETRON 2 MG/ML
INJECTION INTRAMUSCULAR; INTRAVENOUS PRN
Status: DISCONTINUED | OUTPATIENT
Start: 2022-03-22 | End: 2022-03-22

## 2022-03-22 RX ORDER — MEPERIDINE HYDROCHLORIDE 25 MG/ML
12.5 INJECTION INTRAMUSCULAR; INTRAVENOUS; SUBCUTANEOUS
Status: DISCONTINUED | OUTPATIENT
Start: 2022-03-22 | End: 2022-03-22 | Stop reason: HOSPADM

## 2022-03-22 RX ORDER — ASPIRIN 81 MG/1
81 TABLET, CHEWABLE ORAL DAILY
Status: DISCONTINUED | OUTPATIENT
Start: 2022-03-22 | End: 2022-03-23 | Stop reason: HOSPADM

## 2022-03-22 RX ORDER — HYDROMORPHONE HCL IN WATER/PF 6 MG/30 ML
0.4 PATIENT CONTROLLED ANALGESIA SYRINGE INTRAVENOUS EVERY 5 MIN PRN
Status: DISCONTINUED | OUTPATIENT
Start: 2022-03-22 | End: 2022-03-22 | Stop reason: HOSPADM

## 2022-03-22 RX ORDER — LABETALOL HYDROCHLORIDE 5 MG/ML
5 INJECTION, SOLUTION INTRAVENOUS EVERY 5 MIN PRN
Status: DISCONTINUED | OUTPATIENT
Start: 2022-03-22 | End: 2022-03-22 | Stop reason: HOSPADM

## 2022-03-22 RX ORDER — NALOXONE HYDROCHLORIDE 0.4 MG/ML
0.4 INJECTION, SOLUTION INTRAMUSCULAR; INTRAVENOUS; SUBCUTANEOUS
Status: DISCONTINUED | OUTPATIENT
Start: 2022-03-22 | End: 2022-03-23 | Stop reason: HOSPADM

## 2022-03-22 RX ORDER — DULOXETIN HYDROCHLORIDE 60 MG/1
60 CAPSULE, DELAYED RELEASE ORAL DAILY
Status: DISCONTINUED | OUTPATIENT
Start: 2022-03-22 | End: 2022-03-23 | Stop reason: HOSPADM

## 2022-03-22 RX ORDER — ACETAMINOPHEN 325 MG/1
975 TABLET ORAL
Status: DISCONTINUED | OUTPATIENT
Start: 2022-03-22 | End: 2022-03-22 | Stop reason: HOSPADM

## 2022-03-22 RX ORDER — LIDOCAINE 40 MG/G
CREAM TOPICAL
Status: DISCONTINUED | OUTPATIENT
Start: 2022-03-22 | End: 2022-03-22 | Stop reason: HOSPADM

## 2022-03-22 RX ORDER — DEXTROSE MONOHYDRATE 25 G/50ML
25-50 INJECTION, SOLUTION INTRAVENOUS
Status: DISCONTINUED | OUTPATIENT
Start: 2022-03-22 | End: 2022-03-23 | Stop reason: HOSPADM

## 2022-03-22 RX ORDER — TRAZODONE HYDROCHLORIDE 50 MG/1
50-100 TABLET, FILM COATED ORAL AT BEDTIME
Status: DISCONTINUED | OUTPATIENT
Start: 2022-03-22 | End: 2022-03-23 | Stop reason: HOSPADM

## 2022-03-22 RX ORDER — FENTANYL CITRATE 50 UG/ML
INJECTION, SOLUTION INTRAMUSCULAR; INTRAVENOUS PRN
Status: DISCONTINUED | OUTPATIENT
Start: 2022-03-22 | End: 2022-03-22

## 2022-03-22 RX ORDER — HYDROCHLOROTHIAZIDE 12.5 MG/1
12.5 TABLET ORAL DAILY
Status: DISCONTINUED | OUTPATIENT
Start: 2022-03-22 | End: 2022-03-23 | Stop reason: HOSPADM

## 2022-03-22 RX ORDER — CEFAZOLIN SODIUM/WATER 2 G/20 ML
2 SYRINGE (ML) INTRAVENOUS
Status: COMPLETED | OUTPATIENT
Start: 2022-03-22 | End: 2022-03-22

## 2022-03-22 RX ORDER — HEPARIN SODIUM 1000 [USP'U]/ML
INJECTION, SOLUTION INTRAVENOUS; SUBCUTANEOUS PRN
Status: DISCONTINUED | OUTPATIENT
Start: 2022-03-22 | End: 2022-03-22

## 2022-03-22 RX ORDER — ONDANSETRON 2 MG/ML
4 INJECTION INTRAMUSCULAR; INTRAVENOUS EVERY 6 HOURS PRN
Status: DISCONTINUED | OUTPATIENT
Start: 2022-03-22 | End: 2022-03-23 | Stop reason: HOSPADM

## 2022-03-22 RX ORDER — CEFAZOLIN SODIUM/WATER 2 G/20 ML
2 SYRINGE (ML) INTRAVENOUS SEE ADMIN INSTRUCTIONS
Status: DISCONTINUED | OUTPATIENT
Start: 2022-03-22 | End: 2022-03-22 | Stop reason: HOSPADM

## 2022-03-22 RX ORDER — SODIUM CHLORIDE, SODIUM LACTATE, POTASSIUM CHLORIDE, CALCIUM CHLORIDE 600; 310; 30; 20 MG/100ML; MG/100ML; MG/100ML; MG/100ML
INJECTION, SOLUTION INTRAVENOUS CONTINUOUS
Status: DISCONTINUED | OUTPATIENT
Start: 2022-03-22 | End: 2022-03-22 | Stop reason: HOSPADM

## 2022-03-22 RX ORDER — ONDANSETRON 4 MG/1
4 TABLET, ORALLY DISINTEGRATING ORAL EVERY 30 MIN PRN
Status: DISCONTINUED | OUTPATIENT
Start: 2022-03-22 | End: 2022-03-22 | Stop reason: HOSPADM

## 2022-03-22 RX ORDER — LIDOCAINE 40 MG/G
CREAM TOPICAL
Status: DISCONTINUED | OUTPATIENT
Start: 2022-03-22 | End: 2022-03-23 | Stop reason: HOSPADM

## 2022-03-22 RX ORDER — BUPIVACAINE HYDROCHLORIDE AND EPINEPHRINE 5; 5 MG/ML; UG/ML
INJECTION, SOLUTION EPIDURAL; INTRACAUDAL; PERINEURAL PRN
Status: DISCONTINUED | OUTPATIENT
Start: 2022-03-22 | End: 2022-03-22 | Stop reason: HOSPADM

## 2022-03-22 RX ORDER — ONDANSETRON 2 MG/ML
4 INJECTION INTRAMUSCULAR; INTRAVENOUS EVERY 30 MIN PRN
Status: DISCONTINUED | OUTPATIENT
Start: 2022-03-22 | End: 2022-03-22 | Stop reason: HOSPADM

## 2022-03-22 RX ORDER — FENTANYL CITRATE 50 UG/ML
25 INJECTION, SOLUTION INTRAMUSCULAR; INTRAVENOUS
Status: DISCONTINUED | OUTPATIENT
Start: 2022-03-22 | End: 2022-03-22 | Stop reason: HOSPADM

## 2022-03-22 RX ORDER — FAMOTIDINE 20 MG/1
20 TABLET, FILM COATED ORAL 2 TIMES DAILY
Status: DISCONTINUED | OUTPATIENT
Start: 2022-03-22 | End: 2022-03-22

## 2022-03-22 RX ORDER — ROSUVASTATIN CALCIUM 40 MG/1
40 TABLET, COATED ORAL DAILY
Status: DISCONTINUED | OUTPATIENT
Start: 2022-03-22 | End: 2022-03-22

## 2022-03-22 RX ORDER — ACETAMINOPHEN 325 MG/1
975 TABLET ORAL 4 TIMES DAILY
Status: DISCONTINUED | OUTPATIENT
Start: 2022-03-22 | End: 2022-03-23 | Stop reason: HOSPADM

## 2022-03-22 RX ORDER — DEXMEDETOMIDINE HYDROCHLORIDE 4 UG/ML
INJECTION, SOLUTION INTRAVENOUS PRN
Status: DISCONTINUED | OUTPATIENT
Start: 2022-03-22 | End: 2022-03-22

## 2022-03-22 RX ORDER — ONDANSETRON 4 MG/1
4 TABLET, ORALLY DISINTEGRATING ORAL EVERY 6 HOURS PRN
Status: DISCONTINUED | OUTPATIENT
Start: 2022-03-22 | End: 2022-03-23 | Stop reason: HOSPADM

## 2022-03-22 RX ORDER — CLOPIDOGREL BISULFATE 75 MG/1
75 TABLET ORAL DAILY
Status: DISCONTINUED | OUTPATIENT
Start: 2022-03-23 | End: 2022-03-23 | Stop reason: HOSPADM

## 2022-03-22 RX ORDER — NICOTINE POLACRILEX 4 MG
15-30 LOZENGE BUCCAL
Status: DISCONTINUED | OUTPATIENT
Start: 2022-03-22 | End: 2022-03-23 | Stop reason: HOSPADM

## 2022-03-22 RX ORDER — DEXAMETHASONE SODIUM PHOSPHATE 4 MG/ML
INJECTION, SOLUTION INTRA-ARTICULAR; INTRALESIONAL; INTRAMUSCULAR; INTRAVENOUS; SOFT TISSUE PRN
Status: DISCONTINUED | OUTPATIENT
Start: 2022-03-22 | End: 2022-03-22

## 2022-03-22 RX ORDER — HYDROMORPHONE HCL IN WATER/PF 6 MG/30 ML
0.2 PATIENT CONTROLLED ANALGESIA SYRINGE INTRAVENOUS EVERY 10 MIN PRN
Status: DISCONTINUED | OUTPATIENT
Start: 2022-03-22 | End: 2022-03-22 | Stop reason: HOSPADM

## 2022-03-22 RX ORDER — HEPARIN SODIUM 5000 [USP'U]/.5ML
5000 INJECTION, SOLUTION INTRAVENOUS; SUBCUTANEOUS EVERY 8 HOURS
Status: DISCONTINUED | OUTPATIENT
Start: 2022-03-23 | End: 2022-03-23 | Stop reason: HOSPADM

## 2022-03-22 RX ORDER — FAMOTIDINE 20 MG/1
20 TABLET, FILM COATED ORAL 2 TIMES DAILY
Status: DISCONTINUED | OUTPATIENT
Start: 2022-03-22 | End: 2022-03-23 | Stop reason: HOSPADM

## 2022-03-22 RX ORDER — PROPOFOL 10 MG/ML
INJECTION, EMULSION INTRAVENOUS PRN
Status: DISCONTINUED | OUTPATIENT
Start: 2022-03-22 | End: 2022-03-22

## 2022-03-22 RX ORDER — LOSARTAN POTASSIUM 100 MG/1
100 TABLET ORAL DAILY
Status: DISCONTINUED | OUTPATIENT
Start: 2022-03-22 | End: 2022-03-23 | Stop reason: HOSPADM

## 2022-03-22 RX ORDER — PROCHLORPERAZINE MALEATE 5 MG
5 TABLET ORAL EVERY 6 HOURS PRN
Status: DISCONTINUED | OUTPATIENT
Start: 2022-03-22 | End: 2022-03-23 | Stop reason: HOSPADM

## 2022-03-22 RX ORDER — IODIXANOL 320 MG/ML
INJECTION, SOLUTION INTRAVASCULAR PRN
Status: DISCONTINUED | OUTPATIENT
Start: 2022-03-22 | End: 2022-03-22 | Stop reason: HOSPADM

## 2022-03-22 RX ORDER — POLYETHYLENE GLYCOL 3350 17 G/17G
17 POWDER, FOR SOLUTION ORAL DAILY
Status: DISCONTINUED | OUTPATIENT
Start: 2022-03-23 | End: 2022-03-23 | Stop reason: HOSPADM

## 2022-03-22 RX ORDER — ROSUVASTATIN CALCIUM 10 MG/1
20 TABLET, COATED ORAL EVERY EVENING
Status: DISCONTINUED | OUTPATIENT
Start: 2022-03-22 | End: 2022-03-23 | Stop reason: HOSPADM

## 2022-03-22 RX ORDER — CLOPIDOGREL BISULFATE 75 MG/1
150 TABLET ORAL ONCE
Status: COMPLETED | OUTPATIENT
Start: 2022-03-22 | End: 2022-03-22

## 2022-03-22 RX ORDER — OXYCODONE HYDROCHLORIDE 5 MG/1
5 TABLET ORAL EVERY 4 HOURS PRN
Status: DISCONTINUED | OUTPATIENT
Start: 2022-03-22 | End: 2022-03-22 | Stop reason: HOSPADM

## 2022-03-22 RX ORDER — SODIUM CHLORIDE 9 MG/ML
INJECTION, SOLUTION INTRAVENOUS CONTINUOUS
Status: DISCONTINUED | OUTPATIENT
Start: 2022-03-22 | End: 2022-03-23

## 2022-03-22 RX ORDER — HYDROCODONE BITARTRATE AND ACETAMINOPHEN 5; 325 MG/1; MG/1
1-2 TABLET ORAL EVERY 6 HOURS PRN
Status: DISCONTINUED | OUTPATIENT
Start: 2022-03-22 | End: 2022-03-23 | Stop reason: HOSPADM

## 2022-03-22 RX ORDER — BUPROPION HYDROCHLORIDE 300 MG/1
300 TABLET ORAL DAILY
Status: DISCONTINUED | OUTPATIENT
Start: 2022-03-23 | End: 2022-03-23 | Stop reason: HOSPADM

## 2022-03-22 RX ADMIN — FENTANYL CITRATE 50 MCG: 50 INJECTION, SOLUTION INTRAMUSCULAR; INTRAVENOUS at 08:41

## 2022-03-22 RX ADMIN — PROPOFOL 50 MG: 10 INJECTION, EMULSION INTRAVENOUS at 11:21

## 2022-03-22 RX ADMIN — ROCURONIUM BROMIDE 20 MG: 50 INJECTION, SOLUTION INTRAVENOUS at 10:21

## 2022-03-22 RX ADMIN — DULOXETINE 60 MG: 60 CAPSULE, DELAYED RELEASE ORAL at 15:05

## 2022-03-22 RX ADMIN — ROCURONIUM BROMIDE 45 MG: 50 INJECTION, SOLUTION INTRAVENOUS at 08:13

## 2022-03-22 RX ADMIN — PHENYLEPHRINE HYDROCHLORIDE 100 MCG: 10 INJECTION INTRAVENOUS at 08:33

## 2022-03-22 RX ADMIN — Medication 1 UNITS: at 10:12

## 2022-03-22 RX ADMIN — CLOPIDOGREL BISULFATE 150 MG: 75 TABLET ORAL at 12:28

## 2022-03-22 RX ADMIN — PHENYLEPHRINE HYDROCHLORIDE 200 MCG: 10 INJECTION INTRAVENOUS at 09:14

## 2022-03-22 RX ADMIN — PHENYLEPHRINE HYDROCHLORIDE 100 MCG: 10 INJECTION INTRAVENOUS at 08:54

## 2022-03-22 RX ADMIN — ONDANSETRON 4 MG: 2 INJECTION INTRAMUSCULAR; INTRAVENOUS at 10:59

## 2022-03-22 RX ADMIN — PHENYLEPHRINE HYDROCHLORIDE 150 MCG: 10 INJECTION INTRAVENOUS at 08:38

## 2022-03-22 RX ADMIN — PROPOFOL 100 MG: 10 INJECTION, EMULSION INTRAVENOUS at 08:12

## 2022-03-22 RX ADMIN — Medication 1 UNITS: at 10:23

## 2022-03-22 RX ADMIN — FENTANYL CITRATE 50 MCG: 50 INJECTION, SOLUTION INTRAMUSCULAR; INTRAVENOUS at 10:44

## 2022-03-22 RX ADMIN — Medication 8 MCG: at 07:40

## 2022-03-22 RX ADMIN — PHENYLEPHRINE HYDROCHLORIDE 200 MCG: 10 INJECTION INTRAVENOUS at 09:51

## 2022-03-22 RX ADMIN — DEXAMETHASONE SODIUM PHOSPHATE 8 MG: 4 INJECTION, SOLUTION INTRA-ARTICULAR; INTRALESIONAL; INTRAMUSCULAR; INTRAVENOUS; SOFT TISSUE at 08:23

## 2022-03-22 RX ADMIN — FENTANYL CITRATE 50 MCG: 50 INJECTION, SOLUTION INTRAMUSCULAR; INTRAVENOUS at 09:38

## 2022-03-22 RX ADMIN — PHENYLEPHRINE HYDROCHLORIDE 100 MCG: 10 INJECTION INTRAVENOUS at 09:46

## 2022-03-22 RX ADMIN — PHENYLEPHRINE HYDROCHLORIDE 100 MCG: 10 INJECTION INTRAVENOUS at 10:31

## 2022-03-22 RX ADMIN — ACETAMINOPHEN 975 MG: 325 TABLET ORAL at 15:05

## 2022-03-22 RX ADMIN — Medication 1 UNITS: at 10:52

## 2022-03-22 RX ADMIN — ASPIRIN 81 MG: 81 TABLET, CHEWABLE ORAL at 15:05

## 2022-03-22 RX ADMIN — ROCURONIUM BROMIDE 5 MG: 50 INJECTION, SOLUTION INTRAVENOUS at 08:11

## 2022-03-22 RX ADMIN — FAMOTIDINE 20 MG: 20 TABLET ORAL at 20:38

## 2022-03-22 RX ADMIN — PHENYLEPHRINE HYDROCHLORIDE 100 MCG: 10 INJECTION INTRAVENOUS at 08:25

## 2022-03-22 RX ADMIN — FAMOTIDINE 20 MG: 20 TABLET ORAL at 15:05

## 2022-03-22 RX ADMIN — Medication 2 G: at 08:22

## 2022-03-22 RX ADMIN — FENTANYL CITRATE 100 MCG: 50 INJECTION, SOLUTION INTRAMUSCULAR; INTRAVENOUS at 08:12

## 2022-03-22 RX ADMIN — LOSARTAN POTASSIUM 100 MG: 100 TABLET, FILM COATED ORAL at 15:05

## 2022-03-22 RX ADMIN — TRAZODONE HYDROCHLORIDE 50 MG: 50 TABLET ORAL at 22:36

## 2022-03-22 RX ADMIN — PHENYLEPHRINE HYDROCHLORIDE 200 MCG: 10 INJECTION INTRAVENOUS at 10:03

## 2022-03-22 RX ADMIN — CEFAZOLIN 1 G: 1 INJECTION, POWDER, FOR SOLUTION INTRAMUSCULAR; INTRAVENOUS at 23:32

## 2022-03-22 RX ADMIN — ROCURONIUM BROMIDE 10 MG: 50 INJECTION, SOLUTION INTRAVENOUS at 09:00

## 2022-03-22 RX ADMIN — PHENYLEPHRINE HYDROCHLORIDE 100 MCG: 10 INJECTION INTRAVENOUS at 11:08

## 2022-03-22 RX ADMIN — HYDROCHLOROTHIAZIDE 12.5 MG: 12.5 TABLET ORAL at 16:40

## 2022-03-22 RX ADMIN — ROSUVASTATIN CALCIUM 20 MG: 10 TABLET, FILM COATED ORAL at 20:38

## 2022-03-22 RX ADMIN — ACETAMINOPHEN 975 MG: 325 TABLET ORAL at 20:38

## 2022-03-22 RX ADMIN — SODIUM CHLORIDE, POTASSIUM CHLORIDE, SODIUM LACTATE AND CALCIUM CHLORIDE: 600; 310; 30; 20 INJECTION, SOLUTION INTRAVENOUS at 08:00

## 2022-03-22 RX ADMIN — SUGAMMADEX 200 MG: 100 INJECTION, SOLUTION INTRAVENOUS at 11:16

## 2022-03-22 RX ADMIN — HEPARIN SODIUM 3000 UNITS: 1000 INJECTION INTRAVENOUS; SUBCUTANEOUS at 10:10

## 2022-03-22 RX ADMIN — PROPOFOL 50 MG: 10 INJECTION, EMULSION INTRAVENOUS at 09:45

## 2022-03-22 RX ADMIN — Medication 4 MCG: at 08:00

## 2022-03-22 RX ADMIN — CEFAZOLIN 1 G: 1 INJECTION, POWDER, FOR SOLUTION INTRAMUSCULAR; INTRAVENOUS at 16:41

## 2022-03-22 RX ADMIN — ROCURONIUM BROMIDE 40 MG: 50 INJECTION, SOLUTION INTRAVENOUS at 09:38

## 2022-03-22 RX ADMIN — HEPARIN SODIUM 6000 UNITS: 1000 INJECTION INTRAVENOUS; SUBCUTANEOUS at 09:09

## 2022-03-22 RX ADMIN — SODIUM CHLORIDE, POTASSIUM CHLORIDE, SODIUM LACTATE AND CALCIUM CHLORIDE: 600; 310; 30; 20 INJECTION, SOLUTION INTRAVENOUS at 08:58

## 2022-03-22 ASSESSMENT — ACTIVITIES OF DAILY LIVING (ADL)
ADLS_ACUITY_SCORE: 7
ADLS_ACUITY_SCORE: 7
ADLS_ACUITY_SCORE: 10
ADLS_ACUITY_SCORE: 7
ADLS_ACUITY_SCORE: 12
ADLS_ACUITY_SCORE: 7

## 2022-03-22 NOTE — ANESTHESIA PREPROCEDURE EVALUATION
Anesthesia Pre-Procedure Evaluation    Patient: Jennyfer Elizabeth   MRN: [id number] : 1950        Procedure : Procedure(s):  Right femoral endarterectomy  with right iliac angiogram with possible intervention  Latex Free          Past Medical History:   Diagnosis Date     Benign essential hypertension 2018     Cataract      Chronic pain of right knee 2018     Diabetic eye exam (H) 2018     TAYLOR (generalized anxiety disorder) 2018     History of MI (myocardial infarction) 2017    follows yearly with [facility]     Macular degeneration      Major depressive disorder, single episode      Obstructive sleep apnea     BIPAP     Osteopenia     Lumbar spine     Pure hypercholesterolemia      PVD (peripheral vascular disease) (H)      Squamous cell carcinoma, leg, left 2021     Type 2 diabetes mellitus with complication, without long-term current use of insulin (H) 2018      Past Surgical History:   Procedure Laterality Date     BREAST BIOPSY, RT/LT  1986    x2; in [place]     CARDIAC CATH - HIM SCAN  07/10/2017    Mid RCA;  drug eluting stent     CHOLECYSTECTOMY       COLONOSCOPY N/A 2021    Tubular Adenoma F/U      SKIN CANCER EXCISION Left 2021    [facility]     TONSILLECTOMY, ADENOIDECTOMY, COMBINED        No Known Allergies   Social History     Tobacco Use     Smoking status: Former Smoker     Packs/day: 1.00     Years: 40.00     Pack years: 40.00     Quit date: 10/1/2008     Years since quittin.4     Smokeless tobacco: Never Used     Tobacco comment: Patient occasionally uses Nicorette gum   Substance Use Topics     Alcohol use: Yes     Alcohol/week: 14.0 standard drinks     Comment: 3 beer; not daily      Wt Readings from Last 1 Encounters:   22 75.1 kg (165 lb 9.1 oz)        Anesthesia Evaluation   Pt has had prior anesthetic. Type: General and MAC.    No history of anesthetic complications       ROS/MED HX  ENT/Pulmonary:     (+) sleep  apnea, mild, uses CPAP,     Neurologic:  - neg neurologic ROS     Cardiovascular: Comment: Significant claudicative symptoms in right LE  Mention of thoracic aneurysm    (+) hypertension----stent- (-) murmur   METS/Exercise Tolerance: 1 - Eating, dressing    Hematologic:  - neg hematologic  ROS     Musculoskeletal:  - neg musculoskeletal ROS     GI/Hepatic: Comment: Denies nausea - neg GI/hepatic ROS  (-) GERD   Renal/Genitourinary:     (+) renal disease, type: CRI, Pt does not require dialysis,     Endo:     (+) type II DM, Last HgA1c: 7.8, Not using insulin, - not using insulin pump. not previously admitted for DM/DKA.     Psychiatric/Substance Use: Comment: Uses narcotics for chronic back pain    (+) psychiatric history anxiety     Infectious Disease:  - neg infectious disease ROS     Malignancy:  - neg malignancy ROS     Other:  - neg other ROS   (-) Any chance pregnant       Physical Exam    Airway        Mallampati: III   TM distance: > 3 FB   Neck ROM: full   Mouth opening: > 3 cm    Respiratory Devices and Support         Dental  no notable dental history         Cardiovascular          Rhythm and rate: regular and normal (-) carotid bruit is not present and no murmur    Pulmonary           breath sounds clear to auscultation           OUTSIDE LABS:  CBC:   Lab Results   Component Value Date    WBC 8.3 03/22/2022    WBC 8.9 03/07/2022    HGB 12.2 03/22/2022    HGB 13.8 03/07/2022    HCT 37.9 03/22/2022    HCT 42.2 03/07/2022     03/22/2022     03/07/2022     BMP:   Lab Results   Component Value Date     03/07/2022     09/09/2021    POTASSIUM 4.0 03/07/2022    POTASSIUM 3.8 09/09/2021    CHLORIDE 101 03/07/2022    CHLORIDE 99 09/09/2021    CO2 27 03/07/2022    CO2 27 09/09/2021    BUN 18 03/07/2022    BUN 21 09/09/2021    CR 1.05 03/07/2022    CR 1.03 10/21/2021     (H) 03/22/2022     (H) 03/07/2022     COAGS:   Lab Results   Component Value Date    PTT 59 (H)  07/08/2017    INR 1.1 07/07/2017     POC: No results found for: BGM, HCG, HCGS  HEPATIC:   Lab Results   Component Value Date    ALBUMIN 4.7 03/07/2022    PROTTOTAL 7.9 09/09/2021    ALT 42 09/09/2021    AST 41 (H) 09/09/2021    ALKPHOS 89 09/09/2021    BILITOTAL 0.6 09/09/2021    BILIDIRECT 0.10 09/25/2014     OTHER:   Lab Results   Component Value Date    A1C 7.4 (H) 03/07/2022    GHASSAN 10.2 03/07/2022    PHOS 4.2 03/07/2022    MAG 2.2 09/09/2021    T4 7.90 09/09/2014    CRP 2.2 03/24/2021    SED 20 (H) 03/24/2021       Anesthesia Plan    ASA Status:  4   NPO Status:  NPO Appropriate    Anesthesia Type: General.     - Airway: ETT   Induction: Intravenous.   Maintenance: Inhalation.   Techniques and Equipment:     - Lines/Monitors: 2nd IV, BIS     - Blood: T&S     Consents    Anesthesia Plan(s) and associated risks, benefits, and realistic alternatives discussed. Questions answered and patient/representative(s) expressed understanding.    - Discussed:     - Discussed with:  Patient      - Extended Intubation/Ventilatory Support Discussed: No.      - Patient is DNR/DNI Status: No    Use of blood products discussed: Yes.     - Discussed with: Patient.     - Consented: consented to blood products            Reason for refusal: other.     Postoperative Care       PONV prophylaxis: Ondansetron (or other 5HT-3), Dexamethasone or Solumedrol     Comments:    Other Comments: Patient seen and examined.  Risks, benefits and alternatives to GETA discussed.  Questions answered and patient wishes to proceed  Discussed the role of preoperative sedation using midazolam and the risk for post op cognitive dysfunction.  She states that after a recent procedure she does not recall her hospital stay despite being extubated and not receiving sedation.            Fernando John MD

## 2022-03-22 NOTE — ANESTHESIA POSTPROCEDURE EVALUATION
Patient: Jennyfer Elizabeth    Procedure: Procedure(s):  Right femoral endarterectomy  with right iliac angiogram, right iliac angioplasty and stenting       Anesthesia Type:  General    Note:  Disposition: Inpatient   Postop Pain Control: Uneventful            Sign Out: Well controlled pain   PONV: No   Neuro/Psych: Uneventful            Sign Out: Acceptable/Baseline neuro status   Airway/Respiratory: Uneventful            Sign Out: Acceptable/Baseline resp. status   CV/Hemodynamics: Uneventful            Sign Out: Acceptable CV status; No obvious hypovolemia; No obvious fluid overload   Other NRE: NONE   DID A NON-ROUTINE EVENT OCCUR? No           Last vitals:  Vitals Value Taken Time   /73 03/22/22 1220   Temp 36.9  C (98.4  F) 03/22/22 1135   Pulse 97 03/22/22 1227   Resp 19 03/22/22 1227   SpO2 94 % 03/22/22 1227   Vitals shown include unvalidated device data.    Electronically Signed By: Robert Hackett III, MD  March 22, 2022  12:28 PM

## 2022-03-22 NOTE — INTERVAL H&P NOTE
"I have reviewed the virtual H&P that is linked to this encounter. The physical exam performed by anesthesia during this surgical encounter serves as the physical portion of that the virtual H&P. There are no significant changes from that history and physical as noted.    Clinical Conditions Present on Arrival:  SECTIONPRESENTONADMISSIONBEGIN@           # Hypercalcemia: Ca = N/A and/or iCa = N/A on admission, will monitor as appropriate       # Platelet Defect: home medication list includes an antiplatelet medication  # Diabetes, type II: last A1C 7.4 % (Ref range: 4.0 - 6.2 %)  # Overweight: Estimated body mass index is 29.8 kg/m  as calculated from the following:    Height as of this encounter: 1.588 m (5' 2.5\").    Weight as of this encounter: 75.1 kg (165 lb 9.1 oz).       "

## 2022-03-22 NOTE — LETTER
Jennyfer Elizabeth  [address]  [facility] [address]      SURGERY PACKET            Your surgery is scheduled:    Date: 03/22/2022  ________________________________    Time: 8:00am  ________________________________    Please arrive at:  6:00am  ______________________    Surgeon's Name: Dr. Rama Sewell   _______________________        Pre-Op Physical Fax Numbers:         [facility] Fax:  [phone] / Phone:  [phone]        Your surgery is located at:      [address] [address]      [phone]      [email or web address]       Mar 07, 2022  1:20 PM  Office Visit with Lili Perkins DO  [facility] ([facility] ) [phone]   Mar 18, 2022 11:40 AM  Nurse Only with AMY WALL  [facility] ([facility] ) [phone]     Before Your Surgery  For Patients and Visitors at [facility]    Welcome  As you get ready for surgery, you may have a lot of questions.  This brochure will help you know what to expect before and after surgery.  You and your family are the most important members of your health care team.  You will need to take an active role in your care.    Be sure to ask questions and learn all that you can about your surgery.  If you have any safety concerns, we urge you to tell a nurse as soon as possible.   This brochure is for information only.  It does not replace the advice of your doctor.  Always follow your doctor's advice.    Please tell us if you need a .    GETTING READY FOR SURGERY  Always follow your surgeon's instructions.  If you don't, your surgery could be canceled.  Please use the following checklist.    Within 30 Days of Surgery:    Have a pre-surgery physical exam with your family doctor or partner.    If you  use a [facility], all of your information from the pre-op physical will be in the [facility] computer system.    Ask the doctor to send all of your results to the hospital before the surgery.  The doctor also may ask you to bring the results with you on the day of surgery or you can fax them to [facility] Fax:  [phone] / Phone:  [phone].  Tell the doctor if:    You are allergic to latex or rubber  (Latex and rubber gloves are often used in medical care).    You are taking any medicines (including aspirin), vitamins (Vitamin E, Fish Oil, Omegas) or herbal products.  You will need to stop taking some medicines before surgery.    You have any medical problems (allergies, diabetes or heart disease, for example).    You have a pacemaker or an AICD (automatic implanted cardiac defibrillator).  If you do, please bring the ID card with you on the day of surgery.    You are a smoker.  People who smoke have a higher risk of infection after surgery.  Ask your doctor how you can quit smoking.  Within 7 days of Surgery:    Pre-register with the hospital.  Please use the hospital's phone number, [phone]. Or, to register online, go to [email or web address].      Prior to your surgical procedure, a nurse will be contacting you to obtain a health history [facility] Fax:  [phone] / Phone:  [phone].  Additionally, someone from the Admissions Department will also contact you for preregistration ([phone]).      Call your insurance company.  Ask if you need pre-approval for your surgery.  If you do not have insurance, please let us know.      Arrange for someone to drive you home after surgery.  If you will have same-day surgery, you may not drive or take public transportation home by yourself.    Arrange for someone to stay with you for 24 hours after you go home.  This person must be a responsible adult (ie- Family member or friend).  The Day Before Surgery:     Call your surgeon if there are  any changes in your health.  This includes signs of a cold or flu (such as a sore throat, runny nose, cough, rash or fever).    Do not smoke, drink alcohol or take over-the-counter medicine (unless your surgeon tells you to) for 24 hours before and after surgery.    If you take prescribed drugs:  You may need to stop them until after the surgery.  Follow your doctor's orders.  You may resume Aspirin and/or blood thinners after your surgery as directed by your physician/surgeon.    NO FOOD OR DRINK AFTER MIDNIGHT.  Follow your surgeon's orders for eating and drinking.  You need to have an empty stomach before surgery.  This will make the surgery as safe as possible.  If you don't follow your doctor's orders, your surgery could be changed to another date.  A nurse will call you within a few days of surgery to go over these and other instructions.  If you do not hear from them, please call them at [facility] Fax:  [phone] / Phone:  [phone]  The Day of Surgery:    Take a shower or bath the night before and the morning of surgery.  Use antiseptic soap or the soap your surgeon gave you.  Gently clean the skin.  Do not shave or scrub your surgery site.    Please remove deodorant, cologne, scented lotion, makeup, nail polish and jewelry (including rings and body piercings).  If you wear artificial nails, please remove at least one nail before coming to the hospital.    Wear clean, loose clothing to the hospital.    Bring these items to the hospital:  1. Your insurance card.  2. Money for parking and co-pays (for medicines or the surgery), if needed.   3. A list of all the medicines you take.  Include vitamins, minerals, herbs and over-the-counter drugs.  Note any drug allergies.  4. A copy of your advance health care directive, if you have one.  This tells us what treatment you would want -- and who would make health care decisions -- if you could no longer speak for yourself.  You may request this form in  advance or download it from [email or web address].  5. A case for any glasses, contact lenses, hearing aids or dentures.  6. Your inhaler or CPAP machine, if you use these at home.  Leave extra cash, jewelry and other valuables at home.    When You Arrive:  When you get to the hospital, you will:    Check in.  If you are under age 18, you must be with a parent or legal guardian.    Sign consent forms, if you haven't already.  These forms state that you know the risks and benefits of surgery.  When you sign the forms, you give us permission to do the surgery.  Do not sign them unless you understand what will happen during and after your surgery.  If you have any questions about your surgery, ask to speak with your doctor before you sign the forms.  If you don't understand the answers, ask again.    Receive a copy of the Patients Bill of Rights.  If you do not receive a copy, please ask for one.    Change into hospital clothes.  Your belongings will be placed in a bag.  We will return them to you after surgery.    Meet with the anesthesia provider.  He or she will tell you what kind of anesthesia (medicine) will be used to keep you comfortable during surgery.  Remember: It's okay to remind doctors and nurses to wash their hands before touching you.   In most cases, your surgeon will use a marker to write his or her initials on the surgery site.  This ensures that the exact site is operated on.  For safety reasons, we will ask you the same questions many times.  For example, we may ask your name and birth date over and over again.  Friends and family can stay with you until it's time for surgery.  While you're in surgery, they will be in the waiting area.  Please note that cell phones are not allowed in some patient care areas.  If you have questions about what will happen in the operating room, talk to your care team.  After Surgery:    We will move you to a recovery room where we will watch you closely.  If you  "have any pain or discomfort, tell your nurse.  He or she will try to make you comfortable.      If you are staying overnight we will move you to your hospital room after you are awake.    If you are going home we will move you to another room.  Friends and family may be able to join you.  The length of time you spend in recovery depends on the type of medicine you received, your medical condition, and the type of surgery you had.  Dealing with pain:  A nurse will check your comfort level often during your stay.  He or she will work with you to manage your pain.  Remember:    All pain is real.  There are many ways to control pain.  We can help you decide what works best for you.    Ask for pain medicine when you need it.  Don't try to \"tough it out\" -- this can make you feel worse.  Always take your medicine as ordered.    Medicine doesn't work the same for everyone.  If your medicine isn't working tell your nurse.  There may be other medicines or treatments we can try.  Going Home:  We will let you know when you're ready to leave the hospital.  Before you leave, we will tell you how to care for yourself at home and prevent infections.  If you do not understand something, please say so.  We will answer any questions you have.  We will then help you get ready to leave.  You must have an adult with you for the first 24 hours after you leave the hospital. Take it easy when you get home.  You will need some time to recover -- you may be more tired than you realize at first.  Rest and relax for at least the first 24 hours at home.  You'll feel better and heal faster if you take good care of yourself.                      Pre-Operative Surgery Scrub    Purpose:   The skin harbors a large variety of bacteria, both infectious and noninfectious.  Showering with an antiseptic soap prior to an invasive procedure will decrease the number of transient and resident (good and bad) bacteria that is normally found on the " skin.    Procedure:      Shower or bathe with 1/2 of the bottle of antiseptic soap (enclosed) the evening before and 1/2 of the bottle the morning of surgery (bathing the day of the procedure is most important).       Apply the soap at full strength (use the entire bottle).  Gently clean the skin, rinse, and dry with a clean towel that is freshly laundered (out of the dryer) and then put on clean clothing that is freshly laundered.        We have given you information regarding pre-op showering.  We recommend that patients wash with an antiseptic soap prior to surgery.  This cleanser will be given to you at the clinic or mailed to your home.  It is advised that you liberally wash the specific area surgery area the night before, and again in the morning before the surgery.  Do not apply lotion afterward.  We would like to keep the skin as clean as possible.    Thank you for following these important instructions.      You have been scheduled for surgery and we would like to give you some information that will assist in helping get the best possible outcome.      Before Surgery:   If for any reason you decide not to have the surgery, please contact our office.  We can easily cancel or reschedule the procedure. Please call the  at [phone].      Any pain related to the surgery that occurs before the surgery needs to be reported and managed by your primary care or referring doctor.      Please keep in mind that the time of surgery is subject to change.  Make sure you have nothing to eat or drink after midnight.  If your surgery is later in the afternoon, this recommendation might change, but not until the day before surgery after the actual time of the surgery has been established.    After Surgery:  When you are discharged from the recovery room, the nurses will review instructions with you and your caregiver.      Please wash your hands every time you touch the wound or change bandages or  dressings.      Do not submerge the wound in water.  You may not use a bathtub or hot tub until the wound is closed.  The wait time frame is generally 2-3 weeks but any open area can be a source of incoming bacteria, so it is better to be on the safe side and avoid the tub until your wound is fully healed.      You may take a shower 24 hours after surgery.  Double check with your surgeon if it is ok for water to run over a wound, whether it has been sutured, stapled, glued or is open.  You may gently wash the wound using the antiseptic soap provided for your pre-surgery showering (do not use a washcloth).  Any mild soap will work as well.      Many surgical wounds will have small white strips of tape on them called Steri Strips.  Do not remove these.  The edges will curl and fall off within 7-10 days with normal showering.      If you are going home with sutures (stitches) or staples, you must return to the clinic to have them taken out, usually within 1-2 weeks.      Signs and symptoms of infection include:  1. Fever, temperature over 101.5 ' F  2. Redness  3. Swelling  4. Increasing pain  5. Green or yellow drainage which may or may not have a foul odor.      If you or your  are deaf or hard of hearing, or prefer a language other than English, please let us know.  We have many free services, including interpreters and other aids to help you communicate. You may ask for help  through any member of your care team or by calling Language Services at [phone], option 2.      Preparing for Your Procedure During the COVID-19 Pandemic    Thank you for choosing [facility] for your health care needs. This is a very challenging time for everyone. The World Health Organization and the [facility] have declared the COVID-19 virus a pandemic.    Our goal is to keep you and our team here at [facility] safe and healthy. We ve taken  several steps to make this happen. For example:    We screen our  staff and care teams for COVID-19.    Everyone at [facility] must wear a mask.    We are limiting hospital and clinic visitors.    Before your surgery or procedure  All patients must get tested for the COVID-19 virus before any surgery or procedure. About a week before your scheduled treatment, you ll get a phone call to set up a COVID-19 PCR test at a [facility] location. The call may come from a phone number you don t know.  If you wish to have the test at your local clinic; please schedule it with-in 4 days of your procedure and please make sure it is a PCR Nose swab.  Antigen testing is not allowed.  If you have any issues, contact your surgeon's office.     Your test has to happen 1 to 4 days before your treatment. You ll need to travel to a testing station, where we ll take a swab of your nose or throat.    After the test, please stay at home and stay out of contact with other people. It will be harder for you to recover if you get COVID-19 before your surgery.    So, please follow all current safety guidelines, including:    Limit trips outside your home.    Limit the number of people you see.    Always wear a mask outside your home.    Use social distancing. (Stay 6 feet away from  others whenever you can.)    Wash your hands often.    If your test shows you have COVID-19  If your test is positive, we ll let you know. A positive test means that you have the virus.  It s likely that we ll have to postpone your surgery or procedure. Your doctor will discuss this with you.  After that, we ll let you know what to do and when you can reschedule.  We may have to cancel your surgery or procedure on short notice for other reasons, too.    If your test shows you DON T have COVID-19  Even if your test is negative, you may still get COVID-19. It s rare but, sometimes, the test result is wrong. You could also catch the virus after taking the test.  There s a very small chance that you could catch COVID-19  in the hospital or surgery center.  [facility] has taken many steps to prevent this from happening.    Day of your surgery or procedure    Please come wearing a mask or other face covering.    When you arrive, we ll ask you some questions to find out if you have any signs or symptoms of COVID-19.    Please know visitor guidelines can change at any time, you will receive a call reviewing all surgery information and confirming visitor guidelines.     We ll share your after-care instructions with you and anyone else you name.    Please call your care team, hospital or surgery center if you have any questions. We thank you for your understanding and for choosing [facility] for your care.

## 2022-03-22 NOTE — ANESTHESIA CARE TRANSFER NOTE
Patient: Jennyfer Elizabeth    Procedure: Procedure(s):  Right femoral endarterectomy  with right iliac angiogram, right iliac angioplasty and stenting       Diagnosis: Atherosclerosis of lower extremity with claudication (H) [I70.219]  Diagnosis Additional Information: No value filed.    Anesthesia Type:   General     Note:    Oropharynx: oropharynx clear of all foreign objects and spontaneously breathing  Level of Consciousness: awake  Oxygen Supplementation: face mask  Level of Supplemental Oxygen (L/min / FiO2): 4  Independent Airway: airway patency satisfactory and stable  Dentition: dentition unchanged  Vital Signs Stable: post-procedure vital signs reviewed and stable  Report to RN Given: handoff report given  Patient transferred to: PACU    Handoff Report: Identifed the Patient, Identified the Reponsible Provider, Reviewed the pertinent medical history, Discussed the surgical course, Reviewed Intra-OP anesthesia mangement and issues during anesthesia, Set expectations for post-procedure period and Allowed opportunity for questions and acknowledgement of understanding      Vitals:  Vitals Value Taken Time   /70 03/22/22 1134   Temp     Pulse 101 03/22/22 1136   Resp 23 03/22/22 1136   SpO2 94 % 03/22/22 1136   Vitals shown include unvalidated device data.    Electronically Signed By: DAVID Menendez CRNA  March 22, 2022  11:37 AM

## 2022-03-22 NOTE — BRIEF OP NOTE
[facility]    Brief Operative Note    Pre-operative diagnosis: Atherosclerosis of lower extremity with claudication (H) [I70.219]  Post-operative diagnosis Same as pre-operative diagnosis    Procedure: Procedure(s):  Right femoral endarterectomy  with right iliac angiogram, right iliac angioplasty and stenting  Surgeon: Surgeon(s) and Role:     * Rama Sewell MD - Primary     * Justice Urias MD - Fellow - Assisting  Anesthesia: General   Estimated Blood Loss: 100 ml     Drains: None  Specimens: * No specimens in log *  Findings:  R CFA plaque, R femoral endarterectomy performed. Retrograde iliac angiogram with balloon expandable bare metal stenting of R ARNOL ( 8 mm x27 and 8 mm x 37) and balloon angioplasty of EIA ( 5 mm ) . Biphasic DP and PT at end of case bilaterally     Complications: None.  Implants:   Implant Name Type Inv. Item Serial No.  Lot No. LRB No. Used Action   GRAFT PATCH VASC XENOSURE BIOLOGIC [id number] E0.8P8 -   GRAFT PATCH VASC XENOSURE BIOLOGIC [id number] E0.8P8 0000 [facility] [place] [id number] Right 1 Implanted   Visi-Pro Balloon-Expandable Peripheral Stent System    EV3 [id number] Right 1 Implanted   Visi-Pro Balloon-Expandable Peripheral Stent System    EV3 [id number] Right 1 Implanted       Plan    - ASA, plavix load in PACU with 150 mg and 75 mg daily x 3 months  - Continue statin  - Tegaderm dressing to stay on for 2 weeks

## 2022-03-22 NOTE — PLAN OF CARE
"Pt Aox4, VSS weaned down to 1L NC. Pt continent of B&B and voiding.  Pt tolerating clear liquids, consistent carb diet ordered. Capno in place and WDL. Incision in Lt groin is CDI w/ tegaderm, no other skin alterations. /67 (BP Location: Right leg)   Pulse 93   Temp 97.7  F (36.5  C) (Temporal)   Resp 18   Ht 1.588 m (5' 2.5\")   Wt 75.1 kg (165 lb 9.1 oz)   LMP  (LMP Unknown)   SpO2 93%   BMI 29.80 kg/m      "

## 2022-03-22 NOTE — ANESTHESIA PROCEDURE NOTES
Airway       Patient location during procedure: OR       Procedure Start/Stop Times: 3/22/2022 8:15 AM  Staff -        Performed By: CRNA  Consent for Airway        Urgency: elective  Indications and Patient Condition       Indications for airway management: sarah-procedural       Induction type:intravenous       Mask difficulty assessment: 1 - vent by mask    Final Airway Details       Final airway type: endotracheal airway       Successful airway: ETT - single and Oral  Endotracheal Airway Details        ETT size (mm): 7.0       Cuffed: yes       Successful intubation technique: video laryngoscopy       VL Blade Size: Glidescope 3       Grade View of Cords: 2       Adjucts: stylet       Position: Right       Measured from: lips       Secured at (cm): 21       Bite block used: None    Post intubation assessment        Placement verified by: capnometry, equal breath sounds and chest rise        Number of attempts at approach: 2       Secured with: pink tape       Ease of procedure: easy       Dentition: Intact and Unchanged

## 2022-03-22 NOTE — OP NOTE
Date: March 22, 2022    Pre-operative Diagnosis: Disabling right lower extremity claudication     Post-operative Diagnosis: Same     Procedure(s): 1. Right common femoral endarterectomy with bovine pericardial patch, 2.  Right common iliac artery angioplasty and stenting with 8 mm x 27 mm and 8 mm x 37 mm balloon expandable EV 3 stents, 3. right external iliac artery angioplasty with 5 mm right 60 mm balloon, 4.  Multiple right aortoiliac angiograms     Surgeon: Rama Sewell MD     Assistant: Jaqui Urias MD     Anesthesia: General     Indications: This 72 year old female had disabling right lower extremity claudication and hip pain with significant atherosclerosis in the right common femoral and external iliac arteries.  I recommended endarterectomy and possible right iliac intervention.  She understood the risks and benefits and wished to proceed.         Findings: Calcific plaque in the right common femoral and iliac arteries     Complications: None     Estimated Blood Loss:  100 ml     Drains: None     Specimens: None        Procedure Details:      The patient was brought to the operating placed in supine position.  After general anesthesia was achieved and a timeout performed an oblique incision was made in the right groin.  The common femoral, profunda femoris and superficial femoral arteries were dissected free from the surrounding structures and looped.  Patient was systemically heparinized with 6000 units of IV heparin.  After 3 minutes a longitudinal arteriotomy was created and endarterectomy performed.  A bovine pericardial patch was anastomosed with 5-0 Prolene suture.  The patch was then cannulated with a micropuncture wire and needle and a 6 Citizen of [place] sheath placed over a Bentson wire.  A 5 Citizen of [place] Omni Flush catheter was advanced to the distal aorta and aortoiliac angiogram performed showing high-grade stenosis greater than 70% of the right common iliac artery as well as the distal right common iliac  artery with approximately 70% stenosis in the right external iliac artery.  A balloon expandable bare-metal 8 mm x 27 mm stent was placed at the origin of the right common iliac artery as well as an 8 mm x 37 mm balloon expandable stent in the distal right common iliac artery.  The right hypogastric artery was occluded at its origin and cross filled from the left hypogastric.  A 5 mm x 60 mm balloon was used to dilate the external iliac artery.  This responded well to angioplasty alone and felt that a stent was not necessary.  A figure-of-eight 5-0 Prolene suture was used to remove the sheath.  Once hemostasis is achieved 40 cc of half percent Marcaine with epinephrine was injected for analgesia and the surrounding tissues.      The incision was then closed in layers with running 3.0 Vicryl and 4.0 monocril followed by placement of a glued Tegaderm to be left intact and not removed for the next 2 weeks.  The patient appeared to have tolerated the procedure well without immediate complication. Sponge, needle and instrument count was reported as correct at the end of the case. I was present throughout the entire portion of the procedure.              Rama Sewell MD, DFSVS, RPVI  Director, [place] [facility]  Chief, Vascular and Endovascular Surgery  [facility]  Pager: 1. Send message or 10 digit call back number Securely via [employer] with the [employer] Web Console (learn more here)              2. Outside of [facility]? Call [phone]

## 2022-03-23 VITALS
RESPIRATION RATE: 15 BRPM | OXYGEN SATURATION: 94 % | TEMPERATURE: 98.3 F | WEIGHT: 165.57 LBS | SYSTOLIC BLOOD PRESSURE: 115 MMHG | DIASTOLIC BLOOD PRESSURE: 60 MMHG | HEIGHT: 63 IN | HEART RATE: 85 BPM | BODY MASS INDEX: 29.34 KG/M2

## 2022-03-23 LAB
GLUCOSE BLDC GLUCOMTR-MCNC: 120 MG/DL (ref 70–99)
GLUCOSE BLDC GLUCOMTR-MCNC: 141 MG/DL (ref 70–99)
GLUCOSE BLDC GLUCOMTR-MCNC: 143 MG/DL (ref 70–99)

## 2022-03-23 PROCEDURE — 99207 PR NON-BILLABLE SERV PER CHARTING: CPT | Performed by: SURGERY

## 2022-03-23 PROCEDURE — 258N000003 HC RX IP 258 OP 636: Performed by: SURGERY

## 2022-03-23 PROCEDURE — 250N000013 HC RX MED GY IP 250 OP 250 PS 637: Performed by: STUDENT IN AN ORGANIZED HEALTH CARE EDUCATION/TRAINING PROGRAM

## 2022-03-23 PROCEDURE — 250N000013 HC RX MED GY IP 250 OP 250 PS 637: Performed by: SURGERY

## 2022-03-23 RX ORDER — CLOPIDOGREL BISULFATE 75 MG/1
75 TABLET ORAL DAILY
Qty: 90 TABLET | Refills: 0 | Status: SHIPPED | OUTPATIENT
Start: 2022-03-23 | End: 2022-06-28

## 2022-03-23 RX ORDER — OXYCODONE HYDROCHLORIDE 5 MG/1
5 TABLET ORAL EVERY 6 HOURS PRN
Qty: 15 TABLET | Refills: 0 | Status: SHIPPED | OUTPATIENT
Start: 2022-03-23 | End: 2022-03-26

## 2022-03-23 RX ORDER — POLYETHYLENE GLYCOL 3350 17 G/17G
17 POWDER, FOR SOLUTION ORAL DAILY
Qty: 510 G | Refills: 0 | Status: SHIPPED | OUTPATIENT
Start: 2022-03-23 | End: 2022-10-26

## 2022-03-23 RX ORDER — CLOPIDOGREL BISULFATE 75 MG/1
75 TABLET ORAL DAILY
Qty: 90 TABLET | Refills: 3 | Status: SHIPPED | OUTPATIENT
Start: 2022-03-23 | End: 2022-03-23

## 2022-03-23 RX ORDER — ROSUVASTATIN CALCIUM 20 MG/1
20 TABLET, COATED ORAL EVERY EVENING
Qty: 90 TABLET | Refills: 3 | Status: SHIPPED | OUTPATIENT
Start: 2022-03-23 | End: 2022-04-06

## 2022-03-23 RX ADMIN — SODIUM CHLORIDE: 9 INJECTION, SOLUTION INTRAVENOUS at 01:09

## 2022-03-23 RX ADMIN — HYDROCHLOROTHIAZIDE 12.5 MG: 12.5 TABLET ORAL at 08:10

## 2022-03-23 RX ADMIN — ACETAMINOPHEN 975 MG: 325 TABLET ORAL at 08:03

## 2022-03-23 RX ADMIN — HYDROCODONE BITARTRATE AND ACETAMINOPHEN 1 TABLET: 5; 325 TABLET ORAL at 06:16

## 2022-03-23 RX ADMIN — ASPIRIN 81 MG: 81 TABLET, CHEWABLE ORAL at 08:10

## 2022-03-23 RX ADMIN — BUPROPION HYDROCHLORIDE 300 MG: 300 TABLET, FILM COATED, EXTENDED RELEASE ORAL at 08:10

## 2022-03-23 RX ADMIN — HYDROCODONE BITARTRATE AND ACETAMINOPHEN 2 TABLET: 5; 325 TABLET ORAL at 12:40

## 2022-03-23 RX ADMIN — CLOPIDOGREL BISULFATE 75 MG: 75 TABLET ORAL at 08:10

## 2022-03-23 RX ADMIN — POLYETHYLENE GLYCOL 3350 17 G: 17 POWDER, FOR SOLUTION ORAL at 08:10

## 2022-03-23 RX ADMIN — FAMOTIDINE 20 MG: 20 TABLET ORAL at 08:11

## 2022-03-23 ASSESSMENT — ACTIVITIES OF DAILY LIVING (ADL)
ADLS_ACUITY_SCORE: 7

## 2022-03-23 NOTE — DISCHARGE SUMMARY
Vascular Surgery Service Discharge Summary:     NAME: Jennyfer Elizabeth   MRN: [id number]   : 1950   PCP: Lili Perkins    DATE OF ADMISSION: 3/22/2022       Procedure/Surgery Information   Procedure: Procedure(s):  Right femoral endarterectomy  with right iliac angiogram, right iliac angioplasty and stenting   Surgeon(s): Surgeon(s) and Role:     * Rama Sewell MD - Primary     * Justice Urias MD - Fellow - Assisting   Specimens: * No specimens in log *         PRE/POSTOPERATIVE DIAGNOSES:    Disabling right lower extremity claudication    PROCEDURES PERFORMED, 3/22/2022:   1. Right common femoral endarterectomy with bovine pericardial patch, 2.  Right common iliac artery angioplasty and stenting with 8 mm x 27 mm and 8 mm x 37 mm balloon expandable EV 3 stents, 3. right external iliac artery angioplasty with 5 mm right 60 mm balloon, 4.  Multiple right aortoiliac angiograms    INTRAOPERATIVE FINDINGS: Calcific plaque in the right common femoral and iliac arteries    POSTOPERATIVE COMPLICATIONS: None     DATE OF DISCHARGE: 3/23/2022     ADMISSION HPI (from 3/22/2022): This 72 year old female had disabling right lower extremity claudication and hip pain with significant atherosclerosis in the right common femoral and external iliac arteries.  I recommended endarterectomy and possible right iliac intervention.  She understood the risks and benefits and wished to proceed.     HOSPITAL COURSE: Jennyfer Elizabeth is a 72 year old female who was admitted on 3/22/2022 and underwent the above-named procedures.  She tolerated the procedure well and postoperatively was transferred to the general post-surgical unit.  The remainder of her course was essentiallly uncomplicated.  Prior to discharge, her pain was controlled well with oral pain medicine.  She was able to perform ADLs and ambulate independently without difficulty, and had full return of bowel and bladder function.  On 3/23/2022, she was discharged to home in  stable condition.      Time Spent on this Encounter   I, Jo Ferrer MD, personally saw the patient today and spent less than or equal to 30 minutes discharging this patient.    Vascular Surgery Core Measures:  Continue Aspirin, statin, and Plavix for 3 mo    Physical Exam:  Constitutional: healthy, alert, no acute distress and cooperative   Cardiovascular: RRR without  murmurs, rubs, or gallops noted  Respiratory: breathing unlabored without secondary muscle use  Psychiatric: mentation appears normal and affect normal/bright  Neck: no asymmetry  GI/Abdomen: abdomen soft, non-tender. No palpable masses  MSK: able to move all extremities without weakness or ataxia  Extremities: no open lesions, extremities warm and well perfused  Hematology: no bruising on visible skin  Pulses: DP/PT signals bilaterally    PAST MEDICAL HISTORY:  Past Medical History:   Diagnosis Date     Benign essential hypertension 03/03/2018     Cataract      Chronic pain of right knee 04/11/2018     Diabetic eye exam (H) 06/07/2018     TAYLOR (generalized anxiety disorder) 03/03/2018     History of MI (myocardial infarction) 07/2017    follows yearly with [facility]     Macular degeneration      Major depressive disorder, single episode 1995     Obese      Obstructive sleep apnea 2005    BIPAP     Osteopenia 2007    Lumbar spine     Pure hypercholesterolemia 2001     PVD (peripheral vascular disease) (H)      Squamous cell carcinoma, leg, left 12/2021     Type 2 diabetes mellitus with complication, without long-term current use of insulin (H) 03/03/2018       PAST SURGICAL HISTORY:  Past Surgical History:   Procedure Laterality Date     BREAST BIOPSY, RT/LT  1986    x2; in [place]     CARDIAC CATH - HIM SCAN  07/10/2017    Mid RCA;  drug eluting stent     CHOLECYSTECTOMY  2008     COLONOSCOPY N/A 12/20/2021    Tubular Adenoma F/U 2024     IR OR ANGIOGRAM  3/22/2022     SKIN CANCER EXCISION Left 12/2021    [facility]     TONSILLECTOMY, ADENOIDECTOMY,  COMBINED  1955       Labs:  Recent Labs   Lab Test 03/22/22  1151 03/22/22  0704 03/07/22  1454 02/28/18  1659 07/07/17  1232 07/07/16  1127 09/09/14  1718   WBC  --  8.3 8.9   < >  --   --   --    RBC  --  4.07 4.62   < >  --   --   --    HGB  --  12.2 13.8   < > 14.6   < > 15.9   HCT  --  37.9 42.2   < > 42.6  --  47.4   MCV  --  93 91   < > 89   < > 89   MCH  --  30.0 29.9   < > 30.5  --  29.7   MCHC  --  32.2 32.7   < > 34.3  --  33.5    241 386   < > 269  --  337   MPV  --   --   --   --  10.8  --  7.8    < > = values in this interval not displayed.     Recent Labs   Lab Test 03/22/22  0704 03/07/22  1454 09/09/21  1149   POTASSIUM 3.9 4.0 3.8   CHLORIDE 109 101 99   BUN 20 18 21       DISCHARGE INSTRUCTIONS:  Discharge Orders      Reason for your hospital stay    Peripheral Arterial Disease     Activity    Your activity upon discharge: activity as tolerated. No driving while taking narcotic pain medications     Diet    Follow this diet upon discharge: Regular     Discharge Medications   Current Discharge Medication List      START taking these medications    Details   clopidogrel (PLAVIX) 75 MG tablet Take 1 tablet (75 mg) by mouth daily  Qty: 90 tablet, Refills: 3    Associated Diagnoses: Atherosclerosis of lower extremity with claudication (H)      oxyCODONE (ROXICODONE) 5 MG tablet Take 1 tablet (5 mg) by mouth every 6 hours as needed for pain  Qty: 15 tablet, Refills: 0    Associated Diagnoses: Atherosclerosis of lower extremity with claudication (H)      polyethylene glycol (MIRALAX) 17 GM/Dose powder Take 17 g by mouth daily  Qty: 510 g, Refills: 0    Associated Diagnoses: Atherosclerosis of lower extremity with claudication (H)         CONTINUE these medications which have CHANGED    Details   rosuvastatin (CRESTOR) 20 MG tablet Take 1 tablet (20 mg) by mouth every evening  Qty: 90 tablet, Refills: 3    Associated Diagnoses: Atherosclerosis of lower extremity with claudication (H)          CONTINUE these medications which have NOT CHANGED    Details   ACCU-CHEK SMARTVIEW test strip NEW TO [facility] - 1-2 TIMES DAILY  Qty: 50 each, Refills: 3    Comments: Prescription not previously filled at [facility]. Pleaseauthorize a new RX for this patient. Thank you.  Associated Diagnoses: Type 2 diabetes mellitus with complication, without long-term current use of insulin (H)      acetaminophen (RA ACETAMINOPHEN) 650 MG CR tablet Take 1,300 mg by mouth 3 times daily      aspirin 81 MG chewable tablet Take 81 mg by mouth daily      B Complex CAPS Take 1 capsule by mouth daily      buPROPion (WELLBUTRIN XL) 300 MG 24 hr tablet TAKE 1 TABLET (300 MG) BY MOUTH DAILY  Qty: 90 tablet, Refills: 3    Comments: Patient enrolled in our Rx Med Sync service to improve adherence. We are requesting a refill authorization in advance to ensure an active prescription is on file.  Associated Diagnoses: Anxiety and depression      DULoxetine (CYMBALTA) 30 MG capsule TAKE 2 CAPSULES BY MOUTH DAILY  Qty: 180 capsule, Refills: 3    Comments: Renewal only - patient will call for refill  Associated Diagnoses: Anxiety and depression      LORazepam (ATIVAN) 0.5 MG tablet Take 1 tablet (0.5 mg) by mouth nightly as needed for anxiety  Qty: 15 tablet, Refills: 5    Associated Diagnoses: Anxiety      losartan-hydrochlorothiazide (HYZAAR) 100-12.5 MG tablet Take 1 tablet by mouth daily  Qty: 90 tablet, Refills: 4    Comments: Renewal only - patient will call for refill  Associated Diagnoses: Essential hypertension      metFORMIN (GLUCOPHAGE) 500 MG tablet Take 0.5 tablets (250 mg) by mouth 2 times daily (with meals)  Qty: 90 tablet, Refills: 4    Comments: Renewal only - patient will call for refill  Associated Diagnoses: Type 2 diabetes mellitus without complication, without long-term current use of insulin (H)      Multiple Vitamins-Minerals (PRESERVISION AREDS 2+MULTI VIT PO) Take 1 tablet by mouth 2 times daily (Preservision Areds)       traZODone (DESYREL) 50 MG tablet Take 1-2 tablets ( mg) by mouth At Bedtime  Qty: 180 tablet, Refills: 0    Associated Diagnoses: Insomnia, unspecified type      tretinoin (RETIN-A) 0.05 % external cream Apply 1 g topically At Bedtime  Qty: 45 g, Refills: 1    Associated Diagnoses: Wrinkles      Vitamin D, Cholecalciferol, 25 MCG (1000 UT) TABS Take 1 tablet by mouth daily          STOP taking these medications       acetaminophen-codeine (TYLENOL #3) 300-30 MG tablet Comments:   Reason for Stopping:         HYDROcodone-acetaminophen (NORCO) 5-325 MG tablet Comments:   Reason for Stopping:             Allergies   No Known Allergies       Leo Ferrer MD   Vascular Surgery PGY1

## 2022-03-23 NOTE — PROGRESS NOTES
"Post Op Check    03/22/2022    Jennyfer Elizabeth is a 72 year old female with h/o disabling RLE claudication and hip pain now POD#0 s/p Right common femoral endarterectomy with bovine pericardial patch, 2.  Right common iliac artery angioplasty and stenting with 8 mm x 27 mm and 8 mm x 37 mm balloon expandable EV 3 stents, 3. right external iliac artery angioplasty with 5 mm right 60 mm balloon, 4.  Multiple right aortoiliac angiograms.    Pt reports she is doing well postoperatively. Denies SOB, chest pain, or dizziness. No BM yet.  Eating pizza, tolerated jello w/o nausea/vomiting. Got up to use the restroom.     /67 (BP Location: Right leg)   Pulse 93   Temp 97.7  F (36.5  C) (Temporal)   Resp 18   Ht 1.588 m (5' 2.5\")   Wt 75.1 kg (165 lb 9.1 oz)   LMP  (LMP Unknown)   SpO2 93%   BMI 29.80 kg/m      Gen: A&O x3, NAD  Chest: breathing non-labored on RA  Abdomen: soft, non-tender, non-distended  Incision: clean, dry, intact.  Some bruising surrounding incision.  Extremities: warm and well perfused. DP/PT signals bilaterally.     A/P: No acute post-op issues. Continue plan of care.  Please call with any questions.    Leo Ferrer  Vascular Surgery PGY1        "

## 2022-03-23 NOTE — PLAN OF CARE
Afebrile, VSS on RA.  Alert and oriented x4.  Norco given x1.  Denies nausea.  Left groin incision with liquid bandage CDI.  CMS checks q4hr.  Voiding spontaneously with adequate urine output.  No BM this shift.  Tolerating regular diet, appetite good.  Up independently.  Discharge instructions and medications reviewed with Pt.  PIV removed.  Pt. Is discharging to home.

## 2022-03-23 NOTE — PLAN OF CARE
Assumed care [id number]  AOX4. VSS on 3L 02, pt desats to 80's while asleep dropped down to 2L this am. Capno in place. Denies pain managed with prn norco, denies nausea. Left groin incision CDI with tegaderm. CMS checks q4hr, WNL. Up with SBA, Voiding spontaneously with adequate output. Passing gas, no BM this shift. Needs met, will continue plan of care.

## 2022-03-24 ENCOUNTER — TELEPHONE (OUTPATIENT)
Dept: VASCULAR SURGERY | Facility: CLINIC | Age: 72
End: 2022-03-24
Payer: MEDICARE

## 2022-03-24 NOTE — TELEPHONE ENCOUNTER
Attempted to contact Jennyfer for her post-op RN call. I was unable to reach her but I left a detailed voicemail with my callback number.    IVNI LedezmaN, RN  RN - [facility] Vascular Surgery  Ph: [phone]  Fax: [phone]

## 2022-03-25 NOTE — TELEPHONE ENCOUNTER
Second attempt to contact Jennyfer for her post-op follow-up call. I was unable to reach her, but I did leave a voicemail with my direct callback and asked her to return my call at her convenience.    VINI LedezmaN, RN  [facility] - [facility] Vascular Surgery  Ph: [phone]  Fax: [phone]

## 2022-03-28 ENCOUNTER — TELEPHONE (OUTPATIENT)
Dept: INTERNAL MEDICINE | Facility: OTHER | Age: 72
End: 2022-03-28
Payer: MEDICARE

## 2022-03-28 DIAGNOSIS — M50.13 CERVICAL DISC DISORDER WITH RADICULOPATHY, CERVICOTHORACIC REGION: Primary | ICD-10-CM

## 2022-03-28 NOTE — TELEPHONE ENCOUNTER
Jennyfer is looking to see Dr. Valdivia for her neck pain. Her previous MRI was done 9/20 and would need a new one prior to her appointment on 6/2.     Would you be able to place an order for a new Cervical Spine MRI?    Thank you,    Peyton Jackson on 3/28/2022 at 3:51 PM

## 2022-03-29 ENCOUNTER — TELEPHONE (OUTPATIENT)
Dept: INTERNAL MEDICINE | Facility: OTHER | Age: 72
End: 2022-03-29
Payer: MEDICARE

## 2022-03-29 DIAGNOSIS — M25.511 CHRONIC RIGHT SHOULDER PAIN: Primary | ICD-10-CM

## 2022-03-29 DIAGNOSIS — G89.29 CHRONIC RIGHT SHOULDER PAIN: Primary | ICD-10-CM

## 2022-03-29 NOTE — TELEPHONE ENCOUNTER
Reason for call: Request for a referral.    Referral requested for what concern?  Wants MRI Right Shoulder    Have you already been seen by the specialty you need the referral for?  no    If yes, Date:   ,  Location:   ,  Provider:       If no,  Where do you want to go?   [facility]- GI    Additional comments:   Patient has MRI ordered for her neck but wants to have her R shoulder done at the same time.      Preferred method for responding to this message: Telephone Call    Phone number patient can be reached at? Cell number on file:    Telephone Information:   Mobile [phone]       If we can't reach you directly, may we leave a detailed response at the number you provided? Yes     Betty Amaya on 3/29/2022 at 1:50 PM

## 2022-03-30 ENCOUNTER — TELEPHONE (OUTPATIENT)
Dept: VASCULAR SURGERY | Facility: CLINIC | Age: 72
End: 2022-03-30

## 2022-03-30 NOTE — TELEPHONE ENCOUNTER
I received a callback from Jennyfer.    She reports she is doing well since surgery. She denies pain, and states she is ambulating without difficulty. She has not walked long distances

## 2022-03-30 NOTE — TELEPHONE ENCOUNTER
Left message to let her know that it's been ordered. Also provided [facility]'s phone number to call if she has questions.  Betty Kong CMA([facility])..................3/30/2022   4:55 PM

## 2022-04-01 ENCOUNTER — DOCUMENTATION ONLY (OUTPATIENT)
Dept: OTHER | Facility: CLINIC | Age: 72
End: 2022-04-01
Payer: MEDICARE

## 2022-04-06 ENCOUNTER — OFFICE VISIT (OUTPATIENT)
Dept: INTERNAL MEDICINE | Facility: OTHER | Age: 72
End: 2022-04-06
Attending: INTERNAL MEDICINE
Payer: MEDICARE

## 2022-04-06 VITALS
RESPIRATION RATE: 18 BRPM | HEART RATE: 112 BPM | WEIGHT: 164 LBS | SYSTOLIC BLOOD PRESSURE: 124 MMHG | TEMPERATURE: 98.3 F | BODY MASS INDEX: 29.52 KG/M2 | OXYGEN SATURATION: 95 % | DIASTOLIC BLOOD PRESSURE: 62 MMHG

## 2022-04-06 DIAGNOSIS — M25.511 CHRONIC RIGHT SHOULDER PAIN: Primary | ICD-10-CM

## 2022-04-06 DIAGNOSIS — G89.29 CHRONIC RIGHT SHOULDER PAIN: Primary | ICD-10-CM

## 2022-04-06 DIAGNOSIS — I70.219 ATHEROSCLEROSIS OF LOWER EXTREMITY WITH CLAUDICATION (H): ICD-10-CM

## 2022-04-06 DIAGNOSIS — M54.2 NECK PAIN: ICD-10-CM

## 2022-04-06 PROCEDURE — 99214 OFFICE O/P EST MOD 30 MIN: CPT | Performed by: INTERNAL MEDICINE

## 2022-04-06 PROCEDURE — G0463 HOSPITAL OUTPT CLINIC VISIT: HCPCS

## 2022-04-06 RX ORDER — ROSUVASTATIN CALCIUM 40 MG/1
40 TABLET, COATED ORAL EVERY EVENING
Qty: 90 TABLET | Refills: 1 | Status: SHIPPED | OUTPATIENT
Start: 2022-04-06 | End: 2022-10-31

## 2022-04-06 ASSESSMENT — PAIN SCALES - GENERAL: PAINLEVEL: EXTREME PAIN (8)

## 2022-04-06 NOTE — PROGRESS NOTES
"  Assessment & Plan     Chronic right shoulder pain  We had a long discussion today regarding various surgeons who work with shoulders.  A referral was placed for [facility].  - Orthopedic  Referral; Future    Neck pain  Keep the appointment for MRI and spinal surgery.  We will hold on EMG    Atherosclerosis of lower extremity with claudication (H)  -Dressing removed and incision is inspected and healing well.  We will reach out to vascular to clarify dosing of her statin.  No concerns at this time otherwise.  Follow-up with Dr. Sewell with scheduled visit later in April.     BMI:   Estimated body mass index is 29.52 kg/m  as calculated from the following:    Height as of 3/22/22: 1.588 m (5' 2.5\").    Weight as of this encounter: 74.4 kg (164 lb).   Weight management plan: Discussed healthy diet and exercise guidelines      Return in about 2 months (around 6/6/2022) for multiple issues.     33 minutes spent on the date of the encounter doing chart review, history and exam, documentation and further activities as noted above      Lili Perkins, [facility] AND \[facility] [place] [facility]\""    Jose Burger is a 72 year old who presents for the following health issues:    She recently underwent revascularization of her right lower extremity.  She did do well with surgery overall.  She is due to have her dressing removed.  She denies any significant concerns other than she noted slight itching in the area last night.  She has not had any significant drainage.  She does feel overall that the pain in her right leg seems to be improved although she has not been up walking much.    She continues to have significant neck pain.  She is set up for an MRI of the cervical spine and will be seeing spinal surgery.  She also has had some worsening of her right shoulder and has decreased range of motion of this.  She had an injection done in early March and at that time there was concern for possible rotator cuff " tear.  She has had some bruising of the shoulder since then.  She is scheduled for an MRI of the shoulder and wants to discuss options for referral to address the shoulder issues.    History of Present Illness       Reason for visit:  Check up after surgery - remove bandages  Symptom onset:  1-2 weeks ago  Symptom intensity:  Mild  Symptom progression:  Improving  Had these symptoms before:  No  What makes it worse:  Nono    She eats 2-3 servings of fruits and vegetables daily.She consumes 3 sweetened beverage(s) daily.She exercises with enough effort to increase her heart rate 9 or less minutes per day.  She exercises with enough effort to increase her heart rate 3 or less days per week.   She is taking medications regularly.     Review of Systems   Denies any fevers or chills      Objective    /62 (BP Location: Right arm, Patient Position: Sitting, Cuff Size: Adult Large)   Pulse 112   Temp 98.3  F (36.8  C) (Tympanic)   Resp 18   Wt 74.4 kg (164 lb)   LMP  (LMP Unknown)   SpO2 95%   BMI 29.52 kg/m    Body mass index is 29.52 kg/m .  Physical Exam   GENERAL: healthy, alert and no distress  MS: decreased range of motion in the right shoulder with flexion and external rotation and abduction and internal rotation are intact; bruising is noted along the lateral aspect of the right shoulder.  SKIN: no suspicious lesions or rashes and incision is inspected and is clean dry and intact

## 2022-04-06 NOTE — NURSING NOTE
Patient presents to clinic today for follow up on multiple issues.  Medication reconciliation completed.    ACP on file? yes    FOOD SECURITY SCREENING QUESTIONS    The next two questions are to help us understand your food security.  If you are feeling you need any assistance in this area, we have resources available to support you today.    Hunger Vital Signs:  Within the past 12 months we worried whether our food would run out before we got money to buy more. Never  Within the past 12 months the food we bought just didn't last and we didn't have money to get more. Never    Betty Kong CMA([facility])..................4/6/2022   2:21 PM

## 2022-04-19 ENCOUNTER — TELEPHONE (OUTPATIENT)
Dept: INTERNAL MEDICINE | Facility: OTHER | Age: 72
End: 2022-04-19
Payer: MEDICARE

## 2022-04-19 ENCOUNTER — HOSPITAL ENCOUNTER (OUTPATIENT)
Dept: ULTRASOUND IMAGING | Facility: OTHER | Age: 72
Discharge: HOME | End: 2022-04-19
Attending: SURGERY
Payer: MEDICARE

## 2022-04-19 ENCOUNTER — HOSPITAL ENCOUNTER (OUTPATIENT)
Dept: MRI IMAGING | Facility: OTHER | Age: 72
Discharge: HOME | End: 2022-04-19
Attending: INTERNAL MEDICINE
Payer: MEDICARE

## 2022-04-19 DIAGNOSIS — I73.9 PAD (PERIPHERAL ARTERY DISEASE) (H): ICD-10-CM

## 2022-04-19 DIAGNOSIS — M25.511 CHRONIC RIGHT SHOULDER PAIN: ICD-10-CM

## 2022-04-19 DIAGNOSIS — I70.219 ATHEROSCLEROSIS OF LOWER EXTREMITY WITH CLAUDICATION (H): ICD-10-CM

## 2022-04-19 DIAGNOSIS — I73.9 CLAUDICATION OF RIGHT LOWER EXTREMITY (H): ICD-10-CM

## 2022-04-19 DIAGNOSIS — M50.13 CERVICAL DISC DISORDER WITH RADICULOPATHY, CERVICOTHORACIC REGION: ICD-10-CM

## 2022-04-19 DIAGNOSIS — G89.29 CHRONIC RIGHT SHOULDER PAIN: ICD-10-CM

## 2022-04-19 PROCEDURE — G1004 CDSM NDSC: HCPCS

## 2022-04-19 PROCEDURE — 93922 UPR/L XTREMITY ART 2 LEVELS: CPT

## 2022-04-19 NOTE — TELEPHONE ENCOUNTER
She will appear at 1:30 tomorrow and present to [address] window for her telemed.  Betty Kong CMA([facility])..................4/19/2022   2:53 PM

## 2022-04-19 NOTE — TELEPHONE ENCOUNTER
Patient would like a call.  She is stating she is suppose to have a Telemed appointment tomorrow and the nurse has not called her to set it up.  Please call    Hanane Taveras on 4/19/2022 at 2:11 PM

## 2022-04-20 ENCOUNTER — VIRTUAL VISIT (OUTPATIENT)
Dept: CARDIOLOGY | Facility: OTHER | Age: 72
End: 2022-04-20
Attending: SURGERY
Payer: MEDICARE

## 2022-04-20 ENCOUNTER — VIRTUAL VISIT (OUTPATIENT)
Dept: VASCULAR SURGERY | Facility: CLINIC | Age: 72
End: 2022-04-20
Payer: COMMERCIAL

## 2022-04-20 VITALS
DIASTOLIC BLOOD PRESSURE: 68 MMHG | OXYGEN SATURATION: 98 % | TEMPERATURE: 97 F | WEIGHT: 163.8 LBS | BODY MASS INDEX: 29.48 KG/M2 | WEIGHT: 163.8 LBS | SYSTOLIC BLOOD PRESSURE: 112 MMHG | HEART RATE: 108 BPM | TEMPERATURE: 97 F | SYSTOLIC BLOOD PRESSURE: 112 MMHG | DIASTOLIC BLOOD PRESSURE: 68 MMHG | RESPIRATION RATE: 16 BRPM | OXYGEN SATURATION: 98 % | HEART RATE: 108 BPM | BODY MASS INDEX: 29.48 KG/M2 | RESPIRATION RATE: 16 BRPM

## 2022-04-20 DIAGNOSIS — Z98.890 H/O ENDARTERECTOMY: ICD-10-CM

## 2022-04-20 DIAGNOSIS — Z95.828 STATUS POST INSERTION OF ILIAC ARTERY STENT: ICD-10-CM

## 2022-04-20 DIAGNOSIS — I73.9 PAD (PERIPHERAL ARTERY DISEASE) (H): Primary | ICD-10-CM

## 2022-04-20 DIAGNOSIS — I70.219 ATHEROSCLEROSIS OF LOWER EXTREMITY WITH CLAUDICATION (H): Primary | ICD-10-CM

## 2022-04-20 PROCEDURE — G0463 HOSPITAL OUTPT CLINIC VISIT: HCPCS | Mod: GT

## 2022-04-20 PROCEDURE — 99024 POSTOP FOLLOW-UP VISIT: CPT | Performed by: SURGERY

## 2022-04-20 ASSESSMENT — PAIN SCALES - GENERAL
PAINLEVEL: SEVERE PAIN (7)
PAINLEVEL: SEVERE PAIN (7)

## 2022-04-20 NOTE — PROGRESS NOTES
Vascular Surgery Consultation Note     Patient:  Jennyfer Elizabeth   Date of birth 1950, Medical record number [id number]  Date of Visit:  04/20/2022  Consult Requester:No att. providers found            Assessment and Recommendations:   ASSESSMENT / RECOMMENDATION:    Doing well status post right femoral endarterectomy with right common iliac artery angioplasty and stenting.  We will plan for 6 months of Plavix.  She has been noting significant bruising and thus I have asked her to discontinue the aspirin 81 mg, and continue forth on the Plavix and statin.  At our 6-month follow-up, if all is well, we will plan to discontinue the Plavix and resume aspirin 81 mg daily as the sole antiplatelet therapy.  I advised a dry gauze or nonstick gauze between the incision and her undergarments so as not to irritate while healing.    Many thanks for involving me in the care of this very pleasant patient. Should any questions or concerns arise, please don't hesitate to contact me.    Warm Regards,    Rama Sewell MD, DFSVS, RPVI  Director, [facility] Vascular Services  Professor and Chief, Vascular and Endovascular Surgery  [facility]  Pager: 1. Send message or 10 digit call back number Securely via [employer] with the [employer] Web Console (learn more here)              2. Outside of [facility]? Call [phone]        40 minutes spent on the date of the encounter doing chart review, review of outside records, review of test results, interpretation of tests, patient visit and documentation           HPI: Jennyfer returns today for a postoperative visit following a right femoral endarterectomy with ipsilateral right common iliac artery angioplasty and stenting.  She has been able to walk without any right hip or leg issue.  She is facing issues with her C-spine and right shoulder.  She has noted more bruising with the combination of Plavix and aspirin.  While her incision is healed, she notes some  irritation from where her underwear rubs.      Review of Systems   Constitutional, HEENT, cardiovascular, pulmonary, gi and gu systems are negative, except as otherwise noted.    Physical Exam   GENERAL: Healthy, alert and no distress  EYES: Eyes grossly normal to inspection.  No discharge or erythema, or obvious scleral/conjunctival abnormalities.  RESP: No audible wheeze, cough, or visible cyanosis.  No visible retractions or increased work of breathing.    SKIN: Visible skin clear. No significant rash, abnormal pigmentation or lesions.  NEURO: Cranial nerves grossly intact.  Mentation and speech appropriate for age.  PSYCH: Mentation appears normal, affect normal/bright, judgement and insight intact, normal speech and appearance well-groomed.    TOMMY: 0.89 / 1.10 (Previous 0.72 / 1.10)

## 2022-04-20 NOTE — NURSING NOTE
Per Dr. Sewell patient should follow up in 6 months with TOMMY's prior.  Peyton Moran RN......April 20, 2022...3:09 PM

## 2022-04-20 NOTE — PATIENT INSTRUCTIONS
Thank you so much for choosing us for your care. It was a pleasure to see you at the vascular clinic today.     Follow-up recommendations: We will see you back in 6 months. Please let us know if any issues arise in the meantime.    Additional testing/imaging ordered today: Repeat ABIs in 6 months prior to seeing Dr. Sewell.      Our scheduling team will get in touch with you to set up any follow-up testing/imaging and/or appointments. Please be aware that any testing/imaging recommended today will need to completed prior to your next visit with the provider. If testing/imaging is not completed prior to your next visit, your visit may be rescheduled.        If you have any questions, please call Carlee Moran RN at [phone] or contact our clinic at [phone]. We also encourage the use of [employer] to communicate with your HealthCare Provider.      If you have an urgent need after business hours (8:00 am to 4:30 pm) please call [phone], option 4, and ask for the vascular attending on call. For non-urgent after hours needs, please call the vascular nurse at [phone] and leave a detailed voicemail. For scheduling needs, please call our clinic directly at [phone].

## 2022-04-20 NOTE — NURSING NOTE
"Chief Complaint   Patient presents with     Telemedicine consult     F/U surgery       Initial /68 (BP Location: Right arm, Patient Position: Sitting, Cuff Size: Adult Regular)   Pulse 108   Temp 97  F (36.1  C) (Temporal)   Resp 16   Wt 74.3 kg (163 lb 12.8 oz)   LMP  (LMP Unknown)   BMI 29.48 kg/m   Estimated body mass index is 29.48 kg/m  as calculated from the following:    Height as of 3/22/22: 1.588 m (5' 2.5\").    Weight as of this encounter: 74.3 kg (163 lb 12.8 oz).  Medication Reconciliation: complete    Peyton Moran RN  "

## 2022-05-02 ENCOUNTER — OFFICE VISIT (OUTPATIENT)
Dept: ORTHOPEDICS | Facility: OTHER | Age: 72
End: 2022-05-02
Attending: INTERNAL MEDICINE
Payer: MEDICARE

## 2022-05-02 VITALS
HEART RATE: 120 BPM | RESPIRATION RATE: 20 BRPM | DIASTOLIC BLOOD PRESSURE: 60 MMHG | WEIGHT: 164 LBS | BODY MASS INDEX: 29.52 KG/M2 | SYSTOLIC BLOOD PRESSURE: 108 MMHG

## 2022-05-02 DIAGNOSIS — M25.511 CHRONIC RIGHT SHOULDER PAIN: ICD-10-CM

## 2022-05-02 DIAGNOSIS — G89.29 CHRONIC RIGHT SHOULDER PAIN: ICD-10-CM

## 2022-05-02 PROCEDURE — G0463 HOSPITAL OUTPT CLINIC VISIT: HCPCS

## 2022-05-02 PROCEDURE — 99213 OFFICE O/P EST LOW 20 MIN: CPT | Performed by: ORTHOPAEDIC SURGERY

## 2022-05-02 ASSESSMENT — PAIN SCALES - GENERAL: PAINLEVEL: NO PAIN (0)

## 2022-05-02 NOTE — PROGRESS NOTES
"Chief Complaint   Patient presents with     Consult     Right shoulder pain       Initial LMP  (LMP Unknown)  Estimated body mass index is 29.48 kg/m  as calculated from the following:    Height as of 3/22/22: 1.588 m (5' 2.5\").    Weight as of 4/20/22: 74.3 kg (163 lb 12.8 oz).  Medication Reconciliation: complete    Rama Blevins LPN    "

## 2022-05-02 NOTE — PROGRESS NOTES
Visit Date: 2022    CHIEF COMPLAINT:  Right shoulder pain.    HISTORY OF PRESENT ILLNESS:  Jennyfer Reid is a 72-year-old female with right shoulder pain.  She has had ongoing pain in her shoulder for quite some time, starting to have more and more pain and subsequently went to see her primary, who ordered an MRI of her shoulder.  This showed that she had a fairly significant rotator cuff tear of her shoulder and a severely high-riding head.  She has significant atrophy of her rotator cuff muscle bellies as well.  She was referred to me for care.    PHYSICAL EXAMINATION:  On examination today, this is a 72-year-old female in no acute distress, very pleasant on examination.  Has full range of motion of her right shoulder all the way up to 160 degrees.  She has a positive Neer impingement sign, severe weakness with supraspinatus and infraspinatus testing.  She is otherwise neuro and vascularly intact.    IMAGING:  Review of the MRI shows rotator cuff tear arthropathy of the right shoulder with severe degenerative changes, a high-riding humerus, and an irreparable rotator cuff tear.    IMPRESSION AND PLAN:  A 72-year-old female with rotator cuff tear arthropathy of her right shoulder.  In speaking with her, she probably would benefit from a reverse total shoulder arthroplasty.  This was expressed to her today.  She is going to go ahead and think about that, and I am going to see her back on an as-needed basis.    Orestes Escudero MD        D: 2022   T: 2022   MT: [id number]    Name:     JENNYFER REID  MRN:      [id number]        Account:    [id number]   :      1950           Visit Date: 2022     Document: [id number]

## 2022-05-19 ENCOUNTER — TRANSFERRED RECORDS (OUTPATIENT)
Dept: HEALTH INFORMATION MANAGEMENT | Facility: OTHER | Age: 72
End: 2022-05-19
Payer: MEDICARE

## 2022-06-08 ENCOUNTER — MYC MEDICAL ADVICE (OUTPATIENT)
Dept: INTERNAL MEDICINE | Facility: OTHER | Age: 72
End: 2022-06-08
Payer: MEDICARE

## 2022-06-09 NOTE — TELEPHONE ENCOUNTER
Patient called and she will not be coming to the appt on 6/9.  She will let us know when she needs this rescheduled.

## 2022-06-28 DIAGNOSIS — I70.219 ATHEROSCLEROSIS OF LOWER EXTREMITY WITH CLAUDICATION (H): ICD-10-CM

## 2022-06-28 NOTE — TELEPHONE ENCOUNTER
"Requested Prescriptions   Pending Prescriptions Disp Refills     clopidogrel (PLAVIX) 75 MG tablet [Pharmacy Med Name: CLOPIDOGREL 75MG TABLET] 90 tablet 0     Sig: TAKE 1 TAB BY MOUTH ONCE DAILY       Plavix Passed - 6/28/2022  2:28 PM        Passed - No active PPI on record unless is Protonix        Passed - Normal HGB on file in past 12 months     Recent Labs   Lab Test 03/22/22  0704   HGB 12.2               Passed - Normal Platelets on file in past 12 months     Recent Labs   Lab Test 03/22/22  1151                  Passed - Recent (12 mo) or future (30 days) visit within the authorizing provider's specialty     Patient has had an office visit with the authorizing provider or a provider within the authorizing providers department within the previous 12 mos or has a future within next 30 days. See \"Patient Info\" tab in inbasket, or \"Choose Columns\" in Meds & Orders section of the refill encounter.              Passed - Medication is active on med list        Passed - Patient is age 18 or older        Passed - No active pregnancy on record        Passed - No positive pregnancy test in past 12 months     Last Written Prescription Date:  3/23/22  Last Fill Quantity: 90,   # refills: 0  Last Office Visit: 4/6/22 Maddie  Future Office visit: none      Routing refill request to provider for review/approval:  Brenda J. Goodell, RN on 6/28/2022 at 2:32 PM      "

## 2022-06-29 RX ORDER — CLOPIDOGREL BISULFATE 75 MG/1
TABLET ORAL
Qty: 90 TABLET | Refills: 2 | Status: SHIPPED | OUTPATIENT
Start: 2022-06-29 | End: 2022-07-12

## 2022-07-11 ENCOUNTER — TELEPHONE (OUTPATIENT)
Dept: VASCULAR SURGERY | Facility: CLINIC | Age: 72
End: 2022-07-11

## 2022-07-11 NOTE — TELEPHONE ENCOUNTER
----- Message from Carlee Moran RN sent at 4/21/2022  2:30 PM CDT -----  Hello,    Please schedule this pt for a return/follow-up TELEMED visit  with Dr. Sewell in 6 months for 6 month post-op follow-up. Pt will need imaging prior to this appt. Imaging orders have been placed.    Please have imaging scheduled at [facility] where her telemed apt will be. Thanks!    Thank you very much!    Carlee

## 2022-07-12 DIAGNOSIS — I70.219 ATHEROSCLEROSIS OF LOWER EXTREMITY WITH CLAUDICATION (H): ICD-10-CM

## 2022-07-12 RX ORDER — CLOPIDOGREL BISULFATE 75 MG/1
TABLET ORAL
Qty: 90 TABLET | Refills: 1 | Status: SHIPPED | OUTPATIENT
Start: 2022-07-12 | End: 2023-04-21

## 2022-07-12 NOTE — TELEPHONE ENCOUNTER
Pt contacted me for a refill of her Plavix. Refill sent as requested. I called the pt and clarified with her that she is to remain on her Plavix until her 6 month follow-up w/ Dr Sewell which is due in October/November.    VINI LedezmaN, RN  [facility]  Ph: [phone]  Fax: [phone]

## 2022-07-28 ENCOUNTER — TELEPHONE (OUTPATIENT)
Dept: VASCULAR SURGERY | Facility: CLINIC | Age: 72
End: 2022-07-28

## 2022-07-28 NOTE — TELEPHONE ENCOUNTER
I have been unsuccessful in my efforts to contact the pt by phone and Mychart.  I am sending out a letter to the pt today.  Jim Lr on 7/28/2022 at 2:14 PM       Letter sent 7/28    ST 7/15 LVM and MyChart to call and schedule  ST 7/11 LVM and MyChart to call and schedule follow up

## 2022-07-28 NOTE — TELEPHONE ENCOUNTER
----- Message from Carlee Moran RN sent at 4/21/2022  2:30 PM CDT -----  Hello,    Please schedule this pt for a return/follow-up TELEMED visit  with Dr. Sewell in 6 months for 6 month post-op follow-up. Pt will need imaging prior to this appt. Imaging orders have been placed.    Please have imaging scheduled at [facility] where her telemed apt will be. Thanks!    Thank you very much!    Carlee

## 2022-08-01 ENCOUNTER — OFFICE VISIT (OUTPATIENT)
Dept: ORTHOPEDICS | Facility: OTHER | Age: 72
End: 2022-08-01
Attending: ORTHOPAEDIC SURGERY
Payer: MEDICARE

## 2022-08-01 DIAGNOSIS — M12.811 ROTATOR CUFF TEAR ARTHROPATHY OF RIGHT SHOULDER: Primary | ICD-10-CM

## 2022-08-01 DIAGNOSIS — M75.101 ROTATOR CUFF TEAR ARTHROPATHY OF RIGHT SHOULDER: Primary | ICD-10-CM

## 2022-08-01 PROCEDURE — G0463 HOSPITAL OUTPT CLINIC VISIT: HCPCS

## 2022-08-01 PROCEDURE — 99213 OFFICE O/P EST LOW 20 MIN: CPT | Performed by: ORTHOPAEDIC SURGERY

## 2022-08-01 NOTE — PROGRESS NOTES
Chief Complaint   Patient presents with     RECHECK     Right shoulder pain       Rama Blevins LPN

## 2022-08-01 NOTE — PROGRESS NOTES
Visit Date: 2022    HISTORY OF PRESENT ILLNESS:  She is a 72-year-old female with right shoulder pain.  She has a rotator cuff tear arthropathy.  This is well known of her right shoulder.  She at this point can no longer tolerate her shoulder in this fashion and wishes to have it replaced.  All the risks and benefits of surgical intervention were discussed with her, and she wishes to proceed.     PHYSICAL EXAMINATION:  Examination of her shoulder shows that she has a pseudoparalytic right shoulder.  Significant difficulty with raising her arm out to the side.    IMPRESSION AND PLAN:  A 72-year-old female with rotator cuff tear arthropathy of her right shoulder.  We are going to see her back at the time of surgery.  All the risks and benefits of surgical intervention for a right reverse total shoulder arthroplasty were discussed with her, and she wished to proceed.    Orestes Escudero MD        D: 2022   T: 2022   MT: MANOJ    Name:     ALVA REID  MRN:      [id number]        Account:    [id number]   :      1950           Visit Date: 2022     Document: [id number]

## 2022-08-15 ENCOUNTER — TELEPHONE (OUTPATIENT)
Dept: INTERNAL MEDICINE | Facility: OTHER | Age: 72
End: 2022-08-15

## 2022-08-15 NOTE — TELEPHONE ENCOUNTER
Kota-patient would like to talk to Dr Escudero about her up coming surgery the appt that was offered is not working for her and she would like to talk about that with Dr Escudero      Please call and advise  Thank You    Sultana Pena on 8/15/2022 at 2:05 PM

## 2022-08-16 ENCOUNTER — HOSPITAL ENCOUNTER (OUTPATIENT)
Facility: OTHER | Age: 72
End: 2022-08-16
Attending: ORTHOPAEDIC SURGERY | Admitting: ORTHOPAEDIC SURGERY
Payer: MEDICARE

## 2022-08-17 ENCOUNTER — OFFICE VISIT (OUTPATIENT)
Dept: INTERNAL MEDICINE | Facility: OTHER | Age: 72
End: 2022-08-17
Attending: NURSE PRACTITIONER
Payer: MEDICARE

## 2022-08-17 VITALS
HEART RATE: 95 BPM | OXYGEN SATURATION: 92 % | TEMPERATURE: 97.8 F | WEIGHT: 162.6 LBS | RESPIRATION RATE: 14 BRPM | BODY MASS INDEX: 29.27 KG/M2

## 2022-08-17 DIAGNOSIS — Z01.818 PREOP GENERAL PHYSICAL EXAM: Primary | ICD-10-CM

## 2022-08-17 DIAGNOSIS — D22.9 ATYPICAL MOLE: ICD-10-CM

## 2022-08-17 DIAGNOSIS — B37.2 YEAST INFECTION OF THE SKIN: ICD-10-CM

## 2022-08-17 DIAGNOSIS — M12.811 ROTATOR CUFF TEAR ARTHROPATHY OF RIGHT SHOULDER: ICD-10-CM

## 2022-08-17 DIAGNOSIS — E11.8 TYPE 2 DIABETES MELLITUS WITH COMPLICATION, WITHOUT LONG-TERM CURRENT USE OF INSULIN (H): ICD-10-CM

## 2022-08-17 DIAGNOSIS — N18.31 STAGE 3A CHRONIC KIDNEY DISEASE (H): ICD-10-CM

## 2022-08-17 DIAGNOSIS — L85.8: ICD-10-CM

## 2022-08-17 DIAGNOSIS — L98.9 SKIN LESION: ICD-10-CM

## 2022-08-17 DIAGNOSIS — M75.101 ROTATOR CUFF TEAR ARTHROPATHY OF RIGHT SHOULDER: ICD-10-CM

## 2022-08-17 PROBLEM — M75.100 ROTATOR CUFF TEAR ARTHROPATHY: Chronic | Status: ACTIVE | Noted: 2022-08-17

## 2022-08-17 PROBLEM — M12.819 ROTATOR CUFF TEAR ARTHROPATHY: Status: ACTIVE | Noted: 2022-08-17

## 2022-08-17 PROBLEM — M75.100 ROTATOR CUFF TEAR ARTHROPATHY: Status: ACTIVE | Noted: 2022-08-17

## 2022-08-17 PROBLEM — M12.819 ROTATOR CUFF TEAR ARTHROPATHY: Chronic | Status: ACTIVE | Noted: 2022-08-17

## 2022-08-17 LAB
ALBUMIN SERPL-MCNC: 4.5 G/DL (ref 3.5–5.7)
ALP SERPL-CCNC: 85 U/L (ref 34–104)
ALT SERPL W P-5'-P-CCNC: 30 U/L (ref 7–52)
ANION GAP SERPL CALCULATED.3IONS-SCNC: 9 MMOL/L (ref 3–14)
AST SERPL W P-5'-P-CCNC: 33 U/L (ref 13–39)
BASOPHILS # BLD AUTO: 0 10E3/UL (ref 0–0.2)
BASOPHILS NFR BLD AUTO: 0 %
BILIRUB SERPL-MCNC: 0.5 MG/DL (ref 0.3–1)
BUN SERPL-MCNC: 18 MG/DL (ref 7–25)
CALCIUM SERPL-MCNC: 9.9 MG/DL (ref 8.6–10.3)
CHLORIDE BLD-SCNC: 103 MMOL/L (ref 98–107)
CO2 SERPL-SCNC: 27 MMOL/L (ref 21–31)
CREAT SERPL-MCNC: 1.01 MG/DL (ref 0.6–1.2)
EOSINOPHIL # BLD AUTO: 0.4 10E3/UL (ref 0–0.7)
EOSINOPHIL NFR BLD AUTO: 4 %
ERYTHROCYTE [DISTWIDTH] IN BLOOD BY AUTOMATED COUNT: 14.1 % (ref 10–15)
GFR SERPL CREATININE-BSD FRML MDRD: 58 ML/MIN/1.73M2
GLUCOSE BLD-MCNC: 109 MG/DL (ref 70–105)
HBA1C MFR BLD: 7.1 % (ref 4–6.2)
HCT VFR BLD AUTO: 39.1 % (ref 35–47)
HGB BLD-MCNC: 12.7 G/DL (ref 11.7–15.7)
IMM GRANULOCYTES # BLD: 0 10E3/UL
IMM GRANULOCYTES NFR BLD: 0 %
LYMPHOCYTES # BLD AUTO: 2.6 10E3/UL (ref 0.8–5.3)
LYMPHOCYTES NFR BLD AUTO: 31 %
MCH RBC QN AUTO: 29.1 PG (ref 26.5–33)
MCHC RBC AUTO-ENTMCNC: 32.5 G/DL (ref 31.5–36.5)
MCV RBC AUTO: 90 FL (ref 78–100)
MONOCYTES # BLD AUTO: 0.8 10E3/UL (ref 0–1.3)
MONOCYTES NFR BLD AUTO: 9 %
NEUTROPHILS # BLD AUTO: 4.7 10E3/UL (ref 1.6–8.3)
NEUTROPHILS NFR BLD AUTO: 56 %
NRBC # BLD AUTO: 0 10E3/UL
NRBC BLD AUTO-RTO: 0 /100
PLATELET # BLD AUTO: 285 10E3/UL (ref 150–450)
POTASSIUM BLD-SCNC: 4 MMOL/L (ref 3.5–5.1)
PROT SERPL-MCNC: 7.4 G/DL (ref 6.4–8.9)
RBC # BLD AUTO: 4.37 10E6/UL (ref 3.8–5.2)
SODIUM SERPL-SCNC: 139 MMOL/L (ref 134–144)
WBC # BLD AUTO: 8.4 10E3/UL (ref 4–11)

## 2022-08-17 PROCEDURE — 99215 OFFICE O/P EST HI 40 MIN: CPT | Performed by: NURSE PRACTITIONER

## 2022-08-17 PROCEDURE — G0463 HOSPITAL OUTPT CLINIC VISIT: HCPCS

## 2022-08-17 PROCEDURE — 93010 ELECTROCARDIOGRAM REPORT: CPT | Performed by: INTERNAL MEDICINE

## 2022-08-17 PROCEDURE — 85025 COMPLETE CBC W/AUTO DIFF WBC: CPT | Mod: ZL | Performed by: NURSE PRACTITIONER

## 2022-08-17 PROCEDURE — 80053 COMPREHEN METABOLIC PANEL: CPT | Mod: ZL | Performed by: NURSE PRACTITIONER

## 2022-08-17 PROCEDURE — 36415 COLL VENOUS BLD VENIPUNCTURE: CPT | Mod: ZL | Performed by: NURSE PRACTITIONER

## 2022-08-17 PROCEDURE — 93005 ELECTROCARDIOGRAM TRACING: CPT | Performed by: NURSE PRACTITIONER

## 2022-08-17 PROCEDURE — 83036 HEMOGLOBIN GLYCOSYLATED A1C: CPT | Mod: ZL | Performed by: NURSE PRACTITIONER

## 2022-08-17 RX ORDER — NYSTATIN 100000 [USP'U]/G
POWDER TOPICAL 2 TIMES DAILY PRN
Qty: 60 G | Refills: 4 | Status: SHIPPED | OUTPATIENT
Start: 2022-08-17 | End: 2023-02-22

## 2022-08-17 ASSESSMENT — ENCOUNTER SYMPTOMS
NECK PAIN: 1
NECK STIFFNESS: 1
ARTHRALGIAS: 1
NERVOUS/ANXIOUS: 1

## 2022-08-17 ASSESSMENT — PAIN SCALES - GENERAL: PAINLEVEL: EXTREME PAIN (8)

## 2022-08-17 NOTE — PATIENT INSTRUCTIONS
Medication Instructions:  Patient is to take all scheduled medications on the day of surgery EXCEPT for modifications listed below:   - clopidrogel (Plavix): Patient has a cardiac stent. Medication will NOT be stopped until cleared by cardiology.    - ACE/ARB: May be continued on the day of surgery. - losartan-hydrochlorothiazide    - metformin: HOLD day of surgery.   - ibuprofen (Advil, Motrin): HOLD 1 day before surgery.    - naproxen (Aleve, Naprosyn): HOLD 4 days before surgery.    - Benzodiazepines: Continue without modification.  - trazodone, lorazepam,    - SSRIs, SNRIs, TCAs, Antipsychotics: Continue without modification.  - cymbalta, buproprion    Preparing for Your Surgery  Getting started  A nurse will call you to review your health history and instructions. They will give you an arrival time based on your scheduled surgery time. Please be ready to share:  Your doctor's clinic name and phone number  Your medical, surgical and anesthesia history  A list of allergies and sensitivities  A list of medicines, including herbal treatments and over-the-counter drugs  Whether the patient has a legal guardian (ask how to send us the papers in advance)  Please tell us if you're pregnant--or if there's any chance you might be pregnant. Some surgeries may injure a fetus (unborn baby), so they require a pregnancy test. Surgeries that are safe for a fetus don't always need a test, and you can choose whether to have one.   If you have a child who's having surgery, please ask for a copy of Preparing for Your Child's Surgery.    Preparing for surgery  Within 30 days of surgery: Have a pre-op exam (sometimes called an H&P, or History and Physical). This can be done at a clinic or pre-operative center.  If you're having a , you may not need this exam. Talk to your care team.  At your pre-op exam, talk to your care team about all medicines you take. If you need to stop any medicines before surgery, ask when to start  taking them again.  We do this for your safety. Many medicines can make you bleed too much during surgery. Some change how well surgery (anesthesia) drugs work.  Call your insurance company to let them know you're having surgery. (If you don't have insurance, call [phone].)  Call your clinic if there's any change in your health. This includes signs of a cold or flu (sore throat, runny nose, cough, rash, fever). It also includes a scrape or scratch near the surgery site.  If you have questions on the day of surgery, call your hospital or surgery center.  COVID testing  You may need to be tested for COVID-19 before having surgery. If so, we will give you instructions.  Eating and drinking guidelines  For your safety: Unless your surgeon tells you otherwise, follow the guidelines below.  Eat and drink as usual until 8 hours before surgery. After that, no food or milk.  Drink clear liquids until 2 hours before surgery. These are liquids you can see through, like water, Gatorade and Propel Water. You may also have black coffee and tea (no cream or milk).  Nothing by mouth within 2 hours of surgery. This includes gum, candy and breath mints.  If you drink alcohol: Stop drinking it the night before surgery.  If your care team tells you to take medicine on the morning of surgery, it's okay to take it with a sip of water.  Preventing infection  Shower or bathe the night before and morning of your surgery. Follow the instructions your clinic gave you. (If no instructions, use regular soap.)  Don't shave or clip hair near your surgery site. We'll remove the hair if needed.  Don't smoke or vape the morning of surgery. You may chew nicotine gum up to 2 hours before surgery. A nicotine patch is okay.  Note: Some surgeries require you to completely quit smoking and nicotine. Check with your surgeon.  Your care team will make every effort to keep you safe from infection. We will:  Clean our hands often with soap and water (or  an alcohol-based hand rub).  Clean the skin at your surgery site with a special soap that kills germs.  Give you a special gown to keep you warm. (Cold raises the risk of infection.)  Wear special hair covers, masks, gowns and gloves during surgery.  Give antibiotic medicine, if prescribed. Not all surgeries need antibiotics.  What to bring on the day of surgery  Photo ID and insurance card  Copy of your health care directive, if you have one  Glasses and hearing aides (bring cases)  You can't wear contacts during surgery  Inhaler and eye drops, if you use them (tell us about these when you arrive)  CPAP machine or breathing device, if you use them  A few personal items, if spending the night  If you have . . .  A pacemaker, ICD (cardiac defibrillator) or other implant: Bring the ID card.  An implanted stimulator: Bring the remote control.  A legal guardian: Bring a copy of the certified (court-stamped) guardianship papers.  Please remove any jewelry, including body piercings. Leave jewelry and other valuables at home.  If you're going home the day of surgery  You must have a responsible adult drive you home. They should stay with you overnight as well.  If you don't have someone to stay with you, and you aren't safe to go home alone, we may keep you overnight. Insurance often won't pay for this.  After surgery  If it's hard to control your pain or you need more pain medicine, please call your surgeon's office.  Questions?   If you have any questions for your care team, list them here: _________________________________________________________________________________________________________________________________________________________________________ ____________________________________ ____________________________________ ____________________________________  For informational purposes only. Not to replace the advice of your health care provider. Copyright   2003, 2019 [facility]. All rights  reserved. Clinically reviewed by Elena Connolly MD. [employer] [id number] - REV .    Preparing for Your Surgery  Getting started  A nurse will call you to review your health history and instructions. They will give you an arrival time based on your scheduled surgery time. Please be ready to share:  Your doctor's clinic name and phone number  Your medical, surgical and anesthesia history  A list of allergies and sensitivities  A list of medicines, including herbal treatments and over-the-counter drugs  Whether the patient has a legal guardian (ask how to send us the papers in advance)  Please tell us if you're pregnant--or if there's any chance you might be pregnant. Some surgeries may injure a fetus (unborn baby), so they require a pregnancy test. Surgeries that are safe for a fetus don't always need a test, and you can choose whether to have one.   If you have a child who's having surgery, please ask for a copy of Preparing for Your Child's Surgery.    Preparing for surgery  Within 30 days of surgery: Have a pre-op exam (sometimes called an H&P, or History and Physical). This can be done at a clinic or pre-operative center.  If you're having a , you may not need this exam. Talk to your care team.  At your pre-op exam, talk to your care team about all medicines you take. If you need to stop any medicines before surgery, ask when to start taking them again.  We do this for your safety. Many medicines can make you bleed too much during surgery. Some change how well surgery (anesthesia) drugs work.  Call your insurance company to let them know you're having surgery. (If you don't have insurance, call [phone].)  Call your clinic if there's any change in your health. This includes signs of a cold or flu (sore throat, runny nose, cough, rash, fever). It also includes a scrape or scratch near the surgery site.  If you have questions on the day of surgery, call your hospital or surgery center.  COVID  testing  You may need to be tested for COVID-19 before having surgery. If so, we will give you instructions.  Eating and drinking guidelines  For your safety: Unless your surgeon tells you otherwise, follow the guidelines below.  Eat and drink as usual until 8 hours before surgery. After that, no food or milk.  Drink clear liquids until 2 hours before surgery. These are liquids you can see through, like water, Gatorade and Propel Water. You may also have black coffee and tea (no cream or milk).  Nothing by mouth within 2 hours of surgery. This includes gum, candy and breath mints.  If you drink alcohol: Stop drinking it the night before surgery.  If your care team tells you to take medicine on the morning of surgery, it's okay to take it with a sip of water.  Preventing infection  Shower or bathe the night before and morning of your surgery. Follow the instructions your clinic gave you. (If no instructions, use regular soap.)  Don't shave or clip hair near your surgery site. We'll remove the hair if needed.  Don't smoke or vape the morning of surgery. You may chew nicotine gum up to 2 hours before surgery. A nicotine patch is okay.  Note: Some surgeries require you to completely quit smoking and nicotine. Check with your surgeon.  Your care team will make every effort to keep you safe from infection. We will:  Clean our hands often with soap and water (or an alcohol-based hand rub).  Clean the skin at your surgery site with a special soap that kills germs.  Give you a special gown to keep you warm. (Cold raises the risk of infection.)  Wear special hair covers, masks, gowns and gloves during surgery.  Give antibiotic medicine, if prescribed. Not all surgeries need antibiotics.  What to bring on the day of surgery  Photo ID and insurance card  Copy of your health care directive, if you have one  Glasses and hearing aides (bring cases)  You can't wear contacts during surgery  Inhaler and eye drops, if you use them  (tell us about these when you arrive)  CPAP machine or breathing device, if you use them  A few personal items, if spending the night  If you have . . .  A pacemaker, ICD (cardiac defibrillator) or other implant: Bring the ID card.  An implanted stimulator: Bring the remote control.  A legal guardian: Bring a copy of the certified (court-stamped) guardianship papers.  Please remove any jewelry, including body piercings. Leave jewelry and other valuables at home.  If you're going home the day of surgery  You must have a responsible adult drive you home. They should stay with you overnight as well.  If you don't have someone to stay with you, and you aren't safe to go home alone, we may keep you overnight. Insurance often won't pay for this.  After surgery  If it's hard to control your pain or you need more pain medicine, please call your surgeon's office.  Questions?   If you have any questions for your care team, list them here: _________________________________________________________________________________________________________________________________________________________________________ ____________________________________ ____________________________________ ____________________________________  For informational purposes only. Not to replace the advice of your health care provider. Copyright   2003, 2019 [facility]. All rights reserved. Clinically reviewed by Elena Connolly MD. [employer] [id number] - REV 07/21.

## 2022-08-17 NOTE — PROGRESS NOTES
[facility] AND [facility]  [address]  [place] [address]  Phone: [phone]  Primary Provider: Lili Perkins  Pre-op Performing Provider: EBONI YU    PREOPERATIVE EVALUATION:  Today's date: 8/17/2022    Jennyfer Elizabeth is a 72 year old female who presents for a preoperative evaluation.    Surgical Information:  Surgery/Procedure:  ARTHROPLASTY, SHOULDER, TOTAL, REVERSE  Surgery Location:  [facility]  Surgeon:   Dr. Escudero  Surgery Date:   8/29/22  Time of Surgery:  TBD  Where patient plans to recover: Other: possibly family or else rehab  Fax number for surgical facility: Note does not need to be faxed, will be available electronically in Epic.    Type of Anesthesia Anticipated: Choice    Assessment & Plan     The proposed surgical procedure is considered INTERMEDIATE risk.    Preop general physical exam  Rotator cuff tear arthropathy of right shoulder  Surgical wash given to patient at clinic appointment today.  Surgical folder given.  Did discuss with case management RN to work on lining up rehab for patient after surgery.  She is not comfortable going home alone after her surgery.  - Comprehensive Metabolic Panel  - CBC with Platelets & Differential ([facility] Only)  - EKG 12-lead ([facility] and [facility] Only)  - Hemoglobin A1c    Type 2 diabetes mellitus with complication, without long-term current use of insulin (H)  - Hemoglobin A1c    Stage 3a chronic kidney disease (H)    Yeast infection of the skin  - nystatin (MYCOSTATIN) 117101 UNIT/GM external powder  Dispense: 60 g; Refill: 4    Skin lesion - right axilla  Encourage patient to have this removed after surgery when she has better range of motion to promote better healing.    Atypical mole - right groin/femoral crease  We will plan to do a shave biopsy on this next week.  Appointment made.  Will send in for pathology.    Cornu cutaneum - upper mid back  Removed in clinic with skin nipper. Will plan to freeze with liquid nitrogen at  next week's appointment to ensure base of lesion is treated.    Risks and Recommendations:  The patient has the following additional risks and recommendations for perioperative complications:  Diabetes:  - Patient is not on insulin therapy: regular NPO guidelines can be followed.     Medication Instructions:  Patient is to take all scheduled medications on the day of surgery EXCEPT for modifications listed below:   - clopidrogel (Plavix): Patient has a iliac stent. Medication will be HELD 48 hours prior to procedure and patient will continue aspirin per Dr. Rama Sewell. Must be restarted immediately after surgery.   - ACE/ARB: May be continued on the day of surgery. - losartan-hydrochlorothiazide    - metformin: HOLD day of surgery.   - ibuprofen (Advil, Motrin): HOLD 1 day before surgery.    - naproxen (Aleve, Naprosyn): HOLD 4 days before surgery.    - Benzodiazepines: Continue without modification.  - trazodone, lorazepam,    - SSRIs, SNRIs, TCAs, Antipsychotics: Continue without modification.  - cymbalta, buproprion    RECOMMENDATION:  APPROVAL GIVEN to proceed with proposed procedure, without further diagnostic evaluation.    51 minutes spent on the date of the encounter doing chart review, history and exam, documentation and further activities per the note    Subjective     HPI related to upcoming procedure: chronic right shoulder pain    Preop Questions 8/17/2022   1. Have you ever had a heart attack or stroke? YES - heart attack   2. Have you ever had surgery on your heart or blood vessels, such as a stent placement, a coronary artery bypass, or surgery on an artery in your head, neck, heart, or legs? YES - cardiac stent   3. Do you have chest pain with activity? No   4. Do you have a history of  heart failure? YES    5. Do you currently have a cold, bronchitis or symptoms of other infection? No   6. Do you have a cough, shortness of breath, or wheezing? No   7. Do you or anyone in your family have previous  history of blood clots? No   8. Do you or does anyone in your family have a serious bleeding problem such as prolonged bleeding following surgeries or cuts? No   9. Have you ever had problems with anemia or been told to take iron pills? No   10. Have you had any abnormal blood loss such as black, tarry or bloody stools, or abnormal vaginal bleeding? No   11. Have you ever had a blood transfusion? No   12. Are you willing to have a blood transfusion if it is medically needed before, during, or after your surgery? Yes   13. Have you or any of your relatives ever had problems with anesthesia? No   14. Do you have sleep apnea, excessive snoring or daytime drowsiness? YES    14a. Do you have a CPAP machine? Yes   15. Do you have any artifical heart valves or other implanted medical devices like a pacemaker, defibrillator, or continuous glucose monitor? No   16. Do you have artificial joints? No   17. Are you allergic to latex? No      Health Care Directive:  Patient does not have a Health Care Directive or Living Will: Discussed advance care planning with patient; however, patient declined at this time.    Preoperative Review of :   reviewed - controlled substances reflected in medication list.    Status of Chronic Conditions:  See problem list for active medical problems.  Problems all longstanding and stable, except as noted/documented.  See ROS for pertinent symptoms related to these conditions.    Review of Systems   Musculoskeletal: Positive for arthralgias (Right shoulder), neck pain and neck stiffness.   Skin:        Multiple skin lesions she wants me to look at today: Mid upper back, right axilla, right groin femoral crease and a sore right big toe.  Stubbed her toenail and thinks she lifted her toenail.  The base of her toenail is mildly reddened and painful   Psychiatric/Behavioral: The patient is nervous/anxious (Nervous about surgery).      Patient Active Problem List    Diagnosis Date Noted     Rotator  cuff tear arthropathy 08/17/2022     Priority: Medium     Atherosclerosis of lower extremity with claudication (H) 03/22/2022     Priority: Medium     Depression, major, recurrent, moderate (H) 01/10/2022     Priority: Medium     Onychogryphosis 12/02/2021     Priority: Medium     Bunion, left 12/02/2021     Priority: Medium     Bunion, right 12/02/2021     Priority: Medium     Corn or callus 12/02/2021     Priority: Medium     Nail dystrophy 12/02/2021     Priority: Medium     Onychomycosis 12/02/2021     Priority: Medium     Hammertoe, bilateral 12/02/2021     Priority: Medium     Chronic kidney disease, stage 3 (H) 02/01/2021     Priority: Medium     Thoracic aortic aneurysm without rupture (H) 02/01/2021     Priority: Medium     Chronic pain of right knee 04/11/2018     Priority: Medium     Type 2 diabetes mellitus with complication, without long-term current use of insulin (H) 03/03/2018     Priority: Medium     TAYLOR (generalized anxiety disorder) 03/03/2018     Priority: Medium     MOSHE on CPAP 09/16/2009     Priority: Medium     IMO Update 10/11  IMO Update       Blepharochalasis 03/26/2009     Priority: Medium     Dermatochalasia 03/26/2009     Priority: Medium      Past Medical History:   Diagnosis Date     Benign essential hypertension 03/03/2018     Cataract      Chronic pain of right knee 04/11/2018     Diabetic eye exam (H) 06/07/2018     TAYLOR (generalized anxiety disorder) 03/03/2018     History of MI (myocardial infarction) 07/2017    follows yearly with [facility]     Macular degeneration      Major depressive disorder, single episode 1995     Obese      Obstructive sleep apnea 2005    BIPAP     Osteopenia 2007    Lumbar spine     Pure hypercholesterolemia 2001     PVD (peripheral vascular disease) (H)      Squamous cell carcinoma, leg, left 12/2021     Type 2 diabetes mellitus with complication, without long-term current use of insulin (H) 03/03/2018     Past Surgical History:   Procedure Laterality Date      ANGIOGRAM Right 3/22/2022    Procedure: with right iliac angiogram, right iliac angioplasty and stenting;  Surgeon: Rama Sewell MD;  Location: [facility] OR     BREAST BIOPSY, RT/LT  1986    x2; in [employer]     CARDIAC CATH - HIM SCAN  07/10/2017    Mid RCA;  drug eluting stent     CHOLECYSTECTOMY  2008     COLONOSCOPY N/A 12/20/2021    Tubular Adenoma F/U 2024     ENDARTERECTOMY FEMORAL Right 3/22/2022    Procedure: Right femoral endarterectomy;  Surgeon: Rama Sewell MD;  Location: [facility] OR     IR OR ANGIOGRAM  3/22/2022     SKIN CANCER EXCISION Left 12/2021    [facility]     TONSILLECTOMY, ADENOIDECTOMY, COMBINED  1955     Current Outpatient Medications   Medication Sig Dispense Refill     ACCU-CHEK SMARTVIEW test strip NEW TO [facility] - 1-2 TIMES DAILY 50 each 3     acetaminophen (TYLENOL) 650 MG CR tablet Take 1,300 mg by mouth 3 times daily       aspirin 81 MG chewable tablet Take 81 mg by mouth daily       B Complex CAPS Take 1 capsule by mouth daily       buPROPion (WELLBUTRIN XL) 300 MG 24 hr tablet TAKE 1 TABLET (300 MG) BY MOUTH DAILY 90 tablet 3     clopidogrel (PLAVIX) 75 MG tablet TAKE 1 TAB BY MOUTH ONCE DAILY 90 tablet 1     DULoxetine (CYMBALTA) 30 MG capsule TAKE 2 CAPSULES BY MOUTH DAILY 180 capsule 3     LORazepam (ATIVAN) 0.5 MG tablet Take 1 tablet (0.5 mg) by mouth nightly as needed for anxiety 15 tablet 5     losartan-hydrochlorothiazide (HYZAAR) 100-12.5 MG tablet Take 1 tablet by mouth daily 90 tablet 4     metFORMIN (GLUCOPHAGE) 500 MG tablet Take 0.5 tablets (250 mg) by mouth 2 times daily (with meals) 90 tablet 4     Multiple Vitamins-Minerals (PRESERVISION AREDS 2+MULTI VIT PO) Take 1 tablet by mouth 2 times daily (Preservision Areds)       polyethylene glycol (MIRALAX) 17 GM/Dose powder Take 17 g by mouth daily 510 g 0     rosuvastatin (CRESTOR) 40 MG tablet Take 1 tablet (40 mg) by mouth every evening 90 tablet 1     traZODone (DESYREL) 50 MG tablet Take 1-2 tablets ( mg) by mouth At Bedtime 180  tablet 0     tretinoin (RETIN-A) 0.05 % external cream Apply 1 g topically At Bedtime 45 g 1     Vitamin D, Cholecalciferol, 25 MCG (1000 UT) TABS Take 1 tablet by mouth daily          No Known Allergies     Social History     Tobacco Use     Smoking status: Former Smoker     Packs/day: 1.00     Years: 40.00     Pack years: 40.00     Quit date: 10/1/2008     Years since quittin.8     Smokeless tobacco: Never Used     Tobacco comment: Patient occasionally uses Nicorette gum   Substance Use Topics     Alcohol use: Yes     Alcohol/week: 14.0 standard drinks     Comment: 1-3 beer; not daily     Family History   Problem Relation Age of Onset     Hypertension Father      Heart Disease Father         MI     Lung Cancer Mother         smoker     Diabetes Sister      Heart Disease Sister      Kidney Disease Sister 33        ?congenital     Hyperlipidemia Sister      No Known Problems Brother      Breast Cancer No family hx of         Cancer-breast     History   Drug Use No         Objective     Pulse 95   Temp 97.8  F (36.6  C) (Temporal)   Resp 14   Wt 73.8 kg (162 lb 9.6 oz)   LMP  (LMP Unknown)   SpO2 92%   Breastfeeding No   BMI 29.27 kg/m      Physical Exam  Vitals and nursing note reviewed.   Constitutional:       Appearance: Normal appearance.   HENT:      Head: Normocephalic and atraumatic.   Eyes:      Extraocular Movements: Extraocular movements intact.      Conjunctiva/sclera: Conjunctivae normal.   Cardiovascular:      Rate and Rhythm: Normal rate and regular rhythm.      Heart sounds: Normal heart sounds.   Pulmonary:      Effort: Pulmonary effort is normal.      Breath sounds: Normal breath sounds.   Abdominal:      General: Bowel sounds are normal.      Palpations: Abdomen is soft.   Musculoskeletal:         General: Tenderness present.      Right shoulder: Tenderness and bony tenderness present. Decreased range of motion. Decreased strength.      Cervical back: Rigidity and tenderness present.    Skin:     General: Skin is warm.      Findings: Lesion present.             Comments: Plan to remove right axilla lesion after surgery.  Plan to remove right groin/femoral lesion next week due to irregularity.  Removed upper mid back corn growth.    Neurological:      Mental Status: She is alert and oriented to person, place, and time. Mental status is at baseline.   Psychiatric:         Mood and Affect: Mood normal.         Thought Content: Thought content normal.         Judgment: Judgment normal.        Skin Tag Removal   Indication: ABNORMAL GROWTH/APPEARANCE OF SKIN LESION  Number of skin tags: 1  Location 1: upper mid back    Universal protocol was followed.    [x] YES [] NO  Verbal consent obtained.  [x] YES [] NO  Timeout was completed prior to procedure and confirmed patient identity, site/side, procedure, patient position, and availability of correct equipment [x] YES [] NO    The procedure, benefits (lesion removal), risks (infection, scarring, bruising, bleeding and the possible need for further procedures), and alternatives (watchful waiting, referral to Derm) were explained to the patient. [unfilled] voiced understanding of the information and agreed to proceed with the procedure. [] YES [] NO    Site sterilized with alcohol.  [x] YES [] NO  Anesthetic was infiltrated in area of biopsy.  [] YES  [x] NO    The skin tag/s was sharply excised using a skin nipper. Hemostasis was obtained by pressure and with silver nitrate.      The specimen was labeled and sent to pathology for evaluation. [] YES [x] NO    The procedure was well tolerated without immediate complication. Patient was given post procedure instructions and denied further questions.  We will plan to treat this area with liquid nitrogen at her appointment next week also.    Recent Labs   Lab Test 03/22/22  1151 03/22/22  0704 03/07/22  1454 10/21/21  1141 09/09/21  1149   HGB  --  12.2 13.8  --  14.7    241 386  --  328   NA  --  140 137   --  135   POTASSIUM  --  3.9 4.0  --  3.8   CR  --  0.81 1.05   < > 1.19   A1C  --   --  7.4*  --  7.3*    < > = values in this interval not displayed.      Diagnostics:  Recent Results (from the past 24 hour(s))   EKG 12-lead ([facility] Only)    Collection Time: 08/17/22  4:28 PM   Result Value Ref Range    Systolic Blood Pressure  mmHg    Diastolic Blood Pressure  mmHg    Ventricular Rate 88 BPM    Atrial Rate 88 BPM    MD Interval 144 ms    QRS Duration 84 ms     ms    QTc 462 ms    P Axis 16 degrees    R AXIS -9 degrees    T Axis 58 degrees    Interpretation ECG       Sinus rhythm  Normal ECG  When compared with ECG of 07-MAR-2022 14:49,  No significant change was found        EKG: appears normal, NSR    Revised Cardiac Risk Index (RCRI):  The patient has the following serious cardiovascular risks for perioperative complications:   - Coronary Artery Disease (MI, positive stress test, angina, Qs on EKG) = 1 point     RCRI Interpretation: 1 point: Class II (low risk - 0.9% complication rate)         Signed Electronically by: Annamaria Reagan NP  Copy of this evaluation report is provided to requesting physician.

## 2022-08-17 NOTE — NURSING NOTE
"Chief Complaint   Patient presents with     Pre-Op Exam     R NIELS, 8/29/22, VIVEK Clarke       Initial Pulse 95   Temp 97.8  F (36.6  C) (Temporal)   Resp 14   Wt 73.8 kg (162 lb 9.6 oz)   LMP  (LMP Unknown)   SpO2 92%   Breastfeeding No   BMI 29.27 kg/m   Estimated body mass index is 29.27 kg/m  as calculated from the following:    Height as of 3/22/22: 1.588 m (5' 2.5\").    Weight as of this encounter: 73.8 kg (162 lb 9.6 oz).     Medication Reconciliation: complete      FOOD SECURITY SCREENING QUESTIONS:    The next two questions are to help us understand your food security.  If you are feeling you need any assistance in this area, we have resources available to support you today.    Hunger Vital Signs:  Within the past 12 months we worried whether our food would run out before we got money to buy more. Never  Within the past 12 months the food we bought just didn't last and we didn't have money to get more. Never        Advance care plan reviewed      Lore Kyle LPN on 8/17/2022 at 3:30 PM      "

## 2022-08-18 PROBLEM — E11.8 TYPE 2 DIABETES MELLITUS WITH COMPLICATION, WITHOUT LONG-TERM CURRENT USE OF INSULIN (H): Chronic | Status: ACTIVE | Noted: 2018-03-03

## 2022-08-18 PROBLEM — Z95.828 S/P INSERTION OF ILIAC ARTERY STENT: Chronic | Status: ACTIVE | Noted: 2022-08-18

## 2022-08-18 PROBLEM — I70.219: Chronic | Status: ACTIVE | Noted: 2022-03-22

## 2022-08-19 ENCOUNTER — TELEPHONE (OUTPATIENT)
Dept: INTERNAL MEDICINE | Facility: OTHER | Age: 72
End: 2022-08-19

## 2022-08-19 NOTE — TELEPHONE ENCOUNTER
Called patient to relay and verify medication instructions for pre-op per Annamaria Reagan NP.   Sent medications instructions to patient's My Chart for to review and informed her to call us with any questions.    Lore Kyle LPN on 8/19/2022 at 10:06 AM

## 2022-08-20 NOTE — TELEPHONE ENCOUNTER
Trazodone not on current medication list or noted in history.    Called patient and patient states that she has not taken Trazodone for a long time.  States she has 2 bottles at home but they are from 2014 and 2016.  Patient states that Dr. Hopper use to fill for her.    Patient states she has been having a hard time sleeping for months and unsure why but is hoping to get Trazodone refilled .  Informed patient Dr. Perkins out of office until tomorrow and patient states that is when she was going to come in to  refills, around noon.    Debbie Jackson RN on 6/29/2021 at 11:47 AM     4 = No assist / stand by assistance

## 2022-08-21 LAB
ATRIAL RATE - MUSE: 88 BPM
DIASTOLIC BLOOD PRESSURE - MUSE: NORMAL MMHG
INTERPRETATION ECG - MUSE: NORMAL
P AXIS - MUSE: 16 DEGREES
PR INTERVAL - MUSE: 144 MS
QRS DURATION - MUSE: 84 MS
QT - MUSE: 382 MS
QTC - MUSE: 462 MS
R AXIS - MUSE: -9 DEGREES
SYSTOLIC BLOOD PRESSURE - MUSE: NORMAL MMHG
T AXIS - MUSE: 58 DEGREES
VENTRICULAR RATE- MUSE: 88 BPM

## 2022-08-22 ENCOUNTER — OFFICE VISIT (OUTPATIENT)
Dept: INTERNAL MEDICINE | Facility: OTHER | Age: 72
End: 2022-08-22
Attending: NURSE PRACTITIONER
Payer: MEDICARE

## 2022-08-22 VITALS
WEIGHT: 162 LBS | SYSTOLIC BLOOD PRESSURE: 112 MMHG | OXYGEN SATURATION: 98 % | HEART RATE: 105 BPM | DIASTOLIC BLOOD PRESSURE: 78 MMHG | BODY MASS INDEX: 29.16 KG/M2 | RESPIRATION RATE: 14 BRPM | TEMPERATURE: 96.9 F

## 2022-08-22 DIAGNOSIS — D22.9 ATYPICAL MOLE: Primary | ICD-10-CM

## 2022-08-22 PROCEDURE — G0463 HOSPITAL OUTPT CLINIC VISIT: HCPCS

## 2022-08-22 PROCEDURE — 11102 TANGNTL BX SKIN SINGLE LES: CPT | Performed by: NURSE PRACTITIONER

## 2022-08-22 PROCEDURE — 17111 DESTRUCTION B9 LESIONS 15/>: CPT | Performed by: NURSE PRACTITIONER

## 2022-08-22 PROCEDURE — 17110 DESTRUCTION B9 LES UP TO 14: CPT | Mod: XU | Performed by: NURSE PRACTITIONER

## 2022-08-22 PROCEDURE — 88305 TISSUE EXAM BY PATHOLOGIST: CPT

## 2022-08-22 ASSESSMENT — PAIN SCALES - GENERAL: PAINLEVEL: EXTREME PAIN (8)

## 2022-08-22 NOTE — PROGRESS NOTES
Assessment & Plan     Atypical mole  Posterior left forearm and top of right foot both treated with cryotherapy, 3 rounds of 10 second sessions. Home care instructions given to patient.  Mole in right groin was removed per shave biopsy procedure.  Skin Shave Biopsy  Indication: ABNORMAL GROWTH/APPEARANCE OF SKIN LESION  Number of biopsies: 1  Location 1: right groin/abdomen fold    Anesthesia: 1% lidocaine with epinephrine.    Universal protocol was followed.    [x] YES [] NO  Written/Verbal consent obtained.  [x] YES [] NO  Timeout was completed prior to procedure and confirmed patient identity, site/side, procedure, patient position, and availability of correct equipment and implants. [x] YES [] NO    The procedure, benefits (lesion removal), risks (infection, scarring, bruising, bleeding and the possible need for further procedures), and alternatives (watchful waiting, referral to Derm) were explained to the patient. Jennyfer Elizabeth voiced understanding of the information and agreed to proceed with the procedure. [x] YES [] NO    The skin overlying the [x] RIGHT []LEFT groin/abdominal crease was prepped and draped in the usual manner.  [x] YES [] NO    Site sterilized with Chlorhexadine x2.  [x] YES [] NO  Anesthetic was infiltrated in area of biopsy.  [x] YES  [] NO    When appropriate anesthesia had been achieved, the lesion was sharply excised with a margin of grossly normal tissue using a derma-blade. Hemostasis was obtained by pressure and with silver nitrate.      The specimen was labeled and sent to pathology for evaluation. [x] YES [] NO    The procedure was well tolerated without immediate complication. Patient was given post procedure instructions and denied further questions.     We will call the patient with pathology results when available.    PATIENT INSTRUCTIONS:    General:  [x] Keep biopsy area protected from excessive sun exposure.   [x] Use sunscreen.   [x] Wear light-colored clothing to  protect the area.   [x] Call if you notice any increase in bleeding, redness or pain over the next several days    Proper Care for Biopsy Site:   [x] Keep biopsy site absolutely dry for first 24 hours, then you may wash and shower as you normally would.   [x] Wash daily with warm soapy water and pat dry.   [x] Avoid soaking biopsy site as this can slow down healing and raise your chance of getting an infection (no hot tub or pool).   [x] Cover your biopsy site with a bandage or gauze with Vaseling covering wound. Change dressing every 24 hours.  [x] Watch for signs of infection: surrounding redness, increased tenderness and any pus or discharge, fevers, chills. If these develop you should be seen and evaluated.     - HI TANGENTIAL BIOPSY OF SKIN, EA SEPARATE/ADDL LESION  - Surgical Pathology Exam  - DESTRUCT BENIGN LESION, 15 OR MORE      Ordering of each unique test  40 minutes spent on the date of the encounter doing chart review, history and exam, documentation and further activities per the note    Return if symptoms worsen or fail to improve.    Annamaria Reagan NP  [facility]   Jennyfer is a 72 year old}, presenting for the following health issues:  Derm Problem (Mole removal)      History of Present Illness       Reason for visit:  Mole removal    She eats 2-3 servings of fruits and vegetables daily.She exercises with enough effort to increase her heart rate 20 to 29 minutes per day.  She exercises with enough effort to increase her heart rate 3 or less days per week.   She is taking medications regularly.    Review of Systems   CONSTITUTIONAL: NEGATIVE for fever, chills, change in weight  INTEGUMENTARY/SKIN: POSITIVE for changing lesions  ENT/MOUTH: NEGATIVE for ear, mouth and throat problems  RESP: NEGATIVE for significant cough or SOB  CV: NEGATIVE for chest pain, palpitations or peripheral edema      Objective    /78 (BP Location: Right arm, Patient Position:  Sitting, Cuff Size: Adult Regular)   Pulse 105   Temp 96.9  F (36.1  C) (Temporal)   Resp 14   Wt 73.5 kg (162 lb)   LMP  (LMP Unknown)   SpO2 98%   Breastfeeding No   BMI 29.16 kg/m    Body mass index is 29.16 kg/m .  Physical Exam   GENERAL: healthy, alert and no distress  EYES: Eyes grossly normal to inspection, PERRL and conjunctivae and sclerae normal  SKIN: abnormal multicolored lesion in right groin/abdominal fold; patchy scale lesions on top of right foot and left posterior forearm            .  ..     ADDENDUM:  9/16/2022   6:24 AM    Lung Cancer Screening Shared Decision Making Visit     Jennyfer Elizabeth, a 72 year old female, is eligible for lung cancer screening    History   Smoking Status     Former Smoker     Packs/day: 1.00     Years: 40.00     Quit date: 10/1/2008   Smokeless Tobacco     Never Used     Comment: Patient occasionally uses Nicorette gum       I have discussed with patient the risks and benefits of screening for lung cancer with low-dose CT.     The risks include:    radiation exposure: one low dose chest CT has as much ionizing radiation as about 15 chest x-rays, or 6 months of background radiation living in [place]      false positives: most findings/nodules are NOT cancer, but some might still require additional diagnostic evaluation, including biopsy    over-diagnosis: some slow growing cancers that might never have been clinically significant will be detected and treated unnecessarily     The benefit of early detection of lung cancer is contingent upon adherence to annual screening or more frequent follow up if indicated.     Furthermore, to benefit from screening, Jennyfer must be willing and able to undergo diagnostic procedures, if indicated. Although no specific guide is available for determining severity of comorbidities, it is reasonable to withhold screening in patients who have greater mortality risk from other diseases.     We did discuss that the best way to prevent  lung cancer is to not smoke.    Some patients may value a numeric estimation of lung cancer risk when evaluating if lung cancer screening is right for them, here is one calculator:    ShouldIScreen      Lili Perkins, DO

## 2022-08-22 NOTE — NURSING NOTE
"Chief Complaint   Patient presents with     Derm Problem     Mole removal       Initial /78 (BP Location: Right arm, Patient Position: Sitting, Cuff Size: Adult Regular)   Pulse 105   Temp 96.9  F (36.1  C) (Temporal)   Resp 14   Wt 73.5 kg (162 lb)   LMP  (LMP Unknown)   SpO2 98%   Breastfeeding No   BMI 29.16 kg/m   Estimated body mass index is 29.16 kg/m  as calculated from the following:    Height as of 3/22/22: 1.588 m (5' 2.5\").    Weight as of this encounter: 73.5 kg (162 lb).     Medication Reconciliation: complete      FOOD SECURITY SCREENING QUESTIONS:    The next two questions are to help us understand your food security.  If you are feeling you need any assistance in this area, we have resources available to support you today.    Hunger Vital Signs:  Within the past 12 months we worried whether our food would run out before we got money to buy more. Never  Within the past 12 months the food we bought just didn't last and we didn't have money to get more. Never        Advance care plan reviewed      Lore Kyle LPN on 8/22/2022 at 3:16 PM      "

## 2022-08-26 DIAGNOSIS — F32.A ANXIETY AND DEPRESSION: ICD-10-CM

## 2022-08-26 DIAGNOSIS — F41.9 ANXIETY AND DEPRESSION: ICD-10-CM

## 2022-08-26 LAB
PATH REPORT.COMMENTS IMP SPEC: NORMAL
PATH REPORT.FINAL DX SPEC: NORMAL
PHOTO IMAGE: NORMAL

## 2022-08-29 RX ORDER — BUPROPION HYDROCHLORIDE 300 MG/1
300 TABLET ORAL DAILY
Qty: 90 TABLET | Refills: 3 | Status: SHIPPED | OUTPATIENT
Start: 2022-08-29 | End: 2023-09-06

## 2022-08-29 NOTE — TELEPHONE ENCOUNTER
"Routing to unit provider, pcp out of office  Anayeli Judge RN on 8/29/2022 at 8:57 AM    Last Prescription Date: 9/3/21  Last Qty/Refills: 90 / 3  Last Office Visit: 8/22/22  Future Office Visit: None     Requested Prescriptions   Pending Prescriptions Disp Refills     buPROPion (WELLBUTRIN XL) 300 MG 24 hr tablet [Pharmacy Med Name: BUPROPION 300MG ER (XL) TABLET] 90 tablet 3     Sig: TAKE 1 TABLET (300 MG) BY MOUTH DAILY       SSRIs Protocol Failed - 8/26/2022  1:00 AM        Failed - PHQ-9 score less than 5 in past 6 months     Please review last PHQ-9 score.           Passed - Medication is Bupropion     If the medication is Bupropion (Wellbutrin), and the patient is taking for smoking cessation; OK to refill.          Passed - Medication is active on med list        Passed - Patient is age 18 or older        Passed - No active pregnancy on record        Passed - No positive pregnancy test in last 12 months        Passed - Recent (6 mo) or future (30 days) visit within the authorizing provider's specialty     Patient had office visit in the last 6 months or has a visit in the next 30 days with authorizing provider or within the authorizing provider's specialty.  See \"Patient Info\" tab in inbasket, or \"Choose Columns\" in Meds & Orders section of the refill encounter.                 "

## 2022-08-30 ENCOUNTER — TELEPHONE (OUTPATIENT)
Dept: INTERNAL MEDICINE | Facility: OTHER | Age: 72
End: 2022-08-30

## 2022-08-30 NOTE — TELEPHONE ENCOUNTER
Patient states that Peyton from [facility] called her today and she is returning a call 08.30.22      Please call and advise   Thank You    Sultana Pena on 8/30/2022 at 1:51 PM

## 2022-08-31 NOTE — TELEPHONE ENCOUNTER
Left message to call back.  Patient just needs to be notified that she needs to come to the clinic at 11AM for check in for her telemed with Dr. Sewell on 10/26/22.  Peyton Moran RN......August 31, 2022...8:33 AM

## 2022-09-14 ENCOUNTER — NURSE TRIAGE (OUTPATIENT)
Dept: NURSING | Facility: CLINIC | Age: 72
End: 2022-09-14

## 2022-09-14 DIAGNOSIS — Z87.891 PERSONAL HISTORY OF TOBACCO USE, PRESENTING HAZARDS TO HEALTH: Primary | ICD-10-CM

## 2022-09-14 DIAGNOSIS — Z87.891 PERSONAL HISTORY OF TOBACCO USE: ICD-10-CM

## 2022-09-14 NOTE — TELEPHONE ENCOUNTER
Pt calling wants to schedule annual mammogram and lung screening before upcoming surgery in November. Pt states she received notification via [email or web address] but is unable to access and wants to know if there is any new information regarding mammogram and lung screening.    Per chart reviewed, informed pt provider inputted orders for mammogram but no lung screening found. Pt would like to send provider a message to have lung screening done before surgery.       Angella Cornelius RN, BSN  9/14/2022 at 6:17 PM  [employer] [facility]      Reason for Disposition    Requesting regular office appointment    Protocols used: INFORMATION ONLY CALL - NO TRIAGE-A-

## 2022-09-16 NOTE — TELEPHONE ENCOUNTER
Please call patient.  Order is placed.  Shared decision making documented as addendum to 8/22 note.  This is the last year she will have lung cancer screening as she quit 15 years ago.  We can discuss this further at her follow up in Oct.

## 2022-09-17 ENCOUNTER — HEALTH MAINTENANCE LETTER (OUTPATIENT)
Age: 72
End: 2022-09-17

## 2022-09-29 ENCOUNTER — HOSPITAL ENCOUNTER (OUTPATIENT)
Dept: CT IMAGING | Facility: OTHER | Age: 72
Discharge: HOME | End: 2022-09-29
Attending: INTERNAL MEDICINE
Payer: MEDICARE

## 2022-09-29 ENCOUNTER — TELEPHONE (OUTPATIENT)
Dept: ORTHOPEDICS | Facility: OTHER | Age: 72
End: 2022-09-29

## 2022-09-29 ENCOUNTER — HOSPITAL ENCOUNTER (OUTPATIENT)
Dept: MAMMOGRAPHY | Facility: OTHER | Age: 72
Discharge: HOME | End: 2022-09-29
Attending: INTERNAL MEDICINE
Payer: MEDICARE

## 2022-09-29 DIAGNOSIS — Z87.891 PERSONAL HISTORY OF TOBACCO USE: ICD-10-CM

## 2022-09-29 DIAGNOSIS — Z12.31 VISIT FOR SCREENING MAMMOGRAM: ICD-10-CM

## 2022-09-29 PROCEDURE — 77067 SCR MAMMO BI INCL CAD: CPT

## 2022-09-29 PROCEDURE — 71271 CT THORAX LUNG CANCER SCR C-: CPT

## 2022-09-29 NOTE — TELEPHONE ENCOUNTER
Jennyfer stopped by the  to make an appointment with Dr Escudero prior to her surgery on 11/14, so she can talk to him in person and ask some questions. She did have one question she would like answered prior to that appointment.    Is she able to get her COVID booster and flu shot before her surgery? Or does this need to be done a certain period of time prior?    Thank you,    Peyton Jackson on 9/29/2022 at 3:50 PM

## 2022-09-30 NOTE — TELEPHONE ENCOUNTER
Left a message for the preadmin surgery nurse to call patient and discuss.  Rylie Emery LPN .....................9/30/2022 2:09 PM

## 2022-10-19 ENCOUNTER — HOSPITAL ENCOUNTER (OUTPATIENT)
Dept: ULTRASOUND IMAGING | Facility: OTHER | Age: 72
Discharge: HOME | End: 2022-10-19
Attending: SURGERY | Admitting: SURGERY
Payer: MEDICARE

## 2022-10-19 DIAGNOSIS — I73.9 PAD (PERIPHERAL ARTERY DISEASE) (H): ICD-10-CM

## 2022-10-19 PROCEDURE — 93922 UPR/L XTREMITY ART 2 LEVELS: CPT

## 2022-10-26 ENCOUNTER — VIRTUAL VISIT (OUTPATIENT)
Dept: VASCULAR SURGERY | Facility: CLINIC | Age: 72
End: 2022-10-26
Payer: MEDICARE

## 2022-10-26 ENCOUNTER — VIRTUAL VISIT (OUTPATIENT)
Dept: CARDIOLOGY | Facility: OTHER | Age: 72
End: 2022-10-26
Attending: SURGERY
Payer: MEDICARE

## 2022-10-26 VITALS
BODY MASS INDEX: 29.91 KG/M2 | DIASTOLIC BLOOD PRESSURE: 80 MMHG | HEART RATE: 94 BPM | WEIGHT: 166.2 LBS | OXYGEN SATURATION: 100 % | RESPIRATION RATE: 12 BRPM | TEMPERATURE: 97.1 F | SYSTOLIC BLOOD PRESSURE: 124 MMHG

## 2022-10-26 VITALS
OXYGEN SATURATION: 100 % | DIASTOLIC BLOOD PRESSURE: 80 MMHG | TEMPERATURE: 97.1 F | HEART RATE: 94 BPM | RESPIRATION RATE: 12 BRPM | BODY MASS INDEX: 29.91 KG/M2 | SYSTOLIC BLOOD PRESSURE: 124 MMHG | WEIGHT: 166.2 LBS

## 2022-10-26 DIAGNOSIS — I70.219 ATHEROSCLEROSIS OF LOWER EXTREMITY WITH CLAUDICATION (H): ICD-10-CM

## 2022-10-26 DIAGNOSIS — I73.9 PAD (PERIPHERAL ARTERY DISEASE) (H): Primary | ICD-10-CM

## 2022-10-26 DIAGNOSIS — I70.219 ATHEROSCLEROSIS OF LOWER EXTREMITY WITH CLAUDICATION (H): Primary | ICD-10-CM

## 2022-10-26 PROCEDURE — 99215 OFFICE O/P EST HI 40 MIN: CPT | Mod: 95 | Performed by: SURGERY

## 2022-10-26 PROCEDURE — G0463 HOSPITAL OUTPT CLINIC VISIT: HCPCS | Mod: GT

## 2022-10-26 ASSESSMENT — PAIN SCALES - GENERAL
PAINLEVEL: SEVERE PAIN (7)
PAINLEVEL: SEVERE PAIN (7)

## 2022-10-26 NOTE — PROGRESS NOTES
Vascular Surgery Consultation Note     Patient:  Jennyfer Elizabeth   Date of birth 1950, Medical record number [id number]  Date of Visit:  10/26/2022  Consult Requester:No att. providers found            Assessment and Recommendations:   ASSESSMENT / RECOMMENDATION:    Patent functioning right lower extremity revascularization with right femoral endarterectomy and iliac angioplasty and stenting in a very pleasant 72-year-old female.  From a peripheral arterial disease standpoint, she would do best with Plavix alone and is not in need of dual antiplatelet therapy.  She is currently on aspirin.  She will contact us after the shoulder surgery to go forth with Plavix along.  We will see her back in 1 year with a resting ankle only TOMMY.    Many thanks for involving me in the care of this very pleasant patient. Should any questions or concerns arise, please don't hesitate to contact me.    Warm Regards,    Rama Sewell MD, DFSVS, RPVI  Director, [facility] Vascular Services  Professor and Chief, Vascular and Endovascular Surgery  [facility]  Pager: 1. Send message or 10 digit call back number Securely via [employer] with the Vocera Web Console (learn more here)              2. Outside of [facility]? Call [phone]        60 minutes spent on the date of the encounter doing chart review, review of outside records, review of test results, interpretation of tests, patient visit and documentation           HPI: Jennyfer follows up today for her peripheral arterial disease.  In March 2022 she had a right femoral endarterectomy with iliac angioplasty and stenting.  She has been doing well with her walking and remains a non-smoker.  She is facing some right shoulder surgery coming up in mid November.  Denies rest pain or tissue loss.      Review of Systems   Constitutional, HEENT, cardiovascular, pulmonary, gi and gu systems are negative, except as otherwise noted.    Physical Exam   GENERAL: Healthy,  alert and no distress  EYES: Eyes grossly normal to inspection.  No discharge or erythema, or obvious scleral/conjunctival abnormalities.  RESP: No audible wheeze, cough, or visible cyanosis.  No visible retractions or increased work of breathing.    SKIN: Visible skin clear. No significant rash, abnormal pigmentation or lesions.  NEURO: Cranial nerves grossly intact.  Mentation and speech appropriate for age.  PSYCH: Mentation appears normal, affect normal/bright, judgement and insight intact, normal speech and appearance well-groomed.    Imaging: Normal ABIs at rest and with exercise bilaterally.    Video Start Time: 11:30 AM  Video End Time: 12 PM

## 2022-10-26 NOTE — NURSING NOTE
"Chief Complaint   Patient presents with     Telemedicine consult     6 mo. F/U TOMMY's       Initial /80 (BP Location: Right arm, Patient Position: Sitting, Cuff Size: Adult Regular)   Pulse 94   Temp 97.1  F (36.2  C) (Temporal)   Resp 12   Wt 75.4 kg (166 lb 3.2 oz)   LMP  (LMP Unknown)   SpO2 100%   BMI 29.91 kg/m   Estimated body mass index is 29.91 kg/m  as calculated from the following:    Height as of 3/22/22: 1.588 m (5' 2.5\").    Weight as of this encounter: 75.4 kg (166 lb 3.2 oz).  Medication Reconciliation: complete    Peyton Moran RN    "

## 2022-10-26 NOTE — NURSING NOTE
"Chief Complaint   Patient presents with     Telemedicine consult     6 mo. F/U TOMMY's prior       Initial /80 (BP Location: Right arm, Patient Position: Sitting, Cuff Size: Adult Regular)   Pulse 94   Temp 97.1  F (36.2  C) (Temporal)   Resp 12   Wt 75.4 kg (166 lb 3.2 oz)   LMP  (LMP Unknown)   SpO2 100%   BMI 29.91 kg/m   Estimated body mass index is 29.91 kg/m  as calculated from the following:    Height as of 3/22/22: 1.588 m (5' 2.5\").    Weight as of this encounter: 75.4 kg (166 lb 3.2 oz).  Medication Reconciliation: complete    Peyton Moran RN  "

## 2022-10-26 NOTE — PATIENT INSTRUCTIONS
Thank you so much for choosing us for your care. It was a pleasure to see you at the vascular clinic today.     Follow-up recommendations: We will see you back in 1 year. Please let us know if any issues arise in the meantime.    Additional testing/imaging ordered today: ABIs in 1 year      Our scheduling team will get in touch with you to set up any follow-up testing/imaging and/or appointments. Please be aware that any testing/imaging recommended today will need to completed prior to your next visit with the provider. If testing/imaging is not completed prior to your next visit, your visit may be rescheduled.        If you have any questions, please call Carlee Moran RN at [phone] or contact our clinic at [phone]. We also encourage the use of [employer]oft to communicate with your HealthCare Provider.      If you have an urgent need after business hours (8:00 am to 4:30 pm) please call [phone], option 4, and ask for the vascular attending on call. For non-urgent after hours needs, please call the vascular nurse at [phone] and leave a detailed voicemail. For scheduling needs, please call our clinic directly at [phone].

## 2022-10-26 NOTE — LETTER
10/26/2022       RE: Jennyfer Elizabeth  [address]  [facility] [address]     Dear Colleague,    Thank you for referring your patient, Jennyfer Elizabeth, to the [facility]ASCULAR [facility] [place] at [facility]. Please see a copy of my visit note below.      Vascular Surgery Consultation Note     Patient:  Jennyfer Elizabeth   Date of birth 1950, Medical record number [id number]  Date of Visit:  10/26/2022  Consult Requester:No att. providers found            Assessment and Recommendations:   ASSESSMENT / RECOMMENDATION:    Patent functioning right lower extremity revascularization with right femoral endarterectomy and iliac angioplasty and stenting in a very pleasant 72-year-old female.  From a peripheral arterial disease standpoint, she would do best with Plavix alone and is not in need of dual antiplatelet therapy.  She is currently on aspirin.  She will contact us after the shoulder surgery to go forth with Plavix along.  We will see her back in 1 year with a resting ankle only TOMMY.    Many thanks for involving me in the care of this very pleasant patient. Should any questions or concerns arise, please don't hesitate to contact me.    Warm Regards,    Rama Sewell MD, DFSVS, RPVI  Director, [facility] Vascular Services  Professor and Chief, Vascular and Endovascular Surgery  [facility]  Pager: 1. Send message or 10 digit call back number Securely via [employer] with the Vocera Web Console (learn more here)              2. Outside of [facility]? Call [phone]        60 minutes spent on the date of the encounter doing chart review, review of outside records, review of test results, interpretation of tests, patient visit and documentation           HPI: Jennyfer follows up today for her peripheral arterial disease.  In March 2022 she had a right femoral endarterectomy with iliac angioplasty and stenting.  She has been doing well with  her walking and remains a non-smoker.  She is facing some right shoulder surgery coming up in mid November.  Denies rest pain or tissue loss.      Review of Systems   Constitutional, HEENT, cardiovascular, pulmonary, gi and gu systems are negative, except as otherwise noted.    Physical Exam   GENERAL: Healthy, alert and no distress  EYES: Eyes grossly normal to inspection.  No discharge or erythema, or obvious scleral/conjunctival abnormalities.  RESP: No audible wheeze, cough, or visible cyanosis.  No visible retractions or increased work of breathing.    SKIN: Visible skin clear. No significant rash, abnormal pigmentation or lesions.  NEURO: Cranial nerves grossly intact.  Mentation and speech appropriate for age.  PSYCH: Mentation appears normal, affect normal/bright, judgement and insight intact, normal speech and appearance well-groomed.    Imaging: Normal ABIs at rest and with exercise bilaterally.    Video Start Time: 11:30 AM  Video End Time: 12 PM      Again, thank you for allowing me to participate in the care of your patient.      Sincerely,    Rama Sewell MD

## 2022-10-31 ENCOUNTER — OFFICE VISIT (OUTPATIENT)
Dept: INTERNAL MEDICINE | Facility: OTHER | Age: 72
End: 2022-10-31
Attending: INTERNAL MEDICINE
Payer: MEDICARE

## 2022-10-31 VITALS
TEMPERATURE: 96.5 F | HEART RATE: 95 BPM | RESPIRATION RATE: 16 BRPM | WEIGHT: 165 LBS | DIASTOLIC BLOOD PRESSURE: 86 MMHG | OXYGEN SATURATION: 96 % | BODY MASS INDEX: 29.23 KG/M2 | HEIGHT: 63 IN | SYSTOLIC BLOOD PRESSURE: 136 MMHG

## 2022-10-31 DIAGNOSIS — G47.00 INSOMNIA, UNSPECIFIED TYPE: ICD-10-CM

## 2022-10-31 DIAGNOSIS — F32.A ANXIETY AND DEPRESSION: ICD-10-CM

## 2022-10-31 DIAGNOSIS — F41.9 ANXIETY AND DEPRESSION: ICD-10-CM

## 2022-10-31 DIAGNOSIS — N18.31 STAGE 3A CHRONIC KIDNEY DISEASE (H): ICD-10-CM

## 2022-10-31 DIAGNOSIS — I10 ESSENTIAL HYPERTENSION: ICD-10-CM

## 2022-10-31 DIAGNOSIS — E11.9 TYPE 2 DIABETES MELLITUS WITHOUT COMPLICATION, WITHOUT LONG-TERM CURRENT USE OF INSULIN (H): ICD-10-CM

## 2022-10-31 DIAGNOSIS — I71.9 AORTIC ANEURYSM WITHOUT RUPTURE, UNSPECIFIED PORTION OF AORTA (H): ICD-10-CM

## 2022-10-31 DIAGNOSIS — M75.101 ROTATOR CUFF TEAR ARTHROPATHY OF RIGHT SHOULDER: ICD-10-CM

## 2022-10-31 DIAGNOSIS — I70.219 ATHEROSCLEROSIS OF LOWER EXTREMITY WITH CLAUDICATION (H): ICD-10-CM

## 2022-10-31 DIAGNOSIS — Z00.00 ENCOUNTER FOR MEDICARE ANNUAL WELLNESS EXAM: Primary | ICD-10-CM

## 2022-10-31 DIAGNOSIS — M12.811 ROTATOR CUFF TEAR ARTHROPATHY OF RIGHT SHOULDER: ICD-10-CM

## 2022-10-31 PROCEDURE — G0439 PPPS, SUBSEQ VISIT: HCPCS | Performed by: INTERNAL MEDICINE

## 2022-10-31 PROCEDURE — G0463 HOSPITAL OUTPT CLINIC VISIT: HCPCS | Mod: 25

## 2022-10-31 PROCEDURE — 99214 OFFICE O/P EST MOD 30 MIN: CPT | Mod: 25 | Performed by: INTERNAL MEDICINE

## 2022-10-31 PROCEDURE — G0008 ADMIN INFLUENZA VIRUS VAC: HCPCS

## 2022-10-31 PROCEDURE — 82043 UR ALBUMIN QUANTITATIVE: CPT | Mod: ZL | Performed by: INTERNAL MEDICINE

## 2022-10-31 RX ORDER — TRAZODONE HYDROCHLORIDE 50 MG/1
50-100 TABLET, FILM COATED ORAL AT BEDTIME
Qty: 180 TABLET | Refills: 4 | Status: SHIPPED | OUTPATIENT
Start: 2022-10-31 | End: 2023-10-16

## 2022-10-31 RX ORDER — DULOXETIN HYDROCHLORIDE 30 MG/1
CAPSULE, DELAYED RELEASE ORAL
Qty: 180 CAPSULE | Refills: 4 | Status: SHIPPED | OUTPATIENT
Start: 2022-10-31 | End: 2023-10-16

## 2022-10-31 RX ORDER — LOSARTAN POTASSIUM AND HYDROCHLOROTHIAZIDE 12.5; 1 MG/1; MG/1
1 TABLET ORAL DAILY
Qty: 90 TABLET | Refills: 4 | Status: SHIPPED | OUTPATIENT
Start: 2022-10-31 | End: 2023-10-16

## 2022-10-31 RX ORDER — ROSUVASTATIN CALCIUM 40 MG/1
40 TABLET, COATED ORAL EVERY EVENING
Qty: 90 TABLET | Refills: 4 | Status: SHIPPED | OUTPATIENT
Start: 2022-10-31 | End: 2023-10-16

## 2022-10-31 ASSESSMENT — PATIENT HEALTH QUESTIONNAIRE - PHQ9
SUM OF ALL RESPONSES TO PHQ QUESTIONS 1-9: 10
SUM OF ALL RESPONSES TO PHQ QUESTIONS 1-9: 10
10. IF YOU CHECKED OFF ANY PROBLEMS, HOW DIFFICULT HAVE THESE PROBLEMS MADE IT FOR YOU TO DO YOUR WORK, TAKE CARE OF THINGS AT HOME, OR GET ALONG WITH OTHER PEOPLE: SOMEWHAT DIFFICULT

## 2022-10-31 ASSESSMENT — PAIN SCALES - GENERAL: PAINLEVEL: MILD PAIN (3)

## 2022-10-31 ASSESSMENT — ACTIVITIES OF DAILY LIVING (ADL): CURRENT_FUNCTION: NO ASSISTANCE NEEDED

## 2022-10-31 NOTE — PROGRESS NOTES
[facility]  [address]  [place] [address]  Phone: [phone]  Primary Provider: Chula Perkins  Pre-op Performing Provider: CHULA PERKINS          Preop Questions 10/31/2022   1. Have you ever had a heart attack or stroke? YES - ***   2. Have you ever had surgery on your heart or blood vessels, such as a stent placement, a coronary artery bypass, or surgery on an artery in your head, neck, heart, or legs? YES - ***   3. Do you have chest pain with activity? No   4. Do you have a history of  heart failure? No   5. Do you currently have a cold, bronchitis or symptoms of other infection? No   6. Do you have a cough, shortness of breath, or wheezing? No   7. Do you or anyone in your family have previous history of blood clots? No   8. Do you or does anyone in your family have a serious bleeding problem such as prolonged bleeding following surgeries or cuts? No   9. Have you ever had problems with anemia or been told to take iron pills? No   10. Have you had any abnormal blood loss such as black, tarry or bloody stools, or abnormal vaginal bleeding? No   11. Have you ever had a blood transfusion? No   12. Are you willing to have a blood transfusion if it is medically needed before, during, or after your surgery? Yes   13. Have you or any of your relatives ever had problems with anesthesia? No   14. Do you have sleep apnea, excessive snoring or daytime drowsiness? YES - ***   14a. Do you have a CPAP machine? Yes   15. Do you have any artifical heart valves or other implanted medical devices like a pacemaker defibrillator, or continuous glucose monitor? No   16. Do you have artificial joints? No   17. Are you allergic to latex? No       Review of Systems  {ROS Preop Choices:650421}     Past Medical History:   Diagnosis Date     Benign essential hypertension 03/03/2018     Cataract      Chronic pain of right knee 04/11/2018     Diabetic eye exam (H) 06/07/2018     TAYLOR (generalized  anxiety disorder) 03/03/2018     History of MI (myocardial infarction) 07/2017    follows yearly with [facility]     Macular degeneration      Major depressive disorder, single episode 1995     Obese      Obstructive sleep apnea 2005    BIPAP     Osteopenia 2007    Lumbar spine     Pure hypercholesterolemia 2001     PVD (peripheral vascular disease) (H)      Squamous cell carcinoma, leg, left 12/2021     Type 2 diabetes mellitus with complication, without long-term current use of insulin (H) 03/03/2018     Past Surgical History:   Procedure Laterality Date     ANGIOGRAM Right 3/22/2022    Procedure: with right iliac angiogram, right iliac angioplasty and stenting;  Surgeon: Rama Sewell MD;  Location: [facility] OR     BREAST BIOPSY, RT/LT  1986    x2; in [place]     CARDIAC CATH - HIM SCAN  07/10/2017    Mid RCA;  drug eluting stent     CHOLECYSTECTOMY  2008     COLONOSCOPY N/A 12/20/2021    Tubular Adenoma F/U 2024     ENDARTERECTOMY FEMORAL Right 3/22/2022    Procedure: Right femoral endarterectomy;  Surgeon: Rama Sewell MD;  Location: [facility] OR     IR OR ANGIOGRAM  3/22/2022     SKIN CANCER EXCISION Left 12/2021    [facility]     TONSILLECTOMY, ADENOIDECTOMY, COMBINED  1955     Current Outpatient Medications   Medication Sig Dispense Refill     acetaminophen (TYLENOL) 650 MG CR tablet Take 1,300 mg by mouth 3 times daily       aspirin 81 MG chewable tablet Take 81 mg by mouth daily       B Complex CAPS Take 1 capsule by mouth daily       buPROPion (WELLBUTRIN XL) 300 MG 24 hr tablet TAKE 1 TABLET (300 MG) BY MOUTH DAILY 90 tablet 3     clopidogrel (PLAVIX) 75 MG tablet TAKE 1 TAB BY MOUTH ONCE DAILY 90 tablet 1     DULoxetine (CYMBALTA) 30 MG capsule TAKE 2 CAPSULES BY MOUTH DAILY 180 capsule 3     LORazepam (ATIVAN) 0.5 MG tablet Take 1 tablet (0.5 mg) by mouth nightly as needed for anxiety 15 tablet 5     losartan-hydrochlorothiazide (HYZAAR) 100-12.5 MG tablet Take 1 tablet by mouth daily 90 tablet 4     metFORMIN (GLUCOPHAGE) 500  "MG tablet Take 0.5 tablets (250 mg) by mouth 2 times daily (with meals) 90 tablet 4     Multiple Vitamins-Minerals (PRESERVISION AREDS 2+MULTI VIT PO) Take 1 tablet by mouth 2 times daily (Preservision Areds)       nystatin (MYCOSTATIN) 972470 UNIT/GM external powder Apply topically 2 times daily as needed for other (yeast) 60 g 4     rosuvastatin (CRESTOR) 40 MG tablet Take 1 tablet (40 mg) by mouth every evening 90 tablet 1     traZODone (DESYREL) 50 MG tablet Take 1-2 tablets ( mg) by mouth At Bedtime 180 tablet 0     tretinoin (RETIN-A) 0.05 % external cream Apply 1 g topically At Bedtime 45 g 1     Vitamin D, Cholecalciferol, 25 MCG (1000 UT) TABS Take 1 tablet by mouth daily        ACCU-CHEK SMARTVIEW test strip NEW TO THRIFTY - 1-2 TIMES DAILY 50 each 3       No Known Allergies     Social History     Tobacco Use     Smoking status: Former     Packs/day: 1.00     Years: 40.00     Pack years: 40.00     Types: Cigarettes     Quit date: 10/1/2008     Years since quittin.0     Smokeless tobacco: Never     Tobacco comments:     Patient occasionally uses Nicorette gum   Substance Use Topics     Alcohol use: Yes     Alcohol/week: 14.0 standard drinks     Comment: 1-3 beer; not daily     Family History   Problem Relation Age of Onset     Hypertension Father      Heart Disease Father         MI     Lung Cancer Mother         smoker     Diabetes Sister      Heart Disease Sister      Kidney Disease Sister 33        ?congenital     Hyperlipidemia Sister      No Known Problems Brother      Breast Cancer No family hx of         Cancer-breast     History   Drug Use No         Objective     /86 (BP Location: Right arm, Patient Position: Sitting, Cuff Size: Adult Regular)   Pulse 95   Temp (!) 96.5  F (35.8  C) (Temporal)   Resp 16   Ht 1.588 m (5' 2.5\")   Wt 74.8 kg (165 lb)   LMP  (LMP Unknown)   SpO2 96%   BMI 29.70 kg/m      Physical Exam  {EXAM Preop Choices:384445}    Recent Labs   Lab Test " 08/17/22  1647 03/22/22  1151 03/22/22  0704 03/07/22  1454   HGB 12.7  --  12.2 13.8    248 241 386     --  140 137   POTASSIUM 4.0  --  3.9 4.0   CR 1.01  --  0.81 1.05   A1C 7.1*  --   --  7.4*        Diagnostics:  {LABS:622061}

## 2022-10-31 NOTE — PROGRESS NOTES
SUBJECTIVE:   CC: Jennyfer is an 72 year old who presents for preventive health visit.     She initially came in today for a preop however in discussing upcoming reverse right shoulder replacement she does have a lot of hesitation going into surgery.  She is unsure if this is the right thing to be doing.  She is curious if further evaluation or second opinion is needed.  We discussed that that if she is this concerned that it is recommended that she not pursue surgery and will cancel her upcoming scheduled OR time.    She continues to have some issues with sleep and mood.  Her dog recently passed away and this has been difficult for her.  She does question if there is something that would help with sleep.    She has ongoing issues with her neck.  She has been doing some massage, chiropractics and physical therapy.  She also has had an injection done in the neck which did not help much.    She has a history of hypertension.  Her blood pressure is controlled.  She has known diabetes and her most recent A1c was 7.1%.  She is due for her microalbumin.  Recent labs overall were stable/normal including her kidney function with her history of stage III chronic kidney disease.  She is on medications for her blood pressure including losartan/hydrochlorothiazide.  She continues on metformin for her blood sugars.  She continues on medications for peripheral artery disease including rosuvastatin.  She also has a known aortic aneurysm and is due for repeat echocardiogram.  She is following with vascular surgery.    No concerns for memory.     Patient has been advised of split billing requirements and indicates understanding: Yes  Healthy Habits:     In general, how would you rate your overall health?  Good    Frequency of exercise:  None    Duration of exercise:  Less than 15 minutes    Do you usually eat at least 4 servings of fruit and vegetables a day, include whole grains    & fiber and avoid regularly eating high fat or  "\"junk\" foods?  No (2-3)    Taking medications regularly:  Yes    Barriers to taking medications:  None    Medication side effects:  None    Ability to successfully perform activities of daily living:  No assistance needed    Home Safety:  No safety concerns identified    Hearing Impairment:  No hearing concerns    In the past 6 months, have you been bothered by leaking of urine?  No    In general, how would you rate your overall mental or emotional health?  Poor (Increased depression)      PHQ-2 Total Score: 2    Additional concerns today:  Yes      Today's PHQ-2 Score:   PHQ-2 (  [employer]) 2022   Q1: Little interest or pleasure in doing things 0   Q2: Feeling down, depressed or hopeless 0   PHQ-2 Score 0   PHQ-2 Total Score (12-17 Years)- Positive if 3 or more points; Administer PHQ-A if positive -   Q1: Little interest or pleasure in doing things -   Q2: Feeling down, depressed or hopeless -   PHQ-2 Score -       Abuse: Current or Past (Physical, Sexual or Emotional) - No  Do you feel safe in your environment? Yes        Social History     Tobacco Use     Smoking status: Former     Packs/day: 1.00     Years: 40.00     Pack years: 40.00     Types: Cigarettes     Quit date: 10/1/2008     Years since quittin.0     Smokeless tobacco: Never     Tobacco comments:     Patient occasionally uses Nicorette gum   Substance Use Topics     Alcohol use: Yes     Alcohol/week: 14.0 standard drinks     Comment: 1-3 beer; not daily       Alcohol Use 2021   Prescreen: >3 drinks/day or >7 drinks/week? -   AUDIT SCORE  4       Reviewed orders with patient.  Reviewed health maintenance and updated orders accordingly - Yes      Breast Cancer Screening:  Annually     History of abnormal Pap smear: NO - age 65 - see link Cervical Cytology Screening Guidelines     Reviewed and updated as needed this visit by clinical staff   Tobacco  Allergies  Meds  Problems  Med Hx  Surg Hx  Fam Hx  Soc   Hx        Reviewed and " "updated as needed this visit by Provider   Tobacco  Allergies  Meds  Problems  Med Hx  Surg Hx  Fam Hx         Current Outpatient Medications   Medication     acetaminophen (TYLENOL) 650 MG CR tablet     B Complex CAPS     buPROPion (WELLBUTRIN XL) 300 MG 24 hr tablet     clopidogrel (PLAVIX) 75 MG tablet     DULoxetine (CYMBALTA) 30 MG capsule     losartan-hydrochlorothiazide (HYZAAR) 100-12.5 MG tablet     metFORMIN (GLUCOPHAGE) 500 MG tablet     Multiple Vitamins-Minerals (PRESERVISION AREDS 2+MULTI VIT PO)     nystatin (MYCOSTATIN) 618761 UNIT/GM external powder     rosuvastatin (CRESTOR) 40 MG tablet     traZODone (DESYREL) 50 MG tablet     Vitamin D, Cholecalciferol, 25 MCG (1000 UT) TABS     ACCU-CHEK SMARTVIEW test strip     tretinoin (RETIN-A) 0.05 % external cream     No current facility-administered medications for this visit.         Review of Systems  CONSTITUTIONAL: Negative for fever, chills, night sweats chronically, significant change in weight  INTEGUMENTARY/SKIN/LYMPH: Negative for worrisome rashes, moles or lesions; swollen lymph nodes  EYES: Negative for vision changes or irritation  ENT: Negative for additional ear, mouth and throat problems  RESP: Negative for significant cough or SOB  CV: Negative for chest pain, palpitations or increased peripheral edema  GI: Negative for abdominal pain, or change in bowel habits or blood in the stools/black stools  : Negative for unusual urinary or vaginal symptoms. No vaginal bleeding.  MUSCULOSKELETAL: Negative for new or changing joint pain or significant swelling; pain along right neck up into the head  NEURO: Negative for new headaches, weakness or paraesthesias  PSYCHIATRIC: Depression after recent loss of dog       OBJECTIVE:   /86 (BP Location: Right arm, Patient Position: Sitting, Cuff Size: Adult Regular)   Pulse 95   Temp (!) 96.5  F (35.8  C) (Temporal)   Resp 16   Ht 1.588 m (5' 2.5\")   Wt 74.8 kg (165 lb)   LMP  (LMP " Unknown)   SpO2 96%   BMI 29.70 kg/m    Physical Exam  GEN: Vitals reviewed. Healthy appearing. Patient is in no acute distress. Cooperative with exam.  HEENT: Normocephalic atraumatic.  Eyes grossly normal to inspection.  No discharge or erythema, or obvious scleral/conjunctival abnormalities.   NECK: Supple; no thyromegaly or masses noted.  No cervical or supraclavicular lymphadenopathy.  CV: Heart regular in rate and rhythm with no murmur.    LUNGS: No audible wheeze, cough, or visible cyanosis.  No visible retractions or increased work of breathing.  Lungs clear to auscultation bilaterally.    ABD:  Obese, nondistended  SKIN: Warm and dry to touch.  Visible skin clear. No significant rash, abnormal pigmentation or lesions.  EXT: No clubbing or cyanosis.  No peripheral edema.        Diagnostic Test Results:  Labs reviewed in Epic    ASSESSMENT/PLAN:   1. Rotator cuff tear arthropathy of right shoulder  -Referral placed for second opinion through orthopedics in [place].  - Orthopedic  Referral; Future    2. Essential hypertension  Controlled today, continue current medication  - losartan-hydrochlorothiazide (HYZAAR) 100-12.5 MG tablet; Take 1 tablet by mouth daily  Dispense: 90 tablet; Refill: 4    3. Type 2 diabetes mellitus without complication, without long-term current use of insulin (H)  Overall controlled, continue current medication and plan for repeat A1c in 2 to 4 months.  Microalbumin done today.  - metFORMIN (GLUCOPHAGE) 500 MG tablet; Take 0.5 tablets (250 mg) by mouth 2 times daily (with meals)  Dispense: 90 tablet; Refill: 4  - Albumin Random Urine Quantitative with Creat Ratio; Future  - Albumin Random Urine Quantitative with Creat Ratio    4. Atherosclerosis of lower extremity with claudication (H)  Continue on medication for cholesterol and will gradually increase to max dosages.  - rosuvastatin (CRESTOR) 40 MG tablet; Take 1 tablet (40 mg) by mouth every evening  Dispense: 90 tablet;  "Refill: 4    5. Insomnia, unspecified type  Will trial trazodone and see if this helps at various doses.  - traZODone (DESYREL) 50 MG tablet; Take 1-2 tablets ( mg) by mouth At Bedtime  Dispense: 180 tablet; Refill: 4    6. Anxiety and depression  - Controlled.  Continue current regimen.    - DULoxetine (CYMBALTA) 30 MG capsule; TAKE 2 CAPSULES BY MOUTH DAILY  Dispense: 180 capsule; Refill: 4    7. Encounter for Medicare annual wellness exam    8. Stage 3a chronic kidney disease (H)  Overall stable, continue to monitor every 6 to 12 months    9. Aortic aneurysm without rupture, unspecified portion of aorta (H)  Repeat echocardiogram to follow-up on aortic aneurysm.  - Echocardiogram Complete; Future      Patient has been advised of split billing requirements and indicates understanding: Yes      COUNSELING:  Reviewed preventive health counseling, as reflected in patient instructions    Estimated body mass index is 29.7 kg/m  as calculated from the following:    Height as of this encounter: 1.588 m (5' 2.5\").    Weight as of this encounter: 74.8 kg (165 lb).    Weight management plan: Discussed healthy diet and exercise guidelines    She reports that she quit smoking about 14 years ago. She has a 40.00 pack-year smoking history. She has never used smokeless tobacco.      Counseling Resources:  ATP IV Guidelines  Pooled Cohorts Equation Calculator  Breast Cancer Risk Calculator  BRCA-Related Cancer Risk Assessment: FHS-7 Tool  FRAX Risk Assessment  ICSI Preventive Guidelines  Dietary Guidelines for Americans, 2010  USDA's MyPlate  ASA Prophylaxis  Lung CA Screening    Lili Perkins DO  [facility]    The patient s PHQ-9 score is consistent with moderate depression. She was provided with information regarding depression and was advised to schedule a follow up appointment in 12-16 weeks to further address this issue.  Answers for HPI/ROS submitted by the patient on 10/31/2022  If you checked " off any problems, how difficult have these problems made it for you to do your work, take care of things at home, or get along with other people?: Somewhat difficult  PHQ9 TOTAL SCORE: 10

## 2022-10-31 NOTE — NURSING NOTE
Patient presents to clinic today for Pre-op.  Medication reconciliation completed.    ACP on file? No, form previously provided.     FOOD SECURITY SCREENING QUESTIONS    The next two questions are to help us understand your food security.  If you are feeling you need any assistance in this area, we have resources available to support you today.    Hunger Vital Signs:  Within the past 12 months we worried whether our food would run out before we got money to buy more. Never  Within the past 12 months the food we bought just didn't last and we didn't have money to get more. Never    Betty Kong CMA([facility])..................10/31/2022   2:15 PM     Immunization Documentation    Prior to FLUZONE HD Immunization administration, verified patients identity using patient's name and date of birth. Please see IMMUNIZATIONS  and order for additional information.  Patient / Parent instructed to remain in clinic for 15 minutes and report any adverse reaction to staff immediately.    Was entire vial of medication used? Yes  Vial/Syringe: Syringe

## 2022-10-31 NOTE — PATIENT INSTRUCTIONS
"Start Trazodone every night for mood and sleep    Patient Education   Personalized Prevention Plan  You are due for the preventive services outlined below.  Your care team is available to assist you in scheduling these services.  If you have already completed any of these items, please share that information with your care team to update in your medical record.  Health Maintenance Due   Topic Date Due    Flu Vaccine (1) 09/01/2022    Annual Wellness Visit  09/09/2022    Cholesterol Lab  09/09/2022    Kidney Microalbumin Urine Test  09/09/2022    Diabetic Foot Exam  09/09/2022    COVID-19 Vaccine (5 - Booster for Moderna series) 09/29/2022    Eye Exam  11/30/2022       Depression and Suicide in Older Adults    Nearly 2 million older Americans have some type of depression. Some of them even take their own lives. Yet depression among older adults is often ignored. Learn the warning signs. You may help spare a loved one needless pain. You may also save a life.   What is depression?  Depression is a common and serious illness that affects the way you think and feel. It is not a normal part of aging, nor is it a sign of weakness, a character flaw, or something you can snap out of. Most people with depression need treatment to get better. The most common symptom is a feeling of deep sadness. People who are depressed also may seem tired and listless. And nothing seems to give them pleasure. It s normal to grieve or be sad sometimes. But sadness lessens or passes with time. Depression rarely goes away or improves on its own. A person with clinical depression can't \"snap out of it.\" Other symptoms of depression are:   Sleeping more or less than normal  Eating more or less than normal  Having headaches, stomachaches, or other pains that don t go away  Feeling nervous,  empty,  or worthless  Crying a great deal  Thinking or talking about suicide or death  Loss of interest in activities previously enjoyed  Social " isolation  Feeling confused or forgetful  What causes it?  The causes of depression aren t fully known. But it is thought to result from a complex blend of these factors:   Biochemistry. Certain chemicals in the brain play a role.  Genes. Depression does run in families.  Life stress. Life stresses can also trigger depression in some people. Older adults often face many stressors, such as death of friends or a spouse, health problems, and financial concerns.  Chronic conditions. This includes conditions such as diabetes, heart disease, or cancer. These can cause symptoms of depression. Medicine side effects can cause changes in thoughts and behaviors.  How you can help  Often, depressed people may not want to ask for help. When they do, they may be ignored. Or, they may receive the wrong treatment. You can help by showing parents and older friends love and support. If they seem depressed, don t lecture the person, ignore the symptoms, or discount the symptoms as a  normal  part of aging -which they are not. Get involved, listen, and show interest and support.   Help them understand that depression is a treatable illness. Tell them you can help them find the right treatment. Offer to go to their healthcare provider's appointment with them for support when the symptoms are discussed. With their approval, contact a local mental health center, social service agency, or hospital about services.   You can be an advocate for him or her at healthcare appointments. Many older adults have chronic illnesses that can cause symptoms of depression. Medicine side effects can change thoughts and behaviors. You can help make sure that the healthcare provider looks at all of these factors. He or she should refer your family member or friend to a mental healthcare provider when needed. in some cases, untreated depression can lead to a misdiagnosis. A person may be diagnosed with a brain disorder such as dementia. If the healthcare  provider does not take the issue of depression seriously, help your family member or friend to find another provider.   Don't be afraid to ask  If you think an older person you care about could be suicidal, ask,  Have you thought about suicide?  Most people will tell you the truth. If they say  yes,  they may already have a plan for how and when they will attempt it. Find out as much as you can. The more detailed the plan, and the easier it is to carry out, the more danger the person is in right now. Tell the person you are there for them and do not want them to harm him or herself. Don't wait to get help for the person. Call the person's healthcare provider, local hospital, or emergency services.   To learn more  National Suicide Prevention Lifeline (crisis hotline) 908-308-TFZT (708-293-3625)  National [place] of Mental Itqmup073-235-1903nwa.[facility].nih.gov  National [place] on Mental Deirowe352-773-0836lhe.[place].org  Mental Health Sbkfpta545-740-3511grg.[facility].org  National Suicide Wkqlqqd391-ZNZLSAA (543-356-8781)    Call 911  Never leave the person alone. A person who is actively suicidal needs psychiatric care right away. They will need constant supervision. Never leave the person out of sight. Call 911 or the national 24-hour suicide crisis hotline at 945-067-SCLS (643-251-0144). You can also take the person to the closest emergency room.   [employer] last reviewed this educational content on 5/1/2020 2000-2021 [employer]. All rights reserved. This information is not intended as a substitute for professional medical care. Always follow your healthcare professional's instructions.

## 2022-11-01 LAB
CREAT UR-MCNC: 196 MG/DL
MICROALBUMIN UR-MCNC: 21.3 MG/L
MICROALBUMIN/CREAT UR: 10.87 MG/G CR (ref 0–25)

## 2022-11-08 DIAGNOSIS — L98.8 WRINKLES: ICD-10-CM

## 2022-11-08 RX ORDER — TRETINOIN 0.5 MG/G
CREAM TOPICAL
Qty: 45 G | Refills: 1 | Status: SHIPPED | OUTPATIENT
Start: 2022-11-08 | End: 2023-10-16

## 2022-11-08 NOTE — TELEPHONE ENCOUNTER
Additional request received from [phone]. Left message for Pt, to verify preferred pharmacy. Zenia Griffin RN .............. 11/8/2022  3:18 PM

## 2022-11-08 NOTE — TELEPHONE ENCOUNTER
[id number] - [place], [place] - [address]     Requested Prescriptions   Pending Prescriptions Disp Refills     tretinoin (RETIN-A) 0.05 % external cream [Pharmacy Med Name: TRETINOIN 0.05% CREAM 45GM] 45 g 1     Sig: APPLY 1 GRAM EXTERNALLY TO THE AFFECTED AREA AT BEDTIME        Last Prescription Date:  8/23/21  Last Fill Qty/Refills:         45 g, R-1    Last Office Visit:             10/31/22  Future Office visit:           None    Nanette Walters RN on 11/8/2022 at 2:30 PM

## 2022-11-11 DIAGNOSIS — L98.8 WRINKLES: ICD-10-CM

## 2022-11-15 ENCOUNTER — TELEPHONE (OUTPATIENT)
Dept: INTERNAL MEDICINE | Facility: OTHER | Age: 72
End: 2022-11-15

## 2022-11-15 NOTE — TELEPHONE ENCOUNTER
Patient needing some direction on if she should keep her appointment with Dr. Galvin in [place] on 11/21/22 or if she should cancel. She states that he does not address neck pain. Please call when available.     Anushka Roberts on 11/15/2022 at 2:42 PM

## 2022-11-15 NOTE — TELEPHONE ENCOUNTER
tretinoin (RETIN-A) 0.05 % external cream 45 g 1 11/8/2022  No   Sig: APPLY 1 GRAM EXTERNALLY TO THE AFFECTED AREA AT BEDTIME     To [facility] requesting    Debbie Jackson RN on 11/15/2022 at 2:56 PM

## 2022-11-16 DIAGNOSIS — L98.8 WRINKLES: ICD-10-CM

## 2022-11-16 RX ORDER — TRETINOIN 0.5 MG/G
CREAM TOPICAL
Qty: 45 G | Refills: 1 | OUTPATIENT
Start: 2022-11-16

## 2022-11-16 NOTE — TELEPHONE ENCOUNTER
I would recommend keeping it and getting his opinion on whether her pain would be improved by doing a total shoulder replacement - which she was previously scheduled for.  We do not need him to fix the neck pain, just help us decide if a shoulder surgery would be warranted.

## 2022-11-16 NOTE — TELEPHONE ENCOUNTER
Patient notified. She will keep the appointment.   Betty Kong CMA([facility])..................11/16/2022   5:15 PM

## 2022-11-18 RX ORDER — TRETINOIN 0.5 MG/G
CREAM TOPICAL
Qty: 45 G | Refills: 1 | OUTPATIENT
Start: 2022-11-18

## 2022-11-18 NOTE — TELEPHONE ENCOUNTER
"Refill request(s) refused, with note to pharmacy.      Requested Prescriptions   Pending Prescriptions Disp Refills     tretinoin (RETIN-A) 0.05 % external cream 45 g 1     Sig: APPLY 1 GRAM EXTERNALLY TO THE AFFECTED AREA AT BEDTIME  Strength: 0.05 %       Topical Acne Medications Protocol Passed - 11/16/2022 10:50 AM        Passed - Patient is 12 years of age or older        Passed - Recent (12 mo) or future (30 days) visit within the authorizing provider's specialty     Patient has had an office visit with the authorizing provider or a provider within the authorizing providers department within the previous 12 mos or has a future within next 30 days. See \"Patient Info\" tab in inbasket, or \"Choose Columns\" in Meds & Orders section of the refill encounter.          Passed - Medication is active on med list     Last Prescription Date:   11/08/22  Last Fill Qty/Refills:         45 g, R-1  Last Office Visit:              10/31/22   Future Office visit:           None    Marisa Marcial RN on 11/18/2022 at 10:47 AM      "

## 2022-11-22 DIAGNOSIS — L98.8 WRINKLES: ICD-10-CM

## 2022-11-22 NOTE — TELEPHONE ENCOUNTER
4th fax request.    Please note last prescription was sent to [facility].     Zenia Griffin RN .............. 11/22/2022  9:46 AM

## 2022-11-23 NOTE — NURSING NOTE
Patient Information     Patient Name MRN Sex Jennyfer Solis [id number] Female 1950      Nursing Note by Gosselin, Norma J at 2017 11:30 AM     Author:  Gosselin, Norma J Service:  (none) Author Type:  (none)     Filed:  2017 12:56 PM Encounter Date:  2017 Status:  Signed     :  Gosselin, Norma J            Aspirin 81 mg x 4 tablets ordered by Jennyfer Hopper MD.  Medication administered per order in [facility]   Lot # [id number]  Exp. 10/2018   [id number]  Patient tolerated well.  Norma J GosselinLPASHLEY...... 2017 12:55 PM           Ivermectin Counseling:  Patient instructed to take medication on an empty stomach with a full glass of water.  Patient informed of potential adverse effects including but not limited to nausea, diarrhea, dizziness, itching, and swelling of the extremities or lymph nodes.  The patient verbalized understanding of the proper use and possible adverse effects of ivermectin.  All of the patient's questions and concerns were addressed.

## 2022-11-23 NOTE — TELEPHONE ENCOUNTER
Left message with patient to call back and verify which pharmacy she would like refill sent too.    Evangelista Sharp RN  ....................  11/23/2022   2:18 PM

## 2022-11-25 RX ORDER — TRETINOIN 0.5 MG/G
CREAM TOPICAL
Qty: 45 G | Refills: 1 | OUTPATIENT
Start: 2022-11-25

## 2022-11-25 NOTE — TELEPHONE ENCOUNTER
Last Prescription Date:     Disp Refills Start End SAVANAH   tretinoin (RETIN-A) 0.05 % external cream 45 g 1 11/8/2022  No   Sig: APPLY 1 GRAM EXTERNALLY TO THE AFFECTED AREA AT BEDTIME   Sent to pharmacy as: Tretinoin 0.05 % External Cream (RETIN-A)   Class: E-Prescribe   Order: [id number]   E-Prescribing Status: Receipt confirmed by pharmacy (11/8/2022  5:46 PM CST)   Renewals    Renewal provider: Lili Perkins DO        [facility] #[id number] - [place], [place] - [address]     Refused, with note for [facility] to transfer prescription as appropriate. Zenia Griffin RN .............. 11/25/2022  9:34 AM

## 2022-11-30 ENCOUNTER — TRANSFERRED RECORDS (OUTPATIENT)
Dept: HEALTH INFORMATION MANAGEMENT | Facility: OTHER | Age: 72
End: 2022-11-30

## 2022-11-30 LAB — RETINOPATHY: NEGATIVE

## 2022-12-08 ENCOUNTER — TELEPHONE (OUTPATIENT)
Dept: INTERNAL MEDICINE | Facility: OTHER | Age: 72
End: 2022-12-08

## 2022-12-08 DIAGNOSIS — M25.511 RIGHT SHOULDER PAIN, UNSPECIFIED CHRONICITY: ICD-10-CM

## 2022-12-08 DIAGNOSIS — M53.9 MULTILEVEL DEGENERATIVE DISC DISEASE: ICD-10-CM

## 2022-12-08 DIAGNOSIS — M50.13 CERVICAL DISC DISORDER WITH RADICULOPATHY, CERVICOTHORACIC REGION: Primary | ICD-10-CM

## 2022-12-08 NOTE — TELEPHONE ENCOUNTER
[facility]-patient wants a call back from [facility] to discuss a referral    Please call and advise    Thank You    Sultana Pena on 12/8/2022 at 1:04 PM

## 2022-12-14 NOTE — TELEPHONE ENCOUNTER
I have heard good things about Dr. Hernandez and have had several patients see Dr. Mitchell.  I also have had several patients see Dr. Clay who works with Dr. Mitchell.  I would be comfortable with any of these.  If she needs a referral please let me know and we can place 1.

## 2022-12-14 NOTE — TELEPHONE ENCOUNTER
Left message on machine to call back.  Betty oKng CMA([facility])..................12/14/2022   9:49 AM

## 2022-12-14 NOTE — TELEPHONE ENCOUNTER
Went to see Dr Galvin. She needs a reverse shoulder but he says she needs to get her neck fixed first.     She was given three names for neuro surgery:   Two doctors with [facility]- Darrius Morales and Damion Hernandez and one in [place], [place] David Johnson    She had a virtual visit with Dr Adelia Mitchell once during COVID and wonders if we ever got the records from that visit.    She would like your opinion on which neurosurgeon she should go see and is very frustrated. She is in a lot of pain and really wants to get moving on surgery or something for her neck.    Betty Kong CMA([facility])..................12/14/2022   4:48 PM

## 2022-12-15 NOTE — TELEPHONE ENCOUNTER
She spoke to several people and seems very confused as to where all of the doctors are from.   She decided to try going back to see Dr Mitchell.  Please place referral.   Betty Kong CMA([facility])..................12/15/2022   11:00 AM

## 2022-12-15 NOTE — TELEPHONE ENCOUNTER
The patient would like to get a referral  To Armani Barreto MD at [facility] for neurosurgery.  If she is to see him she needs another CT scan of the neck as CT scan cannot be older than 6 months.      [facility]:  [phone]  FAX:  [phone]

## 2022-12-16 NOTE — TELEPHONE ENCOUNTER
The patient called back and I told her what Dr Perkins said about who is to place the orders for MRI or CT of neck and the patient will call Dr Barreto's office and get back to us.

## 2022-12-16 NOTE — TELEPHONE ENCOUNTER
Referral placed - she should clarify with them if they want an MRI or CT neck, and if they will place orders or if I need to.

## 2022-12-19 ENCOUNTER — MYC MEDICAL ADVICE (OUTPATIENT)
Dept: INTERNAL MEDICINE | Facility: OTHER | Age: 72
End: 2022-12-19

## 2022-12-19 DIAGNOSIS — M25.511 RIGHT SHOULDER PAIN, UNSPECIFIED CHRONICITY: ICD-10-CM

## 2022-12-19 DIAGNOSIS — E11.8 TYPE 2 DIABETES MELLITUS WITH COMPLICATION, WITHOUT LONG-TERM CURRENT USE OF INSULIN (H): Primary | ICD-10-CM

## 2022-12-19 DIAGNOSIS — M53.9 MULTILEVEL DEGENERATIVE DISC DISEASE: ICD-10-CM

## 2022-12-19 DIAGNOSIS — M50.13 CERVICAL DISC DISORDER WITH RADICULOPATHY, CERVICOTHORACIC REGION: Primary | ICD-10-CM

## 2022-12-19 NOTE — TELEPHONE ENCOUNTER
Her last MRI was done in April of this year.   They must need a newer one? And images need to be sent.  Referral was done last week.  I will have our  check in to making sure all records are sent.  Betty Kong CMA([facility])..................12/19/2022   4:35 PM

## 2022-12-21 RX ORDER — BLOOD SUGAR DIAGNOSTIC
STRIP MISCELLANEOUS
Qty: 200 STRIP | Refills: 0 | Status: SHIPPED | OUTPATIENT
Start: 2022-12-21

## 2022-12-21 NOTE — TELEPHONE ENCOUNTER
[id number] sent Rx request for the following:      Requested Prescriptions   Pending Prescriptions Disp Refills     ACCU-CHEK SMARTVIEW test strip [Pharmacy Med Name: ACCU-CHEK CHERYL SMARTVIEW STRIP]  3     Sig: CHECK BLOOD SUGAR 1-2 TIMES DAILY   Last Prescription Date:   1/15/21  Last Fill Qty/Refills:         50, R-3    Last Office Visit:              10/31/22   Future Office visit:           None    Per LOV note:  Return in about 6 months (around 4/30/2023).    Zenia Griffin RN .............. 12/21/2022  11:45 AM

## 2022-12-22 ENCOUNTER — HOSPITAL ENCOUNTER (OUTPATIENT)
Dept: CARDIOLOGY | Facility: OTHER | Age: 72
Discharge: HOME | End: 2022-12-22
Attending: INTERNAL MEDICINE | Admitting: INTERNAL MEDICINE
Payer: MEDICARE

## 2022-12-22 VITALS — SYSTOLIC BLOOD PRESSURE: 148 MMHG | DIASTOLIC BLOOD PRESSURE: 90 MMHG

## 2022-12-22 DIAGNOSIS — I71.9 AORTIC ANEURYSM WITHOUT RUPTURE, UNSPECIFIED PORTION OF AORTA (H): ICD-10-CM

## 2022-12-22 LAB — LVEF ECHO: NORMAL

## 2022-12-22 PROCEDURE — 93306 TTE W/DOPPLER COMPLETE: CPT

## 2022-12-22 PROCEDURE — 93306 TTE W/DOPPLER COMPLETE: CPT | Mod: 26 | Performed by: INTERNAL MEDICINE

## 2022-12-24 NOTE — TELEPHONE ENCOUNTER
Orders are placed - please notify patient and let her know it might take a week or so for scheduling to call and get the MRI scheduled.

## 2022-12-26 NOTE — TELEPHONE ENCOUNTER
Called and left a message for patient to refer to [facility] for review on reply. If any further questions, instructed patient to call clinic.    Jennyfer Mo LPN....................  12/26/2022   4:48 PM

## 2023-01-02 ENCOUNTER — HOSPITAL ENCOUNTER (OUTPATIENT)
Dept: MRI IMAGING | Facility: OTHER | Age: 73
Discharge: HOME | End: 2023-01-02
Attending: INTERNAL MEDICINE | Admitting: INTERNAL MEDICINE
Payer: MEDICARE

## 2023-01-02 DIAGNOSIS — M53.9 MULTILEVEL DEGENERATIVE DISC DISEASE: ICD-10-CM

## 2023-01-02 DIAGNOSIS — M50.13 CERVICAL DISC DISORDER WITH RADICULOPATHY, CERVICOTHORACIC REGION: ICD-10-CM

## 2023-01-02 DIAGNOSIS — M25.511 RIGHT SHOULDER PAIN, UNSPECIFIED CHRONICITY: ICD-10-CM

## 2023-01-02 PROCEDURE — 72141 MRI NECK SPINE W/O DYE: CPT | Mod: ME

## 2023-01-18 ENCOUNTER — TRANSFERRED RECORDS (OUTPATIENT)
Dept: HEALTH INFORMATION MANAGEMENT | Facility: OTHER | Age: 73
End: 2023-01-18
Payer: MEDICARE

## 2023-01-23 ENCOUNTER — TRANSFERRED RECORDS (OUTPATIENT)
Dept: HEALTH INFORMATION MANAGEMENT | Facility: OTHER | Age: 73
End: 2023-01-23
Payer: MEDICARE

## 2023-02-07 ENCOUNTER — TRANSFERRED RECORDS (OUTPATIENT)
Dept: HEALTH INFORMATION MANAGEMENT | Facility: OTHER | Age: 73
End: 2023-02-07
Payer: MEDICARE

## 2023-02-16 ENCOUNTER — OFFICE VISIT (OUTPATIENT)
Dept: INTERNAL MEDICINE | Facility: OTHER | Age: 73
End: 2023-02-16
Attending: INTERNAL MEDICINE
Payer: MEDICARE

## 2023-02-16 VITALS
HEART RATE: 115 BPM | TEMPERATURE: 97.9 F | RESPIRATION RATE: 18 BRPM | SYSTOLIC BLOOD PRESSURE: 118 MMHG | WEIGHT: 159.2 LBS | DIASTOLIC BLOOD PRESSURE: 72 MMHG | OXYGEN SATURATION: 95 % | BODY MASS INDEX: 28.65 KG/M2

## 2023-02-16 DIAGNOSIS — M62.838 MUSCLE SPASM: ICD-10-CM

## 2023-02-16 DIAGNOSIS — I70.219 ATHEROSCLEROSIS OF LOWER EXTREMITY WITH CLAUDICATION (H): ICD-10-CM

## 2023-02-16 DIAGNOSIS — E11.8 TYPE 2 DIABETES MELLITUS WITH COMPLICATION, WITHOUT LONG-TERM CURRENT USE OF INSULIN (H): ICD-10-CM

## 2023-02-16 DIAGNOSIS — N18.31 STAGE 3A CHRONIC KIDNEY DISEASE (H): ICD-10-CM

## 2023-02-16 DIAGNOSIS — F33.1 DEPRESSION, MAJOR, RECURRENT, MODERATE (H): ICD-10-CM

## 2023-02-16 DIAGNOSIS — J01.90 ACUTE SINUSITIS WITH SYMPTOMS > 10 DAYS: Primary | ICD-10-CM

## 2023-02-16 DIAGNOSIS — M50.13 CERVICAL DISC DISORDER WITH RADICULOPATHY, CERVICOTHORACIC REGION: ICD-10-CM

## 2023-02-16 PROCEDURE — G0463 HOSPITAL OUTPT CLINIC VISIT: HCPCS

## 2023-02-16 PROCEDURE — 99214 OFFICE O/P EST MOD 30 MIN: CPT | Performed by: INTERNAL MEDICINE

## 2023-02-16 RX ORDER — PREDNISONE 20 MG/1
40 TABLET ORAL DAILY
Qty: 14 TABLET | Refills: 0 | Status: SHIPPED | OUTPATIENT
Start: 2023-02-16 | End: 2023-03-06

## 2023-02-16 RX ORDER — CYCLOBENZAPRINE HCL 10 MG
5-10 TABLET ORAL 3 TIMES DAILY PRN
Qty: 45 TABLET | Refills: 3 | Status: SHIPPED | OUTPATIENT
Start: 2023-02-16 | End: 2023-10-16

## 2023-02-16 ASSESSMENT — PATIENT HEALTH QUESTIONNAIRE - PHQ9: SUM OF ALL RESPONSES TO PHQ QUESTIONS 1-9: 3

## 2023-02-16 ASSESSMENT — PAIN SCALES - GENERAL: PAINLEVEL: NO PAIN (0)

## 2023-02-16 NOTE — PROGRESS NOTES
Assessment & Plan     Depression, major, recurrent, moderate (H)  - Controlled.  Continue current regimen.      Type 2 diabetes mellitus with complication, without long-term current use of insulin (H)  Plan for recheck of her A1c with next visit and consider Ozempic if indicated for better blood sugar control and her desire for weight loss  - Hemoglobin A1c; Future    Atherosclerosis of lower extremity with claudication (H)  Plan to recheck  - Lipid Panel; Future    Stage 3a chronic kidney disease (H)  Plan for recheck in follow-up  - Renal Function Panel; Future    Acute sinusitis with symptoms > 10 days  - most consistent with bacterial illness due to duration of illness  - will treat with antibiotics, patient to treat symptoms as instructed below, call if she has any ADR's or worsening symptoms  - recommend eating yogurt/kefir 1-2 times daily while on antibiotics  - amoxicillin-clavulanate (AUGMENTIN) 875-125 MG tablet; Take 1 tablet by mouth 2 times daily for 10 days    Cervical disc disorder with radiculopathy, cervicothoracic region  At this time we will trial prednisone for 1 week.  In addition she can use Flexeril as needed for muscle spasms.  We will reach out to radiology to see if they are willing to do a cervical steroid bath locally however I suspect not, continue to follow with neurosurgery as indicated  - cyclobenzaprine (FLEXERIL) 10 MG tablet; Take 0.5-1 tablets (5-10 mg) by mouth 3 times daily as needed for muscle spasms  - predniSONE (DELTASONE) 20 MG tablet; Take 2 tablets (40 mg) by mouth daily for 7 days    Muscle spasm  See above  - cyclobenzaprine (FLEXERIL) 10 MG tablet; Take 0.5-1 tablets (5-10 mg) by mouth 3 times daily as needed for muscle spasms                 Follow-up in April    Lili Perkins,   [facility]    [facility]   Jennyfer is a 73 year old, presenting for the following health issues:  Cold Symptoms (For 3 weeks ) and Follow Up (Referrals )    Sinus  congestion for 3 weeks.  She has had some ongoing fevers.  She has also had some cough.  She is using mucinex which has helped some.      She saw the orthopedic and at this time they are not recommending surgery for her neck.  They did recommend possibly a high level cervical steroid bath however we discussed that this is a high risk procedure and it is not clear if our local interventional radiology group will do this.  It was recommended she consider taking prednisone.  She is open to a prescription of this.  We also discussed using a muscle relaxer given the tightness she feels through her neck.    She has a history of type 2 diabetes.  She will be due for recheck of her labs next appointment.        History of Present Illness       Reason for visit:  Various reasons follow up from referrals    She eats 2-3 servings of fruits and vegetables daily.She consumes 1 sweetened beverage(s) daily.She exercises with enough effort to increase her heart rate 9 or less minutes per day.    She is taking medications regularly.     Acute Illness  Acute illness concerns: Cold symptoms   Onset/Duration: 3 weeks   Symptoms:  Fever: YES- 101.3  Chills/Sweats: No  Headache (location?): YES  Sinus Pressure: YES  Conjunctivitis:  No  Ear Pain: no  Rhinorrhea: YES  Congestion: YES  Sore Throat: YES  Cough: YES-productive of yellow sputum  Wheeze: YES  Decreased Appetite: No  Nausea: No  Vomiting: No  Diarrhea: No  Dysuria/Freq.: No  Dysuria or Hematuria: No  Fatigue/Achiness: YES  Sick/Strep Exposure: No  Therapies tried and outcome: Mucinex; Aleve    Review of Systems   See above      Objective    /72   Pulse 115   Temp 97.9  F (36.6  C) (Tympanic)   Resp 18   Wt 72.2 kg (159 lb 3.2 oz)   LMP  (LMP Unknown)   SpO2 95%   Breastfeeding No   BMI 28.65 kg/m    Body mass index is 28.65 kg/m .  Physical Exam   GEN: Vitals reviewed. Healthy appearing. Patient is in no acute distress. Cooperative with exam.  HEENT: Normocephalic  atraumatic.  Eyes grossly normal to inspection.  No discharge or erythema, or obvious scleral/conjunctival abnormalities.   NECK: Decreased range of motion noted with muscle hypertrophy bilaterally..    LUNGS: No audible wheeze, cough, or visible cyanosis.  No visible retractions or increased work of breathing.     ABD:  nondistended  SKIN: Warm and dry to touch.  Visible skin clear. No significant rash, abnormal pigmentation or lesions.  EXT: No clubbing or cyanosis.  No peripheral edema.

## 2023-02-16 NOTE — NURSING NOTE
"Chief Complaint   Patient presents with     Cold Symptoms     For 3 weeks      Follow Up     Referrals      Patient is here for a follow up from 3 referrals she was given. She also has had cold symptoms for 3 weeks.     Initial /72   Pulse 115   Temp 97.9  F (36.6  C) (Tympanic)   Resp 18   Wt 72.2 kg (159 lb 3.2 oz)   LMP  (LMP Unknown)   SpO2 95%   Breastfeeding No   BMI 28.65 kg/m   Estimated body mass index is 28.65 kg/m  as calculated from the following:    Height as of 10/31/22: 1.588 m (5' 2.5\").    Weight as of this encounter: 72.2 kg (159 lb 3.2 oz).  Medication Reconciliation: complete    Ursula Piper CMA       FOOD SECURITY SCREENING QUESTIONS:    The next two questions are to help us understand your food security.  If you are feeling you need any assistance in this area, we have resources available to support you today.    Hunger Vital Signs:  Within the past 12 months we worried whether our food would run out before we got money to buy more. Never  Within the past 12 months the food we bought just didn't last and we didn't have money to get more. Never  Ursula Piper CMA,LPN on 2/16/2023 at 4:15 PM      "

## 2023-02-16 NOTE — PATIENT INSTRUCTIONS
Take your antibiotics for the next 10 days and then start the steroids when done.  The muscle relaxer (Cyclobenzaprine) you can start now.    Your symptoms are most consistent with a bacterial infection.    Take your antibiotics as prescribed. Increase water intake.    Recommend eating yogurt/kefir or taking probiotics 1-2 times daily while on antibiotics     Call if symptoms do not improve in a couple days or if you develop any medication reactions.      For symptomatic relief you may try:    Nasal congestion  - afrin nasal spray for no more than 3 consecutive days  -loratadine 10mg once daily as needed     Cough  - Guaifenesin DM (generic Robitussin DM) as needed     Sore throat  - cepacol or other throat lozenges as needed  - gargle salt water (1/2 teaspoon salt in8oz warm water) every 1-2 hours    Headache, body aches, pain, sinus pressure or fever  - may use oral decongestant If you choose pseudoephedrine, use for only 5-7 days AS DIRECTED. Speak to your pharmacist ifyou have any concerns about your medications.     General  - get extra rest  - drink plenty of fluid  - avoid tobacco products    You will need to be evaluated if you start to experience:   Fever higher than 102.5 F (39.2 C)   Worsening of current symptoms  Sudden and severe pain in the face and head or troubleseeing or thinking clearly   Swelling or redness around 1 or both eyes   Trouble breathing, chest pain or a stiff neck    * If you are a smoker, try to quit *

## 2023-03-02 ENCOUNTER — MYC MEDICAL ADVICE (OUTPATIENT)
Dept: INTERNAL MEDICINE | Facility: OTHER | Age: 73
End: 2023-03-02
Payer: MEDICARE

## 2023-03-02 DIAGNOSIS — M50.13 CERVICAL DISC DISORDER WITH RADICULOPATHY, CERVICOTHORACIC REGION: ICD-10-CM

## 2023-03-06 RX ORDER — PREDNISONE 10 MG/1
TABLET ORAL
Qty: 20 TABLET | Refills: 0 | Status: SHIPPED | OUTPATIENT
Start: 2023-03-06 | End: 2023-03-18

## 2023-03-06 NOTE — TELEPHONE ENCOUNTER
She says the Prednisone might be helping, but was told she might need to be on it for two more weeks so she can give it a full chance. Also she is traveling so she would like to have it sent in [place] so she can get it tomorrow morning before she leaves for the [facility].    Betty Kong CMA([facility])..................3/6/2023   5:05 PM

## 2023-03-29 ENCOUNTER — LAB (OUTPATIENT)
Dept: LAB | Facility: OTHER | Age: 73
End: 2023-03-29
Attending: INTERNAL MEDICINE
Payer: MEDICARE

## 2023-03-29 ENCOUNTER — OFFICE VISIT (OUTPATIENT)
Dept: FAMILY MEDICINE | Facility: OTHER | Age: 73
End: 2023-03-29
Attending: INTERNAL MEDICINE
Payer: MEDICARE

## 2023-03-29 VITALS
WEIGHT: 158 LBS | DIASTOLIC BLOOD PRESSURE: 58 MMHG | HEIGHT: 63 IN | HEART RATE: 118 BPM | RESPIRATION RATE: 24 BRPM | OXYGEN SATURATION: 96 % | BODY MASS INDEX: 28 KG/M2 | TEMPERATURE: 97 F | SYSTOLIC BLOOD PRESSURE: 104 MMHG

## 2023-03-29 DIAGNOSIS — I70.219 ATHEROSCLEROSIS OF LOWER EXTREMITY WITH CLAUDICATION (H): ICD-10-CM

## 2023-03-29 DIAGNOSIS — H92.03 OTALGIA, BILATERAL: ICD-10-CM

## 2023-03-29 DIAGNOSIS — H61.23 BILATERAL IMPACTED CERUMEN: Primary | ICD-10-CM

## 2023-03-29 DIAGNOSIS — E11.8 TYPE 2 DIABETES MELLITUS WITH COMPLICATION, WITHOUT LONG-TERM CURRENT USE OF INSULIN (H): ICD-10-CM

## 2023-03-29 DIAGNOSIS — N18.31 STAGE 3A CHRONIC KIDNEY DISEASE (H): ICD-10-CM

## 2023-03-29 LAB
ALBUMIN SERPL BCG-MCNC: 4.1 G/DL (ref 3.5–5.2)
ANION GAP SERPL CALCULATED.3IONS-SCNC: 16 MMOL/L (ref 7–15)
BUN SERPL-MCNC: 13.2 MG/DL (ref 8–23)
CALCIUM SERPL-MCNC: 10.1 MG/DL (ref 8.8–10.2)
CHLORIDE SERPL-SCNC: 99 MMOL/L (ref 98–107)
CHOLEST SERPL-MCNC: 103 MG/DL
CREAT SERPL-MCNC: 1.32 MG/DL (ref 0.51–0.95)
DEPRECATED HCO3 PLAS-SCNC: 25 MMOL/L (ref 22–29)
GFR SERPL CREATININE-BSD FRML MDRD: 42 ML/MIN/1.73M2
GLUCOSE SERPL-MCNC: 158 MG/DL (ref 70–99)
HBA1C MFR BLD: 7.6 % (ref 4–6.2)
HDLC SERPL-MCNC: 57 MG/DL
HOLD SPECIMEN: NORMAL
LDLC SERPL CALC-MCNC: 13 MG/DL
NONHDLC SERPL-MCNC: 46 MG/DL
PHOSPHATE SERPL-MCNC: 4.2 MG/DL (ref 2.5–4.5)
POTASSIUM SERPL-SCNC: 4.1 MMOL/L (ref 3.4–5.3)
SODIUM SERPL-SCNC: 140 MMOL/L (ref 136–145)
TRIGL SERPL-MCNC: 164 MG/DL

## 2023-03-29 PROCEDURE — 36415 COLL VENOUS BLD VENIPUNCTURE: CPT | Mod: ZL

## 2023-03-29 PROCEDURE — 80061 LIPID PANEL: CPT | Mod: ZL

## 2023-03-29 PROCEDURE — 83036 HEMOGLOBIN GLYCOSYLATED A1C: CPT | Mod: ZL

## 2023-03-29 PROCEDURE — G0463 HOSPITAL OUTPT CLINIC VISIT: HCPCS

## 2023-03-29 PROCEDURE — 80069 RENAL FUNCTION PANEL: CPT | Mod: ZL

## 2023-03-29 PROCEDURE — 99213 OFFICE O/P EST LOW 20 MIN: CPT | Performed by: NURSE PRACTITIONER

## 2023-03-29 ASSESSMENT — PAIN SCALES - GENERAL: PAINLEVEL: NO PAIN (0)

## 2023-03-29 NOTE — NURSING NOTE
Chief Complaint   Patient presents with     Ear Problem     Both ears X 6 days   Patient treating with tylenol    Advanced Care Planning on file? yes    Medication Review Completed: complete    FOOD SECURITY SCREENING QUESTIONS:    The next two questions are to help us understand your food security.  If you are feeling you need any assistance in this area, we have resources available to support you today.    Hunger Vital Signs:  Within the past 12 months we worried whether our food would run out before we got money to buy more. Never  Within the past 12 months the food we bought just didn't last and we didn't have money to get more. Never    Tiff Bentley LPN

## 2023-03-29 NOTE — PROGRESS NOTES
"  Assessment & Plan   Problem List Items Addressed This Visit    None  Visit Diagnoses     Bilateral impacted cerumen    -  Primary    Otalgia, bilateral             Bilateral ear pain, impacted earwax noted.  This was flushed by nursing and tolerated well.  Recommend monitoring and follow-up if symptoms persist.      20 minutes spent by me on the date of the encounter doing chart review, history and exam, documentation and further activities per the note           No follow-ups on file.    DAVID Hanson CNP  [facility]    [facility]   Jennyfer is a 73 year old, presenting for the following health issues:  Ear Problem (Both ears X 6 days)  No flowsheet data found.  HPI     She is having ear pain x6 days.  She reports a cold symptoms 4 weeks ago, was treated for sinusitis with antibiotics and prednisone.  She was feeling good for couple weeks and then started having some left eye irritation.  She was given eyedrops, antibiotics and steroids by her eye doctor.  Ear pain has been aching, significantly decreased hearing.  She has tried some eardrops without relief of symptoms.    Review of Systems   See above      Objective    /58 (BP Location: Right arm, Patient Position: Sitting, Cuff Size: Adult Regular)   Pulse 118   Temp 97  F (36.1  C) (Temporal)   Resp 24   Ht 1.588 m (5' 2.5\")   Wt 71.7 kg (158 lb)   LMP  (LMP Unknown)   SpO2 96%   BMI 28.44 kg/m    Body mass index is 28.44 kg/m .  Physical Exam   GENERAL: healthy, alert and no distress  EYES: Eyes grossly normal to inspection, PERRL and conjunctivae and sclerae normal  HENT: ear canals and TM's normal after bilateral ears flushed by nursing, nose and mouth without ulcers or lesions  NECK: no adenopathy, no asymmetry, masses, or scars and thyroid normal to palpation  RESP: lungs clear to auscultation - no rales, rhonchi or wheezes  CV: regular rate and rhythm, normal S1 S2  NEURO: Normal strength and tone, mentation intact " and speech normal  PSYCH: mentation appears normal, affect normal/bright

## 2023-03-31 ENCOUNTER — TELEPHONE (OUTPATIENT)
Dept: FAMILY MEDICINE | Facility: OTHER | Age: 73
End: 2023-03-31
Payer: MEDICARE

## 2023-03-31 NOTE — TELEPHONE ENCOUNTER
After verifying patient's name and date of birth, patient was given the below information.  Heidi Liao LPN....3/31/2023 4:06 PM

## 2023-03-31 NOTE — TELEPHONE ENCOUNTER
It looks like she has an appointment with her primary care provider on Monday.  I would recommend that she talk to them at that time to determine next steps. DAVID Hanson CNP on 3/31/2023 at 4:00 PM

## 2023-03-31 NOTE — TELEPHONE ENCOUNTER
Patient saw Hali Oh in the [facility]. She had her ears cleaned out but she is still having a horrible time hearing. She is looking for suggestions on what to do next. Please call.    Janeth Bowman on 3/31/2023 at 2:38 PM

## 2023-04-03 ENCOUNTER — OFFICE VISIT (OUTPATIENT)
Dept: INTERNAL MEDICINE | Facility: OTHER | Age: 73
End: 2023-04-03
Attending: INTERNAL MEDICINE
Payer: MEDICARE

## 2023-04-03 VITALS
HEART RATE: 120 BPM | WEIGHT: 157.2 LBS | RESPIRATION RATE: 16 BRPM | BODY MASS INDEX: 27.85 KG/M2 | SYSTOLIC BLOOD PRESSURE: 136 MMHG | TEMPERATURE: 97.6 F | OXYGEN SATURATION: 95 % | DIASTOLIC BLOOD PRESSURE: 84 MMHG | HEIGHT: 63 IN

## 2023-04-03 DIAGNOSIS — G47.33 OSA (OBSTRUCTIVE SLEEP APNEA): ICD-10-CM

## 2023-04-03 DIAGNOSIS — E11.8 TYPE 2 DIABETES MELLITUS WITH COMPLICATION, WITHOUT LONG-TERM CURRENT USE OF INSULIN (H): ICD-10-CM

## 2023-04-03 DIAGNOSIS — N18.31 STAGE 3A CHRONIC KIDNEY DISEASE (H): ICD-10-CM

## 2023-04-03 DIAGNOSIS — F33.1 DEPRESSION, MAJOR, RECURRENT, MODERATE (H): Primary | ICD-10-CM

## 2023-04-03 DIAGNOSIS — N17.9 AKI (ACUTE KIDNEY INJURY) (H): ICD-10-CM

## 2023-04-03 DIAGNOSIS — H91.93 BILATERAL HEARING LOSS, UNSPECIFIED HEARING LOSS TYPE: ICD-10-CM

## 2023-04-03 DIAGNOSIS — M50.13 CERVICAL DISC DISORDER WITH RADICULOPATHY, CERVICOTHORACIC REGION: ICD-10-CM

## 2023-04-03 PROCEDURE — 99215 OFFICE O/P EST HI 40 MIN: CPT | Performed by: INTERNAL MEDICINE

## 2023-04-03 PROCEDURE — G0463 HOSPITAL OUTPT CLINIC VISIT: HCPCS | Performed by: INTERNAL MEDICINE

## 2023-04-03 RX ORDER — PREDNISONE 10 MG/1
TABLET ORAL
Qty: 30 TABLET | Refills: 0 | Status: SHIPPED | OUTPATIENT
Start: 2023-04-03 | End: 2023-10-16

## 2023-04-03 ASSESSMENT — PATIENT HEALTH QUESTIONNAIRE - PHQ9
SUM OF ALL RESPONSES TO PHQ QUESTIONS 1-9: 7
10. IF YOU CHECKED OFF ANY PROBLEMS, HOW DIFFICULT HAVE THESE PROBLEMS MADE IT FOR YOU TO DO YOUR WORK, TAKE CARE OF THINGS AT HOME, OR GET ALONG WITH OTHER PEOPLE: SOMEWHAT DIFFICULT
SUM OF ALL RESPONSES TO PHQ QUESTIONS 1-9: 7

## 2023-04-03 ASSESSMENT — PAIN SCALES - GENERAL: PAINLEVEL: NO PAIN (0)

## 2023-04-03 NOTE — PATIENT INSTRUCTIONS
Take Trazodone 1/2 tablet at bedtime (every night) and see if this helps with sleep and mood; if you tolerate this, take it every night

## 2023-04-03 NOTE — NURSING NOTE
Chief Complaint   Patient presents with     RECHECK     Multiple issues      Patient presents to the clinic today for a follow up for multiple issues   Medication Reconciliation: completed   Nanette Osei LPN  4/3/2023 3:43 PM

## 2023-04-03 NOTE — PROGRESS NOTES
Assessment & Plan     Depression, major, recurrent, moderate (H)  At this time we will try taking her trazodone on a more regular basis.  She is encouraged to consider trying a different therapist.    Type 2 diabetes mellitus with complication, without long-term current use of insulin (H)  Prescription sent in for Ozempic.  If it is not covered we will trial different medication within the same family for coverage.  She is to monitor for any side effects  - semaglutide (OZEMPIC) 2 MG/1.5ML SOPN pen; Inject 0.25 mg Subcutaneous every 7 days For 4 weeks and then increase to 0.5mg weekly    Cervical disc disorder with radiculopathy, cervicothoracic region  She is encouraged to take Tylenol on a regular basis.  Would avoid NSAIDs at this time given worsening renal function.  Encouraged to follow back up with neurosurgery to discuss injections    Stage 3a chronic kidney disease (H)  See below    MOSHE (obstructive sleep apnea)  Prescription sent in for supplies.  She is provided the number for [facility] to follow-up with them.  - CPAP Order for DME - ONLY FOR DME    CARMEN (acute kidney injury) (H)  Plan to recheck her renal function in approximately 6 weeks.  Recommend increasing water intake in the meantime.  Avoid NSAIDs.  - Renal Function Panel; Future    Bilateral hearing loss, unspecified hearing loss type  Prescription sent in for repeat course of prednisone to see if this helps with hearing.  She will also be referred to meet with ENT.  - predniSONE (DELTASONE) 10 MG tablet; 4 tab daily with food x3 day, then 3 tab daily x3 days, then 2 tab daily x3 days, then 1 tab daily  - Adult ENT  Referral; Future    Return in about 6 weeks (around 5/15/2023) for Recheck, multiple issues.    42 minutes spent on the date of the encounter doing chart review, history and exam, documentation and further activities as noted above    Lili Perkins,   [facility]    Jose Burger is a  73 year old, presenting for the following health issues:  RECHECK (Multiple issues )    She has been having some ear issues.  She had some achiness and was seen in urgent care and had some cerumen that they tried to get out but since then she has had a decrease in hearing.  She was treated for sinus infection prior to her trip.    She felt that the prednisone she was on during her trip helped her back pain but once she was off of it, the pain returned.      She does need new CPAP supplies.  She last had a test 7 years ago.    She had labs recently and her renal function was worse.  She is curious if meds are contributing and ahs not been drinking much water.      She has had worsening depression related to all of her medical issues.  She is currently on Wellbutrin along with Cymbalta.  She denies any side effects.  She has not been taking her trazodone on a regular basis.  She has not found counseling to be beneficial.    She has a history of type 2 diabetes mellitus.  She had a repeat A1c today that is slightly increased from prior.  She is interested in trying additional medication for this.  She does not tolerate metformin at higher doses.      History of Present Illness       Mental Health Follow-up:  Patient presents to follow-up on Depression.Patient's depression since last visit has been:  No change  The patient is not having other symptoms associated with depression.      Any significant life events: grief or loss and other  Patient is not feeling anxious or having panic attacks.  Patient has no concerns about alcohol or drug use.    Diabetes:   She presents for follow up of diabetes.  She is checking home blood glucose a few times a month. She checks blood glucose before meals.  Blood glucose is never over 200 and never under 70. When her blood glucose is low, the patient is asymptomatic for confusion, blurred vision, lethargy and reports not feeling dizzy, shaky, or weak.  She is concerned about other. She  "is not experiencing numbness or burning in feet, excessive thirst, blurry vision, weight changes or redness, sores or blisters on feet.         Reason for visit:  Blood panel results hearing difficulties    She eats 2-3 servings of fruits and vegetables daily.She consumes 0 sweetened beverage(s) daily.She exercises with enough effort to increase her heart rate 9 or less minutes per day.  She exercises with enough effort to increase her heart rate 3 or less days per week.   She is taking medications regularly.    Today's PHQ-9         PHQ-9 Total Score: 7    PHQ-9 Q9 Thoughts of better off dead/self-harm past 2 weeks :   Not at all    How difficult have these problems made it for you to do your work, take care of things at home, or get along with other people: Somewhat difficult       Review of Systems   She denies any fevers, chills, chest pain, shortness of breath, lower extremity edema      Objective    /84 (BP Location: Right arm, Patient Position: Sitting, Cuff Size: Adult Regular)   Pulse 120   Temp 97.6  F (36.4  C) (Temporal)   Resp 16   Ht 1.588 m (5' 2.5\")   Wt 71.3 kg (157 lb 3.2 oz)   LMP  (LMP Unknown)   SpO2 95%   BMI 28.29 kg/m    Body mass index is 28.29 kg/m .  Physical Exam   GEN: Vitals reviewed. Healthy appearing. Patient is in no acute distress. Cooperative with exam.  HEENT: Normocephalic atraumatic.  Eyes grossly normal to inspection.  No discharge or erythema, or obvious scleral/conjunctival abnormalities. EACs clear bilat, TM gray with normal landmarks.   LUNGS: No audible wheeze, cough, or visible cyanosis.  No visible retractions or increased work of breathing.    ABD:  Obese, nondistended  SKIN: Warm and dry to touch.  Visible skin clear. No significant rash, abnormal pigmentation or lesions.        Answers for HPI/ROS submitted by the patient on 4/3/2023  If you checked off any problems, how difficult have these problems made it for you to do your work, take care of things " at home, or get along with other people?: Somewhat difficult  PHQ9 TOTAL SCORE: 7

## 2023-04-05 ENCOUNTER — TELEPHONE (OUTPATIENT)
Dept: INTERNAL MEDICINE | Facility: OTHER | Age: 73
End: 2023-04-05
Payer: MEDICARE

## 2023-04-05 DIAGNOSIS — H91.93 BILATERAL HEARING LOSS, UNSPECIFIED HEARING LOSS TYPE: Primary | ICD-10-CM

## 2023-04-05 NOTE — TELEPHONE ENCOUNTER
States she was going to see Dr. Merino but she would need a referral to see him. She also said that she couldn't be seen until the end of May so she wanted to know if there was someone else that [facility] would suggest seeing.    Nakul Ross on 4/5/2023 at 1:42 PM

## 2023-04-06 NOTE — TELEPHONE ENCOUNTER
After verifying last name and  patient notifed of the below information.   Nelia Rivera LPN on 2023 at 8:19 AM

## 2023-04-17 DIAGNOSIS — I70.219 ATHEROSCLEROSIS OF LOWER EXTREMITY WITH CLAUDICATION (H): ICD-10-CM

## 2023-04-20 NOTE — TELEPHONE ENCOUNTER
[facility] sent Rx request for the following:    CLOPIDOGREL 75MG TABLET  Last Prescription Date:   7/12/22  Last Fill Qty/Refills:         90, R-1    Last Office Visit:              4/3/23   Future Office visit:           5/25/23  Rosana Jesus RN on 4/20/2023 at 10:19 AM

## 2023-04-21 RX ORDER — CLOPIDOGREL BISULFATE 75 MG/1
TABLET ORAL
Qty: 90 TABLET | Refills: 1 | Status: SHIPPED | OUTPATIENT
Start: 2023-04-21 | End: 2023-10-16

## 2023-05-23 ENCOUNTER — OFFICE VISIT (OUTPATIENT)
Dept: OTOLARYNGOLOGY | Facility: OTHER | Age: 73
End: 2023-05-23
Attending: OTOLARYNGOLOGY
Payer: MEDICARE

## 2023-05-23 DIAGNOSIS — H90.3 SENSORINEURAL HEARING LOSS, BILATERAL: Primary | ICD-10-CM

## 2023-05-23 PROCEDURE — G0463 HOSPITAL OUTPT CLINIC VISIT: HCPCS

## 2023-05-23 NOTE — PROGRESS NOTES
document embedded image  Patient Name: Jennyfer Alfredo    Address: [address]     YOB: 1950    [place] [address]    MR Number: [id number]    Phone: [phone]  PCP: NONE            Appointment Date: 05/23/23   Visit Provider: Ady Merino MD    cc: NONE; ~    ENT Progress Note  Intake  Visit Reasons: Hearing loss    HPI  History of Present Illness  Chief complaint:  Hearing loss    History  The patient is a 73-year-old female who suffered a decrease in her hearing after an air flight in March.  She feels her hearing has returned somewhat but not completely back to baseline.  Her hearing history is really quite unremarkable.  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    Exam  The external auditory canals are bilaterally  Remainder of the head neck exam is unremarkable  Audiogram-flat sensorineural hearing loss with speech reception threshold of 45 decibels bilaterally and intact discrimination scores.  Her tympanograms are normal    Allergies    No Known Allergies Allergy (Verified 05/24/23 11:05)    PFSH  PFSH:     Past Medical History: (Updated 05/24/23 @ 11:06 by Symone Jackson, Med Assist)    Depression  Diabetes  Hearing loss  Heart trouble     Past Surgical History: (Updated 05/24/23 @ 11:06 by Symone Jackson, Med Assist)    H/O vascular surgery  Hx of cholecystectomy     Family History: (Updated 05/24/23 @ 11:07 by Symone Jackson Med Assist)  Other  Asthma  Cancer  Coronary artery disease  Diabetes mellitus  Dizziness  Headache  Hearing loss  Hypertension       Social History: (Updated 05/24/23 @ 11:07 by Symone Jackson Med Assist)  Smoking Status:  Former smoker  alcohol intake:  current       A&P  Assessment & Plan  (1) Sensorineural hearing loss, bilateral:        Status: Acute        Code(s):  H90.3 - Sensorineural hearing loss, bilateral  The patient was advised to consider hearing aid trial.  We would be happy to help with this in the future if she desires.                Ady Merino MD    Filed: 05/24/23 1107    <Electronically signed by Ady Merino MD> 05/24/23 1420

## 2023-05-24 ENCOUNTER — LAB (OUTPATIENT)
Dept: LAB | Facility: OTHER | Age: 73
End: 2023-05-24
Attending: INTERNAL MEDICINE
Payer: MEDICARE

## 2023-05-24 DIAGNOSIS — N17.9 AKI (ACUTE KIDNEY INJURY) (H): ICD-10-CM

## 2023-05-24 LAB
ALBUMIN SERPL BCG-MCNC: 4.3 G/DL (ref 3.5–5.2)
ANION GAP SERPL CALCULATED.3IONS-SCNC: 10 MMOL/L (ref 7–15)
BUN SERPL-MCNC: 14.5 MG/DL (ref 8–23)
CALCIUM SERPL-MCNC: 9.5 MG/DL (ref 8.8–10.2)
CHLORIDE SERPL-SCNC: 106 MMOL/L (ref 98–107)
CREAT SERPL-MCNC: 0.99 MG/DL (ref 0.51–0.95)
DEPRECATED HCO3 PLAS-SCNC: 26 MMOL/L (ref 22–29)
GFR SERPL CREATININE-BSD FRML MDRD: 60 ML/MIN/1.73M2
GLUCOSE SERPL-MCNC: 101 MG/DL (ref 70–99)
PHOSPHATE SERPL-MCNC: 2.8 MG/DL (ref 2.5–4.5)
POTASSIUM SERPL-SCNC: 3.9 MMOL/L (ref 3.4–5.3)
SODIUM SERPL-SCNC: 142 MMOL/L (ref 136–145)

## 2023-05-24 PROCEDURE — 36415 COLL VENOUS BLD VENIPUNCTURE: CPT | Mod: ZL

## 2023-05-24 PROCEDURE — 80069 RENAL FUNCTION PANEL: CPT | Mod: ZL

## 2023-05-25 ENCOUNTER — OFFICE VISIT (OUTPATIENT)
Dept: INTERNAL MEDICINE | Facility: OTHER | Age: 73
End: 2023-05-25
Attending: INTERNAL MEDICINE
Payer: MEDICARE

## 2023-05-25 VITALS
BODY MASS INDEX: 28.15 KG/M2 | SYSTOLIC BLOOD PRESSURE: 118 MMHG | HEART RATE: 112 BPM | RESPIRATION RATE: 16 BRPM | OXYGEN SATURATION: 97 % | DIASTOLIC BLOOD PRESSURE: 70 MMHG | TEMPERATURE: 96.8 F | WEIGHT: 156.4 LBS

## 2023-05-25 DIAGNOSIS — Z13.220 SCREENING FOR HYPERLIPIDEMIA: ICD-10-CM

## 2023-05-25 DIAGNOSIS — E11.8 TYPE 2 DIABETES MELLITUS WITH COMPLICATION, WITHOUT LONG-TERM CURRENT USE OF INSULIN (H): Chronic | ICD-10-CM

## 2023-05-25 DIAGNOSIS — M50.13 CERVICAL DISC DISORDER WITH RADICULOPATHY, CERVICOTHORACIC REGION: ICD-10-CM

## 2023-05-25 DIAGNOSIS — N17.9 AKI (ACUTE KIDNEY INJURY) (H): ICD-10-CM

## 2023-05-25 DIAGNOSIS — N18.31 STAGE 3A CHRONIC KIDNEY DISEASE (H): Primary | ICD-10-CM

## 2023-05-25 PROCEDURE — 99214 OFFICE O/P EST MOD 30 MIN: CPT | Performed by: INTERNAL MEDICINE

## 2023-05-25 PROCEDURE — G0463 HOSPITAL OUTPT CLINIC VISIT: HCPCS | Performed by: INTERNAL MEDICINE

## 2023-05-25 ASSESSMENT — ANXIETY QUESTIONNAIRES
2. NOT BEING ABLE TO STOP OR CONTROL WORRYING: NOT AT ALL
7. FEELING AFRAID AS IF SOMETHING AWFUL MIGHT HAPPEN: NOT AT ALL
3. WORRYING TOO MUCH ABOUT DIFFERENT THINGS: NOT AT ALL
8. IF YOU CHECKED OFF ANY PROBLEMS, HOW DIFFICULT HAVE THESE MADE IT FOR YOU TO DO YOUR WORK, TAKE CARE OF THINGS AT HOME, OR GET ALONG WITH OTHER PEOPLE?: SOMEWHAT DIFFICULT
IF YOU CHECKED OFF ANY PROBLEMS ON THIS QUESTIONNAIRE, HOW DIFFICULT HAVE THESE PROBLEMS MADE IT FOR YOU TO DO YOUR WORK, TAKE CARE OF THINGS AT HOME, OR GET ALONG WITH OTHER PEOPLE: SOMEWHAT DIFFICULT
GAD7 TOTAL SCORE: 0
GAD7 TOTAL SCORE: 0
4. TROUBLE RELAXING: NOT AT ALL
GAD7 TOTAL SCORE: 0
6. BECOMING EASILY ANNOYED OR IRRITABLE: NOT AT ALL
7. FEELING AFRAID AS IF SOMETHING AWFUL MIGHT HAPPEN: NOT AT ALL
1. FEELING NERVOUS, ANXIOUS, OR ON EDGE: NOT AT ALL
5. BEING SO RESTLESS THAT IT IS HARD TO SIT STILL: NOT AT ALL

## 2023-05-25 ASSESSMENT — PAIN SCALES - GENERAL: PAINLEVEL: NO PAIN (0)

## 2023-05-25 NOTE — PROGRESS NOTES
"  Assessment & Plan     Stage 3a chronic kidney disease (H)  Recheck labs today show improvement from prior.  Plan to recheck again in the fall or sooner if indicated.  - Comprehensive metabolic panel; Future  - CBC with platelets; Future  - Magnesium; Future    Type 2 diabetes mellitus with complication, without long-term current use of insulin (H)  -Continue on Ozempic at this time but can increase to 1 mg if wanted.  She will work on getting samples for this.  - Hemoglobin A1c; Future  - TSH with free T4 reflex; Future  - Albumin Random Urine Quantitative with Creat Ratio; Future    CARMEN (acute kidney injury) (H)  Resolved at this time    Cervical disc disorder with radiculopathy, cervicothoracic region  Overall stable.  Continue with regular exercises.    Screening for hyperlipidemia  Labs placed for at the fall  - Lipid Profile; Future     BMI:   Estimated body mass index is 28.15 kg/m  as calculated from the following:    Height as of 4/3/23: 1.588 m (5' 2.5\").    Weight as of this encounter: 70.9 kg (156 lb 6.4 oz).   Weight management plan: Discussed healthy diet and exercise guidelines      No follow-ups on file.    Lili Perkins, [facility]   Jennyfer is a 73 year old, presenting for the following health issues:  RECHECK (Multiple issues)        5/25/2023    10:20 AM   Additional Questions   Roomed by Nanette ACHARYA     Patient presents for follow-up of diabetes and other issues.  She has been using Ozempic which she got 6 weeks ago.  Her weight is down 10 pounds although most of this was before she started the Ozempic.  She has had some nausea and vomiting which seems to occur every few weeks with most recently happening yesterday.  She denies any diarrhea.  Her blood sugars have been better controlled overall.  Her last A1c was in March and was 7.6%.    She had labs done for her kidney function in March which showed a decrease in her GFR.  We discussed that in depth today.  " She is due for recheck.    She continues to have some neck and back pain but feels overall that it has stabilized    She will be due for annual physical in the fall and would like labs put in prior.        History of Present Illness       CKD: She uses over the counter pain medication, including tylenol 600 ( [id number] daily), three times daily.    She eats 2-3 servings of fruits and vegetables daily.She consumes 0 sweetened beverage(s) daily.She exercises with enough effort to increase her heart rate 10 to 19 minutes per day.  She exercises with enough effort to increase her heart rate 3 or less days per week.   She is taking medications regularly.    Today's PHQ-9         PHQ-9 Total Score: 7    PHQ-9 Q9 Thoughts of better off dead/self-harm past 2 weeks :   Not at all    How difficult have these problems made it for you to do your work, take care of things at home, or get along with other people: Somewhat difficult  Today's TAYLOR-7 Score: 0     Review of Systems   No fever      Objective    /70 (BP Location: Right arm, Patient Position: Sitting, Cuff Size: Adult Regular)   Pulse 112   Temp 96.8  F (36  C) (Temporal)   Resp 16   Wt 70.9 kg (156 lb 6.4 oz)   LMP  (LMP Unknown)   SpO2 97%   BMI 28.15 kg/m    Body mass index is 28.15 kg/m .  Physical Exam   GEN: Vitals reviewed. Healthy appearing. Patient is in no acute distress. Cooperative with exam.  HEENT: Normocephalic atraumatic.  Eyes grossly normal to inspection.  No discharge or erythema, or obvious scleral/conjunctival abnormalities.   LUNGS: No audible wheeze, cough, or visible cyanosis.  No visible retractions or increased work of breathing.    ABD:  nondistended  SKIN: Warm and dry to touch.  Visible skin clear. No significant rash, abnormal pigmentation or lesions.        Answers for HPI/ROS submitted by the patient on 5/25/2023  If you checked off any problems, how difficult have these problems made it for you to do your work, take care of  things at home, or get along with other people?: Somewhat difficult  PHQ9 TOTAL SCORE: 7  TAYLOR 7 TOTAL SCORE: 0

## 2023-05-25 NOTE — NURSING NOTE
"Chief Complaint   Patient presents with     RECHECK     Multiple issues     Patient presents to the clinic today for multiple issues  Initial LMP  (LMP Unknown)  Estimated body mass index is 28.29 kg/m  as calculated from the following:    Height as of 4/3/23: 1.588 m (5' 2.5\").    Weight as of 4/3/23: 71.3 kg (157 lb 3.2 oz).  Meds Reconciled: complete        FOOD SECURITY SCREENING QUESTIONS:    The next two questions are to help us understand your food security.  If you are feeling you need any assistance in this area, we have resources available to support you today.    Hunger Vital Signs:  Within the past 12 months we worried whether our food would run out before we got money to buy more. Never  Within the past 12 months the food we bought just didn't last and we didn't have money to get more. Never  Nanette Osei LPN,LPN on 5/25/2023 at 10:21 AM      Nanette Osei LPN  "

## 2023-06-19 ENCOUNTER — TELEPHONE (OUTPATIENT)
Dept: OTOLARYNGOLOGY | Facility: OTHER | Age: 73
End: 2023-06-19
Payer: MEDICARE

## 2023-06-19 NOTE — TELEPHONE ENCOUNTER
Patient called and requested a call back regarding her ears. Patient stated both her ears are continuously popping.    Okay to leave detailed message.    Millie Sanchez on 6/19/2023 at 4:29 PM

## 2023-06-20 NOTE — TELEPHONE ENCOUNTER
Called and verified patient full name and . Notified patient that Dr. Merino only does patient outreach here a few times a month. Gave her the [facility] number to reach out to the clinic to see if there is something he advices per last OV in May. Offered to patient a message be sent to PCP, she declined at this time.     Ursula Cartwright LPN on 2023 at 10:36 AM

## 2023-06-27 DIAGNOSIS — E11.8 TYPE 2 DIABETES MELLITUS WITH COMPLICATION, WITHOUT LONG-TERM CURRENT USE OF INSULIN (H): Primary | ICD-10-CM

## 2023-06-30 RX ORDER — SEMAGLUTIDE 0.68 MG/ML
INJECTION, SOLUTION SUBCUTANEOUS
Qty: 6 ML | Refills: 0 | Status: SHIPPED | OUTPATIENT
Start: 2023-06-30 | End: 2023-08-28

## 2023-06-30 NOTE — TELEPHONE ENCOUNTER
[facility] sent Rx request for the following:    OZEMPIC 2MG/3ML PEN INJ  Last Prescription Date:   4/3/23  Last Fill Qty/Refills:         1.5 mL, R-2    Last Office Visit:              5/25/23   Future Office visit:           10/16/23    In clinical absence of patient's primary, Lili Perkins, patient is requesting that this message be sent to the covering provider for consideration please.  Rosana Jesus RN on 6/30/2023 at 6:51 AM

## 2023-08-26 DIAGNOSIS — E11.8 TYPE 2 DIABETES MELLITUS WITH COMPLICATION, WITHOUT LONG-TERM CURRENT USE OF INSULIN (H): ICD-10-CM

## 2023-08-28 RX ORDER — SEMAGLUTIDE 0.68 MG/ML
INJECTION, SOLUTION SUBCUTANEOUS
Qty: 6 ML | Refills: 0 | Status: SHIPPED | OUTPATIENT
Start: 2023-08-28 | End: 2023-10-13

## 2023-08-31 DIAGNOSIS — F32.A ANXIETY AND DEPRESSION: ICD-10-CM

## 2023-08-31 DIAGNOSIS — F41.9 ANXIETY AND DEPRESSION: ICD-10-CM

## 2023-09-06 RX ORDER — BUPROPION HYDROCHLORIDE 300 MG/1
300 TABLET ORAL DAILY
Qty: 90 TABLET | Refills: 0 | Status: SHIPPED | OUTPATIENT
Start: 2023-09-06 | End: 2023-10-16

## 2023-09-06 NOTE — TELEPHONE ENCOUNTER
[id number] of [place] sent Rx request for the following:      Requested Prescriptions   Pending Prescriptions Disp Refills    buPROPion (WELLBUTRIN XL) 300 MG 24 hr tablet [Pharmacy Med Name: BUPROPION 300MG ER (XL) TABLET] 90 tablet 3     Sig: TAKE 1 TABLET (300 MG) BY MOUTH DAILY       SSRIs Protocol Failed - 9/6/2023  4:41 PM        Failed - PHQ-9 score less than 5 in past 6 months     Please review last PHQ-9 score.      Last Prescription Date:   8/29/25  Last Fill Qty/Refills:         90, R-3    Last Office Visit:              5/25/23   Future Office visit:             Next 5 appointments (look out 90 days)      Oct 16, 2023 10:40 AM  PHYSICAL with Lili Perkins DO  [facility] ([facility] ) [address]  [place] [address]  [phone]          Per LOV note:  Return in about 5 months (around 10/25/2023) for Annual Review with renewal of all medications, and as needed sooner.     Zenia Griffin RN .............. 9/6/2023  4:46 PM

## 2023-10-08 ENCOUNTER — HEALTH MAINTENANCE LETTER (OUTPATIENT)
Age: 73
End: 2023-10-08

## 2023-10-08 DIAGNOSIS — E11.8 TYPE 2 DIABETES MELLITUS WITH COMPLICATION, WITHOUT LONG-TERM CURRENT USE OF INSULIN (H): ICD-10-CM

## 2023-10-13 RX ORDER — SEMAGLUTIDE 0.68 MG/ML
INJECTION, SOLUTION SUBCUTANEOUS
Qty: 6 ML | Refills: 0 | Status: SHIPPED | OUTPATIENT
Start: 2023-10-13 | End: 2023-10-16

## 2023-10-13 NOTE — TELEPHONE ENCOUNTER
"Pharmacy note: Patient enrolled in our Rx Med Sync service to improve adherence. We are requesting a refill authorization in advance to ensure an active prescription is on file.    Requested Prescriptions   Pending Prescriptions Disp Refills    OZEMPIC (0.25 OR 0.5 MG/DOSE) 2 MG/3ML pen [Pharmacy Med Name: OZEMPIC 0.25 OR 0.5MG/DOSE INJ] 6 mL 0     Sig: INJECT 0.5MG SUBCUTANEOUSLYEVERY 7 DAYS.       GLP-1 Agonists Protocol Failed - 10/8/2023  5:02 AM        Failed - HgbA1C in past 3 or 6 months     If HgbA1C is 8 or greater, it needs to be on file within the past 3 months.  If less than 8, must be on file within the past 6 months.     Recent Labs   Lab Test 03/29/23  1542 02/28/18  1659 07/07/17  1253   A1C 7.6*   < >  --    [id number]  --   --  6.6*    < > = values in this interval not displayed.             Failed - Normal serum creatinine on file in past 12 months     Recent Labs   Lab Test 05/24/23  1504   CR 0.99*       Ok to refill medication if creatinine is low          Passed - Medication is active on med list        Passed - Patient is age 18 or older        Passed - No active pregnancy on record        Passed - No positive pregnancy test in past 12 months        Passed - Recent (6 mo) or future (30 days) visit within the authorizing provider's specialty     Patient had office visit in the last 6 months or has a visit in the next 30 days with authorizing provider.  See \"Patient Info\" tab in inbasket, or \"Choose Columns\" in Meds & Orders section of the refill encounter.                  LOV: 5/23/2023  Future Office visit:    Next 5 appointments (look out 90 days)      Oct 16, 2023 10:40 AM  PHYSICAL with Lili Perkins DO  [facility] ([facility] ) [address]  [place] [id number] [phone]             Routing refill request to provider for review/approval.      Monique Freed RN  ....................  10/13/2023   12:50 PM    "

## 2023-10-16 ENCOUNTER — LAB (OUTPATIENT)
Dept: LAB | Facility: OTHER | Age: 73
End: 2023-10-16
Attending: INTERNAL MEDICINE
Payer: MEDICARE

## 2023-10-16 ENCOUNTER — OFFICE VISIT (OUTPATIENT)
Dept: INTERNAL MEDICINE | Facility: OTHER | Age: 73
End: 2023-10-16
Attending: INTERNAL MEDICINE
Payer: MEDICARE

## 2023-10-16 VITALS
SYSTOLIC BLOOD PRESSURE: 118 MMHG | DIASTOLIC BLOOD PRESSURE: 80 MMHG | BODY MASS INDEX: 28.81 KG/M2 | HEIGHT: 61 IN | RESPIRATION RATE: 16 BRPM | OXYGEN SATURATION: 98 % | HEART RATE: 68 BPM | WEIGHT: 152.6 LBS | TEMPERATURE: 97.6 F

## 2023-10-16 DIAGNOSIS — Z01.818 PREOPERATIVE EXAMINATION: ICD-10-CM

## 2023-10-16 DIAGNOSIS — Z13.220 SCREENING FOR HYPERLIPIDEMIA: ICD-10-CM

## 2023-10-16 DIAGNOSIS — F41.9 ANXIETY AND DEPRESSION: ICD-10-CM

## 2023-10-16 DIAGNOSIS — N18.31 STAGE 3A CHRONIC KIDNEY DISEASE (H): ICD-10-CM

## 2023-10-16 DIAGNOSIS — Z00.00 ENCOUNTER FOR MEDICARE ANNUAL WELLNESS EXAM: Primary | ICD-10-CM

## 2023-10-16 DIAGNOSIS — R74.8 ELEVATED LIVER ENZYMES: ICD-10-CM

## 2023-10-16 DIAGNOSIS — I10 ESSENTIAL HYPERTENSION: ICD-10-CM

## 2023-10-16 DIAGNOSIS — Z87.891 PERSONAL HISTORY OF NICOTINE DEPENDENCE: ICD-10-CM

## 2023-10-16 DIAGNOSIS — M62.838 MUSCLE SPASM: ICD-10-CM

## 2023-10-16 DIAGNOSIS — I71.9 AORTIC ANEURYSM WITHOUT RUPTURE, UNSPECIFIED PORTION OF AORTA (H): ICD-10-CM

## 2023-10-16 DIAGNOSIS — E11.8 TYPE 2 DIABETES MELLITUS WITH COMPLICATION, WITHOUT LONG-TERM CURRENT USE OF INSULIN (H): Chronic | ICD-10-CM

## 2023-10-16 DIAGNOSIS — M50.13 CERVICAL DISC DISORDER WITH RADICULOPATHY, CERVICOTHORACIC REGION: ICD-10-CM

## 2023-10-16 DIAGNOSIS — E11.9 TYPE 2 DIABETES MELLITUS WITHOUT COMPLICATION, WITHOUT LONG-TERM CURRENT USE OF INSULIN (H): ICD-10-CM

## 2023-10-16 DIAGNOSIS — F32.A ANXIETY AND DEPRESSION: ICD-10-CM

## 2023-10-16 DIAGNOSIS — G47.00 INSOMNIA, UNSPECIFIED TYPE: ICD-10-CM

## 2023-10-16 DIAGNOSIS — I70.219 ATHEROSCLEROSIS OF LOWER EXTREMITY WITH CLAUDICATION (H): ICD-10-CM

## 2023-10-16 DIAGNOSIS — L98.8 WRINKLES: ICD-10-CM

## 2023-10-16 DIAGNOSIS — Z23 NEED FOR COVID-19 VACCINE: ICD-10-CM

## 2023-10-16 LAB
ALBUMIN SERPL BCG-MCNC: 4.6 G/DL (ref 3.5–5.2)
ALP SERPL-CCNC: 106 U/L (ref 35–104)
ALT SERPL W P-5'-P-CCNC: 85 U/L (ref 0–50)
ANION GAP SERPL CALCULATED.3IONS-SCNC: 11 MMOL/L (ref 7–15)
AST SERPL W P-5'-P-CCNC: 78 U/L (ref 0–45)
BILIRUB SERPL-MCNC: 0.6 MG/DL
BUN SERPL-MCNC: 15.4 MG/DL (ref 8–23)
CALCIUM SERPL-MCNC: 9.6 MG/DL (ref 8.8–10.2)
CHLORIDE SERPL-SCNC: 103 MMOL/L (ref 98–107)
CHOLEST SERPL-MCNC: 105 MG/DL
CREAT SERPL-MCNC: 1.08 MG/DL (ref 0.51–0.95)
CREAT UR-MCNC: 100.8 MG/DL
DEPRECATED HCO3 PLAS-SCNC: 24 MMOL/L (ref 22–29)
EGFRCR SERPLBLD CKD-EPI 2021: 54 ML/MIN/1.73M2
ERYTHROCYTE [DISTWIDTH] IN BLOOD BY AUTOMATED COUNT: 14.1 % (ref 10–15)
GLUCOSE SERPL-MCNC: 164 MG/DL (ref 70–99)
HBA1C MFR BLD: 6.5 % (ref 4–6.2)
HCT VFR BLD AUTO: 39.7 % (ref 35–47)
HDLC SERPL-MCNC: 47 MG/DL
HGB BLD-MCNC: 13 G/DL (ref 11.7–15.7)
LDLC SERPL CALC-MCNC: 23 MG/DL
MAGNESIUM SERPL-MCNC: 2 MG/DL (ref 1.7–2.3)
MCH RBC QN AUTO: 29 PG (ref 26.5–33)
MCHC RBC AUTO-ENTMCNC: 32.7 G/DL (ref 31.5–36.5)
MCV RBC AUTO: 88 FL (ref 78–100)
MICROALBUMIN UR-MCNC: <12 MG/L
MICROALBUMIN/CREAT UR: NORMAL MG/G{CREAT}
NONHDLC SERPL-MCNC: 58 MG/DL
PLATELET # BLD AUTO: 312 10E3/UL (ref 150–450)
POTASSIUM SERPL-SCNC: 4.3 MMOL/L (ref 3.4–5.3)
PROT SERPL-MCNC: 7.6 G/DL (ref 6.4–8.3)
RBC # BLD AUTO: 4.49 10E6/UL (ref 3.8–5.2)
SODIUM SERPL-SCNC: 138 MMOL/L (ref 135–145)
TRIGL SERPL-MCNC: 176 MG/DL
TSH SERPL DL<=0.005 MIU/L-ACNC: 3.35 UIU/ML (ref 0.3–4.2)
WBC # BLD AUTO: 7.7 10E3/UL (ref 4–11)

## 2023-10-16 PROCEDURE — 85027 COMPLETE CBC AUTOMATED: CPT | Mod: ZL

## 2023-10-16 PROCEDURE — G0463 HOSPITAL OUTPT CLINIC VISIT: HCPCS | Mod: 25

## 2023-10-16 PROCEDURE — 83036 HEMOGLOBIN GLYCOSYLATED A1C: CPT | Mod: ZL

## 2023-10-16 PROCEDURE — 80061 LIPID PANEL: CPT | Mod: ZL

## 2023-10-16 PROCEDURE — 80053 COMPREHEN METABOLIC PANEL: CPT | Mod: ZL

## 2023-10-16 PROCEDURE — 84443 ASSAY THYROID STIM HORMONE: CPT | Mod: ZL

## 2023-10-16 PROCEDURE — 82570 ASSAY OF URINE CREATININE: CPT | Mod: ZL

## 2023-10-16 PROCEDURE — G0439 PPPS, SUBSEQ VISIT: HCPCS | Performed by: INTERNAL MEDICINE

## 2023-10-16 PROCEDURE — 91320 SARSCV2 VAC 30MCG TRS-SUC IM: CPT

## 2023-10-16 PROCEDURE — 99214 OFFICE O/P EST MOD 30 MIN: CPT | Mod: 25 | Performed by: INTERNAL MEDICINE

## 2023-10-16 PROCEDURE — 83735 ASSAY OF MAGNESIUM: CPT | Mod: ZL

## 2023-10-16 PROCEDURE — 36415 COLL VENOUS BLD VENIPUNCTURE: CPT | Mod: ZL

## 2023-10-16 RX ORDER — TRAZODONE HYDROCHLORIDE 50 MG/1
50-100 TABLET, FILM COATED ORAL AT BEDTIME
Qty: 180 TABLET | Refills: 4 | Status: SHIPPED | OUTPATIENT
Start: 2023-10-16

## 2023-10-16 RX ORDER — CLOPIDOGREL BISULFATE 75 MG/1
TABLET ORAL
Qty: 90 TABLET | Refills: 4 | Status: SHIPPED | OUTPATIENT
Start: 2023-10-16

## 2023-10-16 RX ORDER — ROSUVASTATIN CALCIUM 40 MG/1
40 TABLET, COATED ORAL EVERY EVENING
Qty: 90 TABLET | Refills: 4 | Status: SHIPPED | OUTPATIENT
Start: 2023-10-16 | End: 2024-08-23

## 2023-10-16 RX ORDER — SEMAGLUTIDE 0.68 MG/ML
0.5 INJECTION, SOLUTION SUBCUTANEOUS
Qty: 9 ML | Refills: 2 | Status: SHIPPED | OUTPATIENT
Start: 2023-10-16 | End: 2024-04-17

## 2023-10-16 RX ORDER — TRETINOIN 0.5 MG/G
CREAM TOPICAL
Qty: 45 G | Refills: 1 | Status: SHIPPED | OUTPATIENT
Start: 2023-10-16

## 2023-10-16 RX ORDER — BUPROPION HYDROCHLORIDE 300 MG/1
300 TABLET ORAL DAILY
Qty: 90 TABLET | Refills: 4 | Status: SHIPPED | OUTPATIENT
Start: 2023-10-16 | End: 2024-08-23

## 2023-10-16 RX ORDER — DULOXETIN HYDROCHLORIDE 30 MG/1
CAPSULE, DELAYED RELEASE ORAL
Qty: 180 CAPSULE | Refills: 4 | Status: SHIPPED | OUTPATIENT
Start: 2023-10-16 | End: 2024-08-19

## 2023-10-16 RX ORDER — LOSARTAN POTASSIUM AND HYDROCHLOROTHIAZIDE 12.5; 1 MG/1; MG/1
1 TABLET ORAL DAILY
Qty: 90 TABLET | Refills: 4 | Status: SHIPPED | OUTPATIENT
Start: 2023-10-16 | End: 2024-08-19

## 2023-10-16 RX ORDER — CYCLOBENZAPRINE HCL 10 MG
5-10 TABLET ORAL 3 TIMES DAILY PRN
Qty: 45 TABLET | Refills: 3 | Status: SHIPPED | OUTPATIENT
Start: 2023-10-16

## 2023-10-16 ASSESSMENT — ENCOUNTER SYMPTOMS
PARESTHESIAS: 0
NAUSEA: 0
COUGH: 0
SORE THROAT: 0
FEVER: 0
HEADACHES: 0
ABDOMINAL PAIN: 0
HEMATURIA: 0
SHORTNESS OF BREATH: 0
DIZZINESS: 0
CHILLS: 0
DYSURIA: 0
MYALGIAS: 1
CONSTIPATION: 1
HEARTBURN: 1
DIARRHEA: 0
NERVOUS/ANXIOUS: 0
PALPITATIONS: 0
EYE PAIN: 0
FREQUENCY: 0
BREAST MASS: 0
JOINT SWELLING: 0
HEMATOCHEZIA: 0
ADENOPATHY: 0
ARTHRALGIAS: 1

## 2023-10-16 ASSESSMENT — PATIENT HEALTH QUESTIONNAIRE - PHQ9
10. IF YOU CHECKED OFF ANY PROBLEMS, HOW DIFFICULT HAVE THESE PROBLEMS MADE IT FOR YOU TO DO YOUR WORK, TAKE CARE OF THINGS AT HOME, OR GET ALONG WITH OTHER PEOPLE: SOMEWHAT DIFFICULT
SUM OF ALL RESPONSES TO PHQ QUESTIONS 1-9: 5
SUM OF ALL RESPONSES TO PHQ QUESTIONS 1-9: 5

## 2023-10-16 ASSESSMENT — PAIN SCALES - GENERAL: PAINLEVEL: MODERATE PAIN (5)

## 2023-10-16 ASSESSMENT — ACTIVITIES OF DAILY LIVING (ADL): CURRENT_FUNCTION: NO ASSISTANCE NEEDED

## 2023-10-16 NOTE — PATIENT INSTRUCTIONS
Patient Education   Personalized Prevention Plan  You are due for the preventive services outlined below.  Your care team is available to assist you in scheduling these services.  If you have already completed any of these items, please share that information with your care team to update in your medical record.  Health Maintenance Due   Topic Date Due     Hepatitis B Vaccine (1 of 3 - Risk 3-dose series) Never done     RSV VACCINE 60+ (1 - 1-dose 60+ series) Never done     Diabetic Foot Exam  09/09/2022     Flu Vaccine (1) 09/01/2023     COVID-19 Vaccine (6 - 2023-24 season) 09/01/2023     Annual Wellness Visit  10/31/2023     Learning About Dietary Guidelines  What are the Dietary Guidelines for Americans?     Dietary Guidelines for Americans provide tips for eating well and staying healthy. This helps reduce the risk for long-term (chronic) diseases.  These guidelines recommend that you:  Eat and drink the right amount for you. The U.S. government's food guide is called MyPlate. It can help you make your own well-balanced eating plan.  Try to balance your eating with your activity. This helps you stay at a healthy weight.  Drink alcohol in moderation, if at all.  Limit foods high in salt, saturated fat, trans fat, and added sugar.  These guidelines are from the U.S. Department of Agriculture and the U.S. Department of Health and Human Services. They are updated every 5 years.  What is MyPlate?  MyPlate is the U.[place]. government's food guide. It can help you make your own well-balanced eating plan. A balanced eating plan means that you eat enough, but not too much, and that your food gives you the nutrients you need to stay healthy.  MyPlate focuses on eating plenty of whole grains, fruits, and vegetables, and on limiting fat and sugar. It is available online at www.ChooseMyPlate.gov.  How can you get started?  If you're trying to eat healthier, you can slowly change your eating habits over time. You don't have to  "make big changes all at once. Start by adding one or two healthy foods to your meals each day.  Grains  Choose whole-grain breads and cereals and whole-wheat pasta and whole-grain crackers.  Vegetables  Eat a variety of vegetables every day. They have lots of nutrients and are part of a heart-healthy diet.  Fruits  Eat a variety of fruits every day. Fruits contain lots of nutrients. Choose fresh fruit instead of fruit juice.  Protein foods  Choose fish and lean poultry more often. Eat red meat and fried meats less often. Dried beans, tofu, and nuts are also good sources of protein.  Dairy  Choose low-fat or fat-free products from this food group. If you have problems digesting milk, try eating cheese or yogurt instead.  Fats and oils  Limit fats and oils if you're trying to cut calories. Choose healthy fats when you cook. These include canola oil and olive oil.  Where can you learn more?  Go to https://www.[employer].net/patiented  Enter D676 in the search box to learn more about \"Learning About Dietary Guidelines.\"  Current as of: March 1, 2023               Content Version: 13.7    0530-4295 [employer].   Care instructions adapted under license by your healthcare professional. If you have questions about a medical condition or this instruction, always ask your healthcare professional. [employer] disclaims any warranty or liability for your use of this information.      Hearing Loss: Care Instructions  Overview     Hearing loss is a sudden or slow decrease in how well you hear. It can range from slight to profound. Permanent hearing loss can occur with aging. It also can happen when you are exposed long-term to loud noise. Examples include listening to loud music, riding motorcycles, or being around other loud machines.  Hearing loss can affect your work and home life. It can make you feel lonely or depressed. You may feel that you have lost your independence. But hearing aids and other " devices can help you hear better and feel connected to others.  Follow-up care is a key part of your treatment and safety. Be sure to make and go to all appointments, and call your doctor if you are having problems. It's also a good idea to know your test results and keep a list of the medicines you take.  How can you care for yourself at home?  Avoid loud noises whenever possible. This helps keep your hearing from getting worse.  Always wear hearing protection around loud noises.  Wear a hearing aid as directed.  A professional can help you pick a hearing aid that will work best for you.  You can also get hearing aids over the counter for mild to moderate hearing loss.  Have hearing tests as your doctor suggests. They can show whether your hearing has changed. Your hearing aid may need to be adjusted.  Use other devices as needed. These may include:  Telephone amplifiers and hearing aids that can connect to a television, stereo, radio, or microphone.  Devices that use lights or vibrations. These alert you to the doorbell, a ringing telephone, or a baby monitor.  Television closed-captioning. This shows the words at the bottom of the screen. Most new TVs can do this.  TTY (text telephone). This lets you type messages back and forth on the telephone instead of talking or listening. These devices are also called TDD. When messages are typed on the keyboard, they are sent over the phone line to a receiving TTY. The message is shown on a monitor.  Use text messaging, social media, and email if it is hard for you to communicate by telephone.  Try to learn a listening technique called speechreading. It is not lipreading. You pay attention to people's gestures, expressions, posture, and tone of voice. These clues can help you understand what a person is saying. Face the person you are talking to, and have them face you. Make sure the lighting is good. You need to see the other person's face clearly.  Think about counseling  "if you need help to adjust to your hearing loss.  When should you call for help?  Watch closely for changes in your health, and be sure to contact your doctor if:    You think your hearing is getting worse.     You have new symptoms, such as dizziness or nausea.   Where can you learn more?  Go to https://www.[employer].net/patiented  Enter R798 in the search box to learn more about \"Hearing Loss: Care Instructions.\"  Current as of: March 1, 2023               Content Version: 13.7    4262-3814 [employer].   Care instructions adapted under license by your healthcare professional. If you have questions about a medical condition or this instruction, always ask your healthcare professional. [employer] disclaims any warranty or liability for your use of this information.      Learning About Depression Screening  What is depression screening?  Depression screening is a way to see if you have depression symptoms. It may be done by a doctor or counselor. It's often part of a routine checkup. That's because your mental health is just as important as your physical health.  Depression is a mental health condition that affects how you feel, think, and act. You may:  Have less energy.  Lose interest in your daily activities.  Feel sad and grouchy for a long time.  Depression is very common. It affects people of all ages.  Many things can lead to depression. Some people become depressed after they have a stroke or find out they have a major illness like cancer or heart disease. The death of a loved one or a breakup may lead to depression. It can run in families. Most experts believe that a combination of inherited genes and stressful life events can cause it.  What happens during screening?  You may be asked to fill out a form about your depression symptoms. You and the doctor will discuss your answers. The doctor may ask you more questions to learn more about how you think, act, and feel.  What " "happens after screening?  If you have symptoms of depression, your doctor will talk to you about your options.  Doctors usually treat depression with medicines or counseling. Often, combining the two works best. Many people don't get help because they think that they'll get over the depression on their own. But people with depression may not get better unless they get treatment.  The cause of depression is not well understood. There may be many factors involved. But if you have depression, it's not your fault.  A serious symptom of depression is thinking about death or suicide. If you or someone you care about talks about this or about feeling hopeless, get help right away.  It's important to know that depression can be treated. Medicine, counseling, and self-care may help.  Where can you learn more?  Go to https://www.[employer].net/patiented  Enter T185 in the search box to learn more about \"Learning About Depression Screening.\"  Current as of: October 20, 2022               Content Version: 13.7    5719-8458 [employer].   Care instructions adapted under license by your healthcare professional. If you have questions about a medical condition or this instruction, always ask your healthcare professional. [employer] disclaims any warranty or liability for your use of this information.         "

## 2023-10-16 NOTE — NURSING NOTE
"Chief Complaint   Patient presents with    Medicare Visit   Patient presents to the clinic today for  Medicare Visit    Initial LMP  (LMP Unknown)  Estimated body mass index is 28.15 kg/m  as calculated from the following:    Height as of 4/3/23: 1.588 m (5' 2.5\").    Weight as of 5/25/23: 70.9 kg (156 lb 6.4 oz).  Meds Reconciled: complete        FOOD SECURITY SCREENING QUESTIONS:    The next two questions are to help us understand your food security.  If you are feeling you need any assistance in this area, we have resources available to support you today.    Hunger Vital Signs:  Within the past 12 months we worried whether our food would run out before we got money to buy more. Never  Within the past 12 months the food we bought just didn't last and we didn't have money to get more. Never  Nanette Osei LPN,LPN on 10/16/2023 at 10:35 AM      Nanette Osei LPN  "

## 2023-10-16 NOTE — PROGRESS NOTES
"SUBJECTIVE:   Jennyfer is a 73 year old who presents for Preventive Visit.      10/16/2023    10:35 AM   Additional Questions   Roomed by Nanette ACHARYA       Are you in the first 12 months of your Medicare coverage?  No    Healthy Habits:     In general, how would you rate your overall health?  Good    Frequency of exercise:  2-3 days/week    Duration of exercise:  Less than 15 minutes    Do you usually eat at least 4 servings of fruit and vegetables a day, include whole grains    & fiber and avoid regularly eating high fat or \"junk\" foods?  No    Taking medications regularly:  Yes    Medication side effects:  None    Ability to successfully perform activities of daily living:  No assistance needed    Home Safety:  No safety concerns identified    Hearing Impairment:  Difficulty following a conversation in a noisy restaurant or crowded room, feel that people are mumbling or not speaking clearly, need to ask people to speak up or repeat themselves and difficulty understanding soft or whispered speech    In the past 6 months, have you been bothered by leaking of urine?  No    In general, how would you rate your overall mental or emotional health?  Good    Additional concerns today:  No    Patient with very complicated medical history and ongoing issues.    She reports she is scheduled for blepharoplasty on November 8.  She is instructed to hold her Plavix for 10 days prior to surgery.  She will be having her surgery with Dr. Gupta through [facility].  She does need a preop for this.    She continues on her chronic medications.  She did stop her multivitamin because it was causing some vomiting.  She has had some constipation off and on.  She also reports that she has been eating more Oreos recently and believes her blood sugars will be increased from prior.      Have you ever done Advance Care Planning? (For example, a Health Directive, POLST, or a discussion with a medical provider or your loved ones about " your wishes): Yes, patient states has an Advance Care Planning document and will bring a copy to the clinic.       Fall risk NO  Fallen 2 or more times in the past year?: No  Any fall with injury in the past year?: No    Cognitive Screening   1) Repeat 3 items (Leader, Season, Table)    2) Clock draw: NORMAL  3) 3 item recall: Recalls 3 objects  Results: 3 items recalled: COGNITIVE IMPAIRMENT LESS LIKELY    Mini-CogTM Copyright JO Mendoza. Licensed by the author for use in [facility]; reprinted with permission (claudy@[facility]). All rights reserved.      Do you have sleep apnea, excessive snoring or daytime drowsiness? : yes has CPAP    Reviewed and updated as needed this visit by clinical staff   Tobacco  Allergies  Meds  Problems  Med Hx  Surg Hx  Fam Hx          Reviewed and updated as needed this visit by Provider   Tobacco  Allergies  Meds  Problems  Med Hx  Surg Hx  Fam Hx         Social History     Tobacco Use     Smoking status: Former     Packs/day: 1.00     Years: 40.00     Additional pack years: 0.00     Total pack years: 40.00     Types: Cigarettes     Quit date: 10/1/2008     Years since quitting: 15.0     Passive exposure: Past     Smokeless tobacco: Never     Tobacco comments:     Patient occasionally uses Nicorette gum   Substance Use Topics     Alcohol use: Yes     Alcohol/week: 14.0 standard drinks of alcohol     Types: 14 Standard drinks or equivalent per week     Comment: one wine or beer daily           10/16/2023    10:55 AM   Alcohol Use   Prescreen: >3 drinks/day or >7 drinks/week? No     Do you have a current opioid prescription? No  Do you use any other controlled substances or medications that are not prescribed by a provider? None  Current providers sharing in care for this patient include:   Patient Care Team:  Lili Perkins DO as PCP - General (Internal Medicine)  Lili Perkins DO as Assigned PCP  Annamaria Reagan NP as Nurse Practitioner (Internal  Medicine)  Rama Sewell MD as Assigned Heart and Vascular Provider  Orestes Escudero MD as Assigned Musculoskeletal Provider    The following health maintenance items are reviewed in Epic and correct as of today:  Health Maintenance   Topic Date Due     HEPATITIS B IMMUNIZATION (1 of 3 - Risk 3-dose series) Never done     RSV VACCINE 60+ (1 - 1-dose 60+ series) Never done     INFLUENZA VACCINE (1) 09/01/2023     COVID-19 Vaccine (6 - 2023-24 season) 09/01/2023     EYE EXAM  11/30/2023     A1C  04/16/2024     PHQ-9  04/16/2024     MAMMO SCREENING  09/29/2024     MEDICARE ANNUAL WELLNESS VISIT  10/16/2024     BMP  10/16/2024     LIPID  10/16/2024     MICROALBUMIN  10/16/2024     DIABETIC FOOT EXAM  10/16/2024     FALL RISK ASSESSMENT  10/16/2024     HEMOGLOBIN  10/16/2024     COLORECTAL CANCER SCREENING  12/20/2024     ADVANCE CARE PLANNING  10/16/2028     DTAP/TDAP/TD IMMUNIZATION (3 - Td or Tdap) 11/01/2031     DEXA  06/29/2033     HEPATITIS C SCREENING  Completed     DEPRESSION ACTION PLAN  Completed     Pneumococcal Vaccine: 65+ Years  Completed     URINALYSIS  Completed     ZOSTER IMMUNIZATION  Completed     IPV IMMUNIZATION  Aged Out     HPV IMMUNIZATION  Aged Out     MENINGITIS IMMUNIZATION  Aged Out     LUNG CANCER SCREENING  Discontinued       Current Outpatient Medications   Medication     buPROPion (WELLBUTRIN XL) 300 MG 24 hr tablet     clopidogrel (PLAVIX) 75 MG tablet     cyclobenzaprine (FLEXERIL) 10 MG tablet     DULoxetine (CYMBALTA) 30 MG capsule     losartan-hydrochlorothiazide (HYZAAR) 100-12.5 MG tablet     metFORMIN (GLUCOPHAGE) 500 MG tablet     rosuvastatin (CRESTOR) 40 MG tablet     semaglutide (OZEMPIC, 0.25 OR 0.5 MG/DOSE,) 2 MG/3ML pen     traZODone (DESYREL) 50 MG tablet     tretinoin (RETIN-A) 0.05 % external cream     B Complex CAPS     blood glucose (ACCU-CHEK SMARTVIEW) test strip     Multiple Vitamins-Minerals (PRESERVISION AREDS 2+MULTI VIT PO)     Vitamin D, Cholecalciferol, 25 MCG  "(1000 UT) TABS     No current facility-administered medications for this visit.       Review of Systems   Constitutional:  Negative for chills and fever.   HENT:  Positive for hearing loss. Negative for congestion, ear pain and sore throat.         Chronic loss; evaluated through Audiology   Eyes:  Negative for pain.   Respiratory:  Negative for cough and shortness of breath.    Cardiovascular:  Negative for chest pain, palpitations and peripheral edema.        New constipation and heartburn - likely from Ozempic   Gastrointestinal:  Positive for constipation and heartburn. Negative for abdominal pain, diarrhea, hematochezia and nausea.   Breasts:  Negative for tenderness, breast mass and discharge.   Genitourinary:  Negative for dysuria, frequency, genital sores, hematuria, pelvic pain, urgency, vaginal bleeding and vaginal discharge.   Musculoskeletal:  Positive for arthralgias and myalgias. Negative for joint swelling.        Chronic and unchanged   Skin:  Positive for rash.   Neurological:  Negative for dizziness, headaches and paresthesias.   Hematological:  Negative for adenopathy.   Psychiatric/Behavioral:  Negative for mood changes. The patient is not nervous/anxious.        OBJECTIVE:   /80 (BP Location: Right arm, Patient Position: Sitting, Cuff Size: Adult Regular)   Pulse 68   Temp 97.6  F (36.4  C) (Temporal)   Resp 16   Ht 1.556 m (5' 1.25\")   Wt 69.2 kg (152 lb 9.6 oz)   LMP  (LMP Unknown)   SpO2 98%   BMI 28.60 kg/m   Estimated body mass index is 28.6 kg/m  as calculated from the following:    Height as of this encounter: 1.556 m (5' 1.25\").    Weight as of this encounter: 69.2 kg (152 lb 9.6 oz).  Physical Exam  GEN: Vitals reviewed. Healthy appearing. Patient is in no acute distress. Cooperative with exam.  HEENT: Normocephalic atraumatic.  Eyes grossly normal to inspection.  No discharge or erythema, or obvious scleral/conjunctival abnormalities. Oropharynx with no erythema or " exudates. Dentition adequate.  EACs clear bilaterally, TM gray with normal landmarks.  NECK: Supple; no thyromegaly or masses noted.  No cervical or supraclavicular lymphadenopathy.  CV: Heart regular in rate and rhythm with no murmur.    LUNGS: No audible wheeze, cough, or visible cyanosis.  No visible retractions or increased work of breathing.  Lungs clear to auscultation bilaterally.    ABD:  Obese, Nondistended  SKIN: Warm and dry to touch.  Visible skin clear. No significant rash, abnormal pigmentation or lesions.  EXT: No clubbing or cyanosis.  No peripheral edema.  NEURO: Alert and oriented to person, place, and time.  Cranial nerves II-XII grossly intact with no focal or lateralizing deficits.  Muscle tone normal.  Gait normal. No tremor.   MSK: ROM of upper and lower ext symmetric and full.  PSYCH: Mood is good.  Mentation appears normal, affect normal/bright, judgement and insight intact, normal speech and appearance well-groomed.  DIABETIC FOOT EXAM: No sores or lesions on feet bilaterally, no fungus noted, no erythema or ulceration, pulses adequate bilaterally, monofilament with no decrease in sensation bilaterally.  Bilateral hammertoe and bunion deformity noted.  Moderate callus formation noted.      Diagnostic Test Results:  Labs reviewed in Epic    Recent Results (from the past 720 hour(s))   Comprehensive metabolic panel    Collection Time: 10/16/23  9:03 AM   Result Value Ref Range    Sodium 138 135 - 145 mmol/L    Potassium 4.3 3.4 - 5.3 mmol/L    Carbon Dioxide (CO2) 24 22 - 29 mmol/L    Anion Gap 11 7 - 15 mmol/L    Urea Nitrogen 15.4 8.0 - 23.0 mg/dL    Creatinine 1.08 (H) 0.51 - 0.95 mg/dL    GFR Estimate 54 (L) >60 mL/min/1.73m2    Calcium 9.6 8.8 - 10.2 mg/dL    Chloride 103 98 - 107 mmol/L    Glucose 164 (H) 70 - 99 mg/dL    Alkaline Phosphatase 106 (H) 35 - 104 U/L    AST 78 (H) 0 - 45 U/L    ALT 85 (H) 0 - 50 U/L    Protein Total 7.6 6.4 - 8.3 g/dL    Albumin 4.6 3.5 - 5.2 g/dL     Bilirubin Total 0.6 <=1.2 mg/dL   CBC with platelets    Collection Time: 10/16/23  9:03 AM   Result Value Ref Range    WBC Count 7.7 4.0 - 11.0 10e3/uL    RBC Count 4.49 3.80 - 5.20 10e6/uL    Hemoglobin 13.0 11.7 - 15.7 g/dL    Hematocrit 39.7 35.0 - 47.0 %    MCV 88 78 - 100 fL    MCH 29.0 26.5 - 33.0 pg    MCHC 32.7 31.5 - 36.5 g/dL    RDW 14.1 10.0 - 15.0 %    Platelet Count 312 150 - 450 10e3/uL   Hemoglobin A1c    Collection Time: 10/16/23  9:03 AM   Result Value Ref Range    Hemoglobin A1C 6.5 (H) 4.0 - 6.2 %   TSH with free T4 reflex    Collection Time: 10/16/23  9:03 AM   Result Value Ref Range    TSH 3.35 0.30 - 4.20 uIU/mL   Magnesium    Collection Time: 10/16/23  9:03 AM   Result Value Ref Range    Magnesium 2.0 1.7 - 2.3 mg/dL   Albumin Random Urine Quantitative with Creat Ratio    Collection Time: 10/16/23  9:03 AM   Result Value Ref Range    Creatinine Urine mg/dL 100.8 mg/dL    Albumin Urine mg/L <12.0 mg/L    Albumin Urine mg/g Cr     Lipid Profile    Collection Time: 10/16/23  9:03 AM   Result Value Ref Range    Cholesterol 105 <200 mg/dL    Triglycerides 176 (H) <150 mg/dL    Direct Measure HDL 47 (L) >=50 mg/dL    LDL Cholesterol Calculated 23 <=100 mg/dL    Non HDL Cholesterol 58 <130 mg/dL        ASSESSMENT / PLAN:   1. Encounter for Medicare annual wellness exam    2. Preoperative examination  Patient with no concerns for upcoming surgery.  Okay to proceed as scheduled.  This note will be faxed to her surgery center.  She is to call with any new or changing symptoms.    3. Anxiety and depression  - Controlled.  Continue current regimen.    - buPROPion (WELLBUTRIN XL) 300 MG 24 hr tablet; Take 1 tablet (300 mg) by mouth daily  Dispense: 90 tablet; Refill: 4  - DULoxetine (CYMBALTA) 30 MG capsule; TAKE 2 CAPSULES BY MOUTH DAILY  Dispense: 180 capsule; Refill: 4    4. Atherosclerosis of lower extremity with claudication (H24)  - Controlled.  Continue current regimen.    - clopidogrel (PLAVIX) 75  MG tablet; TAKE 1 TAB BY MOUTH ONCE DAILY  Dispense: 90 tablet; Refill: 4  - rosuvastatin (CRESTOR) 40 MG tablet; Take 1 tablet (40 mg) by mouth every evening  Dispense: 90 tablet; Refill: 4    5. Cervical disc disorder with radiculopathy, cervicothoracic region  -Continued neck issues.  We will trial low-dose muscle relaxer  - cyclobenzaprine (FLEXERIL) 10 MG tablet; Take 0.5-1 tablets (5-10 mg) by mouth 3 times daily as needed for muscle spasms  Dispense: 45 tablet; Refill: 3    6. Muscle spasm  - cyclobenzaprine (FLEXERIL) 10 MG tablet; Take 0.5-1 tablets (5-10 mg) by mouth 3 times daily as needed for muscle spasms  Dispense: 45 tablet; Refill: 3    7. Essential hypertension  - Controlled.  Continue current regimen.    - losartan-hydrochlorothiazide (HYZAAR) 100-12.5 MG tablet; Take 1 tablet by mouth daily  Dispense: 90 tablet; Refill: 4    8. Type 2 diabetes mellitus without complication, without long-term current use of insulin (H)  Most recent A1c controlled.  Continue current medications.  - metFORMIN (GLUCOPHAGE) 500 MG tablet; Take 0.5 tablets (250 mg) by mouth 2 times daily (with meals)  Dispense: 90 tablet; Refill: 4  - semaglutide (OZEMPIC, 0.25 OR 0.5 MG/DOSE,) 2 MG/3ML pen; Inject 0.5 mg Subcutaneous every 7 days  Dispense: 9 mL; Refill: 2    9. Insomnia, unspecified type  - Controlled.  Continue current regimen.    - traZODone (DESYREL) 50 MG tablet; Take 1-2 tablets ( mg) by mouth at bedtime  Dispense: 180 tablet; Refill: 4    10. Wrinkles  - Controlled.  Continue current regimen.    - tretinoin (RETIN-A) 0.05 % external cream; APPLY 1 GRAM EXTERNALLY TO THE AFFECTED AREA AT BEDTIME  Dispense: 45 g; Refill: 1    11. Elevated liver enzymes  Liver enzymes noted to be slightly increased.  Recommend avoiding all alcohol and repeating.  - Hepatic Function Panel; Future    12. Need for COVID-19 vaccine  - COVID-19 12+ (2023-24) (PFIZER)    13. Stage 3a chronic kidney disease (H)    14. Personal  history of nicotine dependence  Due for lung cancer screening  - CT Chest Lung Cancer Scrn Low Dose wo; Future    15. Aortic aneurysm without rupture, unspecified portion of aorta (H24)  Due for annual follow-up  - Echocardiogram Complete; Future      Patient has been advised of split billing requirements and indicates understanding: Yes      COUNSELING:  Reviewed preventive health counseling, as reflected in patient instructions        She reports that she quit smoking about 15 years ago. Her smoking use included cigarettes. She has a 40.00 pack-year smoking history. She has been exposed to tobacco smoke. She has never used smokeless tobacco.      Appropriate preventive services were discussed with this patient, including applicable screening as appropriate for fall prevention, nutrition, physical activity, Tobacco-use cessation, weight loss and cognition.  Checklist reviewing preventive services available has been given to the patient.    Reviewed patients plan of care and provided an AVS. The Basic Care Plan (routine screening as documented in Health Maintenance) for Jennyfer meets the Care Plan requirement. This Care Plan has been established and reviewed with the Patient.      Lili Perkins,   [facility]  Identified Health Risks:  I have reviewed Opioid Use Disorder and Substance Use Disorder risk factors and made any needed referrals.   Answers submitted by the patient for this visit:  Patient Health Questionnaire (Submitted on 10/16/2023)  If you checked off any problems, how difficult have these problems made it for you to do your work, take care of things at home, or get along with other people?: Somewhat difficult  PHQ9 TOTAL SCORE: 5

## 2023-10-16 NOTE — Clinical Note
Please fax to [facility] for surgery on 11/8/2023. Fax # [phone].  Attention Dr Gupta. Phone number is [phone]

## 2023-10-16 NOTE — PROGRESS NOTES
The patient was counseled and encouraged to consider modifying their diet and eating habits. She was provided with information on recommended healthy diet options.  The patient was provided with written information regarding signs of hearing loss.  The patient's PHQ-9 score is consistent with mild depression. She was provided with information regarding depression and was advised to schedule a follow up appointment in 26 weeks to further address this issue.

## 2023-10-26 ENCOUNTER — TELEPHONE (OUTPATIENT)
Dept: INTERNAL MEDICINE | Facility: OTHER | Age: 73
End: 2023-10-26
Payer: MEDICARE

## 2023-10-26 NOTE — TELEPHONE ENCOUNTER
After proper verification, patient stated that she has been bleeding rectally for the last two days with mucous in the blood. Patient was informed that she should go into the ER. Patient stated that she would wait until tomorrow to see how she feels.  Nanette Osei LPN on 10/26/2023 at 4:13 PM

## 2023-10-26 NOTE — TELEPHONE ENCOUNTER
Patient called to talk to Dr. Perkins. She states she is bleeding and is very concerned about it. She would like talk to Dr. Perkins today, if possible.    Janeth Bowman on 10/26/2023 at 3:03 PM

## 2023-10-30 ENCOUNTER — LAB (OUTPATIENT)
Dept: LAB | Facility: OTHER | Age: 73
End: 2023-10-30
Attending: INTERNAL MEDICINE
Payer: MEDICARE

## 2023-10-30 ENCOUNTER — ALLIED HEALTH/NURSE VISIT (OUTPATIENT)
Dept: FAMILY MEDICINE | Facility: OTHER | Age: 73
End: 2023-10-30
Attending: INTERNAL MEDICINE
Payer: MEDICARE

## 2023-10-30 DIAGNOSIS — R74.8 ELEVATED LIVER ENZYMES: ICD-10-CM

## 2023-10-30 DIAGNOSIS — Z23 NEED FOR PROPHYLACTIC VACCINATION AND INOCULATION AGAINST INFLUENZA: Primary | ICD-10-CM

## 2023-10-30 LAB
ALBUMIN SERPL BCG-MCNC: 4.4 G/DL (ref 3.5–5.2)
ALP SERPL-CCNC: 95 U/L (ref 35–104)
ALT SERPL W P-5'-P-CCNC: 90 U/L (ref 0–50)
AST SERPL W P-5'-P-CCNC: 78 U/L (ref 0–45)
BILIRUB DIRECT SERPL-MCNC: <0.2 MG/DL (ref 0–0.3)
BILIRUB SERPL-MCNC: 0.5 MG/DL
PROT SERPL-MCNC: 7.6 G/DL (ref 6.4–8.3)

## 2023-10-30 PROCEDURE — G0008 ADMIN INFLUENZA VIRUS VAC: HCPCS

## 2023-10-30 PROCEDURE — 36415 COLL VENOUS BLD VENIPUNCTURE: CPT | Mod: ZL

## 2023-10-30 PROCEDURE — 80076 HEPATIC FUNCTION PANEL: CPT | Mod: ZL

## 2023-10-30 NOTE — PROGRESS NOTES
Immunization Documentation    Prior to Immunization administration, verified patients identity using patient's name and date of birth. Please see IMMUNIZATIONS  and order for additional information.  Patient / Parent instructed to remain in clinic for 15 minutes and report any adverse reaction to staff immediately.    Was entire vial of medication used? Yes  Vial/Syringe: Lio Torres LPN  10/30/2023   10:35 AM

## 2023-11-14 ENCOUNTER — MYC MEDICAL ADVICE (OUTPATIENT)
Dept: INTERNAL MEDICINE | Facility: OTHER | Age: 73
End: 2023-11-14
Payer: MEDICARE

## 2023-11-14 DIAGNOSIS — R74.8 ELEVATED LIVER ENZYMES: Primary | ICD-10-CM

## 2023-11-15 ENCOUNTER — TRANSFERRED RECORDS (OUTPATIENT)
Dept: HEALTH INFORMATION MANAGEMENT | Facility: OTHER | Age: 73
End: 2023-11-15
Payer: MEDICARE

## 2023-11-16 NOTE — TELEPHONE ENCOUNTER
Patient's liver function tests are only borderline elevated.  We can monitor this within the next couple weeks and see if they are trending back to normal.  Please pend lab orders if she would like to have this rechecked in the next few weeks. Please inquire on Tylenol and alcohol intake, ask if she is having any new abdominal pain.

## 2023-11-20 ENCOUNTER — HOSPITAL ENCOUNTER (OUTPATIENT)
Dept: ULTRASOUND IMAGING | Facility: OTHER | Age: 73
Discharge: HOME | End: 2023-11-20
Attending: SURGERY
Payer: MEDICARE

## 2023-11-20 ENCOUNTER — HOSPITAL ENCOUNTER (OUTPATIENT)
Dept: MAMMOGRAPHY | Facility: OTHER | Age: 73
Discharge: HOME | End: 2023-11-20
Attending: INTERNAL MEDICINE
Payer: MEDICARE

## 2023-11-20 ENCOUNTER — HOSPITAL ENCOUNTER (OUTPATIENT)
Dept: CT IMAGING | Facility: OTHER | Age: 73
Discharge: HOME | End: 2023-11-20
Attending: INTERNAL MEDICINE
Payer: MEDICARE

## 2023-11-20 DIAGNOSIS — I73.9 PAD (PERIPHERAL ARTERY DISEASE) (H): ICD-10-CM

## 2023-11-20 DIAGNOSIS — Z87.891 PERSONAL HISTORY OF NICOTINE DEPENDENCE: ICD-10-CM

## 2023-11-20 DIAGNOSIS — Z12.31 VISIT FOR SCREENING MAMMOGRAM: ICD-10-CM

## 2023-11-20 DIAGNOSIS — I70.219 ATHEROSCLEROSIS OF LOWER EXTREMITY WITH CLAUDICATION (H): ICD-10-CM

## 2023-11-20 PROCEDURE — 77067 SCR MAMMO BI INCL CAD: CPT

## 2023-11-20 PROCEDURE — 71271 CT THORAX LUNG CANCER SCR C-: CPT

## 2023-11-20 PROCEDURE — 93922 UPR/L XTREMITY ART 2 LEVELS: CPT

## 2023-11-25 ENCOUNTER — HOSPITAL ENCOUNTER (OUTPATIENT)
Dept: GENERAL RADIOLOGY | Facility: OTHER | Age: 73
Discharge: HOME | End: 2023-11-25
Attending: NURSE PRACTITIONER
Payer: MEDICARE

## 2023-11-25 ENCOUNTER — OFFICE VISIT (OUTPATIENT)
Dept: FAMILY MEDICINE | Facility: OTHER | Age: 73
End: 2023-11-25
Payer: MEDICARE

## 2023-11-25 VITALS
HEART RATE: 94 BPM | TEMPERATURE: 97.6 F | WEIGHT: 153.6 LBS | OXYGEN SATURATION: 96 % | BODY MASS INDEX: 28.79 KG/M2 | SYSTOLIC BLOOD PRESSURE: 112 MMHG | DIASTOLIC BLOOD PRESSURE: 74 MMHG | RESPIRATION RATE: 18 BRPM

## 2023-11-25 DIAGNOSIS — S63.502A WRIST SPRAIN, LEFT, INITIAL ENCOUNTER: Primary | ICD-10-CM

## 2023-11-25 DIAGNOSIS — M25.532 LEFT WRIST PAIN: ICD-10-CM

## 2023-11-25 DIAGNOSIS — S80.811A ABRASION OF RIGHT LOWER EXTREMITY, INITIAL ENCOUNTER: ICD-10-CM
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
## 2023-11-25 PROCEDURE — 99214 OFFICE O/P EST MOD 30 MIN: CPT | Performed by: NURSE PRACTITIONER

## 2023-11-25 PROCEDURE — 250N000011 HC RX IP 250 OP 636: Performed by: NURSE PRACTITIONER

## 2023-11-25 PROCEDURE — 73110 X-RAY EXAM OF WRIST: CPT | Mod: LT

## 2023-11-25 PROCEDURE — G0463 HOSPITAL OUTPT CLINIC VISIT: HCPCS | Mod: 25

## 2023-11-25 PROCEDURE — G0463 HOSPITAL OUTPT CLINIC VISIT: HCPCS

## 2023-11-25 PROCEDURE — 96372 THER/PROPH/DIAG INJ SC/IM: CPT | Performed by: NURSE PRACTITIONER

## 2023-11-25 RX ORDER — TOBRAMYCIN AND DEXAMETHASONE 3; 1 MG/ML; MG/ML
SUSPENSION/ DROPS OPHTHALMIC
COMMUNITY
Start: 2023-03-24

## 2023-11-25 RX ORDER — LORAZEPAM 0.5 MG/1
TABLET ORAL
COMMUNITY

## 2023-11-25 RX ORDER — TRAMADOL HYDROCHLORIDE 50 MG/1
50 TABLET ORAL EVERY 6 HOURS PRN
Qty: 10 TABLET | Refills: 0 | Status: SHIPPED | OUTPATIENT
Start: 2023-11-25 | End: 2023-11-25

## 2023-11-25 RX ORDER — ERYTHROMYCIN 5 MG/G
OINTMENT OPHTHALMIC
COMMUNITY
Start: 2023-11-15

## 2023-11-25 RX ORDER — KETOROLAC TROMETHAMINE 30 MG/ML
15 INJECTION, SOLUTION INTRAMUSCULAR; INTRAVENOUS ONCE
Status: COMPLETED | OUTPATIENT
Start: 2023-11-25 | End: 2023-11-25

## 2023-11-25 RX ORDER — TRAMADOL HYDROCHLORIDE 50 MG/1
50 TABLET ORAL EVERY 6 HOURS PRN
Qty: 10 TABLET | Refills: 0 | Status: SHIPPED | OUTPATIENT
Start: 2023-11-25

## 2023-11-25 RX ORDER — POLYMYXIN B SULFATE AND TRIMETHOPRIM 1; 10000 MG/ML; [USP'U]/ML
SOLUTION OPHTHALMIC
COMMUNITY
Start: 2023-03-23

## 2023-11-25 RX ORDER — KETOROLAC TROMETHAMINE 30 MG/ML
30 INJECTION, SOLUTION INTRAMUSCULAR; INTRAVENOUS ONCE
Status: DISCONTINUED | OUTPATIENT
Start: 2023-11-25 | End: 2023-11-25

## 2023-11-25 RX ORDER — NITROGLYCERIN 40 MG/1
PATCH TRANSDERMAL
COMMUNITY

## 2023-11-25 RX ADMIN — KETOROLAC TROMETHAMINE 15 MG: 30 INJECTION INTRAMUSCULAR; INTRAVENOUS at 15:07

## 2023-11-25 ASSESSMENT — PATIENT HEALTH QUESTIONNAIRE - PHQ9
SUM OF ALL RESPONSES TO PHQ QUESTIONS 1-9: 0
10. IF YOU CHECKED OFF ANY PROBLEMS, HOW DIFFICULT HAVE THESE PROBLEMS MADE IT FOR YOU TO DO YOUR WORK, TAKE CARE OF THINGS AT HOME, OR GET ALONG WITH OTHER PEOPLE: NOT DIFFICULT AT ALL
SUM OF ALL RESPONSES TO PHQ QUESTIONS 1-9: 0

## 2023-11-25 ASSESSMENT — PAIN SCALES - GENERAL: PAINLEVEL: WORST PAIN (10)

## 2023-11-25 NOTE — NURSING NOTE
Patient presents to clinic after falling and injuring left hand and arm   /74   Pulse 62   Temp 97.6  F (36.4  C) (Temporal)   Resp 18   Wt 69.7 kg (153 lb 9.6 oz)   LMP  (LMP Unknown)   SpO2 96%   BMI 28.79 kg/m    Marie Oh LPN on 11/25/2023 at 2:11 PM

## 2023-11-25 NOTE — PROGRESS NOTES
Jennyfer Elizabeth  1950    ASSESSMENT/PLAN:   1. Fall, initial encounter    2. Left wrist pain  - XR Wrist Left G/E 3 Views; Future  - ketorolac (TORADOL) injection 15 mg    3. Wrist sprain, left, initial encounter  - Wrist/Arm Supplies Order Wrist Brace; Left; non-thumb spica  - traMADol (ULTRAM) 50 MG tablet; Take 1 tablet (50 mg) by mouth every 6 hours as needed for severe pain  Dispense: 10 tablet; Refill: 0    Patient is given a dose of Toradol 15 mg IM in clinic and tolerated this well.  She states this did improve her pain.    Left wrist x-ray returned negative for acute fracture or dislocation.  Recommend conservative treatment options including rest, ice, elevation and compression.  Patient is fitted into left wrist brace which she did tolerate well.  Recommend Tylenol, ibuprofen for pain management.  I will send in tramadol 50 mg every 6 hours as needed for breakthrough pain , 10 tablets were sent in.  PDMP checked, no red flags.  Reviewed with patient that expect symptoms to improve over the next 2 weeks.  If symptoms or not improving I recommend she return for further evaluation.    4. Abrasion of right lower extremity, initial encounter  Superficial abrasion.  I recommend keeping area clean and dry.  Using triple antibiotic ointment and Band-Aid as needed.      Patient agrees with plan of care and verbalizes understating. AVS printed. Patient education provided verbally and written instructions provided as requested. Patient made aware of emergent signs and symptoms to monitor for and when to seek additional care/follow up.     SUBJECTIVE:   CHIEF COMPLAINT/ REASON FOR VISIT  Patient presents with:  Hand Injury     HISTORY OF PRESENT ILLNESS  Jennyfer Elizabeth is a pleasant 73 year old female presents to [facility] clinic today with concerns for left wrist pain.  Patient states she was in [facility] yesterday filling up her vehicle with gas when she tripped over the gas pump broCylande.  She fell landing on  her left side with an out reach to left hand.  She did land on her left breast/chest and scraped her right shin.  She states she did not initially hit her head but rolled over and hit the back of her head.  She did not lose consciousness.  She denies any dizziness, lightheadedness, blurry vision or vomiting.  The scratch on her shin was bleeding however has since stopped.  Her left wrist is painful, she is unable to move it or lift anything.  The area is swollen and tender.  She did have Norco at home from a previous prescription and she states that it did not help with her pain.    I have reviewed the nursing notes.  I have reviewed allergies, medication list, problem list, and past medical history.    REVIEW OF SYSTEMS  Review of Systems   Musculoskeletal:         Left wrist pain   All other systems reviewed and are negative.       VITAL SIGNS  Vitals:    11/25/23 1409   BP: 112/74   Pulse: 94   Resp: 18   Temp: 97.6  F (36.4  C)   TempSrc: Temporal   SpO2: 96%   Weight: 69.7 kg (153 lb 9.6 oz)      Body mass index is 28.79 kg/m .    OBJECTIVE:   PHYSICAL EXAM  Physical Exam  Vitals reviewed.   Constitutional:       Appearance: Normal appearance. She is not ill-appearing or toxic-appearing.   HENT:      Head: Normocephalic and atraumatic.      Nose: Nose normal.   Eyes:      Conjunctiva/sclera: Conjunctivae normal.   Cardiovascular:      Rate and Rhythm: Normal rate and regular rhythm.      Pulses: Normal pulses.      Heart sounds: Normal heart sounds. No murmur heard.  Pulmonary:      Effort: Pulmonary effort is normal.   Musculoskeletal:         General: Swelling, tenderness, deformity and signs of injury present.      Right wrist: Normal.      Left wrist: Swelling, deformity, effusion, tenderness, bony tenderness and snuff box tenderness present. No crepitus. Decreased range of motion. Normal pulse.   Skin:     Comments: Right shin abrasion with dried bloody drainage.  Superficial skin tear.  No surrounding  erythema or edema.  No foreign body.    Skin check on chest, abdomen ribs and breast did not reveal any bruising, abrasion, skin tear.  No rib pain.   Neurological:      General: No focal deficit present.      Mental Status: She is alert and oriented to person, place, and time.      Cranial Nerves: No cranial nerve deficit.      Motor: No weakness.      Coordination: Coordination normal.      Gait: Gait normal.   Psychiatric:         Mood and Affect: Mood normal.         Behavior: Behavior normal.         Thought Content: Thought content normal.         Judgment: Judgment normal.        DIAGNOSTICS  Results for orders placed or performed during the hospital encounter of 11/25/23   XR Wrist Left G/E 3 Views     Status: None    Narrative    PROCEDURE:  XR WRIST LEFT G/E 3 VIEWS    HISTORY: Left wrist pain.    COMPARISON:  None.    TECHNIQUE:  3 views left wrist.    FINDINGS:  No fracture or dislocation is identified. Degenerative  changes are seen at the first CMC and STT joints. No foreign body is  seen.       Impression    IMPRESSION: No acute fracture.      LEANN HERMAN MD         SYSTEM ID:  [id number]        Wen Griffith NP  [facility]

## 2023-11-27 ENCOUNTER — VIRTUAL VISIT (OUTPATIENT)
Dept: VASCULAR SURGERY | Facility: CLINIC | Age: 73
End: 2023-11-27
Payer: MEDICARE

## 2023-11-27 ENCOUNTER — VIRTUAL VISIT (OUTPATIENT)
Dept: CARDIOLOGY | Facility: OTHER | Age: 73
End: 2023-11-27
Attending: NURSE PRACTITIONER
Payer: MEDICARE

## 2023-11-27 VITALS
SYSTOLIC BLOOD PRESSURE: 128 MMHG | WEIGHT: 154 LBS | RESPIRATION RATE: 18 BRPM | BODY MASS INDEX: 28.86 KG/M2 | HEART RATE: 88 BPM | OXYGEN SATURATION: 95 % | DIASTOLIC BLOOD PRESSURE: 80 MMHG

## 2023-11-27 VITALS
WEIGHT: 154 LBS | BODY MASS INDEX: 28.86 KG/M2 | DIASTOLIC BLOOD PRESSURE: 80 MMHG | HEART RATE: 88 BPM | RESPIRATION RATE: 18 BRPM | OXYGEN SATURATION: 95 % | SYSTOLIC BLOOD PRESSURE: 128 MMHG

## 2023-11-27 DIAGNOSIS — R74.8 ELEVATED LIVER ENZYMES: ICD-10-CM

## 2023-11-27 DIAGNOSIS — I70.219 ATHEROSCLEROSIS OF LOWER EXTREMITY WITH CLAUDICATION (H): Primary | ICD-10-CM

## 2023-11-27 DIAGNOSIS — I73.9 PAD (PERIPHERAL ARTERY DISEASE) (H): Primary | ICD-10-CM

## 2023-11-27 LAB
ALBUMIN SERPL BCG-MCNC: 4.4 G/DL (ref 3.5–5.2)
ALP SERPL-CCNC: 102 U/L (ref 40–150)
ALT SERPL W P-5'-P-CCNC: 44 U/L (ref 0–50)
AST SERPL W P-5'-P-CCNC: 47 U/L (ref 0–45)
BILIRUB DIRECT SERPL-MCNC: <0.2 MG/DL (ref 0–0.3)
BILIRUB SERPL-MCNC: 0.4 MG/DL
PROT SERPL-MCNC: 7.5 G/DL (ref 6.4–8.3)

## 2023-11-27 PROCEDURE — 80076 HEPATIC FUNCTION PANEL: CPT | Mod: ZL,GT | Performed by: NURSE PRACTITIONER

## 2023-11-27 PROCEDURE — 99215 OFFICE O/P EST HI 40 MIN: CPT | Mod: 95 | Performed by: NURSE PRACTITIONER

## 2023-11-27 ASSESSMENT — PAIN SCALES - GENERAL
PAINLEVEL: NO PAIN (0)
PAINLEVEL: NO PAIN (0)

## 2023-11-27 NOTE — NURSING NOTE
"Chief Complaint   Patient presents with    Telemedicine consult       Initial /80   Pulse 88   Resp 18   Wt 69.9 kg (154 lb)   LMP  (LMP Unknown)   SpO2 95%   BMI 28.86 kg/m   Estimated body mass index is 28.86 kg/m  as calculated from the following:    Height as of 10/16/23: 1.556 m (5' 1.25\").    Weight as of this encounter: 69.9 kg (154 lb).  Medication Reconciliation: complete  Rosana Jesus RN on 11/27/2023 at 10:48 AM   "

## 2023-11-27 NOTE — PATIENT INSTRUCTIONS
Thank you so much for choosing us for your care. It was a pleasure to see you at the vascular clinic today.     Follow-up recommendations: We will see you back in 6 months. Please do not hesitate to reach out to us in the meantime with any concerns. Our schedulers will get in touch with you to set up your follow-up visit.     Additional testing/imaging ordered today: Repeat ABIs in 6 months.        Our scheduling team will get in touch with you to set up any follow-up testing/imaging and/or appointments. Please be aware that any testing/imaging recommended today will need to completed prior to your next visit with the provider. If testing/imaging is not completed prior to your next visit, your visit may be rescheduled.        If you have any questions, please call Carlee Moran RN at [phone] or contact our clinic at [phone]. We also encourage the use of [employer] to communicate with your HealthCare Provider.        If you have an urgent need after business hours (8:00 am to 4:30 pm) please call [phone], option 4, and ask for the vascular attending on call. For non-urgent after hours needs, please call the vascular nurse at [phone] and leave a detailed voicemail. For scheduling needs, please call our clinic directly at [phone].

## 2023-11-27 NOTE — PROGRESS NOTES
VASCULAR SURGERY PROGRESS NOTE    LOCATION: [facility]     Jennyfer Elizabeth  Medical Record #:  [id number]  YOB: 1950  Age:  73 year old     Date of Service: 11/27/2023    PRIMARY CARE PROVIDER: Lili Perkins    Reason for visit: annual surveillance PAD s/p RLE revascularization with Right femoral endarterectomy and iliac angioplasty with stenting     IMPRESSION / RECOMMENDATION:   Jennyfer Elizabeth is a very pleasant 73-year-old patient who underwent RLE revascularization with right femoral endarterectomy and iliac angioplasty with stenting in March 2022. ABIs on right is 0.92 and on the left is 0.94. When compared to last year, these have decreased from 1.08 on right and 1.24 on left. Patient has stopped taking Plavix approximately 2 months ago due to prior eye surgery.    From a vascular surgery perspective, recommend resuming Plavix (after discussing with her primary team) if safe to do so.  Patient will reach out to her team prior to resuming Plavix.  Continue with statin as you are.  Given recent slight decrease in ABIs, recommend follow-up in 6 months with repeat ABIs.      All questions were answered and support provided. She has our contact information and knows to reach out with any additional questions or concerns.       Dori Dawkins, CNP  Vascular Surgery  Pager: [phone]  [email or web address]  Send message or 10 digit call back number Securely via [employer] with the [employer] Web Console (learn more here)    45 minutes spent on the date of the encounter doing chart review, review of outside records, review of test results, interpretation of tests, patient visit, documentation and discussion with other provider(s)            HPI:  Jennyfer Elizabeth is a 73 year old female with past medical history significant for symptomatic  PAD.  In March 2022 she had a right femoral endarterectomy with iliac angioplasty and stenting.  She is a non-smoker, compliant with  medications however stopped taking Plavix approximately 2 months ago for procedure after which did not resume the medication. Patient denies claudication symptoms, remains asymptomatic. RLE without overt discoloration, no open wounds, no edema. RLE warm to palpation with palpable DP per remote/[place] RN during this virtual visit.  No other concerns.    REVIEW OF SYSTEMS:    A 12 point ROS was reviewed and is negative except for what is listed above in HPI.    PHH:    Past Medical History:   Diagnosis Date     Benign essential hypertension 03/03/2018     Cataract      Chronic pain of right knee 04/11/2018     Diabetic eye exam (H) 06/07/2018     TAYLOR (generalized anxiety disorder) 03/03/2018     History of MI (myocardial infarction) 07/2017    follows yearly with [facility]     Macular degeneration      Major depressive disorder, single episode 1995     Obese      Obstructive sleep apnea 2005    BIPAP     Osteopenia 2007    Lumbar spine     Pure hypercholesterolemia 2001     PVD (peripheral vascular disease) (H24)      Squamous cell carcinoma, leg, left 12/2021     Type 2 diabetes mellitus with complication, without long-term current use of insulin (H) 03/03/2018          Past Surgical History:   Procedure Laterality Date     ANGIOGRAM Right 3/22/2022    Procedure: with right iliac angiogram, right iliac angioplasty and stenting;  Surgeon: Rama Sewell MD;  Location: [facility] OR     BREAST BIOPSY, RT/LT  1986    x2; in [place]     CARDIAC CATH - HIM SCAN  07/10/2017    Mid RCA;  drug eluting stent     CHOLECYSTECTOMY  2008     COLONOSCOPY N/A 12/20/2021    Tubular Adenoma F/U 2024     ENDARTERECTOMY FEMORAL Right 3/22/2022    Procedure: Right femoral endarterectomy;  Surgeon: Rama Sewell MD;  Location: [facility] OR     IR OR ANGIOGRAM  3/22/2022     SKIN CANCER EXCISION Left 12/2021    [facility]     TONSILLECTOMY, ADENOIDECTOMY, COMBINED  1955       ALLERGIES:  Patient has no known allergies.    MEDS:    Current Outpatient  Medications:      B Complex CAPS, Take 1 capsule by mouth daily, Disp: , Rfl:      blood glucose (ACCU-CHEK SMARTVIEW) test strip, Use to test blood glucose 2 times daily. Dispense as covered by insurance. Dx. Code: E11.8., Disp: 200 strip, Rfl: 0     buPROPion (WELLBUTRIN XL) 300 MG 24 hr tablet, Take 1 tablet (300 mg) by mouth daily, Disp: 90 tablet, Rfl: 4     clopidogrel (PLAVIX) 75 MG tablet, TAKE 1 TAB BY MOUTH ONCE DAILY, Disp: 90 tablet, Rfl: 4     cyclobenzaprine (FLEXERIL) 10 MG tablet, Take 0.5-1 tablets (5-10 mg) by mouth 3 times daily as needed for muscle spasms, Disp: 45 tablet, Rfl: 3     DULoxetine (CYMBALTA) 30 MG capsule, TAKE 2 CAPSULES BY MOUTH DAILY, Disp: 180 capsule, Rfl: 4     erythromycin (ROMYCIN) 5 MG/GM ophthalmic ointment, APPLY AS DIRECTED (THIN LAYER) ON UPPER EYELIDS OF BOTH EYES 3 TIMES DAILY FOR 10 DAYS, Disp: , Rfl:      LORazepam (ATIVAN) 0.5 MG tablet, , Disp: , Rfl:      losartan-hydrochlorothiazide (HYZAAR) 100-12.5 MG tablet, Take 1 tablet by mouth daily, Disp: 90 tablet, Rfl: 4     metFORMIN (GLUCOPHAGE) 500 MG tablet, Take 0.5 tablets (250 mg) by mouth 2 times daily (with meals), Disp: 90 tablet, Rfl: 4     Multiple Vitamins-Minerals (PRESERVISION AREDS 2+MULTI VIT PO), Take 1 tablet by mouth 2 times daily (Preservision Areds), Disp: , Rfl:      nitroGLYcerin (NITRODUR) 0.2 MG/HR 24 hr patch, , Disp: , Rfl:      polymixin b-trimethoprim (POLYTRIM) 83695-6.1 UNIT/ML-% ophthalmic solution, PLACE 1 DROP INTO LEFT EYE 4 TIMES A DAY FOR 5 DAYS., Disp: , Rfl:      rosuvastatin (CRESTOR) 40 MG tablet, Take 1 tablet (40 mg) by mouth every evening, Disp: 90 tablet, Rfl: 4     semaglutide (OZEMPIC, 0.25 OR 0.5 MG/DOSE,) 2 MG/3ML pen, Inject 0.5 mg Subcutaneous every 7 days, Disp: 9 mL, Rfl: 2     tobramycin-dexAMETHasone (TOBRADEX) 0.3-0.1 % ophthalmic suspension, INSTILL 1 DROP IN LEFT EYE 4 TIMES A DAY, Disp: , Rfl:      traMADol (ULTRAM) 50 MG tablet, Take 1 tablet (50 mg) by mouth  every 6 hours as needed for severe pain, Disp: 10 tablet, Rfl: 0     traZODone (DESYREL) 50 MG tablet, Take 1-2 tablets ( mg) by mouth at bedtime, Disp: 180 tablet, Rfl: 4     tretinoin (RETIN-A) 0.05 % external cream, APPLY 1 GRAM EXTERNALLY TO THE AFFECTED AREA AT BEDTIME, Disp: 45 g, Rfl: 1     Vitamin D, Cholecalciferol, 25 MCG (1000 UT) TABS, Take 1 tablet by mouth daily , Disp: , Rfl:     SOCIAL HABITS:    History   Smoking Status     Former     Packs/day: 1.00     Years: 40.00     Types: Cigarettes     Quit date: 10/1/2008   Smokeless Tobacco     Never     Social History    Substance and Sexual Activity      Alcohol use: Yes        Alcohol/week: 14.0 standard drinks of alcohol        Types: 14 Standard drinks or equivalent per week        Comment: one wine or beer daily      History   Drug Use No       FAMILY HISTORY:    Family History   Problem Relation Age of Onset     Lung Cancer Mother         smoker     Hypertension Father      Heart Disease Father         MI     Diabetes Sister      Heart Disease Sister      Kidney Disease Sister 33        ?congenital     Hyperlipidemia Sister      No Known Problems Brother      Breast Cancer No family hx of         Cancer-breast       PE:  /80   Pulse 88   Resp 18   Wt 69.9 kg (154 lb)   LMP  (LMP Unknown)   SpO2 95%   BMI 28.86 kg/m    Wt Readings from Last 1 Encounters:   11/27/23 69.9 kg (154 lb)     Body mass index is 28.86 kg/m .    EXAM:  GENERAL: Healthy, alert and no distress  EYES: Eyes grossly normal to inspection.  No discharge or erythema, or obvious scleral/conjunctival abnormalities.  RESP: No audible wheeze, cough, or visible cyanosis.  No visible retractions or increased work of breathing.    SKIN: Visible skin clear. No significant rash, abnormal pigmentation or lesions.  NEURO: Cranial nerves grossly intact.  Mentation and speech appropriate for age.  PSYCH: Mentation appears normal, affect normal/bright, judgement and insight  intact, normal speech and appearance well-groomed.  VASCULAR: RLE without open wounds, no overt discoloration on toes, no edema visible on virtual exam, warm with palpable DP per [facility] DOUGLAS who conducted the assessment in my presence during this virtual visit.      DIAGNOSTIC STUDIES:     Images:  US TOMMY Doppler No Exercise    Result Date: 11/20/2023  US TOMMY DOPPLER NO EXERCISE, 1-2 LEVELS, BILAT HISTORY: 73 years Female PAD (peripheral artery disease) (H24); Atherosclerosis of lower extremity with claudication (H24) COMPARISON: None TECHNIQUE: Ultrasound TOMMY Doppler FINDINGS: The right ankle-brachial index is 0.92. Left ankle brachial index equals 0.94.     IMPRESSION: Borderline severity of disease. KALYANI HOOD MD   SYSTEM ID:  [id number]      LABS:      Sodium   Date Value Ref Range Status   10/16/2023 138 135 - 145 mmol/L Final     Comment:     Reference intervals for this test were updated on 09/26/2023 to more accurately reflect our healthy population. There may be differences in the flagging of prior results with similar values performed with this method. Interpretation of those prior results can be made in the context of the updated reference intervals.    05/24/2023 142 136 - 145 mmol/L Final   03/29/2023 140 136 - 145 mmol/L Final   02/01/2021 136 134 - 144 mmol/L Final   06/09/2020 140 134 - 144 mmol/L Final   08/15/2019 137 134 - 144 mmol/L Final     Urea Nitrogen   Date Value Ref Range Status   10/16/2023 15.4 8.0 - 23.0 mg/dL Final   05/24/2023 14.5 8.0 - 23.0 mg/dL Final   03/29/2023 13.2 8.0 - 23.0 mg/dL Final   08/17/2022 18 7 - 25 mg/dL Final   03/22/2022 20 7 - 30 mg/dL Final   03/07/2022 18 7 - 25 mg/dL Final   02/01/2021 18 7 - 25 mg/dL Final   06/09/2020 18 7 - 25 mg/dL Final   04/03/2019 14 7 - 25 mg/dL Final     Hemoglobin   Date Value Ref Range Status   10/16/2023 13.0 11.7 - 15.7 g/dL Final   08/17/2022 12.7 11.7 - 15.7 g/dL Final   03/22/2022 12.2 11.7 - 15.7 g/dL Final    03/24/2021 13.2 11.7 - 15.7 g/dL Final   08/29/2019 14.5 11.7 - 15.7 g/dL Final   04/03/2019 14.7 11.7 - 15.7 g/dL Final     Platelet Count   Date Value Ref Range Status   10/16/2023 312 150 - 450 10e3/uL Final   08/17/2022 285 150 - 450 10e3/uL Final   03/22/2022 248 150 - 450 10e3/uL Final   03/24/2021 287 150 - 450 10e9/L Final   08/29/2019 314 150 - 450 10e9/L Final   04/03/2019 311 150 - 450 10e9/L Final     INR - Historical   Date Value Ref Range Status   07/07/2017 1.1 <1.3

## 2023-11-27 NOTE — LETTER
11/27/2023       RE: Jennyfer Elizabeth  [address]  [facility] [address]     Dear Colleague,    Thank you for referring your patient, Jennyfer Elizabeth, to the [facility] [place] [facility]. Please see a copy of my visit note below.        VASCULAR SURGERY PROGRESS NOTE    LOCATION: [facility]     Jennyfer Elizabeth  Medical Record #:  [id number]  YOB: 1950  Age:  73 year old     Date of Service: 11/27/2023    PRIMARY CARE PROVIDER: Lili Perkins    Reason for visit: annual surveillance PAD s/p RLE revascularization with Right femoral endarterectomy and iliac angioplasty with stenting     IMPRESSION / RECOMMENDATION:   Jennyfer Elizabeth is a very pleasant 73-year-old patient who underwent RLE revascularization with right femoral endarterectomy and iliac angioplasty with stenting in March 2022. ABIs on right is 0.92 and on the left is 0.94. When compared to last year, these have decreased from 1.08 on right and 1.24 on left. Patient has stopped taking Plavix approximately 2 months ago due to prior eye surgery.    From a vascular surgery perspective, recommend resuming Plavix (after discussing with her primary team) if safe to do so.  Patient will reach out to her team prior to resuming Plavix.  Continue with statin as you are.  Given recent slight decrease in ABIs, recommend follow-up in 6 months with repeat ABIs.      All questions were answered and support provided. She has our contact information and knows to reach out with any additional questions or concerns.       Dori Dawkins, CNP  Vascular Surgery  Pager: [phone]  [email or web address]  Send message or 10 digit call back number Securely via [facility] with the [facility] Web Console (learn more here)    45 minutes spent on the date of the encounter doing chart review, review of outside records, review of test results, interpretation of tests,  patient visit, documentation and discussion with other provider(s)            HPI:  Jennyfer Elizabeth is a 73 year old female with past medical history significant for symptomatic  PAD.  In March 2022 she had a right femoral endarterectomy with iliac angioplasty and stenting.  She is a non-smoker, compliant with medications however stopped taking Plavix approximately 2 months ago for procedure after which did not resume the medication. Patient denies claudication symptoms, remains asymptomatic. RLE without overt discoloration, no open wounds, no edema. RLE warm to palpation with palpable DP per remote/[place] RN during this virtual visit.  No other concerns.    REVIEW OF SYSTEMS:    A 12 point ROS was reviewed and is negative except for what is listed above in HPI.    PHH:    Past Medical History:   Diagnosis Date    Benign essential hypertension 03/03/2018    Cataract     Chronic pain of right knee 04/11/2018    Diabetic eye exam (H) 06/07/2018    TAYLOR (generalized anxiety disorder) 03/03/2018    History of MI (myocardial infarction) 07/2017    follows yearly with [facility]    Macular degeneration     Major depressive disorder, single episode 1995    Obese     Obstructive sleep apnea 2005    BIPAP    Osteopenia 2007    Lumbar spine    Pure hypercholesterolemia 2001    PVD (peripheral vascular disease) (H24)     Squamous cell carcinoma, leg, left 12/2021    Type 2 diabetes mellitus with complication, without long-term current use of insulin (H) 03/03/2018          Past Surgical History:   Procedure Laterality Date    ANGIOGRAM Right 3/22/2022    Procedure: with right iliac angiogram, right iliac angioplasty and stenting;  Surgeon: Rama Sewell MD;  Location: [facility] OR    BREAST BIOPSY, RT/LT  1986    x2; in [place]    CARDIAC CATH - HIM SCAN  07/10/2017    Mid RCA;  drug eluting stent    CHOLECYSTECTOMY  2008    COLONOSCOPY N/A 12/20/2021    Tubular Adenoma F/U 2024    ENDARTERECTOMY FEMORAL Right 3/22/2022     Procedure: Right femoral endarterectomy;  Surgeon: Rama Sewell MD;  Location: [facility] OR    IR OR ANGIOGRAM  3/22/2022    SKIN CANCER EXCISION Left 12/2021    [facility]    TONSILLECTOMY, ADENOIDECTOMY, COMBINED  1955       ALLERGIES:  Patient has no known allergies.    MEDS:    Current Outpatient Medications:     B Complex CAPS, Take 1 capsule by mouth daily, Disp: , Rfl:     blood glucose (ACCU-CHEK SMARTVIEW) test strip, Use to test blood glucose 2 times daily. Dispense as covered by insurance. Dx. Code: E11.8., Disp: 200 strip, Rfl: 0    buPROPion (WELLBUTRIN XL) 300 MG 24 hr tablet, Take 1 tablet (300 mg) by mouth daily, Disp: 90 tablet, Rfl: 4    clopidogrel (PLAVIX) 75 MG tablet, TAKE 1 TAB BY MOUTH ONCE DAILY, Disp: 90 tablet, Rfl: 4    cyclobenzaprine (FLEXERIL) 10 MG tablet, Take 0.5-1 tablets (5-10 mg) by mouth 3 times daily as needed for muscle spasms, Disp: 45 tablet, Rfl: 3    DULoxetine (CYMBALTA) 30 MG capsule, TAKE 2 CAPSULES BY MOUTH DAILY, Disp: 180 capsule, Rfl: 4    erythromycin (ROMYCIN) 5 MG/GM ophthalmic ointment, APPLY AS DIRECTED (THIN LAYER) ON UPPER EYELIDS OF BOTH EYES 3 TIMES DAILY FOR 10 DAYS, Disp: , Rfl:     LORazepam (ATIVAN) 0.5 MG tablet, , Disp: , Rfl:     losartan-hydrochlorothiazide (HYZAAR) 100-12.5 MG tablet, Take 1 tablet by mouth daily, Disp: 90 tablet, Rfl: 4    metFORMIN (GLUCOPHAGE) 500 MG tablet, Take 0.5 tablets (250 mg) by mouth 2 times daily (with meals), Disp: 90 tablet, Rfl: 4    Multiple Vitamins-Minerals (PRESERVISION AREDS 2+MULTI VIT PO), Take 1 tablet by mouth 2 times daily (Preservision Areds), Disp: , Rfl:     nitroGLYcerin (NITRODUR) 0.2 MG/HR 24 hr patch, , Disp: , Rfl:     polymixin b-trimethoprim (POLYTRIM) 70784-0.1 UNIT/ML-% ophthalmic solution, PLACE 1 DROP INTO LEFT EYE 4 TIMES A DAY FOR 5 DAYS., Disp: , Rfl:     rosuvastatin (CRESTOR) 40 MG tablet, Take 1 tablet (40 mg) by mouth every evening, Disp: 90 tablet, Rfl: 4    semaglutide (OZEMPIC, 0.25 OR 0.5  MG/DOSE,) 2 MG/3ML pen, Inject 0.5 mg Subcutaneous every 7 days, Disp: 9 mL, Rfl: 2    tobramycin-dexAMETHasone (TOBRADEX) 0.3-0.1 % ophthalmic suspension, INSTILL 1 DROP IN LEFT EYE 4 TIMES A DAY, Disp: , Rfl:     traMADol (ULTRAM) 50 MG tablet, Take 1 tablet (50 mg) by mouth every 6 hours as needed for severe pain, Disp: 10 tablet, Rfl: 0    traZODone (DESYREL) 50 MG tablet, Take 1-2 tablets ( mg) by mouth at bedtime, Disp: 180 tablet, Rfl: 4    tretinoin (RETIN-A) 0.05 % external cream, APPLY 1 GRAM EXTERNALLY TO THE AFFECTED AREA AT BEDTIME, Disp: 45 g, Rfl: 1    Vitamin D, Cholecalciferol, 25 MCG (1000 UT) TABS, Take 1 tablet by mouth daily , Disp: , Rfl:     SOCIAL HABITS:    History   Smoking Status    Former    Packs/day: 1.00    Years: 40.00    Types: Cigarettes    Quit date: 10/1/2008   Smokeless Tobacco    Never     Social History    Substance and Sexual Activity      Alcohol use: Yes        Alcohol/week: 14.0 standard drinks of alcohol        Types: 14 Standard drinks or equivalent per week        Comment: one wine or beer daily      History   Drug Use No       FAMILY HISTORY:    Family History   Problem Relation Age of Onset    Lung Cancer Mother         smoker    Hypertension Father     Heart Disease Father         MI    Diabetes Sister     Heart Disease Sister     Kidney Disease Sister 33        ?congenital    Hyperlipidemia Sister     No Known Problems Brother     Breast Cancer No family hx of         Cancer-breast       PE:  /80   Pulse 88   Resp 18   Wt 69.9 kg (154 lb)   LMP  (LMP Unknown)   SpO2 95%   BMI 28.86 kg/m    Wt Readings from Last 1 Encounters:   11/27/23 69.9 kg (154 lb)     Body mass index is 28.86 kg/m .    EXAM:  GENERAL: Healthy, alert and no distress  EYES: Eyes grossly normal to inspection.  No discharge or erythema, or obvious scleral/conjunctival abnormalities.  RESP: No audible wheeze, cough, or visible cyanosis.  No visible retractions or increased work of  breathing.    SKIN: Visible skin clear. No significant rash, abnormal pigmentation or lesions.  NEURO: Cranial nerves grossly intact.  Mentation and speech appropriate for age.  PSYCH: Mentation appears normal, affect normal/bright, judgement and insight intact, normal speech and appearance well-groomed.  VASCULAR: RLE without open wounds, no overt discoloration on toes, no edema visible on virtual exam, warm with palpable DP per [facility] DOUGLAS who conducted the assessment in my presence during this virtual visit.      DIAGNOSTIC STUDIES:     Images:  US TOMMY Doppler No Exercise    Result Date: 11/20/2023  US TOMMY DOPPLER NO EXERCISE, 1-2 LEVELS, BILAT HISTORY: 73 years Female PAD (peripheral artery disease) (H24); Atherosclerosis of lower extremity with claudication (H24) COMPARISON: None TECHNIQUE: Ultrasound TOMMY Doppler FINDINGS: The right ankle-brachial index is 0.92. Left ankle brachial index equals 0.94.     IMPRESSION: Borderline severity of disease. KALYANI HOOD MD   SYSTEM ID:  [id number]      LABS:      Sodium   Date Value Ref Range Status   10/16/2023 138 135 - 145 mmol/L Final     Comment:     Reference intervals for this test were updated on 09/26/2023 to more accurately reflect our healthy population. There may be differences in the flagging of prior results with similar values performed with this method. Interpretation of those prior results can be made in the context of the updated reference intervals.    05/24/2023 142 136 - 145 mmol/L Final   03/29/2023 140 136 - 145 mmol/L Final   02/01/2021 136 134 - 144 mmol/L Final   06/09/2020 140 134 - 144 mmol/L Final   08/15/2019 137 134 - 144 mmol/L Final     Urea Nitrogen   Date Value Ref Range Status   10/16/2023 15.4 8.0 - 23.0 mg/dL Final   05/24/2023 14.5 8.0 - 23.0 mg/dL Final   03/29/2023 13.2 8.0 - 23.0 mg/dL Final   08/17/2022 18 7 - 25 mg/dL Final   03/22/2022 20 7 - 30 mg/dL Final   03/07/2022 18 7 - 25 mg/dL Final   02/01/2021 18 7 -  25 mg/dL Final   06/09/2020 18 7 - 25 mg/dL Final   04/03/2019 14 7 - 25 mg/dL Final     Hemoglobin   Date Value Ref Range Status   10/16/2023 13.0 11.7 - 15.7 g/dL Final   08/17/2022 12.7 11.7 - 15.7 g/dL Final   03/22/2022 12.2 11.7 - 15.7 g/dL Final   03/24/2021 13.2 11.7 - 15.7 g/dL Final   08/29/2019 14.5 11.7 - 15.7 g/dL Final   04/03/2019 14.7 11.7 - 15.7 g/dL Final     Platelet Count   Date Value Ref Range Status   10/16/2023 312 150 - 450 10e3/uL Final   08/17/2022 285 150 - 450 10e3/uL Final   03/22/2022 248 150 - 450 10e3/uL Final   03/24/2021 287 150 - 450 10e9/L Final   08/29/2019 314 150 - 450 10e9/L Final   04/03/2019 311 150 - 450 10e9/L Final     INR - Historical   Date Value Ref Range Status   07/07/2017 1.1 <1.3            Again, thank you for allowing me to participate in the care of your patient.      Sincerely,    DAVID Reid CNP

## 2023-11-27 NOTE — PATIENT INSTRUCTIONS
Contact eye doctor about resuming Plavix.  Follow up in 6 months with repeat ultrasound.  [facility] will contact you for this.   Immediate phone number: Carlee DOUGLAS-  [phone]  Clinic general number: [phone]

## 2024-01-06 ENCOUNTER — HOSPITAL ENCOUNTER (OUTPATIENT)
Dept: GENERAL RADIOLOGY | Facility: OTHER | Age: 74
Discharge: HOME | End: 2024-01-06
Attending: NURSE PRACTITIONER
Payer: MEDICARE

## 2024-01-06 ENCOUNTER — OFFICE VISIT (OUTPATIENT)
Dept: FAMILY MEDICINE | Facility: OTHER | Age: 74
End: 2024-01-06
Payer: MEDICARE

## 2024-01-06 VITALS
SYSTOLIC BLOOD PRESSURE: 112 MMHG | HEIGHT: 60 IN | DIASTOLIC BLOOD PRESSURE: 82 MMHG | BODY MASS INDEX: 28.62 KG/M2 | RESPIRATION RATE: 24 BRPM | HEART RATE: 96 BPM | TEMPERATURE: 98.1 F | OXYGEN SATURATION: 95 % | WEIGHT: 145.8 LBS

## 2024-01-06 DIAGNOSIS — R06.02 SOB (SHORTNESS OF BREATH): ICD-10-CM

## 2024-01-06 DIAGNOSIS — N17.9 AKI (ACUTE KIDNEY INJURY) (H): ICD-10-CM

## 2024-01-06 DIAGNOSIS — J22 LOWER RESPIRATORY INFECTION (E.G., BRONCHITIS, PNEUMONIA, PNEUMONITIS, PULMONITIS): ICD-10-CM

## 2024-01-06 DIAGNOSIS — R05.8 PRODUCTIVE COUGH: Primary | ICD-10-CM

## 2024-01-06 LAB
ANION GAP SERPL CALCULATED.3IONS-SCNC: 14 MMOL/L (ref 7–15)
BASOPHILS # BLD AUTO: 0.1 10E3/UL (ref 0–0.2)
BASOPHILS NFR BLD AUTO: 1 %
BUN SERPL-MCNC: 28.4 MG/DL (ref 8–23)
CALCIUM SERPL-MCNC: 9.7 MG/DL (ref 8.8–10.2)
CHLORIDE SERPL-SCNC: 98 MMOL/L (ref 98–107)
CREAT SERPL-MCNC: 1.4 MG/DL (ref 0.51–0.95)
DEPRECATED HCO3 PLAS-SCNC: 25 MMOL/L (ref 22–29)
EGFRCR SERPLBLD CKD-EPI 2021: 39 ML/MIN/1.73M2
EOSINOPHIL # BLD AUTO: 0.5 10E3/UL (ref 0–0.7)
EOSINOPHIL NFR BLD AUTO: 5 %
ERYTHROCYTE [DISTWIDTH] IN BLOOD BY AUTOMATED COUNT: 13.9 % (ref 10–15)
GLUCOSE SERPL-MCNC: 127 MG/DL (ref 70–99)
HCT VFR BLD AUTO: 37.5 % (ref 35–47)
HGB BLD-MCNC: 12.3 G/DL (ref 11.7–15.7)
IMM GRANULOCYTES # BLD: 0.2 10E3/UL
IMM GRANULOCYTES NFR BLD: 1 %
LYMPHOCYTES # BLD AUTO: 2.6 10E3/UL (ref 0.8–5.3)
LYMPHOCYTES NFR BLD AUTO: 24 %
MCH RBC QN AUTO: 28.1 PG (ref 26.5–33)
MCHC RBC AUTO-ENTMCNC: 32.8 G/DL (ref 31.5–36.5)
MCV RBC AUTO: 86 FL (ref 78–100)
MONOCYTES # BLD AUTO: 0.9 10E3/UL (ref 0–1.3)
MONOCYTES NFR BLD AUTO: 9 %
NEUTROPHILS # BLD AUTO: 6.5 10E3/UL (ref 1.6–8.3)
NEUTROPHILS NFR BLD AUTO: 60 %
NRBC # BLD AUTO: 0 10E3/UL
NRBC BLD AUTO-RTO: 0 /100
PLATELET # BLD AUTO: 524 10E3/UL (ref 150–450)
POTASSIUM SERPL-SCNC: 3.9 MMOL/L (ref 3.4–5.3)
RBC # BLD AUTO: 4.38 10E6/UL (ref 3.8–5.2)
SODIUM SERPL-SCNC: 137 MMOL/L (ref 135–145)
WBC # BLD AUTO: 10.8 10E3/UL (ref 4–11)

## 2024-01-06 PROCEDURE — 99213 OFFICE O/P EST LOW 20 MIN: CPT | Performed by: NURSE PRACTITIONER

## 2024-01-06 PROCEDURE — G0463 HOSPITAL OUTPT CLINIC VISIT: HCPCS

## 2024-01-06 PROCEDURE — 80048 BASIC METABOLIC PNL TOTAL CA: CPT | Mod: ZL | Performed by: NURSE PRACTITIONER

## 2024-01-06 PROCEDURE — 36415 COLL VENOUS BLD VENIPUNCTURE: CPT | Mod: ZL | Performed by: NURSE PRACTITIONER

## 2024-01-06 PROCEDURE — G0463 HOSPITAL OUTPT CLINIC VISIT: HCPCS | Mod: 25

## 2024-01-06 PROCEDURE — 85025 COMPLETE CBC W/AUTO DIFF WBC: CPT | Mod: ZL | Performed by: NURSE PRACTITIONER

## 2024-01-06 PROCEDURE — 71046 X-RAY EXAM CHEST 2 VIEWS: CPT

## 2024-01-06 RX ORDER — AZITHROMYCIN 250 MG/1
TABLET, FILM COATED ORAL
Qty: 6 TABLET | Refills: 0 | Status: SHIPPED | OUTPATIENT
Start: 2024-01-06 | End: 2024-01-11

## 2024-01-06 ASSESSMENT — PATIENT HEALTH QUESTIONNAIRE - PHQ9
SUM OF ALL RESPONSES TO PHQ QUESTIONS 1-9: 0
SUM OF ALL RESPONSES TO PHQ QUESTIONS 1-9: 0
10. IF YOU CHECKED OFF ANY PROBLEMS, HOW DIFFICULT HAVE THESE PROBLEMS MADE IT FOR YOU TO DO YOUR WORK, TAKE CARE OF THINGS AT HOME, OR GET ALONG WITH OTHER PEOPLE: NOT DIFFICULT AT ALL

## 2024-01-06 ASSESSMENT — PAIN SCALES - GENERAL: PAINLEVEL: NO PAIN (0)

## 2024-01-06 NOTE — PATIENT INSTRUCTIONS
Your chest xr was clear, which is great news. However, I am surprised by this given the way you sound today. This is most likely the start of pneumonia based on your exam and history of being sick and in bed for 10 days, no signs of improvement. I have opted to treat you with antibiotics today.

## 2024-01-06 NOTE — NURSING NOTE
"Chief Complaint   Patient presents with    Cough     Possible Pneumonia x 1.5-2 Weeks        Initial /82 (BP Location: Right arm, Patient Position: Chair, Cuff Size: Adult Regular)   Pulse 96   Temp 98.1  F (36.7  C) (Temporal)   Resp 24   Ht 1.53 m (5' 0.25\")   Wt 66.1 kg (145 lb 12.8 oz)   LMP  (LMP Unknown)   SpO2 95%   Breastfeeding No   BMI 28.24 kg/m   Estimated body mass index is 28.24 kg/m  as calculated from the following:    Height as of this encounter: 1.53 m (5' 0.25\").    Weight as of this encounter: 66.1 kg (145 lb 12.8 oz).    FOOD SECURITY SCREENING QUESTIONS:    The next two questions are to help us understand your food security.  If you are feeling you need any assistance in this area, we have resources available to support you today.    Hunger Vital Signs:  Within the past 12 months we worried whether our food would run out before we got money to buy more. Never  Within the past 12 months the food we bought just didn't last and we didn't have money to get more. Never    Medication Reconciliation: Complete.       Sonali Sinclair LPN on 1/6/2024 at 4:35 PM     "

## 2024-01-06 NOTE — PROGRESS NOTES
ASSESSMENT/PLAN:    I have reviewed the nursing notes.  I have reviewed the findings, diagnosis, plan and need for follow up with the patient.    1. Productive cough  2. SOB (shortness of breath)  - XR Chest 2 Views  - CBC and Differential  - Basic Metabolic Panel    3. Lower respiratory infection (e.g., bronchitis, pneumonia, pneumonitis, pulmonitis  - amoxicillin-clavulanate (AUGMENTIN) 875-125 MG tablet; Take 1 tablet by mouth 2 times daily for 7 days  Dispense: 14 tablet; Refill: 0  - azithromycin (ZITHROMAX) 250 MG tablet; Take 2 tablets (500 mg) by mouth daily for 1 day, THEN 1 tablet (250 mg) daily for 4 days.  Dispense: 6 tablet; Refill: 0  Chest xr read as clear; however crackles were heard bilaterally upon auscultation and I am suspicious of early pneumonia based on clinical condition and symptoms today. Will treat as such and recommend follow up if not improving or worsening condition. She is receptive. Vitally stable today with oxygen at 95%     4. CARMEN (acute kidney injury) (H24)  Renal function today shows reduced GFR at 39 and creatinine at 1.40, most likely associated to dehydration from current illness from which she states she has been laying in bed for many days. Push fluids, follow up recommended if not improving or worsening over next 2 days.     Discussed warning signs/symptoms indicative of need to f/u    Follow up if symptoms persist or worsen or concerns    I explained my diagnostic considerations and recommendations to the patient, who voiced understanding and agreement with the treatment plan. All questions were answered. We discussed potential side effects of any prescribed or recommended therapies, as well as expectations for response to treatments.    Ursula Amaya NP  1/6/2024  4:54 PM    HPI:  Jennyfer Elizabeth is a 73 year old female who presents to [facility] today for concerns of cough, sob, sore throat. Ongoing symptoms > x2 weeks but worsened around 12/26, lots of phlegm coming  out from chest with loose cough. Non smoker. No known history of pneumonia. She has basically been in bed since December 26th, 2024 because of feeling so miserable she states but was able to get up and moving more so today, cough is worse however. Did not test for covid since onset of symptoms. Couple others did get sick when she did. Last fever was low grade and about 3 days ago. Denies chest pain, some SOB. Non smoker.    ROS otherwise negative.     Past Medical History:   Diagnosis Date    Benign essential hypertension 03/03/2018    Cataract     Chronic pain of right knee 04/11/2018    Diabetic eye exam (H) 06/07/2018    TAYLOR (generalized anxiety disorder) 03/03/2018    History of MI (myocardial infarction) 07/2017    follows yearly with [facility]    Macular degeneration     Major depressive disorder, single episode 1995    Obese     Obstructive sleep apnea 2005    BIPAP    Osteopenia 2007    Lumbar spine    Pure hypercholesterolemia 2001    PVD (peripheral vascular disease) (H24)     Squamous cell carcinoma, leg, left 12/2021    Type 2 diabetes mellitus with complication, without long-term current use of insulin (H) 03/03/2018     Past Surgical History:   Procedure Laterality Date    ANGIOGRAM Right 3/22/2022    Procedure: with right iliac angiogram, right iliac angioplasty and stenting;  Surgeon: Rama Sewell MD;  Location: [facility] OR    BREAST BIOPSY, RT/LT  1986    x2; in [place]    CARDIAC CATH - HIM SCAN  07/10/2017    Mid RCA;  drug eluting stent    CHOLECYSTECTOMY  2008    COLONOSCOPY N/A 12/20/2021    Tubular Adenoma F/U 2024    ENDARTERECTOMY FEMORAL Right 3/22/2022    Procedure: Right femoral endarterectomy;  Surgeon: Rama Sewell MD;  Location: [facility] OR    IR OR ANGIOGRAM  3/22/2022    SKIN CANCER EXCISION Left 12/2021    [facility]    TONSILLECTOMY, ADENOIDECTOMY, COMBINED  1955     Social History     Tobacco Use    Smoking status: Former     Packs/day: 1.00     Years: 40.00     Additional pack years: 0.00     Total  pack years: 40.00     Types: Cigarettes     Quit date: 10/1/2008     Years since quitting: 15.2     Passive exposure: Past    Smokeless tobacco: Never    Tobacco comments:     Patient occasionally uses Nicorette gum   Substance Use Topics    Alcohol use: Yes     Alcohol/week: 14.0 standard drinks of alcohol     Types: 14 Standard drinks or equivalent per week     Comment: one wine or beer daily     Current Outpatient Medications   Medication Sig Dispense Refill    amoxicillin-clavulanate (AUGMENTIN) 875-125 MG tablet Take 1 tablet by mouth 2 times daily for 7 days 14 tablet 0    azithromycin (ZITHROMAX) 250 MG tablet Take 2 tablets (500 mg) by mouth daily for 1 day, THEN 1 tablet (250 mg) daily for 4 days. 6 tablet 0    B Complex CAPS Take 1 capsule by mouth daily      blood glucose (ACCU-CHEK SMARTVIEW) test strip Use to test blood glucose 2 times daily. Dispense as covered by insurance. Dx. Code: E11.8. 200 strip 0    buPROPion (WELLBUTRIN XL) 300 MG 24 hr tablet Take 1 tablet (300 mg) by mouth daily 90 tablet 4    clopidogrel (PLAVIX) 75 MG tablet TAKE 1 TAB BY MOUTH ONCE DAILY 90 tablet 4    cyclobenzaprine (FLEXERIL) 10 MG tablet Take 0.5-1 tablets (5-10 mg) by mouth 3 times daily as needed for muscle spasms 45 tablet 3    DULoxetine (CYMBALTA) 30 MG capsule TAKE 2 CAPSULES BY MOUTH DAILY 180 capsule 4    erythromycin (ROMYCIN) 5 MG/GM ophthalmic ointment APPLY AS DIRECTED (THIN LAYER) ON UPPER EYELIDS OF BOTH EYES 3 TIMES DAILY FOR 10 DAYS      LORazepam (ATIVAN) 0.5 MG tablet       losartan-hydrochlorothiazide (HYZAAR) 100-12.5 MG tablet Take 1 tablet by mouth daily 90 tablet 4    metFORMIN (GLUCOPHAGE) 500 MG tablet Take 0.5 tablets (250 mg) by mouth 2 times daily (with meals) 90 tablet 4    Multiple Vitamins-Minerals (PRESERVISION AREDS 2+MULTI VIT PO) Take 1 tablet by mouth 2 times daily (Preservision Areds)      nitroGLYcerin (NITRODUR) 0.2 MG/HR 24 hr patch       polymixin b-trimethoprim (POLYTRIM)  "39997-3.1 UNIT/ML-% ophthalmic solution PLACE 1 DROP INTO LEFT EYE 4 TIMES A DAY FOR 5 DAYS.      rosuvastatin (CRESTOR) 40 MG tablet Take 1 tablet (40 mg) by mouth every evening 90 tablet 4    semaglutide (OZEMPIC, 0.25 OR 0.5 MG/DOSE,) 2 MG/3ML pen Inject 0.5 mg Subcutaneous every 7 days 9 mL 2    tobramycin-dexAMETHasone (TOBRADEX) 0.3-0.1 % ophthalmic suspension INSTILL 1 DROP IN LEFT EYE 4 TIMES A DAY      traMADol (ULTRAM) 50 MG tablet Take 1 tablet (50 mg) by mouth every 6 hours as needed for severe pain 10 tablet 0    traZODone (DESYREL) 50 MG tablet Take 1-2 tablets ( mg) by mouth at bedtime 180 tablet 4    tretinoin (RETIN-A) 0.05 % external cream APPLY 1 GRAM EXTERNALLY TO THE AFFECTED AREA AT BEDTIME 45 g 1    Vitamin D, Cholecalciferol, 25 MCG (1000 UT) TABS Take 1 tablet by mouth daily        No Known Allergies  Past medical history, past surgical history, current medications and allergies reviewed and accurate to the best of my knowledge.      ROS:  Refer to HPI    /82 (BP Location: Right arm, Patient Position: Chair, Cuff Size: Adult Regular)   Pulse 96   Temp 98.1  F (36.7  C) (Temporal)   Resp 24   Ht 1.53 m (5' 0.25\")   Wt 66.1 kg (145 lb 12.8 oz)   LMP  (LMP Unknown)   SpO2 95%   Breastfeeding No   BMI 28.24 kg/m      EXAM:  General Appearance: ill but nontoxic appearing 73 year old female, appropriate appearance for age. No acute distress   Ears: Left TM intact, translucent with bony landmarks appreciated, no erythema, no effusion, no bulging, no purulence.  Right TM intact, translucent with bony landmarks appreciated, no erythema, no effusion, no bulging, no purulence.  Left auditory canal clear.  Right auditory canal clear.  Normal external ears, non tender.  Eyes: conjunctivae normal without erythema or irritation, corneas clear, no drainage or crusting, no eyelid swelling, pupils equal   Oropharynx: moist mucous membranes, posterior pharynx without erythema, tonsils " symmetric, no erythema, no exudates or petechiae, no post nasal drip seen, no trismus, voice hoarse.    Sinuses:  No sinus tenderness upon palpation of the frontal or maxillary sinuses  Nose:  Bilateral nares: no erythema, no edema, no drainage or congestion   Neck: supple without adenopathy  Respiratory: normal chest wall and respirations.  Normal effort.  No wheezing, + bi-basilar posterior crackles upon auscultation.  No increased work of breathing.  + frequent loose sounding, productive cough appreciated throughout our visit.   Cardiac: RRR with no murmurs  Musculoskeletal:  Equal movement of bilateral upper extremities.  Equal movement of bilateral lower extremities.  Normal gait.    Neuro: Alert and oriented to person, place, and time.    Psychological: normal affect, alert, oriented, and pleasant.     Results for orders placed or performed in visit on 01/06/24   XR Chest 2 Views     Status: None    Narrative    PROCEDURE:  XR CHEST 2 VIEWS    HISTORY: cough, sob, x2 weeks, pneumonia?; Productive cough; SOB  (shortness of breath), .    COMPARISON:  11/20/2023    FINDINGS:  The cardiomediastinal contours are stable. The trachea is midline.  There is calcific aortic atherosclerosis.  No focal consolidation, effusion or pneumothorax.    No suspicious osseous lesion or subdiaphragmatic free air.      Impression    IMPRESSION:      No focal consolidation.      LEANN HERMAN MD         SYSTEM ID:  [id number]   Basic Metabolic Panel     Status: Abnormal   Result Value Ref Range    Sodium 137 135 - 145 mmol/L    Potassium 3.9 3.4 - 5.3 mmol/L    Chloride 98 98 - 107 mmol/L    Carbon Dioxide (CO2) 25 22 - 29 mmol/L    Anion Gap 14 7 - 15 mmol/L    Urea Nitrogen 28.4 (H) 8.0 - 23.0 mg/dL    Creatinine 1.40 (H) 0.51 - 0.95 mg/dL    GFR Estimate 39 (L) >60 mL/min/1.73m2    Calcium 9.7 8.8 - 10.2 mg/dL    Glucose 127 (H) 70 - 99 mg/dL   CBC with platelets and differential     Status: Abnormal   Result Value Ref Range     WBC Count 10.8 4.0 - 11.0 10e3/uL    RBC Count 4.38 3.80 - 5.20 10e6/uL    Hemoglobin 12.3 11.7 - 15.7 g/dL    Hematocrit 37.5 35.0 - 47.0 %    MCV 86 78 - 100 fL    MCH 28.1 26.5 - 33.0 pg    MCHC 32.8 31.5 - 36.5 g/dL    RDW 13.9 10.0 - 15.0 %    Platelet Count 524 (H) 150 - 450 10e3/uL    % Neutrophils 60 %    % Lymphocytes 24 %    % Monocytes 9 %    % Eosinophils 5 %    % Basophils 1 %    % Immature Granulocytes 1 %    NRBCs per 100 WBC 0 <1 /100    Absolute Neutrophils 6.5 1.6 - 8.3 10e3/uL    Absolute Lymphocytes 2.6 0.8 - 5.3 10e3/uL    Absolute Monocytes 0.9 0.0 - 1.3 10e3/uL    Absolute Eosinophils 0.5 0.0 - 0.7 10e3/uL    Absolute Basophils 0.1 0.0 - 0.2 10e3/uL    Absolute Immature Granulocytes 0.2 <=0.4 10e3/uL    Absolute NRBCs 0.0 10e3/uL   CBC and Differential     Status: Abnormal    Narrative    The following orders were created for panel order CBC and Differential.  Procedure                               Abnormality         Status                     ---------                               -----------         ------                     CBC with platelets and d...[[id number]]  Abnormal            Final result                 Please view results for these tests on the individual orders.

## 2024-01-16 ENCOUNTER — HOSPITAL ENCOUNTER (OUTPATIENT)
Dept: CARDIOLOGY | Facility: OTHER | Age: 74
Discharge: HOME | End: 2024-01-16
Attending: INTERNAL MEDICINE | Admitting: INTERNAL MEDICINE
Payer: MEDICARE

## 2024-01-16 DIAGNOSIS — I71.9 AORTIC ANEURYSM WITHOUT RUPTURE, UNSPECIFIED PORTION OF AORTA (H): ICD-10-CM

## 2024-01-16 LAB — LVEF ECHO: NORMAL

## 2024-01-16 PROCEDURE — 93306 TTE W/DOPPLER COMPLETE: CPT

## 2024-01-16 PROCEDURE — 93306 TTE W/DOPPLER COMPLETE: CPT | Mod: 26 | Performed by: INTERNAL MEDICINE

## 2024-02-02 ENCOUNTER — TELEPHONE (OUTPATIENT)
Dept: VASCULAR SURGERY | Facility: CLINIC | Age: 74
End: 2024-02-02
Payer: MEDICARE

## 2024-02-07 ENCOUNTER — TELEPHONE (OUTPATIENT)
Dept: VASCULAR SURGERY | Facility: CLINIC | Age: 74
End: 2024-02-07
Payer: MEDICARE

## 2024-02-12 ENCOUNTER — PATIENT OUTREACH (OUTPATIENT)
Dept: ONCOLOGY | Facility: CLINIC | Age: 74
End: 2024-02-12
Payer: MEDICARE

## 2024-02-12 NOTE — PROGRESS NOTES
"[facility]: Cancer Care                                                                                          Received voice mail message from pt stating she is trying to reach a \"Irlanda\", in vascular surgery, about scheduling after she received a message last week.  Stated she had an appt with (what sounded like) a Dori Alvarado.  Per review of chart, Irlanda Bender RN reached out to pt last week on 2/7.  RN sent inbasket message to both Irlanda, and DAVID Reid, YONAS, with request that they reach out to patient.  RN called and spoke with pt.  Discussed RN sent message to STELLA Fournier in vascular surgery.  Also discussed [phone] should be number for vascular surgery and that she will likely want option 1.  Pt appreciated call back.    Signature:  Odalys Hernandez RN, OCN  "

## 2024-02-13 ENCOUNTER — TELEPHONE (OUTPATIENT)
Dept: VASCULAR SURGERY | Facility: CLINIC | Age: 74
End: 2024-02-13
Payer: MEDICARE

## 2024-02-13 NOTE — TELEPHONE ENCOUNTER
LVM; pt to schedule virtual visit with Dori Dawkins around May 27, 2024; US at [facility]- KB 2.13.24//

## 2024-02-14 ENCOUNTER — TELEPHONE (OUTPATIENT)
Dept: VASCULAR SURGERY | Facility: CLINIC | Age: 74
End: 2024-02-14
Payer: MEDICARE

## 2024-02-19 ENCOUNTER — TELEPHONE (OUTPATIENT)
Dept: VASCULAR SURGERY | Facility: CLINIC | Age: 74
End: 2024-02-19

## 2024-02-20 NOTE — TELEPHONE ENCOUNTER
Telemedicine team is working on scheduling this pt.    VINI LedezmaN, RN  RN Care Coordinator  [facility] - [facility] phone: [phone]  Fax: [phone]

## 2024-03-07 NOTE — TELEPHONE ENCOUNTER
Left message to call back.  We will call her when it gets closer to the date to schedule the ultrasound and telemed.  Peyton Moran RN......March 7, 2024...11:23 AM

## 2024-03-21 NOTE — TELEPHONE ENCOUNTER
Patient notified that we will contact her at a later date to schedule her TOMMY's and follow up with Dori Dawkins NP.  Peyton Moran RN......March 21, 2024...2:15 PM

## 2024-04-17 ENCOUNTER — OFFICE VISIT (OUTPATIENT)
Dept: INTERNAL MEDICINE | Facility: OTHER | Age: 74
End: 2024-04-17
Attending: INTERNAL MEDICINE
Payer: MEDICARE

## 2024-04-17 ENCOUNTER — LAB (OUTPATIENT)
Dept: LAB | Facility: OTHER | Age: 74
End: 2024-04-17
Attending: INTERNAL MEDICINE
Payer: MEDICARE

## 2024-04-17 VITALS
DIASTOLIC BLOOD PRESSURE: 78 MMHG | WEIGHT: 148.2 LBS | HEART RATE: 78 BPM | RESPIRATION RATE: 18 BRPM | OXYGEN SATURATION: 93 % | SYSTOLIC BLOOD PRESSURE: 128 MMHG | BODY MASS INDEX: 28.7 KG/M2 | TEMPERATURE: 97.4 F

## 2024-04-17 DIAGNOSIS — M54.2 NECK PAIN: ICD-10-CM

## 2024-04-17 DIAGNOSIS — E11.8 TYPE 2 DIABETES MELLITUS WITH COMPLICATION, WITHOUT LONG-TERM CURRENT USE OF INSULIN (H): Chronic | ICD-10-CM

## 2024-04-17 DIAGNOSIS — N18.31 STAGE 3A CHRONIC KIDNEY DISEASE (H): ICD-10-CM

## 2024-04-17 DIAGNOSIS — Z87.891 PERSONAL HISTORY OF NICOTINE DEPENDENCE: ICD-10-CM

## 2024-04-17 DIAGNOSIS — I71.20 THORACIC AORTIC ANEURYSM WITHOUT RUPTURE, UNSPECIFIED PART (H): ICD-10-CM

## 2024-04-17 DIAGNOSIS — F33.1 DEPRESSION, MAJOR, RECURRENT, MODERATE (H): ICD-10-CM

## 2024-04-17 DIAGNOSIS — E11.8 TYPE 2 DIABETES MELLITUS WITH COMPLICATION, WITHOUT LONG-TERM CURRENT USE OF INSULIN (H): Primary | ICD-10-CM

## 2024-04-17 LAB
ALBUMIN SERPL BCG-MCNC: 4.1 G/DL (ref 3.5–5.2)
ALP SERPL-CCNC: 106 U/L (ref 40–150)
ALT SERPL W P-5'-P-CCNC: 49 U/L (ref 0–50)
ANION GAP SERPL CALCULATED.3IONS-SCNC: 15 MMOL/L (ref 7–15)
AST SERPL W P-5'-P-CCNC: 36 U/L (ref 0–45)
BILIRUB SERPL-MCNC: 0.2 MG/DL
BUN SERPL-MCNC: 17.4 MG/DL (ref 8–23)
CALCIUM SERPL-MCNC: 9.4 MG/DL (ref 8.8–10.2)
CHLORIDE SERPL-SCNC: 107 MMOL/L (ref 98–107)
CREAT SERPL-MCNC: 1.11 MG/DL (ref 0.51–0.95)
DEPRECATED HCO3 PLAS-SCNC: 21 MMOL/L (ref 22–29)
EGFRCR SERPLBLD CKD-EPI 2021: 52 ML/MIN/1.73M2
ERYTHROCYTE [DISTWIDTH] IN BLOOD BY AUTOMATED COUNT: 14.6 % (ref 10–15)
GLUCOSE SERPL-MCNC: 166 MG/DL (ref 70–99)
HBA1C MFR BLD: 6.5 % (ref 4–6.2)
HCT VFR BLD AUTO: 41.9 % (ref 35–47)
HGB BLD-MCNC: 13 G/DL (ref 11.7–15.7)
MCH RBC QN AUTO: 28 PG (ref 26.5–33)
MCHC RBC AUTO-ENTMCNC: 31 G/DL (ref 31.5–36.5)
MCV RBC AUTO: 90 FL (ref 78–100)
PLATELET # BLD AUTO: 329 10E3/UL (ref 150–450)
POTASSIUM SERPL-SCNC: 4.3 MMOL/L (ref 3.4–5.3)
PROT SERPL-MCNC: 7.3 G/DL (ref 6.4–8.3)
RBC # BLD AUTO: 4.64 10E6/UL (ref 3.8–5.2)
SODIUM SERPL-SCNC: 143 MMOL/L (ref 135–145)
WBC # BLD AUTO: 6.8 10E3/UL (ref 4–11)

## 2024-04-17 PROCEDURE — 83036 HEMOGLOBIN GLYCOSYLATED A1C: CPT | Mod: ZL

## 2024-04-17 PROCEDURE — 99215 OFFICE O/P EST HI 40 MIN: CPT | Performed by: INTERNAL MEDICINE

## 2024-04-17 PROCEDURE — 85014 HEMATOCRIT: CPT | Mod: ZL

## 2024-04-17 PROCEDURE — G0463 HOSPITAL OUTPT CLINIC VISIT: HCPCS | Performed by: INTERNAL MEDICINE

## 2024-04-17 PROCEDURE — 80053 COMPREHEN METABOLIC PANEL: CPT | Mod: ZL

## 2024-04-17 PROCEDURE — 36415 COLL VENOUS BLD VENIPUNCTURE: CPT | Mod: ZL

## 2024-04-17 RX ORDER — SEMAGLUTIDE 0.68 MG/ML
0.5 INJECTION, SOLUTION SUBCUTANEOUS
Qty: 9 ML | Refills: 2 | Status: SHIPPED | OUTPATIENT
Start: 2024-04-17

## 2024-04-17 ASSESSMENT — ANXIETY QUESTIONNAIRES
GAD7 TOTAL SCORE: 0
6. BECOMING EASILY ANNOYED OR IRRITABLE: NOT AT ALL
3. WORRYING TOO MUCH ABOUT DIFFERENT THINGS: NOT AT ALL
2. NOT BEING ABLE TO STOP OR CONTROL WORRYING: NOT AT ALL
1. FEELING NERVOUS, ANXIOUS, OR ON EDGE: NOT AT ALL
IF YOU CHECKED OFF ANY PROBLEMS ON THIS QUESTIONNAIRE, HOW DIFFICULT HAVE THESE PROBLEMS MADE IT FOR YOU TO DO YOUR WORK, TAKE CARE OF THINGS AT HOME, OR GET ALONG WITH OTHER PEOPLE: SOMEWHAT DIFFICULT
7. FEELING AFRAID AS IF SOMETHING AWFUL MIGHT HAPPEN: NOT AT ALL
5. BEING SO RESTLESS THAT IT IS HARD TO SIT STILL: NOT AT ALL
4. TROUBLE RELAXING: NOT AT ALL
GAD7 TOTAL SCORE: 0
GAD7 TOTAL SCORE: 0
7. FEELING AFRAID AS IF SOMETHING AWFUL MIGHT HAPPEN: NOT AT ALL
8. IF YOU CHECKED OFF ANY PROBLEMS, HOW DIFFICULT HAVE THESE MADE IT FOR YOU TO DO YOUR WORK, TAKE CARE OF THINGS AT HOME, OR GET ALONG WITH OTHER PEOPLE?: SOMEWHAT DIFFICULT

## 2024-04-17 ASSESSMENT — PATIENT HEALTH QUESTIONNAIRE - PHQ9
SUM OF ALL RESPONSES TO PHQ QUESTIONS 1-9: 4
10. IF YOU CHECKED OFF ANY PROBLEMS, HOW DIFFICULT HAVE THESE PROBLEMS MADE IT FOR YOU TO DO YOUR WORK, TAKE CARE OF THINGS AT HOME, OR GET ALONG WITH OTHER PEOPLE: SOMEWHAT DIFFICULT
SUM OF ALL RESPONSES TO PHQ QUESTIONS 1-9: 4

## 2024-04-17 NOTE — NURSING NOTE
"Chief Complaint   Patient presents with    History of Present Illness     Liver labs, Meds       Initial /78   Pulse 78   Temp 97.4  F (36.3  C)   Resp 18   Wt 67.2 kg (148 lb 3.2 oz)   LMP  (LMP Unknown)   SpO2 93%   BMI 28.70 kg/m   Estimated body mass index is 28.7 kg/m  as calculated from the following:    Height as of 1/6/24: 1.53 m (5' 0.25\").    Weight as of this encounter: 67.2 kg (148 lb 3.2 oz).  Medication Review: complete    The next two questions are to help us understand your food security.  If you are feeling you need any assistance in this area, we have resources available to support you today.          10/16/2023   SDOH- Food Insecurity   Within the past 12 months, did you worry that your food would run out before you got money to buy more? N   Within the past 12 months, did the food you bought just not last and you didn t have money to get more? N         Health Care Directive:  Patient does not have a Health Care Directive or Living Will: Discussed advance care planning with patient; however, patient declined at this time.    Vannessa Abbott LPN      "

## 2024-04-17 NOTE — PROGRESS NOTES
"  Assessment & Plan     Type 2 diabetes mellitus with complication, without long-term current use of insulin (H)  At this time given that she has finally started feeling better we will continue at current dosing of semaglutide.  She is to call with any new or changing symptoms.  A1c obtained today and overall is stable from prior.  Plan for recheck in December.  - semaglutide (OZEMPIC, 0.25 OR 0.5 MG/DOSE,) 2 MG/3ML pen; Inject 0.5 mg Subcutaneous every 7 days    Depression, major, recurrent, moderate (H)  - Controlled.  Continue current regimen.      Stage 3a chronic kidney disease (H)  Kidney function is improved from 3 months ago.  Continue to monitor periodically    Thoracic aortic aneurysm without rupture, unspecified part (H24)  Continue with annual echocardiograms.  If at any point we see any change in her thoracic aortic aneurysm would recommend referral to CT surgery.    Neck pain  At this time we will try acupuncture.  - Chiropractic Referral; Future    Personal history of nicotine dependence  - CT Chest Lung Cancer Scrn Low Dose wo; Future        BMI  Estimated body mass index is 28.7 kg/m  as calculated from the following:    Height as of 1/6/24: 1.53 m (5' 0.25\").    Weight as of this encounter: 67.2 kg (148 lb 3.2 oz).   Weight management plan: Discussed healthy diet and exercise guidelines    Labs placed for annual visit in December    Return in about 8 months (around 12/17/2024) for Annual Review with renewal of all medications, and as needed sooner.    Jose Burger is a 74 year old, presenting for the following health issues:  History of Present Illness (Liver labs, Meds)        4/17/2024    10:05 AM   Additional Questions   Roomed by Hay Abbott LPN     History of Present Illness         Patient returns for follow-up of multiple issues.  She continues to have significant neck pain.  She is unsure what to do in addition at this time as she has pursued multiple modalities including massage, " PT, chiropractics and neurosurgery.  She is not felt to be a candidate for surgery.  Is interested in possibly trying acupuncture.    She has a history of type 2 diabetes mellitus.  She is on semaglutide.  She did have some vomiting with this initially however this has resolved.  She is curious if she should continue on current dosing or not.    She has a history of depression.  She continues on duloxetine, bupropion and some lorazepam for anxiety.  She also takes trazodone for sleep.  She denies any obvious side effects.    She is due for lung cancer screening.  She does have a history of thoracic aortic aneurysm.  She is curious if anything further is needed at this time.  She is due for recheck of her kidney function.      Objective    /78   Pulse 78   Temp 97.4  F (36.3  C)   Resp 18   Wt 67.2 kg (148 lb 3.2 oz)   LMP  (LMP Unknown)   SpO2 93%   BMI 28.70 kg/m    Body mass index is 28.7 kg/m .  Physical Exam   GEN: Vitals reviewed. Healthy appearing. Patient is in no acute distress.  Obvious abnormality with left shoulder higher than right.  Cooperative with exam.  HEENT: Normocephalic atraumatic.  Eyes grossly normal to inspection.  No discharge or erythema, or obvious scleral/conjunctival abnormalities.   LUNGS: No audible wheeze, cough, or visible cyanosis.  No visible retractions or increased work of breathing.    ABD:  nondistended  SKIN: Warm and dry to touch.  Visible skin clear. No significant rash, abnormal pigmentation or lesions.    40 minutes spent on the date of the encounter doing chart review, history and exam, documentation and further activities as noted above    Signed Electronically by: Lili Perkins, DO    Answers submitted by the patient for this visit:  Patient Health Questionnaire (Submitted on 4/17/2024)  If you checked off any problems, how difficult have these problems made it for you to do your work, take care of things at home, or get along with other people?: Somewhat  difficult  PHQ9 TOTAL SCORE: 4  TAYLOR-7 (Submitted on 4/17/2024)  TAYLOR 7 TOTAL SCORE: 0

## 2024-05-06 ENCOUNTER — TELEPHONE (OUTPATIENT)
Dept: INTERNAL MEDICINE | Facility: OTHER | Age: 74
End: 2024-05-06
Payer: MEDICARE

## 2024-05-06 DIAGNOSIS — L82.1 SEBORRHEIC KERATOSIS: Primary | ICD-10-CM

## 2024-05-06 NOTE — TELEPHONE ENCOUNTER
Maddie-patient is looking for a referral for dermatology     Please call       Thank You      Sultana Pena on 5/6/2024 at 9:50 AM

## 2024-05-06 NOTE — TELEPHONE ENCOUNTER
Pt has multiple seborrheic keratosis and is wanting a referral to  [facility] [place].  Referral is pended for you signature.  Vannessa Abbott LPN on 5/6/2024 at 10:10 AM

## 2024-05-17 ENCOUNTER — TELEPHONE (OUTPATIENT)
Dept: VASCULAR SURGERY | Facility: CLINIC | Age: 74
End: 2024-05-17
Payer: MEDICARE

## 2024-05-17 NOTE — TELEPHONE ENCOUNTER
Blood pressure is stable on present regimen Patient confirmed scheduled appointment:  Date: 6/24/24  Time: 12:30 pm  Visit type: RVASCPV  Provider: Juancho  Location: virtual ([facility])  Testing/imaging: US on 6/21/24  Additional notes: cancelled 6/4/24  appts.

## 2024-06-09 DIAGNOSIS — F32.A ANXIETY AND DEPRESSION: ICD-10-CM

## 2024-06-09 DIAGNOSIS — F41.9 ANXIETY AND DEPRESSION: ICD-10-CM

## 2024-06-09 DIAGNOSIS — I70.219 ATHEROSCLEROSIS OF LOWER EXTREMITY WITH CLAUDICATION (H): ICD-10-CM

## 2024-06-10 ENCOUNTER — TRANSFERRED RECORDS (OUTPATIENT)
Dept: HEALTH INFORMATION MANAGEMENT | Facility: OTHER | Age: 74
End: 2024-06-10
Payer: MEDICARE

## 2024-06-12 RX ORDER — BUPROPION HYDROCHLORIDE 300 MG/1
300 TABLET ORAL DAILY
Qty: 267 TABLET | Refills: 0 | OUTPATIENT
Start: 2024-06-12

## 2024-06-12 RX ORDER — ROSUVASTATIN CALCIUM 40 MG/1
40 TABLET, COATED ORAL EVERY EVENING
Qty: 267 TABLET | Refills: 0 | OUTPATIENT
Start: 2024-06-12

## 2024-06-12 NOTE — TELEPHONE ENCOUNTER
[id number] sent Rx request for the following:      Requested Prescriptions   Pending Prescriptions Disp Refills    rosuvastatin (CRESTOR) 40 MG tablet [Pharmacy Med Name: ROSUVASTATIN 40MG TABLET] 267 tablet 0     Sig: TAKE 1 TABLET BY MOUTH EVERY EVENING       Antihyperlipidemic agents Passed - 6/12/2024  1:46 PM        Passed - LDL on file in the past 12 months        Passed - Medication is active on med list        Passed - Recent (12 mo) or future (90 days) visit within the authorizing provider's specialty     The patient must have completed an in-person or virtual visit within the past 12 months or has a future visit scheduled within the next 90 days with the authorizing provider s specialty.  Urgent care and e-visits do not quality as an office visit for this protocol.          Passed - Patient is age 18 years or older        Passed - No active pregnancy on record        Passed - No positive pregnancy test in past 12 mos        Last Prescription Date:   10/16/23  Last Fill Qty/Refills:         90, R-4    Last Office Visit:              10/16/23         buPROPion (WELLBUTRIN XL) 300 MG 24 hr tablet [Pharmacy Med Name: BUPROPION 300MG ER (XL) TABLET] 267 tablet 0     Sig: TAKE 1 TABLET BY MOUTH EVERY DAY       SSRIs Protocol Passed - 6/12/2024  1:46 PM        Passed - PHQ-9 score less than 5 in past 6 months     Please review last PHQ-9 score.           Passed - Medication is Bupropion     If the medication is Bupropion (Wellbutrin), and the patient is taking for smoking cessation; OK to refill.          Passed - Medication is active on med list        Passed - Patient is age 18 or older        Passed - No active pregnancy on record        Passed - No positive pregnancy test in last 12 months        Passed - Recent (6 mo) or future (30 days) visit within the authorizing provider's specialty     Patient had office visit in the last 6 months or has a visit in the next 30 days with  "authorizing provider or within the authorizing provider's specialty.  See \"Patient Info\" tab in inbasket, or \"Choose Columns\" in Meds & Orders section of the refill encounter.                 Last Prescription Date:   10/16/23  Last Fill Qty/Refills:         90, R-4    Last Office Visit:              4/17/24   Future Office visit:           12/4/24    Refills on file. Declined.    "

## 2024-06-18 ENCOUNTER — TRANSFERRED RECORDS (OUTPATIENT)
Dept: HEALTH INFORMATION MANAGEMENT | Facility: OTHER | Age: 74
End: 2024-06-18
Payer: MEDICARE

## 2024-06-21 ENCOUNTER — HOSPITAL ENCOUNTER (OUTPATIENT)
Dept: ULTRASOUND IMAGING | Facility: OTHER | Age: 74
Discharge: HOME | End: 2024-06-21
Attending: NURSE PRACTITIONER | Admitting: NURSE PRACTITIONER
Payer: MEDICARE

## 2024-06-21 DIAGNOSIS — I73.9 PAD (PERIPHERAL ARTERY DISEASE) (H): ICD-10-CM

## 2024-06-21 PROCEDURE — 93922 UPR/L XTREMITY ART 2 LEVELS: CPT

## 2024-06-25 ENCOUNTER — VIRTUAL VISIT (OUTPATIENT)
Dept: VASCULAR SURGERY | Facility: CLINIC | Age: 74
End: 2024-06-25
Payer: MEDICARE

## 2024-06-25 ENCOUNTER — VIRTUAL VISIT (OUTPATIENT)
Dept: CARDIOLOGY | Facility: OTHER | Age: 74
End: 2024-06-25
Attending: NURSE PRACTITIONER
Payer: MEDICARE

## 2024-06-25 VITALS
HEART RATE: 96 BPM | SYSTOLIC BLOOD PRESSURE: 118 MMHG | OXYGEN SATURATION: 98 % | WEIGHT: 147.8 LBS | RESPIRATION RATE: 12 BRPM | BODY MASS INDEX: 28.63 KG/M2 | DIASTOLIC BLOOD PRESSURE: 70 MMHG | TEMPERATURE: 97.7 F

## 2024-06-25 DIAGNOSIS — I73.9 PAD (PERIPHERAL ARTERY DISEASE) (H): ICD-10-CM

## 2024-06-25 DIAGNOSIS — I70.219 ATHEROSCLEROSIS OF LOWER EXTREMITY WITH CLAUDICATION (H): Primary | ICD-10-CM

## 2024-06-25 DIAGNOSIS — Z95.828 S/P INSERTION OF ILIAC ARTERY STENT: Primary | Chronic | ICD-10-CM

## 2024-06-25 PROCEDURE — 99215 OFFICE O/P EST HI 40 MIN: CPT | Mod: 95 | Performed by: NURSE PRACTITIONER

## 2024-06-25 ASSESSMENT — PAIN SCALES - GENERAL: PAINLEVEL: NO PAIN (0)

## 2024-06-25 NOTE — LETTER
6/25/2024       RE: Jennyfer Elizabeth  [address]  [facility] [address]     Dear Colleague,    Thank you for referring your patient, Jennyfer Elizabeth, to the [facility] VASCULAR [facility]IN[facility] [place] at [facility]. Please see a copy of my visit note below.        VASCULAR SURGERY PROGRESS NOTE    LOCATION: [facility]     Jennyfer Elizabeth  Medical Record #:  [id number]  YOB: 1950  Age:  74 year old     Date of Service: 6/25/2024    PRIMARY CARE PROVIDER: Lili Perkins    Reason for visit: PAD s/p RLE revascularization with Right femoral endarterectomy and iliac angioplasty with stenting, 6 months surveillance    IMPRESSION / RECOMMENDATION:   Jennyfer Elizabeth is a very pleasant 74-year-old patient who underwent bilateral knee revascularization with right femoral endarterectomy and iliac angioplasty with stenting in March 2022. Here for 6 months follow-up with ABIs which are slightly improved. On right TOMMY 0.96 (previously 0.92), toe pressures 88 mmHg. On left, TOMMY 0.99 (previous 0.94), toe pressure 71 mmHg, overall normal.     -Recommend continue with aspirin, Plavix and statin  -Follow-up in 12 months with repeat ABIs and clinic visit.    Discussed claudication symptoms as well as symptoms, nonhealing wounds.  If she experiences any of these, she has been is advised to contact our team immediately. Patient verbalized clear understanding of the above. Information/Education: patient able to teach back. Patient agreeable to plan of care: yes.    All questions were answered and support provided. She has our contact information and knows to reach out with any additional questions or concerns.     Send message or 10 digit call back number Securely via [employer] with the [employer] Web Console (learn more here)    45 minutes spent on the date of the encounter doing chart review, review of outside records, review of test results, interpretation of  tests, patient visit, documentation and discussion with other provider(s)          HPI:  Jennyfer Elizabeth is a 74 year old female with past medical history significant for PAD.  In March 2022, Ms Elizabeth underwent right femoral endarterectomy with iliac angioplasty and stenting.  She is a non-smoker, diligent with medications. Required by surgery about 6 months ago, which stopped Plavix however this was resumed. Lower extremities warm to palpation, no open wounds, with remote RN at [facility] assessment during this virtual visit. Denies claudication symptoms, no rest pain. No other concerns.    REVIEW OF SYSTEMS:    A 12 point ROS was reviewed and is negative except for what is listed above in HPI.    PHH:    Past Medical History:   Diagnosis Date    Benign essential hypertension 03/03/2018    Cataract     Chronic pain of right knee 04/11/2018    Diabetic eye exam (H) 06/07/2018    TAYLOR (generalized anxiety disorder) 03/03/2018    History of MI (myocardial infarction) 07/2017    follows yearly with MHI    Macular degeneration     Major depressive disorder, single episode 1995    Obese     Obstructive sleep apnea 2005    BIPAP    Osteopenia 2007    Lumbar spine    Pure hypercholesterolemia 2001    PVD (peripheral vascular disease) (H24)     Squamous cell carcinoma, leg, left 12/2021    Type 2 diabetes mellitus with complication, without long-term current use of insulin (H) 03/03/2018          Past Surgical History:   Procedure Laterality Date    ANGIOGRAM Right 3/22/2022    Procedure: with right iliac angiogram, right iliac angioplasty and stenting;  Surgeon: Rama Sewell MD;  Location:  OR    BREAST BIOPSY, RT/LT  1986    x2; in [place]    CARDIAC CATH - HIM SCAN  07/10/2017    Mid RCA;  drug eluting stent    CHOLECYSTECTOMY  2008    COLONOSCOPY N/A 12/20/2021    Tubular Adenoma F/U 2024    ENDARTERECTOMY FEMORAL Right 3/22/2022    Procedure: Right femoral endarterectomy;  Surgeon: Rama Sewell MD;  Location:   OR    IR OR ANGIOGRAM  3/22/2022    SKIN CANCER EXCISION Left 12/2021    [facility]    TONSILLECTOMY, ADENOIDECTOMY, COMBINED  1955       ALLERGIES:  Patient has no known allergies.    MEDS:    Current Outpatient Medications:     B Complex CAPS, Take 1 capsule by mouth daily, Disp: , Rfl:     blood glucose (ACCU-CHEK SMARTVIEW) test strip, Use to test blood glucose 2 times daily. Dispense as covered by insurance. Dx. Code: E11.8., Disp: 200 strip, Rfl: 0    buPROPion (WELLBUTRIN XL) 300 MG 24 hr tablet, Take 1 tablet (300 mg) by mouth daily, Disp: 90 tablet, Rfl: 4    clopidogrel (PLAVIX) 75 MG tablet, TAKE 1 TAB BY MOUTH ONCE DAILY, Disp: 90 tablet, Rfl: 4    cyclobenzaprine (FLEXERIL) 10 MG tablet, Take 0.5-1 tablets (5-10 mg) by mouth 3 times daily as needed for muscle spasms, Disp: 45 tablet, Rfl: 3    DULoxetine (CYMBALTA) 30 MG capsule, TAKE 2 CAPSULES BY MOUTH DAILY, Disp: 180 capsule, Rfl: 4    erythromycin (ROMYCIN) 5 MG/GM ophthalmic ointment, APPLY AS DIRECTED (THIN LAYER) ON UPPER EYELIDS OF BOTH EYES 3 TIMES DAILY FOR 10 DAYS, Disp: , Rfl:     LORazepam (ATIVAN) 0.5 MG tablet, , Disp: , Rfl:     losartan-hydrochlorothiazide (HYZAAR) 100-12.5 MG tablet, Take 1 tablet by mouth daily, Disp: 90 tablet, Rfl: 4    metFORMIN (GLUCOPHAGE) 500 MG tablet, Take 0.5 tablets (250 mg) by mouth 2 times daily (with meals), Disp: 90 tablet, Rfl: 4    Multiple Vitamins-Minerals (PRESERVISION AREDS 2+MULTI VIT PO), Take 1 tablet by mouth 2 times daily (Preservision Areds), Disp: , Rfl:     nitroGLYcerin (NITRODUR) 0.2 MG/HR 24 hr patch, , Disp: , Rfl:     polymixin b-trimethoprim (POLYTRIM) 38228-1.1 UNIT/ML-% ophthalmic solution, PLACE 1 DROP INTO LEFT EYE 4 TIMES A DAY FOR 5 DAYS., Disp: , Rfl:     rosuvastatin (CRESTOR) 40 MG tablet, Take 1 tablet (40 mg) by mouth every evening, Disp: 90 tablet, Rfl: 4    semaglutide (OZEMPIC, 0.25 OR 0.5 MG/DOSE,) 2 MG/3ML pen, Inject 0.5 mg Subcutaneous every 7 days, Disp: 9 mL, Rfl: 2     tobramycin-dexAMETHasone (TOBRADEX) 0.3-0.1 % ophthalmic suspension, INSTILL 1 DROP IN LEFT EYE 4 TIMES A DAY, Disp: , Rfl:     traMADol (ULTRAM) 50 MG tablet, Take 1 tablet (50 mg) by mouth every 6 hours as needed for severe pain, Disp: 10 tablet, Rfl: 0    traZODone (DESYREL) 50 MG tablet, Take 1-2 tablets ( mg) by mouth at bedtime, Disp: 180 tablet, Rfl: 4    tretinoin (RETIN-A) 0.05 % external cream, APPLY 1 GRAM EXTERNALLY TO THE AFFECTED AREA AT BEDTIME, Disp: 45 g, Rfl: 1    Vitamin D, Cholecalciferol, 25 MCG (1000 UT) TABS, Take 1 tablet by mouth daily , Disp: , Rfl:     SOCIAL HABITS:    History   Smoking Status    Former    Types: Cigarettes   Smokeless Tobacco    Never     Social History    Substance and Sexual Activity      Alcohol use: Yes        Alcohol/week: 14.0 standard drinks of alcohol        Types: 14 Standard drinks or equivalent per week        Comment: one wine or beer every other day on average      History   Drug Use No       FAMILY HISTORY:    Family History   Problem Relation Age of Onset    Lung Cancer Mother         smoker    Hypertension Father     Heart Disease Father         MI    Diabetes Sister     Heart Disease Sister     Kidney Disease Sister 33        ?congenital    Hyperlipidemia Sister     No Known Problems Brother     Breast Cancer No family hx of         Cancer-breast       PE:  /70   Pulse 96   Temp 97.7  F (36.5  C) (Temporal)   Resp 12   Wt 147 lb 12.8 oz   LMP  (LMP Unknown)   SpO2 98%   BMI 28.63 kg/m    Wt Readings from Last 1 Encounters:   06/25/24 147 lb 12.8 oz     Body mass index is 28.63 kg/m .    EXAM:  GENERAL: alert and no distress  EYES: Eyes grossly normal to inspection.  No discharge or erythema, or obvious scleral/conjunctival abnormalities.  RESP: No audible wheeze, cough, or visible cyanosis.    SKIN: Visible skin clear. No significant rash, abnormal pigmentation or lesions.  NEURO: Cranial nerves grossly intact.  Mentation and speech  appropriate for age.  PSYCH: Appropriate affect, tone, and pace of words      DIAGNOSTIC STUDIES:     Images:  US TOMMY Doppler No Exercise    Result Date: 6/21/2024  Exam: US TOMMY DOPPLER NO EXERCISE, 1-2 LEVELS, BILAT Clinical history: compare to prior, known PAD s/p stenting; PAD (peripheral artery disease) (H24) Comparison study: 11/20/2023 Technique: Bilateral lower extremity resting ankle brachial indices obtained. Findings: Right:  Arm: 127 mmHg  PT at ankle: 122 mmHg  DP at foot: 108 mmHg  Toe pressure 88 mmHg (TBI 0.69, previously 0.57)  TOMMY: 0.96 (previously 0.92) Left:  Arm: 125 mmHg  PT at ankle: 126 mmHg  DP at foot: 123 mmHg  Toe pressure 71 mmHg (TBI 0.56, previously 0.58)  TOMMY: 0.99 (previously 0.94)     Impression: Right leg: Resting TOMMY is 0.96, borderline low, previously 0.92. Left leg: Resting TOMMY is 0.99, borderline low, previously 0.94. The right TBI measures 0.69 (previously 0.57) and the left TBI measures 0.56 (previously 0.58). Guidelines: TOMMY Diagnostic Criteria (Based on criteria published in Circulation 2011; 124: 7164-1476):   > 1.4: Non compressible   1.00 - 1.40: Normal   0.91 - 0.99: Borderline   At or below 0.90: Abnormal NATIVIDAD RUSSELL MD   SYSTEM ID:  [id number]      LABS:      Sodium   Date Value Ref Range Status   04/17/2024 143 135 - 145 mmol/L Final     Comment:     Reference intervals for this test were updated on 09/26/2023 to more accurately reflect our healthy population. There may be differences in the flagging of prior results with similar values performed with this method. Interpretation of those prior results can be made in the context of the updated reference intervals.    01/06/2024 137 135 - 145 mmol/L Final     Comment:     Reference intervals for this test were updated on 09/26/2023 to more accurately reflect our healthy population. There may be differences in the flagging of prior results with similar values performed with this method. Interpretation of those prior  results can be made in the context of the updated reference intervals.    10/16/2023 138 135 - 145 mmol/L Final     Comment:     Reference intervals for this test were updated on 09/26/2023 to more accurately reflect our healthy population. There may be differences in the flagging of prior results with similar values performed with this method. Interpretation of those prior results can be made in the context of the updated reference intervals.    02/01/2021 136 134 - 144 mmol/L Final   06/09/2020 140 134 - 144 mmol/L Final   08/15/2019 137 134 - 144 mmol/L Final     Urea Nitrogen   Date Value Ref Range Status   04/17/2024 17.4 8.0 - 23.0 mg/dL Final   01/06/2024 28.4 (H) 8.0 - 23.0 mg/dL Final   10/16/2023 15.4 8.0 - 23.0 mg/dL Final   08/17/2022 18 7 - 25 mg/dL Final   03/22/2022 20 7 - 30 mg/dL Final   03/07/2022 18 7 - 25 mg/dL Final   02/01/2021 18 7 - 25 mg/dL Final   06/09/2020 18 7 - 25 mg/dL Final   04/03/2019 14 7 - 25 mg/dL Final     Hemoglobin   Date Value Ref Range Status   04/17/2024 13.0 11.7 - 15.7 g/dL Final   01/06/2024 12.3 11.7 - 15.7 g/dL Final   10/16/2023 13.0 11.7 - 15.7 g/dL Final   03/24/2021 13.2 11.7 - 15.7 g/dL Final   08/29/2019 14.5 11.7 - 15.7 g/dL Final   04/03/2019 14.7 11.7 - 15.7 g/dL Final     Platelet Count   Date Value Ref Range Status   04/17/2024 329 150 - 450 10e3/uL Final   01/06/2024 524 (H) 150 - 450 10e3/uL Final   10/16/2023 312 150 - 450 10e3/uL Final   03/24/2021 287 150 - 450 10e9/L Final   08/29/2019 314 150 - 450 10e9/L Final   04/03/2019 311 150 - 450 10e9/L Final     INR - Historical   Date Value Ref Range Status   07/07/2017 1.1 <1.3          Again, thank you for allowing me to participate in the care of your patient.      Sincerely,    DAVID Reid CNP

## 2024-06-25 NOTE — PROGRESS NOTES
VASCULAR SURGERY PROGRESS NOTE    LOCATION: [facility]     Jennyfer Elizabeth  Medical Record #:  [id number]  YOB: 1950  Age:  74 year old     Date of Service: 6/25/2024    PRIMARY CARE PROVIDER: Lili Perkins    Reason for visit: PAD s/p RLE revascularization with Right femoral endarterectomy and iliac angioplasty with stenting, 6 months surveillance    IMPRESSION / RECOMMENDATION:   Jennyfer Elizabeth is a very pleasant 74-year-old patient who underwent bilateral knee revascularization with right femoral endarterectomy and iliac angioplasty with stenting in March 2022. Here for 6 months follow-up with ABIs which are slightly improved. On right TOMMY 0.96 (previously 0.92), toe pressures 88 mmHg. On left, TOMMY 0.99 (previous 0.94), toe pressure 71 mmHg, overall normal.     -Recommend continue with aspirin, Plavix and statin  -Follow-up in 12 months with repeat ABIs and clinic visit.    Discussed claudication symptoms as well as symptoms, nonhealing wounds.  If she experiences any of these, she has been is advised to contact our team immediately. Patient verbalized clear understanding of the above. Information/Education: patient able to teach back. Patient agreeable to plan of care: yes.    All questions were answered and support provided. She has our contact information and knows to reach out with any additional questions or concerns.     Dori Dawkins, [facility]  Vascular Surgery  Pager: [phone]  [email or web address]  Send message or 10 digit call back number Securely via [employer] with the [employer] Web Console (learn more here)    45 minutes spent on the date of the encounter doing chart review, review of outside records, review of test results, interpretation of tests, patient visit, documentation and discussion with other provider(s)          HPI:  Jennyfer Elizabeth is a 74 year old female with past medical history significant for PAD.  In March 2022, Ms Elizabeth underwent right femoral  endarterectomy with iliac angioplasty and stenting.  She is a non-smoker, diligent with medications. Required by surgery about 6 months ago, which stopped Plavix however this was resumed. Lower extremities warm to palpation, no open wounds, with remote RN at [facility] assessment during this virtual visit. Denies claudication symptoms, no rest pain. No other concerns.    REVIEW OF SYSTEMS:    A 12 point ROS was reviewed and is negative except for what is listed above in HPI.    PHH:    Past Medical History:   Diagnosis Date    Benign essential hypertension 03/03/2018    Cataract     Chronic pain of right knee 04/11/2018    Diabetic eye exam (H) 06/07/2018    TAYLOR (generalized anxiety disorder) 03/03/2018    History of MI (myocardial infarction) 07/2017    follows yearly with MHI    Macular degeneration     Major depressive disorder, single episode 1995    Obese     Obstructive sleep apnea 2005    BIPAP    Osteopenia 2007    Lumbar spine    Pure hypercholesterolemia 2001    PVD (peripheral vascular disease) (H24)     Squamous cell carcinoma, leg, left 12/2021    Type 2 diabetes mellitus with complication, without long-term current use of insulin (H) 03/03/2018          Past Surgical History:   Procedure Laterality Date    ANGIOGRAM Right 3/22/2022    Procedure: with right iliac angiogram, right iliac angioplasty and stenting;  Surgeon: Rama Sewell MD;  Location: [facility] OR    BREAST BIOPSY, RT/LT  1986    x2; in [place]    CARDIAC CATH - HIM SCAN  07/10/2017    Mid RCA;  drug eluting stent    CHOLECYSTECTOMY  2008    COLONOSCOPY N/A 12/20/2021    Tubular Adenoma F/U 2024    ENDARTERECTOMY FEMORAL Right 3/22/2022    Procedure: Right femoral endarterectomy;  Surgeon: Rama Sewell MD;  Location: [facility] OR    IR OR ANGIOGRAM  3/22/2022    SKIN CANCER EXCISION Left 12/2021    [facility]    TONSILLECTOMY, ADENOIDECTOMY, COMBINED  1955       ALLERGIES:  Patient has no known allergies.    MEDS:    Current Outpatient Medications:     B  Complex CAPS, Take 1 capsule by mouth daily, Disp: , Rfl:     blood glucose (ACCU-CHEK SMARTVIEW) test strip, Use to test blood glucose 2 times daily. Dispense as covered by insurance. Dx. Code: E11.8., Disp: 200 strip, Rfl: 0    buPROPion (WELLBUTRIN XL) 300 MG 24 hr tablet, Take 1 tablet (300 mg) by mouth daily, Disp: 90 tablet, Rfl: 4    clopidogrel (PLAVIX) 75 MG tablet, TAKE 1 TAB BY MOUTH ONCE DAILY, Disp: 90 tablet, Rfl: 4    cyclobenzaprine (FLEXERIL) 10 MG tablet, Take 0.5-1 tablets (5-10 mg) by mouth 3 times daily as needed for muscle spasms, Disp: 45 tablet, Rfl: 3    DULoxetine (CYMBALTA) 30 MG capsule, TAKE 2 CAPSULES BY MOUTH DAILY, Disp: 180 capsule, Rfl: 4    erythromycin (ROMYCIN) 5 MG/GM ophthalmic ointment, APPLY AS DIRECTED (THIN LAYER) ON UPPER EYELIDS OF BOTH EYES 3 TIMES DAILY FOR 10 DAYS, Disp: , Rfl:     LORazepam (ATIVAN) 0.5 MG tablet, , Disp: , Rfl:     losartan-hydrochlorothiazide (HYZAAR) 100-12.5 MG tablet, Take 1 tablet by mouth daily, Disp: 90 tablet, Rfl: 4    metFORMIN (GLUCOPHAGE) 500 MG tablet, Take 0.5 tablets (250 mg) by mouth 2 times daily (with meals), Disp: 90 tablet, Rfl: 4    Multiple Vitamins-Minerals (PRESERVISION AREDS 2+MULTI VIT PO), Take 1 tablet by mouth 2 times daily (Preservision Areds), Disp: , Rfl:     nitroGLYcerin (NITRODUR) 0.2 MG/HR 24 hr patch, , Disp: , Rfl:     polymixin b-trimethoprim (POLYTRIM) 81611-2.1 UNIT/ML-% ophthalmic solution, PLACE 1 DROP INTO LEFT EYE 4 TIMES A DAY FOR 5 DAYS., Disp: , Rfl:     rosuvastatin (CRESTOR) 40 MG tablet, Take 1 tablet (40 mg) by mouth every evening, Disp: 90 tablet, Rfl: 4    semaglutide (OZEMPIC, 0.25 OR 0.5 MG/DOSE,) 2 MG/3ML pen, Inject 0.5 mg Subcutaneous every 7 days, Disp: 9 mL, Rfl: 2    tobramycin-dexAMETHasone (TOBRADEX) 0.3-0.1 % ophthalmic suspension, INSTILL 1 DROP IN LEFT EYE 4 TIMES A DAY, Disp: , Rfl:     traMADol (ULTRAM) 50 MG tablet, Take 1 tablet (50 mg) by mouth every 6 hours as needed for severe  pain, Disp: 10 tablet, Rfl: 0    traZODone (DESYREL) 50 MG tablet, Take 1-2 tablets ( mg) by mouth at bedtime, Disp: 180 tablet, Rfl: 4    tretinoin (RETIN-A) 0.05 % external cream, APPLY 1 GRAM EXTERNALLY TO THE AFFECTED AREA AT BEDTIME, Disp: 45 g, Rfl: 1    Vitamin D, Cholecalciferol, 25 MCG (1000 UT) TABS, Take 1 tablet by mouth daily , Disp: , Rfl:     SOCIAL HABITS:    History   Smoking Status    Former    Types: Cigarettes   Smokeless Tobacco    Never     Social History    Substance and Sexual Activity      Alcohol use: Yes        Alcohol/week: 14.0 standard drinks of alcohol        Types: 14 Standard drinks or equivalent per week        Comment: one wine or beer every other day on average      History   Drug Use No       FAMILY HISTORY:    Family History   Problem Relation Age of Onset    Lung Cancer Mother         smoker    Hypertension Father     Heart Disease Father         MI    Diabetes Sister     Heart Disease Sister     Kidney Disease Sister 33        ?congenital    Hyperlipidemia Sister     No Known Problems Brother     Breast Cancer No family hx of         Cancer-breast       PE:  /70   Pulse 96   Temp 97.7  F (36.5  C) (Temporal)   Resp 12   Wt 147 lb 12.8 oz   LMP  (LMP Unknown)   SpO2 98%   BMI 28.63 kg/m    Wt Readings from Last 1 Encounters:   06/25/24 147 lb 12.8 oz     Body mass index is 28.63 kg/m .    EXAM:  GENERAL: alert and no distress  EYES: Eyes grossly normal to inspection.  No discharge or erythema, or obvious scleral/conjunctival abnormalities.  RESP: No audible wheeze, cough, or visible cyanosis.    SKIN: Visible skin clear. No significant rash, abnormal pigmentation or lesions.  NEURO: Cranial nerves grossly intact.  Mentation and speech appropriate for age.  PSYCH: Appropriate affect, tone, and pace of words      DIAGNOSTIC STUDIES:     Images:  US TOMMY Doppler No Exercise    Result Date: 6/21/2024  Exam: US TOMMY DOPPLER NO EXERCISE, 1-2 LEVELS, BILAT Clinical  history: compare to prior, known PAD s/p stenting; PAD (peripheral artery disease) (H24) Comparison study: 11/20/2023 Technique: Bilateral lower extremity resting ankle brachial indices obtained. Findings: Right:  Arm: 127 mmHg  PT at ankle: 122 mmHg  DP at foot: 108 mmHg  Toe pressure 88 mmHg (TBI 0.69, previously 0.57)  TOMMY: 0.96 (previously 0.92) Left:  Arm: 125 mmHg  PT at ankle: 126 mmHg  DP at foot: 123 mmHg  Toe pressure 71 mmHg (TBI 0.56, previously 0.58)  TOMMY: 0.99 (previously 0.94)     Impression: Right leg: Resting TOMMY is 0.96, borderline low, previously 0.92. Left leg: Resting TOMMY is 0.99, borderline low, previously 0.94. The right TBI measures 0.69 (previously 0.57) and the left TBI measures 0.56 (previously 0.58). Guidelines: TOMMY Diagnostic Criteria (Based on criteria published in Circulation 2011; 124: 3072-2225):   > 1.4: Non compressible   1.00 - 1.40: Normal   0.91 - 0.99: Borderline   At or below 0.90: Abnormal NATIVIDAD RUSSELL MD   SYSTEM ID:  [id number]      LABS:      Sodium   Date Value Ref Range Status   04/17/2024 143 135 - 145 mmol/L Final     Comment:     Reference intervals for this test were updated on 09/26/2023 to more accurately reflect our healthy population. There may be differences in the flagging of prior results with similar values performed with this method. Interpretation of those prior results can be made in the context of the updated reference intervals.    01/06/2024 137 135 - 145 mmol/L Final     Comment:     Reference intervals for this test were updated on 09/26/2023 to more accurately reflect our healthy population. There may be differences in the flagging of prior results with similar values performed with this method. Interpretation of those prior results can be made in the context of the updated reference intervals.    10/16/2023 138 135 - 145 mmol/L Final     Comment:     Reference intervals for this test were updated on 09/26/2023 to more accurately reflect our healthy  population. There may be differences in the flagging of prior results with similar values performed with this method. Interpretation of those prior results can be made in the context of the updated reference intervals.    02/01/2021 136 134 - 144 mmol/L Final   06/09/2020 140 134 - 144 mmol/L Final   08/15/2019 137 134 - 144 mmol/L Final     Urea Nitrogen   Date Value Ref Range Status   04/17/2024 17.4 8.0 - 23.0 mg/dL Final   01/06/2024 28.4 (H) 8.0 - 23.0 mg/dL Final   10/16/2023 15.4 8.0 - 23.0 mg/dL Final   08/17/2022 18 7 - 25 mg/dL Final   03/22/2022 20 7 - 30 mg/dL Final   03/07/2022 18 7 - 25 mg/dL Final   02/01/2021 18 7 - 25 mg/dL Final   06/09/2020 18 7 - 25 mg/dL Final   04/03/2019 14 7 - 25 mg/dL Final     Hemoglobin   Date Value Ref Range Status   04/17/2024 13.0 11.7 - 15.7 g/dL Final   01/06/2024 12.3 11.7 - 15.7 g/dL Final   10/16/2023 13.0 11.7 - 15.7 g/dL Final   03/24/2021 13.2 11.7 - 15.7 g/dL Final   08/29/2019 14.5 11.7 - 15.7 g/dL Final   04/03/2019 14.7 11.7 - 15.7 g/dL Final     Platelet Count   Date Value Ref Range Status   04/17/2024 329 150 - 450 10e3/uL Final   01/06/2024 524 (H) 150 - 450 10e3/uL Final   10/16/2023 312 150 - 450 10e3/uL Final   03/24/2021 287 150 - 450 10e9/L Final   08/29/2019 314 150 - 450 10e9/L Final   04/03/2019 311 150 - 450 10e9/L Final     INR - Historical   Date Value Ref Range Status   07/07/2017 1.1 <1.3

## 2024-06-25 NOTE — NURSING NOTE
"Chief Complaint   Patient presents with    Telemedicine consult     6 mo. F/U TOMMY's       Initial /70   Pulse 96   Temp 97.7  F (36.5  C) (Temporal)   Resp 12   Wt 67 kg (147 lb 12.8 oz)   LMP  (LMP Unknown)   SpO2 98%   BMI 28.63 kg/m   Estimated body mass index is 28.63 kg/m  as calculated from the following:    Height as of 1/6/24: 1.53 m (5' 0.25\").    Weight as of this encounter: 67 kg (147 lb 12.8 oz).  Medication Reconciliation: complete    Petyon Moran RN   "

## 2024-06-25 NOTE — PATIENT INSTRUCTIONS
Thank you so much for choosing us for your care. It was a pleasure to see you at the vascular clinic today.     Follow-up recommendations: We will see you back in 1 year. Please do not hesitate to reach out to us in the meantime with any concerns. Our schedulers will get in touch with you to set up your follow-up visit.     Additional testing/imaging ordered today: Repeat ABIs in 1 year        Our scheduling team will get in touch with you to set up any follow-up testing/imaging and/or appointments. Please be aware that any testing/imaging recommended today will need to completed prior to your next visit with the provider. If testing/imaging is not completed prior to your next visit, your visit may be rescheduled.        If you have any questions, please contact our clinic directly at [phone] and ask for the nurse. We also encourage the use of [employer] to communicate with your HealthCare Provider.        If you have an urgent need after business hours (8:00 am to 4:30 pm) please call [phone], option 4, and ask for the vascular attending on call. For non-urgent after hours needs, please call the vascular nurse at [phone] and leave a detailed voicemail. For scheduling needs, please call our clinic directly at [phone].

## 2024-08-16 DIAGNOSIS — F41.9 ANXIETY AND DEPRESSION: ICD-10-CM

## 2024-08-16 DIAGNOSIS — F32.A ANXIETY AND DEPRESSION: ICD-10-CM

## 2024-08-16 DIAGNOSIS — I10 ESSENTIAL HYPERTENSION: ICD-10-CM

## 2024-08-19 RX ORDER — LOSARTAN POTASSIUM AND HYDROCHLOROTHIAZIDE 12.5; 1 MG/1; MG/1
1 TABLET ORAL DAILY
Qty: 90 TABLET | Refills: 4 | Status: SHIPPED | OUTPATIENT
Start: 2024-08-19

## 2024-08-19 RX ORDER — DULOXETIN HYDROCHLORIDE 30 MG/1
CAPSULE, DELAYED RELEASE ORAL
Qty: 180 CAPSULE | Refills: 4 | Status: SHIPPED | OUTPATIENT
Start: 2024-08-19

## 2024-08-19 NOTE — TELEPHONE ENCOUNTER
[id number] sent Rx request for the following:      Requested Prescriptions   Pending Prescriptions Disp Refills    DULoxetine (CYMBALTA) 30 MG capsule [Pharmacy Med Name: DULOXETINE 30MG DR CAPSULE] 534 capsule 0     Sig: TAKE 2 CAPSULES BY MOUTH ONCE DAILY       Serotonin-Norepinephrine Reuptake Inhibitors  Passed - 8/19/2024  3:05 PM        Last Prescription Date:   10/16/23  Last Fill Qty/Refills:         180, R-4             losartan-hydrochlorothiazide (HYZAAR) 100-12.5 MG tablet [Pharmacy Med Name: LOSARTAN/HCTZ 100MG-12.5MG TAB] 267 tablet 0     Sig: TAKE 1 TABLET BY MOUTH DAILY       Angiotensin-II Receptors Failed - 8/19/2024  3:05 PM        Failed - Has GFR on file in past 12 months and most recent value is normal          Last Prescription Date:   10/16/23  Last Fill Qty/Refills:         90, R-4    Last Office Visit:              10/16/23   Future Office visit:           1/9/25    Routing refill request to provider for review/approval because:  Labs out of range:  GFR    Mariola Cope RN on 8/19/2024 at 3:08 PM

## 2024-08-20 ENCOUNTER — TELEPHONE (OUTPATIENT)
Dept: INTERNAL MEDICINE | Facility: OTHER | Age: 74
End: 2024-08-20
Payer: MEDICARE

## 2024-08-20 DIAGNOSIS — F41.9 ANXIETY AND DEPRESSION: ICD-10-CM

## 2024-08-20 DIAGNOSIS — F32.A ANXIETY AND DEPRESSION: ICD-10-CM

## 2024-08-20 DIAGNOSIS — I70.219 ATHEROSCLEROSIS OF LOWER EXTREMITY WITH CLAUDICATION (H): ICD-10-CM

## 2024-08-20 NOTE — TELEPHONE ENCOUNTER
Patient called to request Ozempic in pill form as she is going to be traveling. She is not sure if [facility] would approve that instead of the injection as it is not Ozempic in pill form. Order would need to be sent before Friday. She uses [facility]. Please call.     Janeth Bowman on 8/20/2024 at 1:51 PM

## 2024-08-21 NOTE — TELEPHONE ENCOUNTER
Dr. Perkins,   I spoke with patient who has an ozempic prescription for her diabetes. She is going on vacation for 2 weeks and it is difficult for her to keep the vial cold while traveling. Patient is looking for something in pill form that she is able to take while traveling.  Patient is leaving this weekend. Routing for review and consideration.  Thank You,   Nicolasa Raza RN on 8/21/2024 at 9:12 AM

## 2024-08-23 RX ORDER — ROSUVASTATIN CALCIUM 40 MG/1
40 TABLET, COATED ORAL EVERY EVENING
Qty: 267 TABLET | Refills: 0 | Status: SHIPPED | OUTPATIENT
Start: 2024-08-23

## 2024-08-23 RX ORDER — BUPROPION HYDROCHLORIDE 300 MG/1
300 TABLET ORAL DAILY
Qty: 267 TABLET | Refills: 0 | Status: SHIPPED | OUTPATIENT
Start: 2024-08-23

## 2024-08-23 NOTE — TELEPHONE ENCOUNTER
[facility] sent Rx request for the following:      Requested Prescriptions   Pending Prescriptions Disp Refills    rosuvastatin (CRESTOR) 40 MG tablet [Pharmacy Med Name: ROSUVASTATIN 40MG TABLET] 267 tablet 0     Sig: TAKE 1 TABLET BY MOUTH EVERY EVENING       Antihyperlipidemic agents Passed - 8/20/2024  1:04 AM        Passed - LDL on file in the past 12 months        Passed - Medication is active on med list        Passed - Recent (12 mo) or future (90 days) visit within the authorizing provider's specialty     The patient must have completed an in-person or virtual visit within the past 12 months or has a future visit scheduled within the next 90 days with the authorizing provider s specialty.  Urgent care and e-visits do not quality as an office visit for this protocol.          Passed - Patient is age 18 years or older        Passed - No active pregnancy on record        Passed - No positive pregnancy test in past 12 mos          buPROPion (WELLBUTRIN XL) 300 MG 24 hr tablet [Pharmacy Med Name: BUPROPION 300MG ER (XL) TABLET] 267 tablet 0     Sig: TAKE 1 TABLET BY MOUTH EVERY DAY       Rx Protocol Bupropion Passed - 8/20/2024  1:04 AM        Passed - PHQ-9 score of less than 5 in past 6 months     Please review last PHQ-9 score.           Passed - The medication is active on the med list        Passed - TAYLOR-7 score of less than 5 in past 6 months.     Please review last TAYLOR-7 score.           Passed - Recent (12 mo) or future (90 days) visit within the authorizing provider's specialty     The patient must have completed an in-person or virtual visit within the past 12 months or has a future visit scheduled within the next 90 days with the authorizing provider s specialty.  Urgent care and e-visits do not quality as an office visit for this protocol.          Passed - Medication is indicated for associated diagnosis     Medication is associated with one or more of the following diagnoses:  Anxiety  Bipolar  Disorder  Depression  Smoking Cessation  Adjustment disorder with mixed anxiety and depressed mood  Adjustment disorder with anxiety  Tobacco use disorder  Tobacco abuse          Passed - Blood pressure on file in the past 12 months        Passed - Patient is age 18 or older        Passed - No active pregnancy on record        Passed - No positive pregnancy test in past 12 months             Last Prescription Date:   10/16/23  Last Fill Qty/Refills:         90, R-4    Last Office Visit:              4/17/24   Future Office visit:    none on file    Prescription approved per [facility] Refill Protocol.  Raquel Yi RN on 8/23/2024 at 2:15 PM

## 2024-09-16 NOTE — TELEPHONE ENCOUNTER
Patient Information     Patient Name MRN Jennyfer Brady [id number] Female 1950      Telephone Encounter by Anayeli Bauer MD at 2018 11:51 AM     Author:  Anayeli Bauer MD Service:  (none) Author Type:  Physician     Filed:  2018 11:51 AM Encounter Date:  2018 Status:  Signed     :  Anayeli Bauer MD (Physician)            Ok to schedule on Friday morning. Thanks! Anayeli Bauer MD ....................  2018   11:51 AM           abdominal pain

## 2024-11-25 ENCOUNTER — TELEPHONE (OUTPATIENT)
Dept: INTERNAL MEDICINE | Facility: OTHER | Age: 74
End: 2024-11-25
Payer: MEDICARE

## 2024-11-25 DIAGNOSIS — I71.20 THORACIC AORTIC ANEURYSM WITHOUT RUPTURE, UNSPECIFIED PART (H): Primary | ICD-10-CM

## 2024-11-25 NOTE — TELEPHONE ENCOUNTER
Patient called saying that she gets an echocardiogram done every year and is hoping to get it done before she sees Dr. Perkins on 1/9/2025. Patient would like an order placed.    Thank you,    MARLO PALOMO on 11/25/2024 at 3:58 PM

## 2024-11-29 NOTE — TELEPHONE ENCOUNTER
Left message for patient to return call and ask for a nurse in unit 1.   Rosy Miranda LPN on 11/29/2024 at 1:59 PM   Ext. [phone]

## 2024-11-30 ENCOUNTER — HEALTH MAINTENANCE LETTER (OUTPATIENT)
Age: 74
End: 2024-11-30

## 2024-12-01 DIAGNOSIS — I70.219 ATHEROSCLEROSIS OF LOWER EXTREMITY WITH CLAUDICATION (H): ICD-10-CM

## 2024-12-01 DIAGNOSIS — E11.9 TYPE 2 DIABETES MELLITUS WITHOUT COMPLICATION, WITHOUT LONG-TERM CURRENT USE OF INSULIN (H): ICD-10-CM

## 2024-12-02 NOTE — TELEPHONE ENCOUNTER
Pt has not returned the call in regards to this order but will have received a call from scheduling through Diagnostics to have that appt made.  Vannessa Abbott LPN on 12/2/2024 at 11:15 AM

## 2024-12-03 NOTE — TELEPHONE ENCOUNTER
[id number] sent Rx request for the following:      Requested Prescriptions   Pending Prescriptions Disp Refills    clopidogrel (PLAVIX) 75 MG tablet [Pharmacy Med Name: CLOPIDOGREL 75MG TABLET] 90 tablet 4     Sig: TAKE 1 TAB BY MOUTH ONCE DAILY   Last Prescription Date:   10/16/23  Last Fill Qty/Refills:         90, R-4      Plavix Failed - 12/3/2024  4:44 PM        Failed - Medication indicated for associated diagnosis       metFORMIN (GLUCOPHAGE) 500 MG tablet [Pharmacy Med Name: METFORMIN 500MG TABLET] 90 tablet 4     Sig: TAKE 1/2 TABLET (250 MG) BY MOUTH 2 TIMES DAILY (WITH MEALS)   Last Prescription Date:   10/16/23  Last Fill Qty/Refills:         90, R-4      Biguanide Agents Failed - 12/3/2024  4:44 PM        Failed - Patient has documented A1c within the specified period of time.     If HgbA1C is 8 or greater, it needs to be on file within the past 3 months.  If less than 8, must be on file within the past 6 months.     Recent Labs   Lab Test 04/17/24  0902 02/28/18  1659 07/07/17  1253   A1C 6.5*   < >  --    [id number]  --   --  6.6*    < > = values in this interval not displayed.           Failed - Has GFR on file in past 12 months and most recent value is normal     Last Office Visit:              4/17/24   Future Office visit:           1/9/25    Per LOV note:  Return in about 8 months (around 12/17/2024) for Annual Review with renewal of all medications, and as needed sooner.     Unable to complete prescription refill per RN Medication Refill Policy. Zenia Griffin RN .............. 12/3/2024  4:47 PM

## 2024-12-04 RX ORDER — CLOPIDOGREL BISULFATE 75 MG/1
TABLET ORAL
Qty: 90 TABLET | Refills: 4 | Status: SHIPPED | OUTPATIENT
Start: 2024-12-04

## 2024-12-26 ENCOUNTER — TELEPHONE (OUTPATIENT)
Dept: INTERNAL MEDICINE | Facility: OTHER | Age: 74
End: 2024-12-26
Payer: MEDICARE

## 2024-12-27 PROBLEM — I25.10 ASCVD (ARTERIOSCLEROTIC CARDIOVASCULAR DISEASE): Status: ACTIVE | Noted: 2017-07-11

## 2024-12-27 PROBLEM — I21.4 NSTEMI (NON-ST ELEVATED MYOCARDIAL INFARCTION) (H): Status: ACTIVE | Noted: 2017-07-08

## 2024-12-27 RX ORDER — TRAZODONE HYDROCHLORIDE 50 MG/1
50-100 TABLET, FILM COATED ORAL AT BEDTIME
Qty: 180 TABLET | Refills: 4 | Status: CANCELLED | OUTPATIENT
Start: 2024-12-27

## 2024-12-27 RX ORDER — CYCLOBENZAPRINE HCL 10 MG
5-10 TABLET ORAL 3 TIMES DAILY PRN
Qty: 45 TABLET | Refills: 3 | Status: CANCELLED | OUTPATIENT
Start: 2024-12-27

## 2024-12-27 RX ORDER — CLOPIDOGREL BISULFATE 75 MG/1
TABLET ORAL
Qty: 90 TABLET | Refills: 4 | Status: CANCELLED | OUTPATIENT
Start: 2024-12-27

## 2025-01-02 ENCOUNTER — TELEPHONE (OUTPATIENT)
Dept: INTERNAL MEDICINE | Facility: OTHER | Age: 75
End: 2025-01-02
Payer: MEDICARE

## 2025-01-06 ENCOUNTER — HOSPITAL ENCOUNTER (OUTPATIENT)
Dept: CT IMAGING | Facility: OTHER | Age: 75
Discharge: HOME | End: 2025-01-06
Attending: INTERNAL MEDICINE
Payer: MEDICARE

## 2025-01-06 ENCOUNTER — HOSPITAL ENCOUNTER (OUTPATIENT)
Dept: MAMMOGRAPHY | Facility: OTHER | Age: 75
Discharge: HOME | End: 2025-01-06
Attending: INTERNAL MEDICINE
Payer: MEDICARE

## 2025-01-06 ENCOUNTER — HOSPITAL ENCOUNTER (OUTPATIENT)
Dept: CARDIOLOGY | Facility: OTHER | Age: 75
Discharge: HOME | End: 2025-01-06
Attending: INTERNAL MEDICINE
Payer: MEDICARE

## 2025-01-06 DIAGNOSIS — Z87.891 PERSONAL HISTORY OF NICOTINE DEPENDENCE: ICD-10-CM

## 2025-01-06 DIAGNOSIS — Z12.31 VISIT FOR SCREENING MAMMOGRAM: ICD-10-CM

## 2025-01-06 LAB — LVEF ECHO: NORMAL

## 2025-01-06 PROCEDURE — 77063 BREAST TOMOSYNTHESIS BI: CPT

## 2025-01-06 PROCEDURE — 93306 TTE W/DOPPLER COMPLETE: CPT | Mod: 26 | Performed by: INTERNAL MEDICINE

## 2025-01-06 PROCEDURE — 71250 CT THORAX DX C-: CPT

## 2025-01-06 PROCEDURE — 93306 TTE W/DOPPLER COMPLETE: CPT

## 2025-01-09 ENCOUNTER — LAB (OUTPATIENT)
Dept: LAB | Facility: OTHER | Age: 75
End: 2025-01-09
Attending: INTERNAL MEDICINE
Payer: MEDICARE

## 2025-01-09 ENCOUNTER — OFFICE VISIT (OUTPATIENT)
Dept: INTERNAL MEDICINE | Facility: OTHER | Age: 75
End: 2025-01-09
Attending: INTERNAL MEDICINE
Payer: MEDICARE

## 2025-01-09 VITALS
TEMPERATURE: 97.5 F | SYSTOLIC BLOOD PRESSURE: 102 MMHG | OXYGEN SATURATION: 93 % | HEART RATE: 108 BPM | BODY MASS INDEX: 29.37 KG/M2 | RESPIRATION RATE: 16 BRPM | WEIGHT: 149.6 LBS | DIASTOLIC BLOOD PRESSURE: 60 MMHG | HEIGHT: 60 IN

## 2025-01-09 DIAGNOSIS — L98.8 WRINKLES: ICD-10-CM

## 2025-01-09 DIAGNOSIS — M62.838 MUSCLE SPASM: ICD-10-CM

## 2025-01-09 DIAGNOSIS — F33.1 DEPRESSION, MAJOR, RECURRENT, MODERATE (H): ICD-10-CM

## 2025-01-09 DIAGNOSIS — F32.A ANXIETY AND DEPRESSION: ICD-10-CM

## 2025-01-09 DIAGNOSIS — G47.33 OSA (OBSTRUCTIVE SLEEP APNEA): ICD-10-CM

## 2025-01-09 DIAGNOSIS — Z00.00 ENCOUNTER FOR MEDICARE ANNUAL WELLNESS EXAM: Primary | ICD-10-CM

## 2025-01-09 DIAGNOSIS — Z12.11 SCREEN FOR COLON CANCER: ICD-10-CM

## 2025-01-09 DIAGNOSIS — E11.8 TYPE 2 DIABETES MELLITUS WITH COMPLICATION, WITHOUT LONG-TERM CURRENT USE OF INSULIN (H): ICD-10-CM

## 2025-01-09 DIAGNOSIS — E11.9 TYPE 2 DIABETES MELLITUS WITHOUT COMPLICATION, WITHOUT LONG-TERM CURRENT USE OF INSULIN (H): ICD-10-CM

## 2025-01-09 DIAGNOSIS — G47.00 INSOMNIA, UNSPECIFIED TYPE: ICD-10-CM

## 2025-01-09 DIAGNOSIS — R74.8 INCREASED LIVER ENZYMES: ICD-10-CM

## 2025-01-09 DIAGNOSIS — M50.13 CERVICAL DISC DISORDER WITH RADICULOPATHY, CERVICOTHORACIC REGION: ICD-10-CM

## 2025-01-09 DIAGNOSIS — I10 ESSENTIAL HYPERTENSION: ICD-10-CM

## 2025-01-09 DIAGNOSIS — R94.4 DECREASED GFR: ICD-10-CM

## 2025-01-09 DIAGNOSIS — N18.32 CHRONIC KIDNEY DISEASE, STAGE 3B (H): ICD-10-CM

## 2025-01-09 DIAGNOSIS — I70.219 ATHEROSCLEROSIS OF LOWER EXTREMITY WITH CLAUDICATION: ICD-10-CM

## 2025-01-09 DIAGNOSIS — Z71.85 VACCINE COUNSELING: ICD-10-CM

## 2025-01-09 DIAGNOSIS — N18.31 STAGE 3A CHRONIC KIDNEY DISEASE (H): ICD-10-CM

## 2025-01-09 DIAGNOSIS — F41.9 ANXIETY AND DEPRESSION: ICD-10-CM

## 2025-01-09 LAB
ALBUMIN SERPL BCG-MCNC: 4.2 G/DL (ref 3.5–5.2)
ALP SERPL-CCNC: 103 U/L (ref 40–150)
ALT SERPL W P-5'-P-CCNC: 62 U/L (ref 0–50)
ANION GAP SERPL CALCULATED.3IONS-SCNC: 14 MMOL/L (ref 7–15)
AST SERPL W P-5'-P-CCNC: 56 U/L (ref 0–45)
BILIRUB SERPL-MCNC: 0.6 MG/DL
BUN SERPL-MCNC: 19.6 MG/DL (ref 8–23)
CALCIUM SERPL-MCNC: 9.5 MG/DL (ref 8.8–10.4)
CHLORIDE SERPL-SCNC: 102 MMOL/L (ref 98–107)
CHOLEST SERPL-MCNC: 111 MG/DL
CREAT SERPL-MCNC: 1.53 MG/DL (ref 0.51–0.95)
CREAT UR-MCNC: 86.6 MG/DL
EGFRCR SERPLBLD CKD-EPI 2021: 35 ML/MIN/1.73M2
ERYTHROCYTE [DISTWIDTH] IN BLOOD BY AUTOMATED COUNT: 13.9 % (ref 10–15)
EST. AVERAGE GLUCOSE BLD GHB EST-MCNC: 134 MG/DL
FASTING STATUS PATIENT QL REPORTED: ABNORMAL
FASTING STATUS PATIENT QL REPORTED: ABNORMAL
GLUCOSE SERPL-MCNC: 225 MG/DL (ref 70–99)
HBA1C MFR BLD: 6.3 %
HCO3 SERPL-SCNC: 23 MMOL/L (ref 22–29)
HCT VFR BLD AUTO: 41.8 % (ref 35–47)
HDLC SERPL-MCNC: 48 MG/DL
HGB BLD-MCNC: 13.7 G/DL (ref 11.7–15.7)
LDLC SERPL CALC-MCNC: 21 MG/DL
MAGNESIUM SERPL-MCNC: 1.9 MG/DL (ref 1.7–2.3)
MCH RBC QN AUTO: 29.1 PG (ref 26.5–33)
MCHC RBC AUTO-ENTMCNC: 32.8 G/DL (ref 31.5–36.5)
MCV RBC AUTO: 89 FL (ref 78–100)
MICROALBUMIN UR-MCNC: <12 MG/L
MICROALBUMIN/CREAT UR: NORMAL MG/G{CREAT}
NONHDLC SERPL-MCNC: 63 MG/DL
PLATELET # BLD AUTO: 326 10E3/UL (ref 150–450)
POTASSIUM SERPL-SCNC: 4.2 MMOL/L (ref 3.4–5.3)
PROT SERPL-MCNC: 7.5 G/DL (ref 6.4–8.3)
RBC # BLD AUTO: 4.71 10E6/UL (ref 3.8–5.2)
SODIUM SERPL-SCNC: 139 MMOL/L (ref 135–145)
TRIGL SERPL-MCNC: 211 MG/DL
TSH SERPL DL<=0.005 MIU/L-ACNC: 3.66 UIU/ML (ref 0.3–4.2)
WBC # BLD AUTO: 9.2 10E3/UL (ref 4–11)

## 2025-01-09 PROCEDURE — G0463 HOSPITAL OUTPT CLINIC VISIT: HCPCS | Performed by: INTERNAL MEDICINE

## 2025-01-09 PROCEDURE — 83036 HEMOGLOBIN GLYCOSYLATED A1C: CPT | Mod: ZL

## 2025-01-09 PROCEDURE — 82043 UR ALBUMIN QUANTITATIVE: CPT | Mod: ZL

## 2025-01-09 PROCEDURE — 80053 COMPREHEN METABOLIC PANEL: CPT | Mod: ZL

## 2025-01-09 PROCEDURE — 80061 LIPID PANEL: CPT | Mod: ZL

## 2025-01-09 PROCEDURE — 36415 COLL VENOUS BLD VENIPUNCTURE: CPT | Mod: ZL

## 2025-01-09 PROCEDURE — 83735 ASSAY OF MAGNESIUM: CPT | Mod: ZL

## 2025-01-09 PROCEDURE — 85027 COMPLETE CBC AUTOMATED: CPT | Mod: ZL

## 2025-01-09 PROCEDURE — G2211 COMPLEX E/M VISIT ADD ON: HCPCS | Performed by: INTERNAL MEDICINE

## 2025-01-09 PROCEDURE — 84443 ASSAY THYROID STIM HORMONE: CPT | Mod: ZL

## 2025-01-09 PROCEDURE — 99214 OFFICE O/P EST MOD 30 MIN: CPT | Mod: 25 | Performed by: INTERNAL MEDICINE

## 2025-01-09 PROCEDURE — G0439 PPPS, SUBSEQ VISIT: HCPCS | Performed by: INTERNAL MEDICINE

## 2025-01-09 RX ORDER — DULOXETIN HYDROCHLORIDE 30 MG/1
CAPSULE, DELAYED RELEASE ORAL
Qty: 180 CAPSULE | Refills: 4 | Status: SHIPPED | OUTPATIENT
Start: 2025-01-09

## 2025-01-09 RX ORDER — NITROGLYCERIN 0.4 MG/1
0.4 TABLET SUBLINGUAL EVERY 5 MIN PRN
COMMUNITY
End: 2025-01-09

## 2025-01-09 RX ORDER — ROSUVASTATIN CALCIUM 40 MG/1
40 TABLET, COATED ORAL EVERY EVENING
Qty: 90 TABLET | Refills: 4 | Status: SHIPPED | OUTPATIENT
Start: 2025-01-09

## 2025-01-09 RX ORDER — BUPROPION HYDROCHLORIDE 300 MG/1
300 TABLET ORAL DAILY
Qty: 90 TABLET | Refills: 4 | Status: SHIPPED | OUTPATIENT
Start: 2025-01-09

## 2025-01-09 RX ORDER — SEMAGLUTIDE 0.68 MG/ML
0.5 INJECTION, SOLUTION SUBCUTANEOUS
Qty: 9 ML | Refills: 2 | Status: SHIPPED | OUTPATIENT
Start: 2025-01-09

## 2025-01-09 RX ORDER — LOSARTAN POTASSIUM AND HYDROCHLOROTHIAZIDE 12.5; 1 MG/1; MG/1
1 TABLET ORAL DAILY
Qty: 90 TABLET | Refills: 4 | Status: SHIPPED | OUTPATIENT
Start: 2025-01-09

## 2025-01-09 RX ORDER — TRETINOIN 0.5 MG/G
CREAM TOPICAL
Qty: 45 G | Refills: 1 | Status: SHIPPED | OUTPATIENT
Start: 2025-01-09

## 2025-01-09 SDOH — HEALTH STABILITY: PHYSICAL HEALTH: ON AVERAGE, HOW MANY DAYS PER WEEK DO YOU ENGAGE IN MODERATE TO STRENUOUS EXERCISE (LIKE A BRISK WALK)?: 1 DAY

## 2025-01-09 SDOH — HEALTH STABILITY: PHYSICAL HEALTH: ON AVERAGE, HOW MANY MINUTES DO YOU ENGAGE IN EXERCISE AT THIS LEVEL?: 30 MIN

## 2025-01-09 ASSESSMENT — PAIN SCALES - GENERAL: PAINLEVEL_OUTOF10: MODERATE PAIN (6)

## 2025-01-09 ASSESSMENT — PATIENT HEALTH QUESTIONNAIRE - PHQ9
10. IF YOU CHECKED OFF ANY PROBLEMS, HOW DIFFICULT HAVE THESE PROBLEMS MADE IT FOR YOU TO DO YOUR WORK, TAKE CARE OF THINGS AT HOME, OR GET ALONG WITH OTHER PEOPLE: EXTREMELY DIFFICULT
SUM OF ALL RESPONSES TO PHQ QUESTIONS 1-9: 10
SUM OF ALL RESPONSES TO PHQ QUESTIONS 1-9: 10

## 2025-01-09 ASSESSMENT — SOCIAL DETERMINANTS OF HEALTH (SDOH): HOW OFTEN DO YOU GET TOGETHER WITH FRIENDS OR RELATIVES?: ONCE A WEEK

## 2025-01-09 NOTE — PROGRESS NOTES
Assessment & Plan     Type 2 diabetes mellitus with complication, without long-term current use of insulin (H)  - Controlled.  Continue current regimen.    - semaglutide (OZEMPIC, 0.25 OR 0.5 MG/DOSE,) 2 MG/3ML pen; Inject 0.5 mg subcutaneously every 7 days.    Screen for colon cancer  - Colonoscopy Screening  Referral; Future    Anxiety and depression  - Controlled.  Continue current regimen.    - buPROPion (WELLBUTRIN XL) 300 MG 24 hr tablet; Take 1 tablet (300 mg) by mouth daily.  - DULoxetine (CYMBALTA) 30 MG capsule; TAKE 2 CAPSULES BY MOUTH ONCE DAILY    Atherosclerosis of lower extremity with claudication  - on plavix, statin, ACEI/ARB  - no symptoms at this time; continue meds as prescribed  - rosuvastatin (CRESTOR) 40 MG tablet; Take 1 tablet (40 mg) by mouth every evening.    Cervical disc disorder with radiculopathy, cervicothoracic region  - Controlled.  Continue current regimen.  Continue with physical therapy    Muscle spasm  Continue with physical therapy    Essential hypertension  - Controlled.  Continue current regimen.    - losartan-hydrochlorothiazide (HYZAAR) 100-12.5 MG tablet; Take 1 tablet by mouth daily.    Type 2 diabetes mellitus without complication, without long-term current use of insulin (H)  A1c remains well-controlled.  Continue current medications    Insomnia, unspecified type  - Controlled.  Continue current regimen.      Wrinkles  - Controlled.  Continue current regimen.    - tretinoin (RETIN-A) 0.05 % external cream; APPLY 1 GRAM EXTERNALLY TO THE AFFECTED AREA AT BEDTIME    Vaccine counseling  Encouraged to obtain at the pharmacy  - RSV vaccine, bivalent, ABRYSVO, injection; Inject 0.5 mLs into the muscle once for 1 dose. Pharmacist administered    Depression, major, recurrent, moderate (H)  - Controlled.  Continue current regimen.      Chronic kidney disease, stage 3b (H)  Overall stable.  Continue to monitor periodically.    MOSHE (obstructive sleep apnea)  Referral  placed for repeat sleep study given that her last one was over a decade ago.  - Adult Sleep Eval & Management  Referral; Future    Decreased GFR  Recheck today shows worsening from prior.  Recommend increasing water intake and plan for recheck in 2 to 4 weeks.  - Comprehensive Metabolic Panel; Future  - PTH, Intact; Future    Increased liver enzymes  Suspect this is related to recent binge drinking.  Plan for recheck with upcoming labs.  Encouraged to avoid alcohol in the meantime.  - Comprehensive Metabolic Panel; Future    Patient has been advised of split billing requirements and indicates understanding: Yes        Counseling  Appropriate preventive services were addressed with this patient via screening, questionnaire, or discussion as appropriate for fall prevention, nutrition, physical activity, Tobacco-use cessation, social engagement, weight loss and cognition.  Checklist reviewing preventive services available has been given to the patient.  The patient's PHQ-9 score is consistent with moderate depression. She was provided with information regarding depression.     Return in about 6 months (around 7/9/2025) for Recheck, and as needed sooner.    Jose Burger is a 74 year old, presenting for the following health issues:  Medicare Visit        1/9/2025    12:28 PM   Additional Questions   Roomed by STELLA Patrick     HPI     Patient reports that overall she is doing okay.  She continues on Ozempic.  She has had occasional vomiting with this.  She is unable to drink coffee due to acid reflux with the medication however feels that this is worth it.    She has continued to have back pain and spasms.  This has been going on over the last few months.  She also gets some pain down into her right hip.  She does feel that some of her symptoms are similar to when she had her vascular blockage.  She did have TOMMY testing done in June that was overall stable and it was recommended she consider a follow-up in 12  "months however we discussed that given her new symptoms it would be reasonable for her to see vascular surgery sooner.  She is requesting a handicap parking pass today.    She does need new sleep supplies.  She believes she needs a sleep study for this as it has been more than a decade.    She does need all of her medications renewed.  She denies any obvious side effects.  She continues on medications for her mood.  She does feel these have been beneficial.    She did have a multitude of labs done which we discussed.  Her renal function is slightly worse than it was prior.  She also was noted to have elevated liver enzymes.  She reports that she had a day of heavy alcohol drinking a few days ago.  We discussed that this may be related.    She is due for colonoscopy.  She is due for an RSV vaccine.        ROS:  CONSTITUTIONAL: Negative for fever, chills, significant change in weight; Periodic night sweats that are chronic  INTEGUMENTARY/SKIN/LYMPH: Negative for worrisome rashes, moles or lesions; swollen lymph nodes  EYES: Negative for significant vision changes or irritation  ENT: Negative for additional ear, mouth and throat problems - may need a new hearing test  RESP: Negative for significant cough or SOB  CV: Negative for chest pain, palpitations or increased peripheral edema  GI: Negative for abdominal pain, or change in bowel habits or blood in the stools/black stools; occasional vomiting  : Negative for unusual urinary or vaginal symptoms. No vaginal bleeding.  NEURO: Negative for new headaches  PSYCHIATRIC: Negative for changes in mood or affect; significant anxiety or depression        Objective    /60   Pulse 108   Temp 97.5  F (36.4  C)   Resp 16   Ht 1.53 m (5' 0.25\")   Wt 67.9 kg (149 lb 9.6 oz)   LMP  (LMP Unknown)   SpO2 93%   BMI 28.97 kg/m    Body mass index is 28.97 kg/m .  Physical Exam   GEN: Vitals reviewed. Healthy appearing. Patient is in no acute distress. Cooperative with " exam.  HEENT: Normocephalic atraumatic.  Eyes grossly normal to inspection.  No discharge or erythema, or obvious scleral/conjunctival abnormalities. EACs clear bilat, TM gray with normal landmarks.   NECK: Supple; no thyromegaly or masses noted.  No cervical or supraclavicular lymphadenopathy.  CV: Heart regular in rate and rhythm with no murmur.    LUNGS: No audible wheeze, cough, or visible cyanosis.  No visible retractions or increased work of breathing.  Lungs clear to auscultation bilaterally.    ABD:  Nondistended  SKIN: Warm and dry to touch.  Visible skin clear. No significant rash, abnormal pigmentation or lesions.  EXT: No clubbing or cyanosis.  No peripheral edema.  NEURO: Alert and oriented to person, place, and time.  Cranial nerves II-XII grossly intact with no focal or lateralizing deficits.  Muscle tone normal.  Gait normal. No tremor.   MSK: ROM of upper and lower ext symmetric and full.  PSYCH: Mood is good.  Mentation appears normal, affect normal/bright, judgement and insight intact, normal speech and appearance well-groomed.  DIABETIC FOOT EXAM: No sores or lesions on feet bilaterally, no fungus noted, no erythema or ulceration, pulses adequate bilaterally, monofilament with no decrease in sensation bilaterally.  Bilateral bunion and hammertoe deformity noted.  Moderate callus formation noted.    The longitudinal plan of care for the diagnosis(es)/condition(s) as documented were addressed during this visit. Due to the added complexity in care, I will continue to support Jennyfer in the subsequent management and with ongoing continuity of care.    Signed Electronically by: Lili Perkins, DO    Answers submitted by the patient for this visit:  Patient Health Questionnaire (Submitted on 1/9/2025)  If you checked off any problems, how difficult have these problems made it for you to do your work, take care of things at home, or get along with other people?: Extremely difficult  PHQ9 TOTAL SCORE:  10

## 2025-01-09 NOTE — PROGRESS NOTES
"Preventive Care Visit  [facility]  Lili Perkins DO, Internal Medicine  Jan 9, 2025        Encounter for Medicare annual wellness exam  -Mammo done 1/6/25 (impression: negative). Follow up annually.      -DEXA done 6/29/18 (impression: osteopenia).    -Colon cancer screening done via colonoscopy on 12/20/21 (impression: tubular adenoma). Follow-up 3 years. Due 12/20/24.    -Immunizations: Patient is due for the following: RSV.    -Derm: Does patient regularly see dermatologist? Yes.     -Refills pended for requested medications.  -Labs pended.     -Due: DM foot exam    BMI  Estimated body mass index is 28.97 kg/m  as calculated from the following:    Height as of this encounter: 1.53 m (5' 0.25\").    Weight as of this encounter: 67.9 kg (149 lb 9.6 oz).       Counseling  Appropriate preventive services were addressed with this patient via screening, questionnaire, or discussion as appropriate for fall prevention, nutrition, physical activity, Tobacco-use cessation, social engagement, weight loss and cognition.  Checklist reviewing preventive services available has been given to the patient.  Reviewed patient's diet, addressing concerns and/or questions.   She is at risk for lack of exercise and has been provided with information to increase physical activity for the benefit of her well-being.   She is at risk for psychosocial distress and has been provided with information to reduce risk.   Discussed possible causes of fatigue. The patient was provided with written information regarding signs of hearing loss.   The patient's PHQ-9 score is consistent with moderate depression. She was provided with information regarding depression.   I have reviewed Opioid Use Disorder and Substance Use Disorder risk factors and made any needed referrals.     Return in about 6 months (around 7/9/2025) for Recheck, and as needed sooner.      Jose Burger is a 74 year old, presenting for the " following:  Medicare Visit        1/9/2025    12:28 PM   Additional Questions   Roomed by STELLA Patrick             Health Care Directive  Patient does not have a Health Care Directive: Discussed advance care planning with patient; information given to patient to review.        1/9/2025   General Health   How would you rate your overall physical health? (!) FAIR   Feel stress (tense, anxious, or unable to sleep) To some extent   (!) STRESS CONCERN      1/9/2025   Nutrition   Diet: Regular (no restrictions)         1/9/2025   Exercise   Days per week of moderate/strenous exercise 1 day   Average minutes spent exercising at this level 30 min   (!) EXERCISE CONCERN      1/9/2025   Social Factors   Frequency of gathering with friends or relatives Once a week   Worry food won't last until get money to buy more No   Food not last or not have enough money for food? No   Do you have housing? (Housing is defined as stable permanent housing and does not include staying ouside in a car, in a tent, in an abandoned building, in an overnight shelter, or couch-surfing.) Yes   Are you worried about losing your housing? No   Lack of transportation? No   Unable to get utilities (heat,electricity)? No         1/9/2025   Fall Risk   Fallen 2 or more times in the past year? No    Trouble with walking or balance? Yes        Proxy-reported          1/9/2025   Activities of Daily Living- Home Safety   Needs help with the following daily activites None of the above   Safety concerns in the home None of the above         1/9/2025   Dental   Dentist two times every year? Yes         1/9/2025   Hearing Screening   Hearing concerns? (!) I FEEL THAT PEOPLE ARE MUMBLING OR NOT SPEAKING CLEARLY.    (!) TROUBLE UNDERSTANDING SOFT OR WHISPERED SPEECH.       Multiple values from one day are sorted in reverse-chronological order         1/9/2025   Driving Risk Screening   Patient/family members have concerns about driving No         1/9/2025   General  Alertness/Fatigue Screening   Have you been more tired than usual lately? (!) YES         2025   Urinary Incontinence Screening   Bothered by leaking urine in past 6 months No         2025   TB Screening   Were you born outside of the [place]? No     Today's PHQ-9 Score:       2025    12:56 PM   PHQ-9 SCORE   PHQ-9 Total Score MyChart 10 (Moderate depression)   PHQ-9 Total Score 10        Proxy-reported         2025   Substance Use   Alcohol more than 3/day or more than 7/wk No   Do you have a current opioid prescription? (!) YES   How severe/bad is pain from 1 to 10? 6/10   Do you use any other substances recreationally? No          No data to display              Low Risk (0-3)  Moderate Risk (4-7)  High Risk (>8)  Social History     Tobacco Use    Smoking status: Former     Current packs/day: 0.00     Average packs/day: 1 pack/day for 40.0 years (40.0 ttl pk-yrs)     Types: Cigarettes     Start date: 10/1/1968     Quit date: 10/1/2008     Years since quittin.3     Passive exposure: Past    Smokeless tobacco: Never    Tobacco comments:     Patient occasionally uses Nicorette gum   Vaping Use    Vaping status: Never Used   Substance Use Topics    Alcohol use: Yes     Alcohol/week: 14.0 standard drinks of alcohol     Types: 14 Standard drinks or equivalent per week     Comment: socially    Drug use: No                Reviewed and updated as needed this visit by Provider   Tobacco  Allergies  Meds  Problems  Med Hx  Surg Hx  Fam Hx  Soc   Hx Sexual Activity                    Current providers sharing in care for this patient include:  Patient Care Team:  Lili Perkins DO as PCP - General (Internal Medicine)  Lili Perkins DO as Assigned PCP  Dori Dawkins APRN CNP as Assigned Surgical Provider    The following health maintenance items are reviewed in Epic and correct as of today:  Health Maintenance   Topic Date Due    PARATHYROID  Never done    RSV VACCINE (1 - 1-dose 75+ series) Never done  "   COLORECTAL CANCER SCREENING  12/20/2024    EYE EXAM  06/10/2025    A1C  07/09/2025    MICROALBUMIN  07/09/2025    PHQ-9  07/09/2025    MAMMO SCREENING  01/06/2026    MEDICARE ANNUAL WELLNESS VISIT  01/09/2026    BMP  01/09/2026    LIPID  01/09/2026    DIABETIC FOOT EXAM  01/09/2026    FALL RISK ASSESSMENT  01/09/2026    HEMOGLOBIN  01/09/2026    ADVANCE CARE PLANNING  04/17/2029    DTAP/TDAP/TD IMMUNIZATION (3 - Td or Tdap) 11/01/2031    DEXA  06/29/2033    PHOSPHORUS  Completed    HEPATITIS C SCREENING  Completed    DEPRESSION ACTION PLAN  Completed    INFLUENZA VACCINE  Completed    Pneumococcal Vaccine: 50+ Years  Completed    URINALYSIS  Completed    ALK PHOS  Completed    ZOSTER IMMUNIZATION  Completed    COVID-19 Vaccine  Completed    HPV IMMUNIZATION  Aged Out    MENINGITIS IMMUNIZATION  Aged Out    RSV MONOCLONAL ANTIBODY  Aged Out    LUNG CANCER SCREENING  Discontinued      Objective      Exam  /60   Pulse 108   Temp 97.5  F (36.4  C)   Resp 16   Ht 1.53 m (5' 0.25\")   Wt 67.9 kg (149 lb 9.6 oz)   LMP  (LMP Unknown)   SpO2 93%   BMI 28.97 kg/m           1/9/2025   Mini Cog   Clock Draw Score 2 Normal   3 Item Recall 3 objects recalled   Mini Cog Total Score 5            Signed Electronically by: Lili Perkins, DO    "

## 2025-01-09 NOTE — PATIENT INSTRUCTIONS
Patient Education   Preventive Care Advice   This is general advice given by our system to help you stay healthy. However, your care team may have specific advice just for you. Please talk to your care team about your preventive care needs.  Nutrition  Eat 5 or more servings of fruits and vegetables each day.  Try wheat bread, brown rice and whole grain pasta (instead of white bread, rice, and pasta).  Get enough calcium and vitamin D. Check the label on foods and aim for 100% of the RDA (recommended daily allowance).  Lifestyle  Exercise at least 150 minutes each week  (30 minutes a day, 5 days a week).  Do muscle strengthening activities 2 days a week. These help control your weight and prevent disease.  No smoking.  Wear sunscreen to prevent skin cancer.  Have a dental exam and cleaning every 6 months.  Yearly exams  See your health care team every year to talk about:  Any changes in your health.  Any medicines your care team has prescribed.  Preventive care, family planning, and ways to prevent chronic diseases.  Shots (vaccines)   HPV shots (up to age 26), if you've never had them before.  Hepatitis B shots (up to age 59), if you've never had them before.  COVID-19 shot: Get this shot when it's due.  Flu shot: Get a flu shot every year.  Tetanus shot: Get a tetanus shot every 10 years.  Pneumococcal, hepatitis A, and RSV shots: Ask your care team if you need these based on your risk.  Shingles shot (for age 50 and up)  General health tests  Diabetes screening:  Starting at age 35, Get screened for diabetes at least every 3 years.  If you are younger than age 35, ask your care team if you should be screened for diabetes.  Cholesterol test: At age 39, start having a cholesterol test every 5 years, or more often if advised.  Bone density scan (DEXA): At age 50, ask your care team if you should have this scan for osteoporosis (brittle bones).  Hepatitis C: Get tested at least once in your life.  STIs (sexually  transmitted infections)  Before age 24: Ask your care team if you should be screened for STIs.  After age 24: Get screened for STIs if you're at risk. You are at risk for STIs (including HIV) if:  You are sexually active with more than one person.  You don't use condoms every time.  You or a partner was diagnosed with a sexually transmitted infection.  If you are at risk for HIV, ask about PrEP medicine to prevent HIV.  Get tested for HIV at least once in your life, whether you are at risk for HIV or not.  Cancer screening tests  Cervical cancer screening: If you have a cervix, begin getting regular cervical cancer screening tests starting at age 21.  Breast cancer scan (mammogram): If you've ever had breasts, begin having regular mammograms starting at age 40. This is a scan to check for breast cancer.  Colon cancer screening: It is important to start screening for colon cancer at age 45.  Have a colonoscopy test every 10 years (or more often if you're at risk) Or, ask your provider about stool tests like a FIT test every year or Cologuard test every 3 years.  To learn more about your testing options, visit:   .  For help making a decision, visit:   https://bit.ly/lw79098.  Prostate cancer screening test: If you have a prostate, ask your care team if a prostate cancer screening test (PSA) at age 55 is right for you.  Lung cancer screening: If you are a current or former smoker ages 50 to 80, ask your care team if ongoing lung cancer screenings are right for you.  For informational purposes only. Not to replace the advice of your health care provider. Copyright   2023 [facility]. All rights reserved. Clinically reviewed by the [facility] Transitions Program. [employer] [id number] - REV 01/24.  Preventing Falls: Care Instructions  Injuries and health problems such as trouble walking or poor eyesight can increase your risk of falling. So can some medicines. But there are things you can do to help  "prevent falls. You can exercise to get stronger. You can also arrange your home to make it safer.    Talk to your doctor about the medicines you take. Ask if any of them increase the risk of falls and whether they can be changed or stopped.   Try to exercise regularly. It can help improve your strength and balance. This can help lower your risk of falling.         Practice fall safety and prevention.   Wear low-heeled shoes that fit well and give your feet good support. Talk to your doctor if you have foot problems that make this hard.  Carry a cellphone or wear a medical alert device that you can use to call for help.  Use stepladders instead of chairs to reach high objects. Don't climb if you're at risk for falls. Ask for help, if needed.  Wear the correct eyeglasses, if you need them.        Make your home safer.   Remove rugs, cords, clutter, and furniture from walkways.  Keep your house well lit. Use night-lights in hallways and bathrooms.  Install and use sturdy handrails on stairways.  Wear nonskid footwear, even inside. Don't walk barefoot or in socks without shoes.        Be safe outside.   Use handrails, curb cuts, and ramps whenever possible.  Keep your hands free by using a shoulder bag or backpack.  Try to walk in well-lit areas. Watch out for uneven ground, changes in pavement, and debris.  Be careful in the winter. Walk on the grass or gravel when sidewalks are slippery. Use de-icer on steps and walkways. Add non-slip devices to shoes.    Put grab bars and nonskid mats in your shower or tub and near the toilet. Try to use a shower chair or bath bench when bathing.   Get into a tub or shower by putting in your weaker leg first. Get out with your strong side first. Have a phone or medical alert device in the bathroom with you.   Where can you learn more?  Go to https://www.Salesforcewise.net/patiented  Enter G117 in the search box to learn more about \"Preventing Falls: Care Instructions.\"  Current as of: " July 31, 2024  Content Version: 14.3    2024 [employer].   Care instructions adapted under license by your healthcare professional. If you have questions about a medical condition or this instruction, always ask your healthcare professional. [employer] disclaims any warranty or liability for your use of this information.    Hearing Loss: Care Instructions  Overview     Hearing loss is a sudden or slow decrease in how well you hear. It can range from slight to profound. Permanent hearing loss can occur with aging. It also can happen when you are exposed long-term to loud noise. Examples include listening to loud music, riding motorcycles, or being around other loud machines.  Hearing loss can affect your work and home life. It can make you feel lonely or depressed. You may feel that you have lost your independence. But hearing aids and other devices can help you hear better and feel connected to others.  Follow-up care is a key part of your treatment and safety. Be sure to make and go to all appointments, and call your doctor if you are having problems. It's also a good idea to know your test results and keep a list of the medicines you take.  How can you care for yourself at home?  Avoid loud noises whenever possible. This helps keep your hearing from getting worse.  Always wear hearing protection around loud noises.  Wear a hearing aid as directed.  A professional can help you pick a hearing aid that will work best for you.  You can also get hearing aids over the counter for mild to moderate hearing loss.  Have hearing tests as your doctor suggests. They can show whether your hearing has changed. Your hearing aid may need to be adjusted.  Use other devices as needed. These may include:  Telephone amplifiers and hearing aids that can connect to a television, stereo, radio, or microphone.  Devices that use lights or vibrations. These alert you to the doorbell, a ringing telephone, or a baby  "monitor.  Television closed-captioning. This shows the words at the bottom of the screen. Most new TVs can do this.  TTY (text telephone). This lets you type messages back and forth on the telephone instead of talking or listening. These devices are also called TDD. When messages are typed on the keyboard, they are sent over the phone line to a receiving TTY. The message is shown on a monitor.  Use text messaging, social media, and email if it is hard for you to communicate by telephone.  Try to learn a listening technique called speechreading. It is not lipreading. You pay attention to people's gestures, expressions, posture, and tone of voice. These clues can help you understand what a person is saying. Face the person you are talking to, and have them face you. Make sure the lighting is good. You need to see the other person's face clearly.  Think about counseling if you need help to adjust to your hearing loss.  When should you call for help?  Watch closely for changes in your health, and be sure to contact your doctor if:    You think your hearing is getting worse.     You have new symptoms, such as dizziness or nausea.   Where can you learn more?  Go to https://www.[employer].net/patiented  Enter R798 in the search box to learn more about \"Hearing Loss: Care Instructions.\"  Current as of: September 27, 2023  Content Version: 14.3    2024 [employer].   Care instructions adapted under license by your healthcare professional. If you have questions about a medical condition or this instruction, always ask your healthcare professional. [employer] disclaims any warranty or liability for your use of this information.    Learning About Stress  What is stress?     Stress is your body's response to a hard situation. Your body can have a physical, emotional, or mental response. Stress is a fact of life for most people, and it affects everyone differently. What causes stress for you may not be " stressful for someone else.  A lot of things can cause stress. You may feel stress when you go on a job interview, take a test, or run a race. This kind of short-term stress is normal and even useful. It can help you if you need to work hard or react quickly. For example, stress can help you finish an important job on time.  Long-term stress is caused by ongoing stressful situations or events. Examples of long-term stress include long-term health problems, ongoing problems at work, or conflicts in your family. Long-term stress can harm your health.  How does stress affect your health?  When you are stressed, your body responds as though you are in danger. It makes hormones that speed up your heart, make you breathe faster, and give you a burst of energy. This is called the fight-or-flight stress response. If the stress is over quickly, your body goes back to normal and no harm is done.  But if stress happens too often or lasts too long, it can have bad effects. Long-term stress can make you more likely to get sick, and it can make symptoms of some diseases worse. If you tense up when you are stressed, you may develop neck, shoulder, or low back pain. Stress is linked to high blood pressure and heart disease.  Stress also harms your emotional health. It can make you turpin, tense, or depressed. Your relationships may suffer, and you may not do well at work or school.  What can you do to manage stress?  You can try these things to help manage stress:   Do something active. Exercise or activity can help reduce stress. Walking is a great way to get started. Even everyday activities such as housecleaning or yard work can help.  Try yoga or prince chi. These techniques combine exercise and meditation. You may need some training at first to learn them.  Do something you enjoy. For example, listen to music or go to a movie. Practice your hobby or do volunteer work.  Meditate. This can help you relax, because you are not  "worrying about what happened before or what may happen in the future.  Do guided imagery. Imagine yourself in any setting that helps you feel calm. You can use online videos, books, or a teacher to guide you.  Do breathing exercises. For example:  From a standing position, bend forward from the waist with your knees slightly bent. Let your arms dangle close to the floor.  Breathe in slowly and deeply as you return to a standing position. Roll up slowly and lift your head last.  Hold your breath for just a few seconds in the standing position.  Breathe out slowly and bend forward from the waist.  Let your feelings out. Talk, laugh, cry, and express anger when you need to. Talking with supportive friends or family, a counselor, or a mali leader about your feelings is a healthy way to relieve stress. Avoid discussing your feelings with people who make you feel worse.  Write. It may help to write about things that are bothering you. This helps you find out how much stress you feel and what is causing it. When you know this, you can find better ways to cope.  What can you do to prevent stress?  You might try some of these things to help prevent stress:  Manage your time. This helps you find time to do the things you want and need to do.  Get enough sleep. Your body recovers from the stresses of the day while you are sleeping.  Get support. Your family, friends, and community can make a difference in how you experience stress.  Limit your news feed. Avoid or limit time on social media or news that may make you feel stressed.  Do something active. Exercise or activity can help reduce stress. Walking is a great way to get started.  Where can you learn more?  Go to https://www.FohBoh.net/patiented  Enter N032 in the search box to learn more about \"Learning About Stress.\"  Current as of: October 24, 2023  Content Version: 14.3    2024 [employer].   Care instructions adapted under license by [facility] " professional. If you have questions about a medical condition or this instruction, always ask your healthcare professional. [employer] disclaims any warranty or liability for your use of this information.    Learning About Sleeping Well  What does sleeping well mean?     Sleeping well means getting enough sleep to feel good and stay healthy. How much sleep is enough varies among people.  The number of hours you sleep and how you feel when you wake up are both important. If you do not feel refreshed, you probably need more sleep. Another sign of not getting enough sleep is feeling tired during the day.  Experts recommend that adults get at least 7 or more hours of sleep per day. Children and older adults need more sleep.  Why is getting enough sleep important?  Getting enough quality sleep is a basic part of good health. When your sleep suffers, your physical health, mood, and your thoughts can suffer too. You may find yourself feeling more grumpy or stressed. Not getting enough sleep also can lead to serious problems, including injury, accidents, anxiety, and depression.  What might cause poor sleeping?  Many things can cause sleep problems, including:  Changes to your sleep schedule.  Stress. Stress can be caused by fear about a single event, such as giving a speech. Or you may have ongoing stress, such as worry about work or school.  Depression, anxiety, and other mental or emotional conditions.  Changes in your sleep habits or surroundings. This includes changes that happen where you sleep, such as noise, light, or sleeping in a different bed. It also includes changes in your sleep pattern, such as having jet lag or working a late shift.  Health problems, such as pain, breathing problems, and restless legs syndrome.  Lack of regular exercise.  Using alcohol, nicotine, or caffeine before bed.  How can you help yourself?  Here are some tips that may help you sleep more soundly and wake up feeling more  "refreshed.  Your sleeping area   Use your bedroom only for sleeping and sex. A bit of light reading may help you fall asleep. But if it doesn't, do your reading elsewhere in the house. Try not to use your TV, computer, smartphone, or tablet while you are in bed.  Be sure your bed is big enough to stretch out comfortably, especially if you have a sleep partner.  Keep your bedroom quiet, dark, and cool. Use curtains, blinds, or a sleep mask to block out light. To block out noise, use earplugs, soothing music, or a \"white noise\" machine.  Your evening and bedtime routine   Create a relaxing bedtime routine. You might want to take a warm shower or bath, or listen to soothing music.  Go to bed at the same time every night. And get up at the same time every morning, even if you feel tired.  What to avoid   Limit caffeine (coffee, tea, caffeinated sodas) during the day, and don't have any for at least 6 hours before bedtime.  Avoid drinking alcohol before bedtime. Alcohol can cause you to wake up more often during the night.  Try not to smoke or use tobacco, especially in the evening. Nicotine can keep you awake.  Limit naps during the day, especially close to bedtime.  Avoid lying in bed awake for too long. If you can't fall asleep or if you wake up in the middle of the night and can't get back to sleep within about 20 minutes, get out of bed and go to another room until you feel sleepy.  Avoid taking medicine right before bed that may keep you awake or make you feel hyper or energized. Your doctor can tell you if your medicine may do this and if you can take it earlier in the day.  If you can't sleep   Imagine yourself in a peaceful, pleasant scene. Focus on the details and feelings of being in a place that is relaxing.  Get up and do a quiet or boring activity until you feel sleepy.  Avoid drinking any liquids before going to bed to help prevent waking up often to use the bathroom.  Where can you learn more?  Go to " "https://www.[employer]ia.edu.net/patiented  Enter J942 in the search box to learn more about \"Learning About Sleeping Well.\"  Current as of: July 31, 2024  Content Version: 14.3    2024 [employer].   Care instructions adapted under license by your healthcare professional. If you have questions about a medical condition or this instruction, always ask your healthcare professional. [employer] disclaims any warranty or liability for your use of this information.    Learning About Depression Screening  What is depression screening?  Depression screening is a way to see if you have depression symptoms. It may be done by a doctor or counselor. It's often part of a routine checkup. That's because your mental health is just as important as your physical health.  Depression is a mental health condition that affects how you feel, think, and act. You may:  Have less energy.  Lose interest in your daily activities.  Feel sad and grouchy for a long time.  Depression is very common. It affects people of all ages.  Many things can lead to depression. Some people become depressed after they have a stroke or find out they have a major illness like cancer or heart disease. The death of a loved one or a breakup may lead to depression. It can run in families. Most experts believe that a combination of inherited genes and stressful life events can cause it.  What happens during screening?  You may be asked to fill out a form about your depression symptoms. You and the doctor will discuss your answers. The doctor may ask you more questions to learn more about how you think, act, and feel.  What happens after screening?  If you have symptoms of depression, your doctor will talk to you about your options.  Doctors usually treat depression with medicines or counseling. Often, combining the two works best. Many people don't get help because they think that they'll get over the depression on their own. But people with " "depression may not get better unless they get treatment.  The cause of depression is not well understood. There may be many factors involved. But if you have depression, it's not your fault.  A serious symptom of depression is thinking about death or suicide. If you or someone you care about talks about this or about feeling hopeless, get help right away.  It's important to know that depression can be treated. Medicine, counseling, and self-care may help.  Where can you learn more?  Go to https://www.[employer]Woo.net/patiented  Enter T185 in the search box to learn more about \"Learning About Depression Screening.\"  Current as of: July 31, 2024  Content Version: 14.3    2024 [employer].   Care instructions adapted under license by your healthcare professional. If you have questions about a medical condition or this instruction, always ask your healthcare professional. [employer] disclaims any warranty or liability for your use of this information.    Chronic Pain: Care Instructions  Your Care Instructions     Chronic pain is pain that lasts a long time (months or even years) and may or may not have a clear cause. It is different from acute pain, which usually does have a clear cause--like an injury or illness--and gets better over time. Chronic pain:  Lasts over time but may vary from day to day.  Does not go away despite efforts to end it.  May disrupt your sleep and lead to fatigue.  May cause depression or anxiety.  May make your muscles tense, causing more pain.  Can disrupt your work, hobbies, home life, and relationships with friends and family.  Chronic pain is a very real condition. It is not just in your head. Treatment can help and usually includes several methods used together, such as medicines, physical therapy, exercise, and other treatments. Learning how to relax and changing negative thought patterns can also help you cope.  Chronic pain is complex. Taking an active role in your " treatment will help you better manage your pain. Tell your doctor if you have trouble dealing with your pain. You may have to try several things before you find what works best for you.  Follow-up care is a key part of your treatment and safety. Be sure to make and go to all appointments, and call your doctor if you are having problems. It's also a good idea to know your test results and keep a list of the medicines you take.  How can you care for yourself at home?  Pace yourself. Break up large jobs into smaller tasks. Save harder tasks for days when you have less pain, or go back and forth between hard tasks and easier ones. Take rest breaks.  Relax, and reduce stress. Relaxation techniques such as deep breathing or meditation can help.  Keep moving. Gentle, daily exercise can help reduce pain over the long run. Try low- or no-impact exercises such as walking, swimming, and stationary biking. Do stretches to stay flexible.  Try heat, cold packs, and massage.  Get enough sleep. Chronic pain can make you tired and drain your energy. Talk with your doctor if you have trouble sleeping because of pain.  Think positive. Your thoughts can affect your pain level. Do things that you enjoy to distract yourself when you have pain instead of focusing on the pain. See a movie, read a book, listen to music, or spend time with a friend.  If you think you are depressed, talk to your doctor about treatment.  Keep a daily pain diary. Record how your moods, thoughts, sleep patterns, activities, and medicine affect your pain. You may find that your pain is worse during or after certain activities or when you are feeling a certain emotion. Having a record of your pain can help you and your doctor find the best ways to treat your pain.  Take pain medicines exactly as directed.  If the doctor gave you a prescription medicine for pain, take it as prescribed.  If you are not taking a prescription pain medicine, ask your doctor if you can  "take an over-the-counter medicine.  Reducing constipation caused by pain medicine  Talk to your doctor about a laxative. If a laxative doesn't work, your doctor may suggest a prescription medicine.  Include fruits, vegetables, beans, and whole grains in your diet each day. These foods are high in fiber.  If your doctor recommends it, get more exercise. Walking is a good choice. Bit by bit, increase the amount you walk every day. Try for at least 30 minutes on most days of the week.  Schedule time each day for a bowel movement. A daily routine may help. Take your time and do not strain when having a bowel movement.  When should you call for help?   Call your doctor now or seek immediate medical care if:    Your pain gets worse or is out of control.     You feel down or blue, or you do not enjoy things like you once did. You may be depressed, which is common in people with chronic pain. Depression can be treated.     You have vomiting or cramps for more than 2 hours.   Watch closely for changes in your health, and be sure to contact your doctor if:    You cannot sleep because of pain.     You are very worried or anxious about your pain.     You have trouble taking your pain medicine.     You have any concerns about your pain medicine.     You have trouble with bowel movements, such as:  No bowel movement in 3 days.  Blood in the anal area, in your stool, or on the toilet paper.  Diarrhea for more than 24 hours.   Where can you learn more?  Go to https://www.[employer].net/patiented  Enter N004 in the search box to learn more about \"Chronic Pain: Care Instructions.\"  Current as of: July 31, 2024  Content Version: 14.3    2024 [employer].   Care instructions adapted under license by your healthcare professional. If you have questions about a medical condition or this instruction, always ask your healthcare professional. [employer] disclaims any warranty or liability for your use of this " information.

## 2025-01-27 DIAGNOSIS — Z12.11 ENCOUNTER FOR SCREENING COLONOSCOPY: Primary | ICD-10-CM

## 2025-01-27 NOTE — TELEPHONE ENCOUNTER
Screening Questions for the Scheduling of Screening Colonoscopies   (If Colonoscopy is diagnostic, Provider should review the chart before scheduling.)  Are you younger than 45 or older than 80?  NO  Do you take aspirin or fish oil?  NO (if yes, tell patient to stop 1 week prior to Colonoscopy)  Do you take warfarin (Coumadin), clopidogrel (Plavix), apixaban (Eliquis), dabigatram (Pradaxa), rivaroxaban (Xarelto) or any blood thinner? PLAVIX  Do you take semaglutide (Ozempic or Wegovy), tirzepatide (Mounjaro or Zepbound), liraglutide (Victoza), or dulaglutide (Trulicity)? Ozempic - Takes on Tuesdays.  Patient was instructed to skip 04/08/2025 dose  Do you use oxygen or a CPAP at home?  CPAP  Do you have kidney disease? YES  Are you on dialysis? NO  Have you had a stroke or heart attack in the last year? NO  Have you had a stent in your heart or any blood vessel in the last year? NO  Have you had a transplant of any organ? NO  Have you had a colonoscopy or upper endoscopy (EGD) before? YES         When?  2021  Date of scheduled Colonoscopy. 04/10/2025  Provider [facility] - Patient is requesting the pill prep.  She states her insurance will cover it as long as the physician fills out the Exemption Request Formulary Exception form.

## 2025-01-28 ENCOUNTER — LAB (OUTPATIENT)
Dept: LAB | Facility: OTHER | Age: 75
End: 2025-01-28
Attending: INTERNAL MEDICINE
Payer: MEDICARE

## 2025-01-28 DIAGNOSIS — R74.8 INCREASED LIVER ENZYMES: ICD-10-CM

## 2025-01-28 DIAGNOSIS — R94.4 DECREASED GFR: ICD-10-CM

## 2025-01-28 LAB
ALBUMIN SERPL BCG-MCNC: 4.5 G/DL (ref 3.5–5.2)
ALP SERPL-CCNC: 102 U/L (ref 40–150)
ALT SERPL W P-5'-P-CCNC: 120 U/L (ref 0–50)
ANION GAP SERPL CALCULATED.3IONS-SCNC: 15 MMOL/L (ref 7–15)
AST SERPL W P-5'-P-CCNC: 97 U/L (ref 0–45)
BILIRUB SERPL-MCNC: 0.5 MG/DL
BUN SERPL-MCNC: 21.7 MG/DL (ref 8–23)
CALCIUM SERPL-MCNC: 9.5 MG/DL (ref 8.8–10.4)
CHLORIDE SERPL-SCNC: 102 MMOL/L (ref 98–107)
CREAT SERPL-MCNC: 1.3 MG/DL (ref 0.51–0.95)
EGFRCR SERPLBLD CKD-EPI 2021: 43 ML/MIN/1.73M2
GLUCOSE SERPL-MCNC: 125 MG/DL (ref 70–99)
HCO3 SERPL-SCNC: 22 MMOL/L (ref 22–29)
POTASSIUM SERPL-SCNC: 4.2 MMOL/L (ref 3.4–5.3)
PROT SERPL-MCNC: 7.2 G/DL (ref 6.4–8.3)
SODIUM SERPL-SCNC: 139 MMOL/L (ref 135–145)

## 2025-01-28 PROCEDURE — 82040 ASSAY OF SERUM ALBUMIN: CPT | Mod: ZL

## 2025-01-28 PROCEDURE — 36415 COLL VENOUS BLD VENIPUNCTURE: CPT | Mod: ZL

## 2025-01-28 PROCEDURE — 84155 ASSAY OF PROTEIN SERUM: CPT | Mod: ZL

## 2025-01-28 PROCEDURE — 82310 ASSAY OF CALCIUM: CPT | Mod: ZL

## 2025-01-28 PROCEDURE — 83970 ASSAY OF PARATHORMONE: CPT | Mod: ZL

## 2025-01-29 LAB — PTH-INTACT SERPL-MCNC: 35 PG/ML (ref 15–65)

## 2025-02-05 NOTE — TELEPHONE ENCOUNTER
Left message on answering machine for patient to call back.  What pill prep is patient requesting?  There is a couple different ones.    Shahrzad Sprague LPN .......  2/5/2025  11:12 AM\

## 2025-02-06 ENCOUNTER — MYC MEDICAL ADVICE (OUTPATIENT)
Dept: INTERNAL MEDICINE | Facility: OTHER | Age: 75
End: 2025-02-06
Payer: MEDICARE

## 2025-02-06 DIAGNOSIS — Z11.59 ENCOUNTER FOR SCREENING FOR OTHER VIRAL DISEASES: ICD-10-CM

## 2025-02-06 DIAGNOSIS — R74.8 INCREASED LIVER ENZYMES: Primary | ICD-10-CM

## 2025-02-11 NOTE — TELEPHONE ENCOUNTER
Sent a [employer] message asking patient what pill prep is it that she is requesting.    Shahrzad Sprague LPN .......  2/11/2025  12:03 PM

## 2025-02-21 NOTE — TELEPHONE ENCOUNTER
5/30/24- pt advised to call coumadin clinic if the vaginal spotting continues or worsens.     Attempted to reach patient as have not been able to reach the patient. Left message. Will t up a prep in hopes this is the one she is requesting as unable to reach patient after multiple attempts. Monique Freed RN  ....................  2/21/2025   11:34 AM

## 2025-02-24 ENCOUNTER — MYC MEDICAL ADVICE (OUTPATIENT)
Dept: INTERNAL MEDICINE | Facility: OTHER | Age: 75
End: 2025-02-24
Payer: MEDICARE

## 2025-02-24 NOTE — TELEPHONE ENCOUNTER
Message below came in as CRM Request:  ===View-only below this line===      ----- Message -----       From:Jennyfer Elizabeth       Sent:2/24/2025  4:25 PM CST         To:Customer Service    Subject:Liver ultrasound,appointment and lab work     Topic: Tell us how we are doing.    Josué Pearson,      I have an US of my abdomen on February 27th and an appointment with you on March 3rd at 2:00. Are you requesting for me to have additional lab work?                                          Thank you,                                            Jennyfer Brownlee    I seldom look at My Chart!    Kody Salas, RN on 2/24/2025 at 4:39 PM

## 2025-02-25 RX ORDER — SOD SULF/POT CHLORIDE/MAG SULF 1.479 G
12 TABLET ORAL DAILY
Qty: 24 TABLET | Refills: 0 | Status: SHIPPED | OUTPATIENT
Start: 2025-04-03 | End: 2025-04-05

## 2025-02-27 ENCOUNTER — LAB (OUTPATIENT)
Dept: LAB | Facility: OTHER | Age: 75
End: 2025-02-27
Attending: INTERNAL MEDICINE
Payer: MEDICARE

## 2025-02-27 DIAGNOSIS — R74.8 INCREASED LIVER ENZYMES: ICD-10-CM

## 2025-02-27 DIAGNOSIS — Z11.59 ENCOUNTER FOR SCREENING FOR OTHER VIRAL DISEASES: ICD-10-CM

## 2025-02-27 LAB
FERRITIN SERPL-MCNC: 132 NG/ML (ref 11–328)
HBV SURFACE AG SERPL QL IA: NONREACTIVE
HCV AB SERPL QL IA: NONREACTIVE

## 2025-02-27 PROCEDURE — 86803 HEPATITIS C AB TEST: CPT | Mod: ZL

## 2025-02-27 PROCEDURE — 36415 COLL VENOUS BLD VENIPUNCTURE: CPT | Mod: ZL

## 2025-02-27 PROCEDURE — 87340 HEPATITIS B SURFACE AG IA: CPT | Mod: ZL

## 2025-02-27 PROCEDURE — 82728 ASSAY OF FERRITIN: CPT | Mod: ZL

## 2025-02-27 PROCEDURE — 86038 ANTINUCLEAR ANTIBODIES: CPT | Mod: ZL

## 2025-02-28 LAB
ANA PAT SER IF-IMP: ABNORMAL
ANA SER QL IF: ABNORMAL
ANA TITR SER IF: ABNORMAL {TITER}

## 2025-03-03 ENCOUNTER — MYC MEDICAL ADVICE (OUTPATIENT)
Dept: INTERNAL MEDICINE | Facility: OTHER | Age: 75
End: 2025-03-03

## 2025-03-03 ENCOUNTER — OFFICE VISIT (OUTPATIENT)
Dept: INTERNAL MEDICINE | Facility: OTHER | Age: 75
End: 2025-03-03
Attending: INTERNAL MEDICINE
Payer: MEDICARE

## 2025-03-03 VITALS
DIASTOLIC BLOOD PRESSURE: 64 MMHG | WEIGHT: 148.2 LBS | RESPIRATION RATE: 16 BRPM | HEART RATE: 120 BPM | HEIGHT: 62 IN | SYSTOLIC BLOOD PRESSURE: 118 MMHG | BODY MASS INDEX: 27.27 KG/M2 | OXYGEN SATURATION: 98 %

## 2025-03-03 DIAGNOSIS — Z12.11 ENCOUNTER FOR SCREENING COLONOSCOPY: ICD-10-CM

## 2025-03-03 DIAGNOSIS — E78.5 HYPERLIPIDEMIA, UNSPECIFIED HYPERLIPIDEMIA TYPE: ICD-10-CM

## 2025-03-03 DIAGNOSIS — R74.8 INCREASED LIVER ENZYMES: Primary | ICD-10-CM

## 2025-03-03 LAB
ALBUMIN SERPL BCG-MCNC: 4.4 G/DL (ref 3.5–5.2)
ALP SERPL-CCNC: 106 U/L (ref 40–150)
ALT SERPL W P-5'-P-CCNC: 30 U/L (ref 0–50)
AST SERPL W P-5'-P-CCNC: 31 U/L (ref 0–45)
BILIRUB DIRECT SERPL-MCNC: 0.14 MG/DL (ref 0–0.3)
BILIRUB SERPL-MCNC: 0.4 MG/DL
PROT SERPL-MCNC: 7.7 G/DL (ref 6.4–8.3)

## 2025-03-03 PROCEDURE — 84155 ASSAY OF PROTEIN SERUM: CPT | Mod: ZL | Performed by: INTERNAL MEDICINE

## 2025-03-03 PROCEDURE — 36415 COLL VENOUS BLD VENIPUNCTURE: CPT | Mod: ZL | Performed by: INTERNAL MEDICINE

## 2025-03-03 PROCEDURE — G0463 HOSPITAL OUTPT CLINIC VISIT: HCPCS | Performed by: INTERNAL MEDICINE

## 2025-03-03 RX ORDER — SOD SULF/POT CHLORIDE/MAG SULF 1.479 G
12 TABLET ORAL DAILY
Qty: 24 TABLET | Refills: 0 | Status: CANCELLED | OUTPATIENT
Start: 2025-04-03

## 2025-03-03 ASSESSMENT — PATIENT HEALTH QUESTIONNAIRE - PHQ9
10. IF YOU CHECKED OFF ANY PROBLEMS, HOW DIFFICULT HAVE THESE PROBLEMS MADE IT FOR YOU TO DO YOUR WORK, TAKE CARE OF THINGS AT HOME, OR GET ALONG WITH OTHER PEOPLE: SOMEWHAT DIFFICULT
SUM OF ALL RESPONSES TO PHQ QUESTIONS 1-9: 7
SUM OF ALL RESPONSES TO PHQ QUESTIONS 1-9: 7

## 2025-03-03 ASSESSMENT — PAIN SCALES - GENERAL: PAINLEVEL_OUTOF10: NO PAIN (0)

## 2025-03-03 NOTE — TELEPHONE ENCOUNTER
Pt is wondering if she should still be stopping her Rosuvastatin even though ALT and AST were WNL today.     Per OV from 3/3:      Routing to provider to review and respond.  Kody Salas RN on 3/3/2025 at 4:54 PM

## 2025-03-03 NOTE — PROGRESS NOTES
"  Assessment & Plan     Increased liver enzymes  Liver enzymes returned to normal today.  Given that no other change has been made besides reducing her alcohol intake I do feel that this is the likely etiology.  Recommend continuing with sobriety.  We will plan to trial off of her rosuvastatin however liver enzymes improved before this was discontinued so likely we will restarted assuming her liver enzymes continue to look normal.  Plan for recheck in approximately 1 month.  - Hepatic Function Panel    Hyperlipidemia, unspecified hyperlipidemia type  Plan for recheck of her lipids off of statin therapy.      Return in about 11 weeks (around 5/19/2025) for Recheck, and as needed sooner.    Jose Burger is a 75 year old, presenting for the following health issues:  URI and Results      3/3/2025     2:07 PM   Additional Questions   Roomed by PATRIZIA Guido    History of Present Illness       Reason for visit:  Liver and previous lab and ultra sound results    She eats 2-3 servings of fruits and vegetables daily.She consumes 0 sweetened beverage(s) daily.She exercises with enough effort to increase her heart rate 9 or less minutes per day.  She exercises with enough effort to increase her heart rate 3 or less days per week.   She is taking medications regularly.    Patient presents for a follow-up and her liver enzymes.  She has had abnormal liver enzymes over the last couple months.  She did have an ultrasound that was essentially normal.  She had additional labs that did not reveal any underlining etiology.  She reports that she has stopped drinking in the last 10 days.    She is on medications including rosuvastatin and Plavix which can cause elevation of liver enzymes.      Objective    /64   Pulse 120   Resp 16   Ht 1.575 m (5' 2\")   Wt 67.2 kg (148 lb 3.2 oz)   LMP  (LMP Unknown)   SpO2 98%   Breastfeeding No   BMI 27.11 kg/m    Body mass index is 27.11 kg/m .  Physical Exam   GEN: Vitals " reviewed. Healthy appearing. Patient is in no acute distress. Cooperative with exam.  HEENT: Normocephalic atraumatic.  Eyes grossly normal to inspection.  No discharge or erythema, or obvious scleral/conjunctival abnormalities.   LUNGS: No audible wheeze, cough, or visible cyanosis.  No visible retractions or increased work of breathing.    ABD:  nondistended  SKIN: Warm and dry to touch.  Visible skin clear. No significant rash, abnormal pigmentation or lesions.    The longitudinal plan of care for the diagnosis(es)/condition(s) as documented were addressed during this visit. Due to the added complexity in care, I will continue to support Jennyfer in the subsequent management and with ongoing continuity of care.    30 minutes spent on the date of the encounter doing chart review, history and exam, documentation and further activities as noted above      Signed Electronically by: Lili Perkins DO

## 2025-03-03 NOTE — PATIENT INSTRUCTIONS
Stop your Rosuvastatin for now    Continue on other medications    Get fasting labs in April  - avoid all alcohol for 10 days before this    See me back in May

## 2025-03-03 NOTE — NURSING NOTE
"Chief Complaint   Patient presents with    URI       Initial /64   Pulse 120   Resp 16   Ht 1.575 m (5' 2\")   Wt 67.2 kg (148 lb 3.2 oz)   LMP  (LMP Unknown)   SpO2 98%   Breastfeeding No   BMI 27.11 kg/m   Estimated body mass index is 27.11 kg/m  as calculated from the following:    Height as of this encounter: 1.575 m (5' 2\").    Weight as of this encounter: 67.2 kg (148 lb 3.2 oz).  Medication Review: complete    The next two questions are to help us understand your food security.  If you are feeling you need any assistance in this area, we have resources available to support you today.          1/9/2025   SDOH- Food Insecurity   Within the past 12 months, did you worry that your food would run out before you got money to buy more? N    Within the past 12 months, did the food you bought just not last and you didn t have money to get more? N        Proxy-reported         Health Care Directive:  Patient does not have a Health Care Directive: Discussed advance care planning with patient; however, patient declined at this time.    Vannessa Abbott LPN      "

## 2025-03-05 NOTE — TELEPHONE ENCOUNTER
Continue off of Rosuvastatin for 1 month until we recheck to be sure they continue within the normal range and we will likely restart when I see her in May.

## 2025-03-31 ENCOUNTER — TELEPHONE (OUTPATIENT)
Dept: INTERNAL MEDICINE | Facility: OTHER | Age: 75
End: 2025-03-31
Payer: MEDICARE

## 2025-03-31 NOTE — TELEPHONE ENCOUNTER
"\"Medication Question (Hello-- this patient is scheduled for a colonoscopy on April 10th. She is currently taking clopidogrel / plavix. PAC office needs to know how long to hold this medication prior to the procedure. Please advise when able, thank you!) \" -Jayne Rhoades RN Dr. Olson how long would you like this patient to hold their medication?    Monique Freed RN  ....................  3/31/2025   3:24 PM    "

## 2025-04-07 ENCOUNTER — TELEPHONE (OUTPATIENT)
Dept: INTERNAL MEDICINE | Facility: OTHER | Age: 75
End: 2025-04-07
Payer: MEDICARE

## 2025-04-07 DIAGNOSIS — Z12.11 SCREENING FOR COLON CANCER: Primary | ICD-10-CM

## 2025-04-07 NOTE — TELEPHONE ENCOUNTER
Pt states she was supposed to receive a pill for her upcoming colonoscopy on 4/10/25. She states this is her 3rd call to us regarding the pill. She states the pharmacy still doesn't have it, she thinks maybe there was an issue with ins authorization but isn't sure. She is confused on what is happening, requested a call back from [facility]'s nurse to discuss. Please advise.     Kristen Renae on 4/7/2025 at 11:29 AM

## 2025-04-08 NOTE — TELEPHONE ENCOUNTER
Yesterday Pre Admission nurses had spoke with the patient. She ended up requesting a different prep. T'd up and sent to provider. Monique Freed RN  ....................  4/8/2025   9:14 AM

## 2025-04-10 ENCOUNTER — ANESTHESIA (OUTPATIENT)
Dept: SURGERY | Facility: OTHER | Age: 75
End: 2025-04-10
Payer: MEDICARE

## 2025-04-10 ENCOUNTER — ANESTHESIA EVENT (OUTPATIENT)
Dept: SURGERY | Facility: OTHER | Age: 75
End: 2025-04-10
Payer: MEDICARE

## 2025-04-10 ENCOUNTER — HOSPITAL ENCOUNTER (OUTPATIENT)
Facility: OTHER | Age: 75
Discharge: HOME | End: 2025-04-10
Attending: SURGERY | Admitting: SURGERY
Payer: MEDICARE

## 2025-04-10 VITALS
SYSTOLIC BLOOD PRESSURE: 131 MMHG | OXYGEN SATURATION: 94 % | RESPIRATION RATE: 14 BRPM | HEIGHT: 62 IN | DIASTOLIC BLOOD PRESSURE: 66 MMHG | WEIGHT: 148 LBS | BODY MASS INDEX: 27.23 KG/M2 | HEART RATE: 81 BPM | TEMPERATURE: 96.6 F

## 2025-04-10 PROCEDURE — 88305 TISSUE EXAM BY PATHOLOGIST: CPT

## 2025-04-10 PROCEDURE — 258N000003 HC RX IP 258 OP 636: Performed by: SURGERY

## 2025-04-10 PROCEDURE — 250N000009 HC RX 250: Performed by: NURSE ANESTHETIST, CERTIFIED REGISTERED

## 2025-04-10 PROCEDURE — 45385 COLONOSCOPY W/LESION REMOVAL: CPT | Mod: PT | Performed by: SURGERY

## 2025-04-10 PROCEDURE — 250N000011 HC RX IP 250 OP 636: Performed by: NURSE ANESTHETIST, CERTIFIED REGISTERED

## 2025-04-10 PROCEDURE — 45380 COLONOSCOPY AND BIOPSY: CPT | Performed by: SURGERY

## 2025-04-10 RX ORDER — NALOXONE HYDROCHLORIDE 0.4 MG/ML
0.2 INJECTION, SOLUTION INTRAMUSCULAR; INTRAVENOUS; SUBCUTANEOUS
Status: DISCONTINUED | OUTPATIENT
Start: 2025-04-10 | End: 2025-04-10 | Stop reason: HOSPADM

## 2025-04-10 RX ORDER — NALOXONE HYDROCHLORIDE 0.4 MG/ML
0.4 INJECTION, SOLUTION INTRAMUSCULAR; INTRAVENOUS; SUBCUTANEOUS
Status: DISCONTINUED | OUTPATIENT
Start: 2025-04-10 | End: 2025-04-10 | Stop reason: HOSPADM

## 2025-04-10 RX ORDER — FLUMAZENIL 0.1 MG/ML
0.2 INJECTION, SOLUTION INTRAVENOUS
Status: DISCONTINUED | OUTPATIENT
Start: 2025-04-10 | End: 2025-04-10 | Stop reason: HOSPADM

## 2025-04-10 RX ORDER — PROPOFOL 10 MG/ML
INJECTION, EMULSION INTRAVENOUS CONTINUOUS PRN
Status: DISCONTINUED | OUTPATIENT
Start: 2025-04-10 | End: 2025-04-10

## 2025-04-10 RX ORDER — ONDANSETRON 2 MG/ML
4 INJECTION INTRAMUSCULAR; INTRAVENOUS EVERY 6 HOURS PRN
Status: DISCONTINUED | OUTPATIENT
Start: 2025-04-10 | End: 2025-04-10 | Stop reason: HOSPADM

## 2025-04-10 RX ORDER — ONDANSETRON 4 MG/1
4 TABLET, ORALLY DISINTEGRATING ORAL EVERY 6 HOURS PRN
Status: DISCONTINUED | OUTPATIENT
Start: 2025-04-10 | End: 2025-04-10 | Stop reason: HOSPADM

## 2025-04-10 RX ORDER — LIDOCAINE 40 MG/G
CREAM TOPICAL
Status: DISCONTINUED | OUTPATIENT
Start: 2025-04-10 | End: 2025-04-10 | Stop reason: HOSPADM

## 2025-04-10 RX ORDER — LIDOCAINE HYDROCHLORIDE 20 MG/ML
INJECTION, SOLUTION INFILTRATION; PERINEURAL PRN
Status: DISCONTINUED | OUTPATIENT
Start: 2025-04-10 | End: 2025-04-10

## 2025-04-10 RX ORDER — SODIUM CHLORIDE, SODIUM LACTATE, POTASSIUM CHLORIDE, CALCIUM CHLORIDE 600; 310; 30; 20 MG/100ML; MG/100ML; MG/100ML; MG/100ML
INJECTION, SOLUTION INTRAVENOUS CONTINUOUS
Status: DISCONTINUED | OUTPATIENT
Start: 2025-04-10 | End: 2025-04-10 | Stop reason: HOSPADM

## 2025-04-10 RX ORDER — PROPOFOL 10 MG/ML
INJECTION, EMULSION INTRAVENOUS PRN
Status: DISCONTINUED | OUTPATIENT
Start: 2025-04-10 | End: 2025-04-10

## 2025-04-10 RX ADMIN — LIDOCAINE HYDROCHLORIDE 40 MG: 20 INJECTION, SOLUTION INFILTRATION; PERINEURAL at 09:34

## 2025-04-10 RX ADMIN — PROPOFOL 60 MG: 10 INJECTION, EMULSION INTRAVENOUS at 09:34

## 2025-04-10 RX ADMIN — SODIUM CHLORIDE, SODIUM LACTATE, POTASSIUM CHLORIDE, AND CALCIUM CHLORIDE 30 ML/HR: .6; .31; .03; .02 INJECTION, SOLUTION INTRAVENOUS at 09:29

## 2025-04-10 RX ADMIN — PROPOFOL 160 MCG/KG/MIN: 10 INJECTION, EMULSION INTRAVENOUS at 09:34

## 2025-04-10 ASSESSMENT — ACTIVITIES OF DAILY LIVING (ADL)
ADLS_ACUITY_SCORE: 51

## 2025-04-10 ASSESSMENT — LIFESTYLE VARIABLES: TOBACCO_USE: 1

## 2025-04-10 NOTE — OP NOTE
PROCEDURE NOTE    SURGEON:Calixto Oh MD    PRE-OP DIAGNOSIS:  Screening Colonoscopy      POST-OP DIAGNOSIS: Rectal polyps    PROCEDURE:  Colonoscopy with cold snare    SPECIMEN:      ID Type Source Tests Collected by Time Destination   1 : CECAL POLYPS Polyp Large Intestine, Colon, Cecum SURGICAL PATHOLOGY EXAM Calixto Oh MD 4/10/2025  9:52 AM        ANESTHESIA:  MAC CRNA Independent: Freddy Ndiaye APRN CRNA   Coverage requested age over 70    ESTIMATED BLOOD LOSS: none    COMPLICATIONS:  None    INDICATION FOR THE PROCEDURE: The patient is a 75 year old female. The patient presents with need for screening. I explained to the patient the risks, benefits and alternatives to screening colonoscopy for evaluating for cancer or polyps. We discussed the risks including bleeding, perforation, potential inability to reach the cecum and the risks of sedation. The patient's questions were answered and the patient wished to proceed. Informed consent paperwork was completed.    PROCEDURE: The patient was taken to the endoscopy suite. Appropriate monitors were attached. The patient was placed in the left lateral decubitus position. Timeout was performed confirming the patient's identity and procedure to be performed.  After appropriate sedation was confirmed, digital rectal exam was performed.  There was normal tone and no gross abnormality was noted.  The lubricated colonoscope was introduced into the anus the colon was insufflated with air. The prep quality was adequate. Under direct visualization the scope was advanced to the cecum. The mucosa of the colon was inspected while withdrawing the scope.  There was red material throughout the colon.  There was no evidence of any bleeding.  There was diverticulosis.  5 polyps measuring 2 to 7 mm were removed from the rectum.  The scope was retroflexed in the rectum and the anorectal junction was inspected. No abnormalities were noted. The scope was returned to a  neutral position and the colon was decompressed. The scope was removed. The patient tolerated the procedure with no immediately apparent complication. The patient was taken to recovery in stable condition.    FOLLOW UP: RECOMMEND high fiber diet, will call with pathology results.  No follow-up due to age.    Calixto Oh MD on 4/10/2025 at 10:15 AM

## 2025-04-10 NOTE — ANESTHESIA CARE TRANSFER NOTE
Patient: Jennyfer Elizabeth    Procedure: Procedure(s):  Colonoscopy WITH POLYPECTOMY       Diagnosis: Colon cancer screening [Z12.11]  Diagnosis Additional Information: No value filed.    Anesthesia Type:   MAC     Note:    Oropharynx: oropharynx clear of all foreign objects  Level of Consciousness: awake  Oxygen Supplementation: nasal cannula  Level of Supplemental Oxygen (L/min / FiO2): 2  Independent Airway: airway patency satisfactory and stable  Dentition: dentition unchanged  Vital Signs Stable: post-procedure vital signs reviewed and stable  Report to RN Given: handoff report given  Patient transferred to: Phase II    Handoff Report: Identifed the Patient, Identified the Reponsible Provider, Reviewed the pertinent medical history, Discussed the surgical course, Reviewed Intra-OP anesthesia mangement and issues during anesthesia, Set expectations for post-procedure period and Allowed opportunity for questions and acknowledgement of understanding  Vitals:  Vitals Value Taken Time   BP     Temp     Pulse     Resp     SpO2         Electronically Signed By: DAVID Liriano CRNA  April 10, 2025  10:13 AM

## 2025-04-10 NOTE — DISCHARGE INSTRUCTIONS
[facility]  Adult Discharge Orders & Instructions    ________________________________________________________________          For 12 hours after surgery  Get plenty of rest.  A responsible adult must stay with you for at least 12 hours after you leave the hospital.   You may feel lightheaded.  IF so, sit for a few minutes before standing.  Have someone help you get up.   You may have a slight fever. Call the doctor if your fever is over 101 F (38.3 C) (taken under the tongue) or lasts longer than 24 hours.  You may have a dry mouth, a sore throat, muscle aches or trouble sleeping.  These should go away after 24 hours.  Do not make important or legal decisions.  6.   Do not drive or use heavy equipment.  If you have weakness or tingling, don't drive or use heavy equipment until this feeling goes away.    To contact a doctor, call   [phone]_______________________

## 2025-04-10 NOTE — H&P
PRE-PROCEDURE NOTE    CHIEFCOMPLAINT / REASON FOR PROCEDURE:  Screening for polyps and colorectal cancer.    PERTINENT HISTORY   Patient is due for colonoscopy. Previous colonoscopy 2021. No family history of colon polyps or colon cancer.    Past Medical History:   Diagnosis Date    Benign essential hypertension 03/03/2018    Cataract     Chronic pain of right knee 04/11/2018    Diabetic eye exam (H) 06/07/2018    TAYLOR (generalized anxiety disorder) 03/03/2018    History of MI (myocardial infarction) 07/2017    follows yearly with [facility]    Macular degeneration     Major depressive disorder, single episode 1995    Obese     Obstructive sleep apnea 2005    BIPAP    Osteopenia 2007    Lumbar spine    Pure hypercholesterolemia 2001    PVD (peripheral vascular disease)     Squamous cell carcinoma, leg, left 12/2021    Type 2 diabetes mellitus with complication, without long-term current use of insulin (H) 03/03/2018       Past Surgical History:   Procedure Laterality Date    ANGIOGRAM Right 3/22/2022    Procedure: with right iliac angiogram, right iliac angioplasty and stenting;  Surgeon: Rama Sewell MD;  Location: [facility] OR    BREAST BIOPSY, RT/LT  1986    x2; in [place]    CARDIAC CATH - HIM SCAN  07/10/2017    Mid RCA;  drug eluting stent    CHOLECYSTECTOMY  2008    COLONOSCOPY N/A 12/20/2021    Tubular Adenoma F/U 2024    ENDARTERECTOMY FEMORAL Right 3/22/2022    Procedure: Right femoral endarterectomy;  Surgeon: Rama Sewell MD;  Location: [facility] OR    IR OR ANGIOGRAM  3/22/2022    SKIN CANCER EXCISION Left 12/2021    [facility]    TONSILLECTOMY, ADENOIDECTOMY, COMBINED  1955         Other:  None  Bleeding tendencies: No     Relevant Family History:  None     Relevant Social History:  None     10 point ROS of systems including Constitutional, Eyes, Respiratory, Cardiovascular, Gastroenterology, Genitourinary, Integumentary, Muscularskeletal, Psychiatric were all negative except for pertinent positives noted in my  HPI.      ALLERGIES/SENSITIVITIES:   Allergies   Allergen Reactions    Trazodone Other (See Comments)     Nightmares/sedation        CURRENT MEDICATIONS:    No current facility-administered medications for this encounter.           PRE-SEDATION ASSESSMENT:    LUNGS:  CTA B/L, no wheezing or crackles.  Heart & CV:  RRR no murmur.  Intact distal pulses, good cap refill.    Comment(s):      IMPRESSION: 75 year old female in need of screening colonoscopy.    PLAN:  I discussed screening colonoscopy with the patient. Anesthesia coverage requested.    Calixto Oh MD    4/10/2025 8:45 AM

## 2025-04-10 NOTE — OR NURSING
Patient was discharged home  via ambulatory   Discharge instructions were reviewed with patient and friend. And she verbalized understanding.   Prescriptions: none

## 2025-04-10 NOTE — ANESTHESIA PREPROCEDURE EVALUATION
Anesthesia Pre-Procedure Evaluation    Patient: Jennyfer Elizabeth   MRN: [id number] : 1950        Procedure : Procedure(s):  Colonoscopy          Past Medical History:   Diagnosis Date    Benign essential hypertension 2018    Cataract     Chronic pain of right knee 2018    Diabetic eye exam (H) 2018    TAYLOR (generalized anxiety disorder) 2018    History of MI (myocardial infarction) 2017    follows yearly with [facility]    Macular degeneration     Major depressive disorder, single episode     Obese     Obstructive sleep apnea     BIPAP    Osteopenia     Lumbar spine    Pure hypercholesterolemia     PVD (peripheral vascular disease)     Squamous cell carcinoma, leg, left 2021    Type 2 diabetes mellitus with complication, without long-term current use of insulin (H) 2018      Past Surgical History:   Procedure Laterality Date    ANGIOGRAM Right 3/22/2022    Procedure: with right iliac angiogram, right iliac angioplasty and stenting;  Surgeon: Rama Sewell MD;  Location: [facility] OR    BREAST BIOPSY, RT/LT  1986    x2; in [place]    CARDIAC CATH - HIM SCAN  07/10/2017    Mid RCA;  drug eluting stent    CHOLECYSTECTOMY      COLONOSCOPY N/A 2021    Tubular Adenoma F/U     ENDARTERECTOMY FEMORAL Right 3/22/2022    Procedure: Right femoral endarterectomy;  Surgeon: Rama Sewell MD;  Location: [facility] OR    IR OR ANGIOGRAM  3/22/2022    SKIN CANCER EXCISION Left 2021    [facility]    TONSILLECTOMY, ADENOIDECTOMY, COMBINED        Allergies   Allergen Reactions    Trazodone Other (See Comments)     Nightmares/sedation      Social History     Tobacco Use    Smoking status: Former     Current packs/day: 0.00     Average packs/day: 1 pack/day for 40.0 years (40.0 ttl pk-yrs)     Types: Cigarettes     Start date: 10/1/1968     Quit date: 10/1/2008     Years since quittin.5     Passive exposure: Past    Smokeless tobacco: Never    Tobacco comments:     Patient  occasionally uses Nicorette gum   Substance Use Topics    Alcohol use: Yes     Alcohol/week: 14.0 standard drinks of alcohol     Types: 14 Standard drinks or equivalent per week     Comment: socially      Wt Readings from Last 1 Encounters:   04/10/25 67.1 kg (148 lb)        Anesthesia Evaluation   Pt has had prior anesthetic.     No history of anesthetic complications       ROS/MED HX  ENT/Pulmonary:     (+) sleep apnea,               tobacco use, Past use,                       Neurologic: Comment: Macular Degeneration       Cardiovascular: Comment: Lower extremity atherosclerosis   Thoracic aneurysm     (+) Dyslipidemia hypertension- Peripheral Vascular Disease-  CAD - past MI - -                                      METS/Exercise Tolerance: >4 METS    Hematologic:  - neg hematologic  ROS     Musculoskeletal: Comment: Chronic knee pain      GI/Hepatic:     (+)        bowel prep,            Renal/Genitourinary:     (+) renal disease, type: CRI, Pt does not require dialysis,           Endo:     (+)  type II DM,                 (-) obesity   Psychiatric/Substance Use:     (+) psychiatric history anxiety and depression       Infectious Disease:  - neg infectious disease ROS     Malignancy:  - neg malignancy ROS     Other:  - neg other ROS    (+)  , H/O Chronic Pain,         Physical Exam    Airway        Mallampati: II   TM distance: > 3 FB   Neck ROM: full   Mouth opening: > 3 cm    Respiratory Devices and Support         Dental       (+) Multiple crowns, permanant bridges      Cardiovascular   cardiovascular exam normal       Rhythm and rate: regular and normal     Pulmonary   pulmonary exam normal        breath sounds clear to auscultation           OUTSIDE LABS:  CBC:   Lab Results   Component Value Date    WBC 9.2 01/09/2025    WBC 6.8 04/17/2024    HGB 13.7 01/09/2025    HGB 13.0 04/17/2024    HCT 41.8 01/09/2025    HCT 41.9 04/17/2024     01/09/2025     04/17/2024     BMP:   Lab Results  "  Component Value Date     01/28/2025     01/09/2025    POTASSIUM 4.2 01/28/2025    POTASSIUM 4.2 01/09/2025    CHLORIDE 102 01/28/2025    CHLORIDE 102 01/09/2025    CO2 22 01/28/2025    CO2 23 01/09/2025    BUN 21.7 01/28/2025    BUN 19.6 01/09/2025    CR 1.30 (H) 01/28/2025    CR 1.53 (H) 01/09/2025     (H) 01/28/2025     (H) 01/09/2025     COAGS:   Lab Results   Component Value Date    PTT 59 (H) 07/08/2017    INR 1.1 07/07/2017     POC: No results found for: \"BGM\", \"HCG\", \"HCGS\"  HEPATIC:   Lab Results   Component Value Date    ALBUMIN 4.4 03/03/2025    PROTTOTAL 7.7 03/03/2025    ALT 30 03/03/2025    AST 31 03/03/2025    ALKPHOS 106 03/03/2025    BILITOTAL 0.4 03/03/2025    BILIDIRECT 0.10 09/25/2014     OTHER:   Lab Results   Component Value Date    A1C 6.3 (H) 01/09/2025    GHASSAN 9.5 01/28/2025    PHOS 2.8 05/24/2023    MAG 1.9 01/09/2025    TSH 3.66 01/09/2025    T4 7.90 09/09/2014    CRP 2.2 03/24/2021    SED 20 (H) 03/24/2021       Anesthesia Plan    ASA Status:  3    NPO Status:  NPO Appropriate    Anesthesia Type: MAC.     - Reason for MAC: chronic cardiopulmonary disease (Age greater than 70, ASA III)              Consents    Anesthesia Plan(s) and associated risks, benefits, and realistic alternatives discussed. Questions answered and patient/representative(s) expressed understanding.     - Discussed: Risks, Benefits and Alternatives for BOTH SEDATION and the PROCEDURE were discussed     - Discussed with:  Patient      - Extended Intubation/Ventilatory Support Discussed: No.      - Patient is DNR/DNI Status: No     Use of blood products discussed: No .     Postoperative Care            Comments:               DAVID Malcolm CRNA    Clinically Significant Risk Factors Present on Admission                 # Drug Induced Platelet Defect: home medication list includes an antiplatelet medication   # Hypertension: Home medication list includes antihypertensive(s)           # " "Overweight: Estimated body mass index is 27.07 kg/m  as calculated from the following:    Height as of this encounter: 1.575 m (5' 2\").    Weight as of this encounter: 67.1 kg (148 lb).                "

## 2025-04-10 NOTE — ANESTHESIA POSTPROCEDURE EVALUATION
Patient: Jennyfer Elizabeth    Procedure: Procedure(s):  Colonoscopy WITH POLYPECTOMY       Anesthesia Type:  MAC    Note:  Disposition: Outpatient   Postop Pain Control: Uneventful            Sign Out: Well controlled pain   PONV: No   Neuro/Psych: Uneventful            Sign Out: Acceptable/Baseline neuro status   Airway/Respiratory: Uneventful            Sign Out: Acceptable/Baseline resp. status   CV/Hemodynamics: Uneventful            Sign Out: Acceptable CV status; No obvious hypovolemia; No obvious fluid overload   Other NRE: NONE   DID A NON-ROUTINE EVENT OCCUR?            Last vitals:  Vitals Value Taken Time   /81 04/10/25 1100   Temp 96.6  F (35.9  C) 04/10/25 1012   Pulse 88 04/10/25 1100   Resp 14 04/10/25 1045   SpO2 92 % 04/10/25 1053   Vitals shown include unfiled device data.    Electronically Signed By: DAVID Malcolm CRNA  April 10, 2025  2:47 PM

## 2025-04-15 ENCOUNTER — LAB (OUTPATIENT)
Dept: LAB | Facility: OTHER | Age: 75
End: 2025-04-15
Attending: INTERNAL MEDICINE
Payer: MEDICARE

## 2025-04-15 DIAGNOSIS — R74.8 INCREASED LIVER ENZYMES: ICD-10-CM

## 2025-04-15 DIAGNOSIS — E78.5 HYPERLIPIDEMIA, UNSPECIFIED HYPERLIPIDEMIA TYPE: ICD-10-CM

## 2025-04-15 LAB
ALBUMIN SERPL BCG-MCNC: 4.3 G/DL (ref 3.5–5.2)
ALP SERPL-CCNC: 104 U/L (ref 40–150)
ALT SERPL W P-5'-P-CCNC: 20 U/L (ref 0–50)
AST SERPL W P-5'-P-CCNC: 26 U/L (ref 0–45)
BILIRUB DIRECT SERPL-MCNC: 0.12 MG/DL (ref 0–0.3)
BILIRUB SERPL-MCNC: 0.4 MG/DL
CHOLEST SERPL-MCNC: 246 MG/DL
FASTING STATUS PATIENT QL REPORTED: YES
HDLC SERPL-MCNC: 45 MG/DL
LDLC SERPL CALC-MCNC: 156 MG/DL
NONHDLC SERPL-MCNC: 201 MG/DL
PATH REPORT.COMMENTS IMP SPEC: NORMAL
PATH REPORT.FINAL DX SPEC: NORMAL
PATH REPORT.RELEVANT HX SPEC: NORMAL
PHOTO IMAGE: NORMAL
PROT SERPL-MCNC: 7.7 G/DL (ref 6.4–8.3)
TRIGL SERPL-MCNC: 226 MG/DL

## 2025-04-15 PROCEDURE — 82040 ASSAY OF SERUM ALBUMIN: CPT | Mod: ZL

## 2025-04-15 PROCEDURE — 82247 BILIRUBIN TOTAL: CPT | Mod: ZL

## 2025-04-15 PROCEDURE — 36415 COLL VENOUS BLD VENIPUNCTURE: CPT | Mod: ZL

## 2025-04-15 PROCEDURE — 80061 LIPID PANEL: CPT | Mod: ZL

## 2025-05-27 ENCOUNTER — TELEPHONE (OUTPATIENT)
Dept: INTERNAL MEDICINE | Facility: OTHER | Age: 75
End: 2025-05-27
Payer: MEDICARE

## 2025-05-27 NOTE — TELEPHONE ENCOUNTER
Patient stated that she had a tick on her and she is positive that it was a deer tick and she isn't sure that she got it all out. Please call.    Norma Vallecillo on 5/27/2025 at 2:30 PM

## 2025-05-27 NOTE — TELEPHONE ENCOUNTER
The pt states that she was outside a day or so ago and had gotten a tick bite and she is sure it was a deer tick.  She is not sure if she got all of it out.  She also has a concern about whether or not she should be put back on her Cholesterol med after her labs.  A final concern is that she recently flew back from [place] and noticed that she has a lump in the back of her knee.  Her friend told her it was probably a cyst but she was wondering with the flight if she should be concerned about a blood clot.  She is aware that you are not in the clinic today.  She will present to the ER or [facility] if her symptoms change or worsen.  She originally had an appt scheduled May 19th for follow up liver labs, but was unable to make it do to still being in [place]. She is now currently scheduled to see you on August 21st.  Vannessa Abbott LPN on 5/27/2025 at 3:12 PM

## 2025-05-28 ENCOUNTER — RESULTS FOLLOW-UP (OUTPATIENT)
Dept: FAMILY MEDICINE | Facility: OTHER | Age: 75
End: 2025-05-28

## 2025-05-28 ENCOUNTER — HOSPITAL ENCOUNTER (OUTPATIENT)
Dept: ULTRASOUND IMAGING | Facility: OTHER | Age: 75
Discharge: HOME | End: 2025-05-28
Payer: MEDICARE

## 2025-05-28 ENCOUNTER — OFFICE VISIT (OUTPATIENT)
Dept: FAMILY MEDICINE | Facility: OTHER | Age: 75
End: 2025-05-28
Payer: MEDICARE

## 2025-05-28 VITALS
RESPIRATION RATE: 20 BRPM | SYSTOLIC BLOOD PRESSURE: 115 MMHG | WEIGHT: 152.6 LBS | HEIGHT: 63 IN | OXYGEN SATURATION: 95 % | DIASTOLIC BLOOD PRESSURE: 70 MMHG | HEART RATE: 98 BPM | TEMPERATURE: 97.3 F | BODY MASS INDEX: 27.04 KG/M2

## 2025-05-28 DIAGNOSIS — R22.43 LOCALIZED SWELLING, MASS, OR LUMP OF LOWER EXTREMITY, BILATERAL: ICD-10-CM

## 2025-05-28 DIAGNOSIS — Z79.2 NEED FOR PROPHYLACTIC ANTIBIOTIC: ICD-10-CM
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
## 2025-05-28 DIAGNOSIS — S20.362A TICK BITE OF LEFT SIDE OF CHEST WALL, INITIAL ENCOUNTER: Primary | ICD-10-CM

## 2025-05-28 PROCEDURE — G0463 HOSPITAL OUTPT CLINIC VISIT: HCPCS

## 2025-05-28 PROCEDURE — 76882 US LMTD JT/FCL EVL NVASC XTR: CPT | Mod: RT

## 2025-05-28 PROCEDURE — 76882 US LMTD JT/FCL EVL NVASC XTR: CPT | Mod: 26 | Performed by: RADIOLOGY

## 2025-05-28 RX ORDER — DOXYCYCLINE 100 MG/1
200 CAPSULE ORAL ONCE
Qty: 2 CAPSULE | Refills: 0 | Status: SHIPPED | OUTPATIENT
Start: 2025-05-28 | End: 2025-05-29

## 2025-05-28 ASSESSMENT — PAIN SCALES - GENERAL: PAINLEVEL_OUTOF10: NO PAIN (0)

## 2025-05-28 NOTE — TELEPHONE ENCOUNTER
Discussed with pt that she should be seen either today or tomorrow for further evaluation with any available provider.  She was transferred to the scheduling line to make an appt.  Vannessa Abbott LPN on 5/28/2025 at 11:28 AM

## 2025-05-28 NOTE — PROGRESS NOTES
ASSESSMENT/PLAN:    I have reviewed the nursing notes.  I have reviewed the findings, diagnosis, plan and need for follow up with the patient.    1. Tick bite of left side of chest wall, initial encounter (Primary)  2. Need for prophylactic antibiotic    - doxycycline hyclate (VIBRAMYCIN) 100 MG capsule; Take 2 capsules (200 mg) by mouth once for 1 dose.  Dispense: 2 capsule; Refill: 0    - Please read the attached information on tick bites and monitor for Lyme symptoms that include general aches, headaches, fatigue, vision changes, joint aches, joint swelling, fevers and rash that may appear anywhere on the body not just at the site of the tick bite as we discussed in the clinic today.  The symptoms typically occur within the first 2 to 8 weeks.  If any of the symptoms appear recommend returning to clinic for Lyme disease testing.    3. Localized swelling, mass, or lump of lower extremity, bilateral    - US Extremity Non Vascular Bilateral-unremarkable examination per radiologist    - May use over-the-counter Tylenol as needed for pain or fever    - Discussed warning signs/symptoms indicative of need to f/u    - Follow up if symptoms persist or worsen or concerns    - I explained my diagnostic considerations and recommendations to the patient, who voiced understanding and agreement with the treatment plan. All questions were answered. We discussed potential side effects of any prescribed or recommended therapies, as well as expectations for response to treatments.    DAVID Bang CNP  5/28/2025  3:35 PM    HPI:    Jennyfer Elizabeth is a 75 year old female who [facility] for concerns of tick bite.  Patient states that she removed a deer tick from the left side of her chest wall on Monday.  Patient states that she was able to remove the tick but feels that possibly the head was left.  Patient is also concerned about a lump behind her right and left knee, states the lump is larger behind her  left knee.  Denies any pain in the lumps but states that she can feel the lump in her left knee when she straightens her leg and stretches it.  Patient is requesting an ultrasound to rule out a Baker's cyst today.    Past Medical History:   Diagnosis Date    Benign essential hypertension 2018    Cataract     Chronic pain of right knee 2018    Diabetic eye exam (H) 2018    TAYLOR (generalized anxiety disorder) 2018    History of MI (myocardial infarction) 2017    follows yearly with [facility]    Macular degeneration     Major depressive disorder, single episode     Obese     Obstructive sleep apnea     BIPAP    Osteopenia 2007    Lumbar spine    Pure hypercholesterolemia     PVD (peripheral vascular disease)     Squamous cell carcinoma, leg, left 2021    Type 2 diabetes mellitus with complication, without long-term current use of insulin (H) 2018     Past Surgical History:   Procedure Laterality Date    ANGIOGRAM Right 2022    Procedure: with right iliac angiogram, right iliac angioplasty and stenting;  Surgeon: Rama Sewell MD;  Location: [facility] OR    BREAST BIOPSY, RT/LT  1986    x2; in [place]    CARDIAC CATH - HIM SCAN  07/10/2017    Mid RCA;  drug eluting stent    CHOLECYSTECTOMY      COLONOSCOPY N/A 2021    Tubular Adenoma F/U     COLONOSCOPY N/A 04/10/2025    1 sessile serrated ademona   no f/u due to age    ENDARTERECTOMY FEMORAL Right 2022    Procedure: Right femoral endarterectomy;  Surgeon: Rama Sewell MD;  Location: [facility] OR    IR OR ANGIOGRAM  2022    SKIN CANCER EXCISION Left 2021    [facility]    TONSILLECTOMY, ADENOIDECTOMY, COMBINED       Social History     Tobacco Use    Smoking status: Former     Current packs/day: 0.00     Average packs/day: 1 pack/day for 40.0 years (40.0 ttl pk-yrs)     Types: Cigarettes     Start date: 10/1/1968     Quit date: 10/1/2008     Years since quittin.6     Passive exposure: Past    Smokeless  tobacco: Never    Tobacco comments:     Patient occasionally uses Nicorette gum   Substance Use Topics    Alcohol use: Yes     Alcohol/week: 14.0 standard drinks of alcohol     Types: 14 Standard drinks or equivalent per week     Comment: socially     Current Outpatient Medications   Medication Sig Dispense Refill    B Complex CAPS Take 1 capsule by mouth daily      blood glucose (ACCU-CHEK SMARTVIEW) test strip Use to test blood glucose 2 times daily. Dispense as covered by insurance. Dx. Code: E11.8. 200 strip 0    buPROPion (WELLBUTRIN XL) 300 MG 24 hr tablet Take 1 tablet (300 mg) by mouth daily. 90 tablet 4    clopidogrel (PLAVIX) 75 MG tablet TAKE 1 TAB BY MOUTH ONCE DAILY 90 tablet 4    doxycycline hyclate (VIBRAMYCIN) 100 MG capsule Take 2 capsules (200 mg) by mouth once for 1 dose. 2 capsule 0    DULoxetine (CYMBALTA) 30 MG capsule TAKE 2 CAPSULES BY MOUTH ONCE DAILY 180 capsule 4    losartan-hydrochlorothiazide (HYZAAR) 100-12.5 MG tablet Take 1 tablet by mouth daily. 90 tablet 4    Multiple Vitamins-Minerals (PRESERVISION AREDS 2+MULTI VIT PO) Take 1 tablet by mouth 2 times daily (Preservision Areds)      nitroGLYcerin (NITRODUR) 0.2 MG/HR 24 hr patch Place 1 patch onto the skin daily as needed.      semaglutide (OZEMPIC, 0.25 OR 0.5 MG/DOSE,) 2 MG/3ML pen Inject 0.5 mg subcutaneously every 7 days. 9 mL 2    tretinoin (RETIN-A) 0.05 % external cream APPLY 1 GRAM EXTERNALLY TO THE AFFECTED AREA AT BEDTIME 45 g 1    Vitamin D, Cholecalciferol, 25 MCG (1000 UT) TABS Take 1 tablet by mouth daily        Allergies   Allergen Reactions    Trazodone Other (See Comments)     Nightmares/sedation     Past medical history, past surgical history, current medications and allergies reviewed and accurate to the best of my knowledge.      ROS:  Refer to HPI    /70 (BP Location: Right arm, Patient Position: Sitting, Cuff Size: Adult Regular)   Pulse 98   Temp 97.3  F (36.3  C) (Temporal)   Resp 20   Ht 1.588 m  "(5' 2.5\")   Wt 69.2 kg (152 lb 9.6 oz)   LMP  (LMP Unknown)   SpO2 95%   BMI 27.47 kg/m      EXAM:  General Appearance: Well appearing 75 year old female, appropriate appearance for age. No acute distress   Respiratory: normal chest wall and respirations.  Normal effort.  Clear to auscultation bilaterally, no wheezing, crackles or rhonchi.  No increased work of breathing.  No cough appreciated.  Cardiac: RRR with no murmurs  Musculoskeletal:  Equal movement of bilateral upper extremities.  Equal movement of bilateral lower extremities.  Noted quarter size lump behind left lateral knee and nickel sized lump behind right lateral knee.  No pain with palpation.  Normal gait.    Dermatological: See attached picture for details of tick bite  Neuro: Alert and oriented to person, place, and time.  Cranial nerves II-XII grossly intact with no focal or lateralizing deficits.  Muscle tone normal.  Gait normal. No tremor.   Psychological: normal affect, alert, oriented, and pleasant.     "

## 2025-05-28 NOTE — TELEPHONE ENCOUNTER
I would recommend that she be seen by any available provider today or tomorrow.  She can call tomorrow morning at 7 AM to try to get a same-day appointment with me or another provider.  If she has more urgent concerns I would recommend being seen in rapid clinic or ER today.

## 2025-05-28 NOTE — NURSING NOTE
"Chief Complaint   Patient presents with    Tick Bite   Patient presents to the rapid clinic today for a tick bite since Monday. Patient thinks that the head of the tick is still in.     Initial /70 (BP Location: Right arm, Patient Position: Sitting, Cuff Size: Adult Regular)   Pulse 98   Temp 97.3  F (36.3  C) (Temporal)   Resp 20   Ht 1.588 m (5' 2.5\")   Wt 69.2 kg (152 lb 9.6 oz)   LMP  (LMP Unknown)   SpO2 95%   BMI 27.47 kg/m   Estimated body mass index is 27.47 kg/m  as calculated from the following:    Height as of this encounter: 1.588 m (5' 2.5\").    Weight as of this encounter: 69.2 kg (152 lb 9.6 oz).  Medication Review: complete    The next two questions are to help us understand your food security.  If you are feeling you need any assistance in this area, we have resources available to support you today.          5/28/2025   SDOH- Food Insecurity   Within the past 12 months, did you worry that your food would run out before you got money to buy more? N   Within the past 12 months, did the food you bought just not last and you didn t have money to get more? N         Health Care Directive:  Patient does not have a Health Care Directive: Discussed advance care planning with patient; however, patient declined at this time.    Harsha White      "

## 2025-05-28 NOTE — PATIENT INSTRUCTIONS
I hope you feel better soon Jennyfer!    Anaplasma symptoms include:  sudden onset fever, chills, body aches, headaches, fatigue, nausea/vomiting, etc  Symptoms usually occur within 5 to 10 days of tick bite.  Follow up immediately if symptoms develop.    Lyme symptoms include:  general aches, headaches, fatigue, vision change, joint aches, joint swelling, fevers, rash (bulls eye lesion can be at site of tick bite, anywhere on the body or multiple lesions), etc    Symptoms usually occur within 2 weeks to 8 weeks.    Prophylactic antibiotic dosing includes Doxycycline if the following criteria is met:    One dose of Doxycycline (200 mg) - do not take with any dairy products within 4 hours    Tick is identified as a deer tick    Tick is assumed to be attached for more than 24 hours    Treatment is started within 72 hours of the tick bite    The proper technique for removal of the attached tick includes the following steps:    If available, use tweezers or small forceps to grasp the tick as close to the skin surface as possible. In the absence of tweezers, use paper or cloth to protect the fingers during tick extraction.  Pull straight up gently but firmly, using steady pressure. Do not jerk or twist.  Do not squeeze, crush, or puncture the body of the tick, since its fluids may contain infectious agents.  Disinfect the skin thoroughly after removing the tick and wash hands with soap and water.  If sections of the mouthparts of the tick remain in the skin, they should be left alone as they will normally be expelled spontaneously.  After the tick removal and the skin cleansing, the person bitten (or the parents) should observe the area for the development of rash for up to 30 days following exposure. Components of tick saliva can cause rash  Since the tick usually needs to be attached for two to three days before transmission of the Lyme disease agent occurs, removal of the tick within this time frame often prevents the  infection

## 2025-05-29 ENCOUNTER — MYC MEDICAL ADVICE (OUTPATIENT)
Dept: INTERNAL MEDICINE | Facility: OTHER | Age: 75
End: 2025-05-29
Payer: MEDICARE
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
## 2025-05-29 DIAGNOSIS — S20.362A TICK BITE OF LEFT SIDE OF CHEST WALL, INITIAL ENCOUNTER: ICD-10-CM

## 2025-05-29 DIAGNOSIS — Z79.2 NEED FOR PROPHYLACTIC ANTIBIOTIC: ICD-10-CM

## 2025-05-29 RX ORDER — DOXYCYCLINE 100 MG/1
200 CAPSULE ORAL ONCE
Qty: 2 CAPSULE | Refills: 0 | Status: SHIPPED | OUTPATIENT
Start: 2025-05-29 | End: 2025-05-29

## 2025-05-29 NOTE — TELEPHONE ENCOUNTER
Provider sent in new script to pharmacy already. Patient notified.     Mary Brown LPN on 5/29/2025 at 1:39 PM

## 2025-05-29 NOTE — TELEPHONE ENCOUNTER
5/28  RC for Tick bite- saw Cami Amaya.    Given Doxy x1.    Threw up within 20 minutes after taking (with food).    Pharmacist said she should repeat.      Spencer'd up a repeat.      Cassie Millan RN on 5/29/2025 at 9:27 AM

## 2025-06-10 ENCOUNTER — HOSPITAL ENCOUNTER (OUTPATIENT)
Dept: ULTRASOUND IMAGING | Facility: OTHER | Age: 75
Discharge: HOME | End: 2025-06-10
Attending: NURSE PRACTITIONER
Payer: MEDICARE

## 2025-06-10 DIAGNOSIS — Z95.828 S/P INSERTION OF ILIAC ARTERY STENT: Chronic | ICD-10-CM

## 2025-06-10 DIAGNOSIS — I73.9 PAD (PERIPHERAL ARTERY DISEASE): ICD-10-CM

## 2025-06-10 PROCEDURE — 93922 UPR/L XTREMITY ART 2 LEVELS: CPT

## 2025-06-19 ENCOUNTER — APPOINTMENT (OUTPATIENT)
Dept: CARDIOLOGY | Facility: OTHER | Age: 75
End: 2025-06-19
Payer: MEDICARE

## 2025-06-20 ENCOUNTER — HOSPITAL ENCOUNTER (OUTPATIENT)
Dept: ULTRASOUND IMAGING | Facility: OTHER | Age: 75
Discharge: HOME | End: 2025-06-20
Attending: NURSE PRACTITIONER | Admitting: NURSE PRACTITIONER
Payer: MEDICARE

## 2025-06-20 DIAGNOSIS — I10 BENIGN ESSENTIAL HYPERTENSION: ICD-10-CM

## 2025-06-20 DIAGNOSIS — I73.9 PAD (PERIPHERAL ARTERY DISEASE): ICD-10-CM

## 2025-06-20 DIAGNOSIS — E78.5 HYPERLIPIDEMIA LDL GOAL <70: ICD-10-CM

## 2025-06-20 DIAGNOSIS — E11.9 TYPE 2 DIABETES, HBA1C GOAL < 7% (H): ICD-10-CM

## 2025-06-20 DIAGNOSIS — Z95.828 S/P INSERTION OF ILIAC ARTERY STENT: ICD-10-CM

## 2025-06-20 PROCEDURE — 93979 VASCULAR STUDY: CPT

## 2025-06-20 PROCEDURE — 93926 LOWER EXTREMITY STUDY: CPT | Mod: RT

## 2025-06-26 ENCOUNTER — RESULTS FOLLOW-UP (OUTPATIENT)
Dept: SURGERY | Facility: CLINIC | Age: 75
End: 2025-06-26
Payer: MEDICARE

## 2025-06-30 NOTE — TELEPHONE ENCOUNTER
Called pt to discuss follow-up plan and CTA scheduling - LVM w/ my direct callback. I will try again at a later time.    Carlee Moran, RN  RN Care Coordinator - [facility] - [place]  Supporting Dr Masters, Dr Garcia, Dr Correa, & Dori Dawkins CNP  Phone: [phone]  Fax: [phone]

## 2025-07-01 ENCOUNTER — TELEPHONE (OUTPATIENT)
Dept: VASCULAR SURGERY | Facility: CLINIC | Age: 75
End: 2025-07-01
Payer: MEDICARE

## 2025-07-01 DIAGNOSIS — N18.32 CHRONIC KIDNEY DISEASE, STAGE 3B (H): Primary | ICD-10-CM

## 2025-07-01 NOTE — TELEPHONE ENCOUNTER
Imaging scheduling request     Test needed: CTA abdomen/pelvis + pre-hydration    Timeframe: Within 1-2 weeks   Follow-up needed: Please schedule at [place] and let [facility] know when scheduled.      Request routed high-priority to clinic coordinators.    Carlee Moran RN  RN Care Coordinator - [facility] [place]  Supporting Dr Masters, Dr Garcia, Dr Correa, & Dori Dawkins, YONAS  Phone: [phone]  Fax: [phone]

## 2025-07-02 NOTE — TELEPHONE ENCOUNTER
The pt, myself and [facility] [phone] were on the phone working to get the pt scheduled for IV Hydration and a CTA Scan unfortunately [facility] Phone Number:[phone] is stating that they have to wait to get the Prior Auth for the imaging and this may take until Monday 7/7/25. The pt is aware that myself along with the infusion center and imaging will be calling her back to get scheduled.  Jim Lr on 7/2/2025 at 1:59 PM

## 2025-07-07 ENCOUNTER — LAB (OUTPATIENT)
Dept: LAB | Facility: OTHER | Age: 75
End: 2025-07-07
Attending: NURSE PRACTITIONER
Payer: MEDICARE

## 2025-07-07 DIAGNOSIS — E78.5 HYPERLIPIDEMIA LDL GOAL <70: ICD-10-CM

## 2025-07-07 DIAGNOSIS — R79.89 ABNORMAL LFTS: ICD-10-CM

## 2025-07-07 LAB
ALBUMIN SERPL BCG-MCNC: 3.9 G/DL (ref 3.5–5.2)
ALP SERPL-CCNC: 124 U/L (ref 40–150)
ALT SERPL W P-5'-P-CCNC: 24 U/L (ref 0–50)
AST SERPL W P-5'-P-CCNC: 38 U/L (ref 0–45)
BILIRUB DIRECT SERPL-MCNC: 0.12 MG/DL (ref 0–0.3)
BILIRUB SERPL-MCNC: 0.4 MG/DL
PROT SERPL-MCNC: 7.3 G/DL (ref 6.4–8.3)

## 2025-07-07 PROCEDURE — 80076 HEPATIC FUNCTION PANEL: CPT | Mod: ZL

## 2025-07-07 PROCEDURE — 36415 COLL VENOUS BLD VENIPUNCTURE: CPT | Mod: ZL

## 2025-07-11 ENCOUNTER — HOSPITAL ENCOUNTER (OUTPATIENT)
Dept: CT IMAGING | Facility: OTHER | Age: 75
Discharge: HOME | End: 2025-07-11
Attending: NURSE PRACTITIONER
Payer: MEDICARE

## 2025-07-11 ENCOUNTER — APPOINTMENT (OUTPATIENT)
Dept: LAB | Facility: OTHER | Age: 75
End: 2025-07-11
Payer: MEDICARE

## 2025-07-11 DIAGNOSIS — Z95.828 S/P INSERTION OF ILIAC ARTERY STENT: ICD-10-CM

## 2025-07-11 PROCEDURE — 74174 CTA ABD&PLVS W/CONTRAST: CPT | Mod: 26 | Performed by: RADIOLOGY

## 2025-07-11 PROCEDURE — 250N000009 HC RX 250: Performed by: NURSE PRACTITIONER

## 2025-07-11 PROCEDURE — 76377 3D RENDER W/INTRP POSTPROCES: CPT

## 2025-07-11 PROCEDURE — 250N000011 HC RX IP 250 OP 636: Performed by: NURSE PRACTITIONER

## 2025-07-11 RX ORDER — IOPAMIDOL 755 MG/ML
100 INJECTION, SOLUTION INTRAVASCULAR ONCE
Status: COMPLETED | OUTPATIENT
Start: 2025-07-11 | End: 2025-07-11

## 2025-07-11 RX ADMIN — SODIUM CHLORIDE 90 ML: 9 INJECTION, SOLUTION INTRAVENOUS at 14:48

## 2025-07-11 RX ADMIN — IOPAMIDOL 100 ML: 755 INJECTION, SOLUTION INTRAVENOUS at 14:48

## 2025-07-14 ENCOUNTER — MYC MEDICAL ADVICE (OUTPATIENT)
Dept: VASCULAR SURGERY | Facility: CLINIC | Age: 75
End: 2025-07-14
Payer: MEDICARE

## 2025-07-14 DIAGNOSIS — N18.32 CHRONIC KIDNEY DISEASE, STAGE 3B (H): Primary | ICD-10-CM

## 2025-07-15 ENCOUNTER — MYC MEDICAL ADVICE (OUTPATIENT)
Dept: INTERNAL MEDICINE | Facility: OTHER | Age: 75
End: 2025-07-15
Payer: MEDICARE

## 2025-07-15 DIAGNOSIS — R93.89 ABNORMAL CT SCAN: Primary | ICD-10-CM

## 2025-07-15 NOTE — TELEPHONE ENCOUNTER
Please let Jennyfer know that JESSICA is out of the clinic today. Her follow up CT has been ordered per [facility]. This will get authorized by insurance and then she should receive a call to schedule this. If she is having any questions or concerns in the meantime I'm happy to discuss them with her in follow up. Please let me know if there is anything I can help her with at this time.  IZABELLA EISENBERG, DAVID CNP on 7/15/2025 at 3:45 PM

## 2025-07-16 ENCOUNTER — TRANSFERRED RECORDS (OUTPATIENT)
Dept: HEALTH INFORMATION MANAGEMENT | Facility: OTHER | Age: 75
End: 2025-07-16
Payer: MEDICARE

## 2025-07-17 ENCOUNTER — RESULTS FOLLOW-UP (OUTPATIENT)
Dept: INTERNAL MEDICINE | Facility: OTHER | Age: 75
End: 2025-07-17
Payer: MEDICARE

## 2025-07-17 NOTE — TELEPHONE ENCOUNTER
Brief vascular surgery note:    CTA was reviewed with Dr. Correa, may require intervention.  We will connect with patient to schedule appointment for August 12 in [place] as Dr. Correa has clinic scheduled in person on that day.    No other testing is required.    Dori Dawkins CNP  Vascular Surgery  Pager: [phone]  [email or web address]  Send message or 10 digit call back number Securely via [employer] with the [employer] Web Console (learn more here)    Please page me directly with any questions/concerns during regular weekday hours before 3PM. If there is no response, if it is a weekend, or if it is during evening hours, then please use the [facility] system to page the first-call resident on call for vascular surgery.

## 2025-07-19 ENCOUNTER — HEALTH MAINTENANCE LETTER (OUTPATIENT)
Age: 75
End: 2025-07-19

## 2025-07-22 ENCOUNTER — HOSPITAL ENCOUNTER (OUTPATIENT)
Dept: CT IMAGING | Facility: OTHER | Age: 75
Discharge: HOME | End: 2025-07-22
Attending: INTERNAL MEDICINE
Payer: MEDICARE

## 2025-07-22 DIAGNOSIS — R93.89 ABNORMAL CT SCAN: ICD-10-CM

## 2025-07-22 PROCEDURE — 250N000009 HC RX 250: Performed by: INTERNAL MEDICINE

## 2025-07-22 PROCEDURE — 250N000011 HC RX IP 250 OP 636: Performed by: INTERNAL MEDICINE

## 2025-07-22 PROCEDURE — 71260 CT THORAX DX C+: CPT | Mod: 26 | Performed by: RADIOLOGY

## 2025-07-22 PROCEDURE — 71260 CT THORAX DX C+: CPT

## 2025-07-22 RX ORDER — IOPAMIDOL 755 MG/ML
90 INJECTION, SOLUTION INTRAVASCULAR ONCE
Status: COMPLETED | OUTPATIENT
Start: 2025-07-22 | End: 2025-07-22

## 2025-07-22 RX ADMIN — SODIUM CHLORIDE 60 ML: 9 INJECTION, SOLUTION INTRAVENOUS at 09:34

## 2025-07-22 RX ADMIN — IOPAMIDOL 90 ML: 755 INJECTION, SOLUTION INTRAVENOUS at 09:34

## 2025-07-25 ENCOUNTER — TELEPHONE (OUTPATIENT)
Dept: INTERNAL MEDICINE | Facility: OTHER | Age: 75
End: 2025-07-25
Payer: MEDICARE

## 2025-07-25 NOTE — TELEPHONE ENCOUNTER
Patient called regarding her CT scan performed 7/22, she is upset and anxious that she has not received any results or a follow up call following her appointment. She would like a call back from provider/ care team, okay to leave a detailed message.    Kezia Driver on 7/25/2025 at 2:50 PM

## 2025-07-28 ENCOUNTER — TELEPHONE (OUTPATIENT)
Dept: INTERNAL MEDICINE | Facility: OTHER | Age: 75
End: 2025-07-28
Payer: MEDICARE

## 2025-07-28 DIAGNOSIS — R91.8 PULMONARY NODULES: ICD-10-CM

## 2025-07-28 DIAGNOSIS — R53.83 FATIGUE, UNSPECIFIED TYPE: ICD-10-CM

## 2025-07-28 DIAGNOSIS — R93.89 ABNORMAL CT SCAN: Primary | ICD-10-CM

## 2025-07-28 DIAGNOSIS — E11.9 TYPE 2 DIABETES MELLITUS WITHOUT COMPLICATION, WITHOUT LONG-TERM CURRENT USE OF INSULIN (H): ICD-10-CM

## 2025-07-28 DIAGNOSIS — E78.5 HYPERLIPIDEMIA LDL GOAL <70: ICD-10-CM

## 2025-07-28 DIAGNOSIS — I73.9 PAD (PERIPHERAL ARTERY DISEASE): ICD-10-CM

## 2025-07-28 NOTE — TELEPHONE ENCOUNTER
Reassured the pt that her results will be processed and seen by the provider and that the next steps will be given.  I discussed with her that I understand her frustration but due to the way that the tests are completed, read, and then sent to the ordering provider, it does take some time for this all to be addressed.  I reassured her that the results were on the provider's desktop and that they would be addressed as soon as the provider had a chance to further look at them.  Vannessa Abbott LPN on 7/28/2025 at 9:15 AM

## 2025-07-30 ENCOUNTER — LAB (OUTPATIENT)
Dept: LAB | Facility: OTHER | Age: 75
End: 2025-07-30
Attending: INTERNAL MEDICINE
Payer: MEDICARE

## 2025-07-30 DIAGNOSIS — E11.9 TYPE 2 DIABETES MELLITUS WITHOUT COMPLICATION, WITHOUT LONG-TERM CURRENT USE OF INSULIN (H): ICD-10-CM

## 2025-07-30 DIAGNOSIS — R93.89 ABNORMAL CT SCAN: ICD-10-CM

## 2025-07-30 DIAGNOSIS — R53.83 FATIGUE, UNSPECIFIED TYPE: ICD-10-CM

## 2025-07-30 LAB
BASOPHILS # BLD AUTO: 0.1 10E3/UL (ref 0–0.2)
BASOPHILS NFR BLD AUTO: 1 %
EOSINOPHIL # BLD AUTO: 0.3 10E3/UL (ref 0–0.7)
EOSINOPHIL NFR BLD AUTO: 3 %
ERYTHROCYTE [DISTWIDTH] IN BLOOD BY AUTOMATED COUNT: 14.8 % (ref 10–15)
EST. AVERAGE GLUCOSE BLD GHB EST-MCNC: 114 MG/DL
HBA1C MFR BLD: 5.6 %
HCT VFR BLD AUTO: 37.9 % (ref 35–47)
HGB BLD-MCNC: 12.2 G/DL (ref 11.7–15.7)
IMM GRANULOCYTES # BLD: 0.1 10E3/UL
IMM GRANULOCYTES NFR BLD: 1 %
LYMPHOCYTES # BLD AUTO: 2.4 10E3/UL (ref 0.8–5.3)
LYMPHOCYTES NFR BLD AUTO: 21 %
MCH RBC QN AUTO: 28.4 PG (ref 26.5–33)
MCHC RBC AUTO-ENTMCNC: 32.2 G/DL (ref 31.5–36.5)
MCV RBC AUTO: 88 FL (ref 78–100)
MONOCYTES # BLD AUTO: 1 10E3/UL (ref 0–1.3)
MONOCYTES NFR BLD AUTO: 8 %
NEUTROPHILS # BLD AUTO: 7.6 10E3/UL (ref 1.6–8.3)
NEUTROPHILS NFR BLD AUTO: 66 %
NRBC # BLD AUTO: 0 10E3/UL
NRBC BLD AUTO-RTO: 0 /100
PLATELET # BLD AUTO: 472 10E3/UL (ref 150–450)
RBC # BLD AUTO: 4.29 10E6/UL (ref 3.8–5.2)
T4 FREE SERPL-MCNC: 1.33 NG/DL (ref 0.9–1.7)
TSH SERPL DL<=0.005 MIU/L-ACNC: 5.09 UIU/ML (ref 0.3–4.2)
WBC # BLD AUTO: 11.5 10E3/UL (ref 4–11)

## 2025-07-30 PROCEDURE — 87449 NOS EACH ORGANISM AG IA: CPT | Mod: ZL

## 2025-07-30 PROCEDURE — 84439 ASSAY OF FREE THYROXINE: CPT | Mod: ZL

## 2025-07-30 PROCEDURE — 87385 HISTOPLASMA CAPSUL AG IA: CPT | Mod: ZL

## 2025-07-30 PROCEDURE — 85014 HEMATOCRIT: CPT | Mod: ZL

## 2025-07-30 PROCEDURE — 85004 AUTOMATED DIFF WBC COUNT: CPT | Mod: ZL

## 2025-07-30 PROCEDURE — 84443 ASSAY THYROID STIM HORMONE: CPT | Mod: ZL

## 2025-07-30 PROCEDURE — 36415 COLL VENOUS BLD VENIPUNCTURE: CPT | Mod: ZL

## 2025-07-30 PROCEDURE — 83036 HEMOGLOBIN GLYCOSYLATED A1C: CPT | Mod: ZL

## 2025-08-01 RX ORDER — PRAVASTATIN SODIUM 20 MG
20 TABLET ORAL DAILY
Qty: 90 TABLET | Refills: 1 | Status: SHIPPED | OUTPATIENT
Start: 2025-08-01

## 2025-08-04 ENCOUNTER — APPOINTMENT (OUTPATIENT)
Dept: LAB | Facility: OTHER | Age: 75
End: 2025-08-04
Attending: INTERNAL MEDICINE
Payer: MEDICARE

## 2025-08-04 LAB
SCANNED LAB RESULT: NORMAL
SCANNED LAB RESULT: NORMAL

## 2025-08-04 PROCEDURE — 87385 HISTOPLASMA CAPSUL AG IA: CPT | Mod: ZL

## 2025-08-04 PROCEDURE — 87449 NOS EACH ORGANISM AG IA: CPT | Mod: ZL

## 2025-08-05 RX ORDER — AZITHROMYCIN 250 MG/1
TABLET, FILM COATED ORAL
Qty: 6 TABLET | Refills: 0 | Status: SHIPPED | OUTPATIENT
Start: 2025-08-05 | End: 2025-08-10

## 2025-08-12 ENCOUNTER — OFFICE VISIT (OUTPATIENT)
Dept: CARDIOLOGY | Facility: OTHER | Age: 75
End: 2025-08-12
Attending: SURGERY
Payer: MEDICARE

## 2025-08-12 VITALS
DIASTOLIC BLOOD PRESSURE: 78 MMHG | TEMPERATURE: 97.9 F | HEIGHT: 62 IN | BODY MASS INDEX: 28.01 KG/M2 | WEIGHT: 152.2 LBS | SYSTOLIC BLOOD PRESSURE: 121 MMHG | HEART RATE: 84 BPM | RESPIRATION RATE: 16 BRPM | OXYGEN SATURATION: 95 %

## 2025-08-12 DIAGNOSIS — I70.211 ATHEROSCLEROSIS OF NATIVE ARTERY OF RIGHT LOWER EXTREMITY WITH INTERMITTENT CLAUDICATION: Primary | ICD-10-CM

## 2025-08-12 ASSESSMENT — PAIN SCALES - GENERAL: PAINLEVEL_OUTOF10: NO PAIN (0)

## 2025-08-20 ENCOUNTER — HOSPITAL ENCOUNTER (OUTPATIENT)
Dept: PET IMAGING | Facility: HOSPITAL | Age: 75
Discharge: HOME | End: 2025-08-20
Attending: INTERNAL MEDICINE
Payer: MEDICARE

## 2025-08-20 DIAGNOSIS — R91.8 PULMONARY NODULES: ICD-10-CM

## 2025-08-20 DIAGNOSIS — R53.83 FATIGUE, UNSPECIFIED TYPE: ICD-10-CM

## 2025-08-20 DIAGNOSIS — R93.89 ABNORMAL CT SCAN: ICD-10-CM

## 2025-08-20 PROCEDURE — A9552 F18 FDG: HCPCS | Performed by: INTERNAL MEDICINE

## 2025-08-20 PROCEDURE — 78815 PET IMAGE W/CT SKULL-THIGH: CPT | Mod: 26 | Performed by: STUDENT IN AN ORGANIZED HEALTH CARE EDUCATION/TRAINING PROGRAM

## 2025-08-20 PROCEDURE — 343N000001 HC RX 343 MED OP 636: Performed by: INTERNAL MEDICINE

## 2025-08-20 PROCEDURE — 78815 PET IMAGE W/CT SKULL-THIGH: CPT | Mod: PI

## 2025-08-20 RX ORDER — FLUDEOXYGLUCOSE F 18 200 MCI/ML
12.03 INJECTION, SOLUTION INTRAVENOUS ONCE
Status: COMPLETED | OUTPATIENT
Start: 2025-08-20 | End: 2025-08-20

## 2025-08-20 RX ADMIN — FLUDEOXYGLUCOSE F 18 12.03 MILLICURIE: 200 INJECTION, SOLUTION INTRAVENOUS at 09:36

## 2025-08-21 ENCOUNTER — OFFICE VISIT (OUTPATIENT)
Dept: INTERNAL MEDICINE | Facility: OTHER | Age: 75
End: 2025-08-21
Attending: INTERNAL MEDICINE
Payer: MEDICARE

## 2025-08-21 VITALS
DIASTOLIC BLOOD PRESSURE: 64 MMHG | WEIGHT: 152 LBS | HEART RATE: 56 BPM | RESPIRATION RATE: 14 BRPM | BODY MASS INDEX: 27.8 KG/M2 | OXYGEN SATURATION: 94 % | SYSTOLIC BLOOD PRESSURE: 122 MMHG

## 2025-08-21 DIAGNOSIS — R91.8 PULMONARY NODULES: ICD-10-CM

## 2025-08-21 DIAGNOSIS — R74.8 INCREASED LIVER ENZYMES: ICD-10-CM

## 2025-08-21 DIAGNOSIS — R93.89 ABNORMAL CT SCAN: Primary | ICD-10-CM

## 2025-08-21 DIAGNOSIS — N18.32 CHRONIC KIDNEY DISEASE, STAGE 3B (H): ICD-10-CM

## 2025-08-21 LAB
ALBUMIN SERPL BCG-MCNC: 4.3 G/DL (ref 3.5–5.2)
ALP SERPL-CCNC: 132 U/L (ref 40–150)
ALT SERPL W P-5'-P-CCNC: 22 U/L (ref 0–50)
ANION GAP SERPL CALCULATED.3IONS-SCNC: 14 MMOL/L (ref 7–15)
AST SERPL W P-5'-P-CCNC: 29 U/L (ref 0–45)
BASOPHILS # BLD AUTO: 0.07 10E3/UL (ref 0–0.2)
BASOPHILS NFR BLD AUTO: 0.9 %
BILIRUB SERPL-MCNC: 0.3 MG/DL
BUN SERPL-MCNC: 25.6 MG/DL (ref 8–23)
CALCIUM SERPL-MCNC: 9.7 MG/DL (ref 8.8–10.4)
CHLORIDE SERPL-SCNC: 106 MMOL/L (ref 98–107)
CREAT SERPL-MCNC: 1.33 MG/DL (ref 0.51–0.95)
CRP SERPL-MCNC: <3 MG/L
EGFRCR SERPLBLD CKD-EPI 2021: 41 ML/MIN/1.73M2
EOSINOPHIL # BLD AUTO: 0.47 10E3/UL (ref 0–0.7)
EOSINOPHIL NFR BLD AUTO: 6.2 %
ERYTHROCYTE [DISTWIDTH] IN BLOOD BY AUTOMATED COUNT: 14.9 % (ref 10–15)
ERYTHROCYTE [SEDIMENTATION RATE] IN BLOOD BY WESTERGREN METHOD: 34 MM/HR (ref 0–30)
GLUCOSE SERPL-MCNC: 97 MG/DL (ref 70–99)
HCO3 SERPL-SCNC: 22 MMOL/L (ref 22–29)
HCT VFR BLD AUTO: 37.8 % (ref 35–47)
HGB BLD-MCNC: 12.1 G/DL (ref 11.7–15.7)
IMM GRANULOCYTES # BLD: <0.03 10E3/UL
IMM GRANULOCYTES NFR BLD: 0.3 %
LYMPHOCYTES # BLD AUTO: 1.8 10E3/UL (ref 0.8–5.3)
LYMPHOCYTES NFR BLD AUTO: 23.8 %
MCH RBC QN AUTO: 28.3 PG (ref 26.5–33)
MCHC RBC AUTO-ENTMCNC: 32 G/DL (ref 31.5–36.5)
MCV RBC AUTO: 88.3 FL (ref 78–100)
MONOCYTES # BLD AUTO: 0.81 10E3/UL (ref 0–1.3)
MONOCYTES NFR BLD AUTO: 10.7 %
NEUTROPHILS # BLD AUTO: 4.39 10E3/UL (ref 1.6–8.3)
NEUTROPHILS NFR BLD AUTO: 58.1 %
NRBC # BLD AUTO: <0.03 10E3/UL
NRBC BLD AUTO-RTO: 0 /100
PLATELET # BLD AUTO: 378 10E3/UL (ref 150–450)
POTASSIUM SERPL-SCNC: 4.4 MMOL/L (ref 3.4–5.3)
PROT SERPL-MCNC: 7.7 G/DL (ref 6.4–8.3)
RBC # BLD AUTO: 4.28 10E6/UL (ref 3.8–5.2)
RHEUMATOID FACT SERPL-ACNC: <10 IU/ML
SODIUM SERPL-SCNC: 142 MMOL/L (ref 135–145)
WBC # BLD AUTO: 7.56 10E3/UL (ref 4–11)

## 2025-08-21 PROCEDURE — 85004 AUTOMATED DIFF WBC COUNT: CPT | Mod: ZL | Performed by: INTERNAL MEDICINE

## 2025-08-21 PROCEDURE — 86140 C-REACTIVE PROTEIN: CPT | Mod: ZL | Performed by: INTERNAL MEDICINE

## 2025-08-21 PROCEDURE — 82164 ANGIOTENSIN I ENZYME TEST: CPT | Mod: ZL | Performed by: INTERNAL MEDICINE

## 2025-08-21 PROCEDURE — 36415 COLL VENOUS BLD VENIPUNCTURE: CPT | Mod: ZL | Performed by: INTERNAL MEDICINE

## 2025-08-21 PROCEDURE — 85652 RBC SED RATE AUTOMATED: CPT | Mod: ZL | Performed by: INTERNAL MEDICINE

## 2025-08-21 PROCEDURE — G0463 HOSPITAL OUTPT CLINIC VISIT: HCPCS

## 2025-08-21 PROCEDURE — 86431 RHEUMATOID FACTOR QUANT: CPT | Mod: ZL | Performed by: INTERNAL MEDICINE

## 2025-08-21 PROCEDURE — 80053 COMPREHEN METABOLIC PANEL: CPT | Mod: ZL | Performed by: INTERNAL MEDICINE

## 2025-08-21 ASSESSMENT — PATIENT HEALTH QUESTIONNAIRE - PHQ9
10. IF YOU CHECKED OFF ANY PROBLEMS, HOW DIFFICULT HAVE THESE PROBLEMS MADE IT FOR YOU TO DO YOUR WORK, TAKE CARE OF THINGS AT HOME, OR GET ALONG WITH OTHER PEOPLE: VERY DIFFICULT
SUM OF ALL RESPONSES TO PHQ QUESTIONS 1-9: 10
SUM OF ALL RESPONSES TO PHQ QUESTIONS 1-9: 10

## 2025-08-23 LAB
GAMMA INTERFERON BACKGROUND BLD IA-ACNC: 0.11 IU/ML
M TB IFN-G BLD-IMP: NEGATIVE
M TB IFN-G CD4+ BCKGRND COR BLD-ACNC: 9.89 IU/ML
MITOGEN IGNF BCKGRD COR BLD-ACNC: -0.01 IU/ML
MITOGEN IGNF BCKGRD COR BLD-ACNC: 0.01 IU/ML
QUANTIFERON MITOGEN: 10 IU/ML
QUANTIFERON NIL TUBE: 0.11 IU/ML
QUANTIFERON TB1 TUBE: 0.1 IU/ML
QUANTIFERON TB2 TUBE: 0.12

## 2025-08-24 LAB — ACE SERPL-CCNC: 52 U/L

## 2025-08-26 LAB — SCANNED LAB RESULT: NORMAL

## (undated) DEVICE — DRAPE U SPLIT 74X120" 29440

## (undated) DEVICE — DECANTER VIAL 2006S

## (undated) DEVICE — CLIP HORIZON MED BLUE 002200

## (undated) DEVICE — SOL NACL 0.9% INJ 1000ML BAG 07983-09

## (undated) DEVICE — DRAPE ISOLATION BAG 1003

## (undated) DEVICE — BLADE KNIFE SURG 15C 371716

## (undated) DEVICE — SOL WATER IRRIG 1000ML BOTTLE 2F7114

## (undated) DEVICE — DRAPE SHEET REV FOLD 3/4 9349

## (undated) DEVICE — INTRO SHEATH BRITE TIP 5FRX11CM 401-511M

## (undated) DEVICE — INSERT FOGARTY 33MM TRACTION HYDRAJAW HYDRA33

## (undated) DEVICE — SU SILK 4-0 TIE 12X30" A303H

## (undated) DEVICE — GLOVE PROTEXIS W/NEU-THERA 6.5  2D73TE65

## (undated) DEVICE — CATH OMNIFLUSH 5FRX70CM SIZING 13709702

## (undated) DEVICE — SU VICRYL 3-0 SH 27" UND J416H

## (undated) DEVICE — INTRO SHEATH 6FRX10CM PINNACLE RSS602

## (undated) DEVICE — SPONGE LAP 18X18" X8435

## (undated) DEVICE — SPONGE SURGIFOAM 100 1974

## (undated) DEVICE — ENDO BRUSH CHANNEL MASTER CLEANING 2-4.2MM BW-412T

## (undated) DEVICE — PACK GOWN 3/PK DISP XL SBA32GPFCB

## (undated) DEVICE — SUCTION MANIFOLD NEPTUNE 2 SYS 4 PORT 0702-020-000

## (undated) DEVICE — SUTURE BOOTS 051003PBX

## (undated) DEVICE — TUBING SUCTION 10'X3/16" N510

## (undated) DEVICE — GUIDEWIRE ROSEN CVD .035X260CM G01253 THSCF-35-260-1.5-ROSEN

## (undated) DEVICE — DRAPE SHEET MED 44X70" 9355

## (undated) DEVICE — DRSG KERLIX 4 1/2"X4YDS ROLL 6715

## (undated) DEVICE — ENDO TRAP POLYP E-TRAP 00711099

## (undated) DEVICE — INFLATION DEVICE ENCORE  26 PRESSURE GAUGE M001151050

## (undated) DEVICE — ESU PENCIL SMOKE EVAC W/ROCKER SWITCH 0703-047-000

## (undated) DEVICE — DRSG TEGADERM 2 3/8X2 3/4" 1624W

## (undated) DEVICE — SOL NACL 0.9% IRRIG 1000ML BOTTLE 2F7124

## (undated) DEVICE — ENDO SNARE EXACTO COLD 9MM LOOP 2.4MMX230CM 00711115

## (undated) DEVICE — PITCHER STERILE 1000ML  SSK9004A

## (undated) DEVICE — DRAPE STERI FLUOROSCOPE 35X43"1012 LATEX FREE

## (undated) DEVICE — VESSEL LOOPS RED MINI 31145710

## (undated) DEVICE — LINEN TOWEL PACK X5 5464

## (undated) DEVICE — SU SILK 0 TIE 6X30" A306H

## (undated) DEVICE — CLIP HORIZON SM RED WIDE SLOT 001201

## (undated) DEVICE — SU PROLENE 6-0 C-1DA 30" 8706H

## (undated) DEVICE — GLOVE PROTEXIS BLUE W/NEU-THERA 6.5  2D73EB65

## (undated) DEVICE — ESU ELEC BLADE 2.75" COATED/INSULATED E1455

## (undated) DEVICE — ESU GROUND PAD ADULT REM W/15' CORD E7507DB

## (undated) DEVICE — SU UMBILICAL TAPE .125X30" U11T

## (undated) DEVICE — SU PROLENE 5-0 C-1DA 36" 8720H

## (undated) DEVICE — LINEN TOWEL PACK X6 WHITE 5487

## (undated) DEVICE — SYR MEDRAD 150ML MARK 7 ARTERION ART700SYR

## (undated) DEVICE — TUBING MEDRAD 48" HIGH PRESSURE MX694

## (undated) DEVICE — KIT MICRO-INTRODUCER STIFFEN 4FR 7274V

## (undated) DEVICE — LINEN TOWEL PACK X30 5481

## (undated) DEVICE — SU SILK 2-0 TIE 12X30" A305H

## (undated) DEVICE — SOL WATER 1500ML

## (undated) DEVICE — GUIDEWIRE ROSEN CVD .035X180CM J-TIP G01264

## (undated) DEVICE — Device

## (undated) DEVICE — DECANTER BAG 2002S

## (undated) DEVICE — NDL 21GA 1.5"

## (undated) DEVICE — COVER NEOPROBE SOFTFLEX 5X96" W/BANDS 20-PC596

## (undated) DEVICE — SU MONOCRYL 4-0 PS-2 27" UND Y426H

## (undated) DEVICE — SU SILK 3-0 TIE 12X30" A304H

## (undated) DEVICE — ENDO KIT COMPLIANCE DYKENDOCMPLY

## (undated) DEVICE — PREP CHLORAPREP 26ML TINTED HI-LITE ORANGE 930815

## (undated) DEVICE — GOWN IMPERVIOUS BREATHABLE SMART XLG 89045

## (undated) DEVICE — ESU ELEC BLADE 6" COATED/INSULATED E1455-6

## (undated) DEVICE — DRAPE IOBAN INCISE 23X17" 6650EZ

## (undated) DEVICE — VESSEL LOOPS YELLOW MAXI 31145694

## (undated) DEVICE — SYR 30ML LL W/O NDL 302832

## (undated) RX ORDER — ALBUMIN, HUMAN INJ 5% 5 %
SOLUTION INTRAVENOUS
Status: DISPENSED
Start: 2022-03-22

## (undated) RX ORDER — TRIAMCINOLONE ACETONIDE 40 MG/ML
INJECTION, SUSPENSION INTRA-ARTICULAR; INTRAMUSCULAR
Status: DISPENSED
Start: 2020-10-16

## (undated) RX ORDER — IODIXANOL 320 MG/ML
INJECTION, SOLUTION INTRAVASCULAR
Status: DISPENSED
Start: 2022-03-22

## (undated) RX ORDER — ROCURONIUM BROMIDE 50 MG/5 ML
SYRINGE (ML) INTRAVENOUS
Status: DISPENSED
Start: 2022-03-22

## (undated) RX ORDER — EPHEDRINE SULFATE 50 MG/ML
INJECTION, SOLUTION INTRAMUSCULAR; INTRAVENOUS; SUBCUTANEOUS
Status: DISPENSED
Start: 2022-03-22

## (undated) RX ORDER — KETOROLAC TROMETHAMINE 30 MG/ML
INJECTION, SOLUTION INTRAMUSCULAR; INTRAVENOUS
Status: DISPENSED
Start: 2023-11-25

## (undated) RX ORDER — HYDROMORPHONE HYDROCHLORIDE 1 MG/ML
INJECTION, SOLUTION INTRAMUSCULAR; INTRAVENOUS; SUBCUTANEOUS
Status: DISPENSED
Start: 2022-03-22

## (undated) RX ORDER — BUPIVACAINE HYDROCHLORIDE 5 MG/ML
INJECTION, SOLUTION EPIDURAL; INTRACAUDAL
Status: DISPENSED
Start: 2021-10-18

## (undated) RX ORDER — ONDANSETRON 2 MG/ML
INJECTION INTRAMUSCULAR; INTRAVENOUS
Status: DISPENSED
Start: 2022-03-22

## (undated) RX ORDER — HEPARIN SODIUM 1000 [USP'U]/ML
INJECTION, SOLUTION INTRAVENOUS; SUBCUTANEOUS
Status: DISPENSED
Start: 2022-03-22

## (undated) RX ORDER — BUPIVACAINE HYDROCHLORIDE 5 MG/ML
INJECTION, SOLUTION EPIDURAL; INTRACAUDAL
Status: DISPENSED
Start: 2020-04-17

## (undated) RX ORDER — LIDOCAINE HYDROCHLORIDE 10 MG/ML
INJECTION, SOLUTION EPIDURAL; INFILTRATION; INTRACAUDAL; PERINEURAL
Status: DISPENSED
Start: 2022-03-22

## (undated) RX ORDER — SODIUM CHLORIDE 9 MG/ML
INJECTION, SOLUTION INTRAVENOUS
Status: DISPENSED
Start: 2022-03-22

## (undated) RX ORDER — DEXAMETHASONE SODIUM PHOSPHATE 4 MG/ML
INJECTION, SOLUTION INTRA-ARTICULAR; INTRALESIONAL; INTRAMUSCULAR; INTRAVENOUS; SOFT TISSUE
Status: DISPENSED
Start: 2022-03-22

## (undated) RX ORDER — BUPIVACAINE HYDROCHLORIDE 5 MG/ML
INJECTION, SOLUTION EPIDURAL; INTRACAUDAL
Status: DISPENSED
Start: 2022-03-22

## (undated) RX ORDER — CLOPIDOGREL BISULFATE 75 MG/1
TABLET ORAL
Status: DISPENSED
Start: 2022-03-22

## (undated) RX ORDER — METHYLPREDNISOLONE ACETATE 80 MG/ML
INJECTION, SUSPENSION INTRA-ARTICULAR; INTRALESIONAL; INTRAMUSCULAR; SOFT TISSUE
Status: DISPENSED
Start: 2020-03-26

## (undated) RX ORDER — TRIAMCINOLONE ACETONIDE 40 MG/ML
INJECTION, SUSPENSION INTRA-ARTICULAR; INTRAMUSCULAR
Status: DISPENSED
Start: 2019-10-18

## (undated) RX ORDER — TRIAMCINOLONE ACETONIDE 40 MG/ML
INJECTION, SUSPENSION INTRA-ARTICULAR; INTRAMUSCULAR
Status: DISPENSED
Start: 2022-03-09

## (undated) RX ORDER — CEFAZOLIN SODIUM/WATER 2 G/20 ML
SYRINGE (ML) INTRAVENOUS
Status: DISPENSED
Start: 2022-03-22

## (undated) RX ORDER — PROPOFOL 10 MG/ML
INJECTION, EMULSION INTRAVENOUS
Status: DISPENSED
Start: 2021-12-20

## (undated) RX ORDER — LIDOCAINE HYDROCHLORIDE 10 MG/ML
INJECTION, SOLUTION INFILTRATION; PERINEURAL
Status: DISPENSED
Start: 2019-10-18

## (undated) RX ORDER — TRIAMCINOLONE ACETONIDE 40 MG/ML
INJECTION, SUSPENSION INTRA-ARTICULAR; INTRAMUSCULAR
Status: DISPENSED
Start: 2021-10-18

## (undated) RX ORDER — LIDOCAINE HYDROCHLORIDE 10 MG/ML
INJECTION, SOLUTION INFILTRATION; PERINEURAL
Status: DISPENSED
Start: 2020-04-17

## (undated) RX ORDER — LIDOCAINE HYDROCHLORIDE 10 MG/ML
INJECTION, SOLUTION INFILTRATION; PERINEURAL
Status: DISPENSED
Start: 2020-03-26

## (undated) RX ORDER — TRIAMCINOLONE ACETONIDE 40 MG/ML
INJECTION, SUSPENSION INTRA-ARTICULAR; INTRAMUSCULAR
Status: DISPENSED
Start: 2020-04-17

## (undated) RX ORDER — FENTANYL CITRATE 50 UG/ML
INJECTION, SOLUTION INTRAMUSCULAR; INTRAVENOUS
Status: DISPENSED
Start: 2022-03-22

## (undated) RX ORDER — LIDOCAINE HYDROCHLORIDE 10 MG/ML
INJECTION, SOLUTION INFILTRATION; PERINEURAL
Status: DISPENSED
Start: 2020-10-16

## (undated) RX ORDER — BUPIVACAINE HYDROCHLORIDE 5 MG/ML
INJECTION, SOLUTION EPIDURAL; INTRACAUDAL
Status: DISPENSED
Start: 2020-10-16

## (undated) RX ORDER — LIDOCAINE HYDROCHLORIDE 20 MG/ML
INJECTION, SOLUTION EPIDURAL; INFILTRATION; INTRACAUDAL; PERINEURAL
Status: DISPENSED
Start: 2022-03-22

## (undated) RX ORDER — BUPIVACAINE HYDROCHLORIDE 5 MG/ML
INJECTION, SOLUTION EPIDURAL; INTRACAUDAL
Status: DISPENSED
Start: 2021-12-08

## (undated) RX ORDER — LIDOCAINE HYDROCHLORIDE 20 MG/ML
INJECTION, SOLUTION EPIDURAL; INFILTRATION; INTRACAUDAL; PERINEURAL
Status: DISPENSED
Start: 2021-12-20

## (undated) RX ORDER — TRIAMCINOLONE ACETONIDE 40 MG/ML
INJECTION, SUSPENSION INTRA-ARTICULAR; INTRAMUSCULAR
Status: DISPENSED
Start: 2021-12-08

## (undated) RX ORDER — LIDOCAINE HYDROCHLORIDE 10 MG/ML
INJECTION, SOLUTION EPIDURAL; INFILTRATION; INTRACAUDAL; PERINEURAL
Status: DISPENSED
Start: 2020-03-26

## (undated) RX ORDER — BUPIVACAINE HYDROCHLORIDE 5 MG/ML
INJECTION, SOLUTION EPIDURAL; INTRACAUDAL
Status: DISPENSED
Start: 2022-03-09

## (undated) RX ORDER — BUPIVACAINE HYDROCHLORIDE 5 MG/ML
INJECTION, SOLUTION EPIDURAL; INTRACAUDAL
Status: DISPENSED
Start: 2019-10-18

## (undated) RX ORDER — LIDOCAINE HYDROCHLORIDE 10 MG/ML
INJECTION, SOLUTION INFILTRATION; PERINEURAL
Status: DISPENSED
Start: 2021-12-08

## (undated) RX ORDER — LIDOCAINE HYDROCHLORIDE 10 MG/ML
INJECTION, SOLUTION INFILTRATION; PERINEURAL
Status: DISPENSED
Start: 2022-03-09

## (undated) RX ORDER — REGADENOSON 0.08 MG/ML
INJECTION, SOLUTION INTRAVENOUS
Status: DISPENSED
Start: 2019-08-30

## (undated) RX ORDER — FENTANYL CITRATE-0.9 % NACL/PF 10 MCG/ML
PLASTIC BAG, INJECTION (ML) INTRAVENOUS
Status: DISPENSED
Start: 2022-03-22

## (undated) RX ORDER — BETAMETHASONE SODIUM PHOSPHATE AND BETAMETHASONE ACETATE 3; 3 MG/ML; MG/ML
INJECTION, SUSPENSION INTRA-ARTICULAR; INTRALESIONAL; INTRAMUSCULAR; SOFT TISSUE
Status: DISPENSED
Start: 2022-02-10

## (undated) RX ORDER — BUPIVACAINE HYDROCHLORIDE AND EPINEPHRINE 5; 5 MG/ML; UG/ML
INJECTION, SOLUTION PERINEURAL
Status: DISPENSED
Start: 2022-03-22

## (undated) RX ORDER — LIDOCAINE HYDROCHLORIDE 10 MG/ML
INJECTION, SOLUTION INFILTRATION; PERINEURAL
Status: DISPENSED
Start: 2022-02-10

## (undated) RX ORDER — PROPOFOL 10 MG/ML
INJECTION, EMULSION INTRAVENOUS
Status: DISPENSED
Start: 2022-03-22

## (undated) RX ORDER — LIDOCAINE HYDROCHLORIDE 10 MG/ML
INJECTION, SOLUTION INFILTRATION; PERINEURAL
Status: DISPENSED
Start: 2021-10-18